# Patient Record
Sex: MALE | Race: WHITE | NOT HISPANIC OR LATINO | Employment: FULL TIME | URBAN - METROPOLITAN AREA
[De-identification: names, ages, dates, MRNs, and addresses within clinical notes are randomized per-mention and may not be internally consistent; named-entity substitution may affect disease eponyms.]

---

## 2022-02-25 ENCOUNTER — OFFICE VISIT (OUTPATIENT)
Dept: INTERNAL MEDICINE | Facility: CLINIC | Age: 67
End: 2022-02-25
Payer: MEDICARE

## 2022-02-25 ENCOUNTER — LAB VISIT (OUTPATIENT)
Dept: LAB | Facility: HOSPITAL | Age: 67
End: 2022-02-25
Attending: PEDIATRICS
Payer: MEDICARE

## 2022-02-25 VITALS
DIASTOLIC BLOOD PRESSURE: 84 MMHG | TEMPERATURE: 98 F | WEIGHT: 154.31 LBS | SYSTOLIC BLOOD PRESSURE: 136 MMHG | OXYGEN SATURATION: 96 % | HEART RATE: 75 BPM | HEIGHT: 69 IN | BODY MASS INDEX: 22.85 KG/M2

## 2022-02-25 DIAGNOSIS — Z12.11 COLON CANCER SCREENING: ICD-10-CM

## 2022-02-25 DIAGNOSIS — Z13.29 THYROID DISORDER SCREEN: ICD-10-CM

## 2022-02-25 DIAGNOSIS — R79.9 ABNORMAL FINDING OF BLOOD CHEMISTRY, UNSPECIFIED: ICD-10-CM

## 2022-02-25 DIAGNOSIS — R94.6 ABNORMAL RESULTS OF THYROID FUNCTION STUDIES: ICD-10-CM

## 2022-02-25 DIAGNOSIS — Z00.00 WELL ADULT EXAM: ICD-10-CM

## 2022-02-25 DIAGNOSIS — Z13.6 ENCOUNTER FOR LIPID SCREENING FOR CARDIOVASCULAR DISEASE: ICD-10-CM

## 2022-02-25 DIAGNOSIS — Z00.00 WELL ADULT EXAM: Primary | ICD-10-CM

## 2022-02-25 DIAGNOSIS — H90.5 SENSORINEURAL HEARING LOSS (SNHL) OF RIGHT EAR, UNSPECIFIED HEARING STATUS ON CONTRALATERAL SIDE: ICD-10-CM

## 2022-02-25 DIAGNOSIS — Z11.59 ENCOUNTER FOR HEPATITIS C SCREENING TEST FOR LOW RISK PATIENT: ICD-10-CM

## 2022-02-25 DIAGNOSIS — Z12.5 PROSTATE CANCER SCREENING: ICD-10-CM

## 2022-02-25 DIAGNOSIS — Z13.220 ENCOUNTER FOR LIPID SCREENING FOR CARDIOVASCULAR DISEASE: ICD-10-CM

## 2022-02-25 LAB
ALBUMIN SERPL BCP-MCNC: 4 G/DL (ref 3.5–5.2)
ALP SERPL-CCNC: 63 U/L (ref 55–135)
ALT SERPL W/O P-5'-P-CCNC: 18 U/L (ref 10–44)
ANION GAP SERPL CALC-SCNC: 7 MMOL/L (ref 8–16)
AST SERPL-CCNC: 18 U/L (ref 10–40)
BASOPHILS # BLD AUTO: 0.07 K/UL (ref 0–0.2)
BASOPHILS NFR BLD: 1.1 % (ref 0–1.9)
BILIRUB SERPL-MCNC: 0.9 MG/DL (ref 0.1–1)
BUN SERPL-MCNC: 15 MG/DL (ref 8–23)
CALCIUM SERPL-MCNC: 9.1 MG/DL (ref 8.7–10.5)
CHLORIDE SERPL-SCNC: 106 MMOL/L (ref 95–110)
CHOLEST SERPL-MCNC: 159 MG/DL (ref 120–199)
CHOLEST/HDLC SERPL: 3.7 {RATIO} (ref 2–5)
CO2 SERPL-SCNC: 26 MMOL/L (ref 23–29)
COMPLEXED PSA SERPL-MCNC: 80.6 NG/ML (ref 0–4)
CREAT SERPL-MCNC: 0.7 MG/DL (ref 0.5–1.4)
DIFFERENTIAL METHOD: ABNORMAL
EOSINOPHIL # BLD AUTO: 0.2 K/UL (ref 0–0.5)
EOSINOPHIL NFR BLD: 3.2 % (ref 0–8)
ERYTHROCYTE [DISTWIDTH] IN BLOOD BY AUTOMATED COUNT: 12.4 % (ref 11.5–14.5)
EST. GFR  (AFRICAN AMERICAN): >60 ML/MIN/1.73 M^2
EST. GFR  (NON AFRICAN AMERICAN): >60 ML/MIN/1.73 M^2
GLUCOSE SERPL-MCNC: 95 MG/DL (ref 70–110)
HCT VFR BLD AUTO: 41.3 % (ref 40–54)
HDLC SERPL-MCNC: 43 MG/DL (ref 40–75)
HDLC SERPL: 27 % (ref 20–50)
HGB BLD-MCNC: 13.9 G/DL (ref 14–18)
IMM GRANULOCYTES # BLD AUTO: 0.02 K/UL (ref 0–0.04)
IMM GRANULOCYTES NFR BLD AUTO: 0.3 % (ref 0–0.5)
LDLC SERPL CALC-MCNC: 104.6 MG/DL (ref 63–159)
LYMPHOCYTES # BLD AUTO: 2 K/UL (ref 1–4.8)
LYMPHOCYTES NFR BLD: 31.6 % (ref 18–48)
MCH RBC QN AUTO: 34 PG (ref 27–31)
MCHC RBC AUTO-ENTMCNC: 33.7 G/DL (ref 32–36)
MCV RBC AUTO: 101 FL (ref 82–98)
MONOCYTES # BLD AUTO: 0.5 K/UL (ref 0.3–1)
MONOCYTES NFR BLD: 8.1 % (ref 4–15)
NEUTROPHILS # BLD AUTO: 3.5 K/UL (ref 1.8–7.7)
NEUTROPHILS NFR BLD: 55.7 % (ref 38–73)
NONHDLC SERPL-MCNC: 116 MG/DL
NRBC BLD-RTO: 0 /100 WBC
PLATELET # BLD AUTO: 334 K/UL (ref 150–450)
PMV BLD AUTO: 9.6 FL (ref 9.2–12.9)
POTASSIUM SERPL-SCNC: 4.3 MMOL/L (ref 3.5–5.1)
PROT SERPL-MCNC: 6.4 G/DL (ref 6–8.4)
RBC # BLD AUTO: 4.09 M/UL (ref 4.6–6.2)
SODIUM SERPL-SCNC: 139 MMOL/L (ref 136–145)
TRIGL SERPL-MCNC: 57 MG/DL (ref 30–150)
TSH SERPL DL<=0.005 MIU/L-ACNC: 0.68 UIU/ML (ref 0.4–4)
WBC # BLD AUTO: 6.26 K/UL (ref 3.9–12.7)

## 2022-02-25 PROCEDURE — 84443 ASSAY THYROID STIM HORMONE: CPT | Performed by: PEDIATRICS

## 2022-02-25 PROCEDURE — 85025 COMPLETE CBC W/AUTO DIFF WBC: CPT | Performed by: PEDIATRICS

## 2022-02-25 PROCEDURE — 99999 PR PBB SHADOW E&M-NEW PATIENT-LVL IV: CPT | Mod: PBBFAC,,, | Performed by: PEDIATRICS

## 2022-02-25 PROCEDURE — 99999 PR PBB SHADOW E&M-NEW PATIENT-LVL IV: ICD-10-PCS | Mod: PBBFAC,,, | Performed by: PEDIATRICS

## 2022-02-25 PROCEDURE — 84153 ASSAY OF PSA TOTAL: CPT | Performed by: PEDIATRICS

## 2022-02-25 PROCEDURE — 36415 COLL VENOUS BLD VENIPUNCTURE: CPT | Performed by: PEDIATRICS

## 2022-02-25 PROCEDURE — 99387 PR PREVENTIVE VISIT,NEW,65 & OVER: ICD-10-PCS | Mod: S$PBB,GZ,, | Performed by: PEDIATRICS

## 2022-02-25 PROCEDURE — 80053 COMPREHEN METABOLIC PANEL: CPT | Performed by: PEDIATRICS

## 2022-02-25 PROCEDURE — 99204 OFFICE O/P NEW MOD 45 MIN: CPT | Mod: PBBFAC | Performed by: PEDIATRICS

## 2022-02-25 PROCEDURE — 86803 HEPATITIS C AB TEST: CPT | Performed by: PEDIATRICS

## 2022-02-25 PROCEDURE — 80061 LIPID PANEL: CPT | Performed by: PEDIATRICS

## 2022-02-25 PROCEDURE — 99387 INIT PM E/M NEW PAT 65+ YRS: CPT | Mod: S$PBB,GZ,, | Performed by: PEDIATRICS

## 2022-02-25 NOTE — PROGRESS NOTES
Subjective:       Patient ID: Aurelio Daniels is a 66 y.o. male.    Chief Complaint: Establish Care    Aurelio Daniels is a 66 y.o. male who presents to clinic to establish care.     PMHx, PSHx, SocHx, and FHx reviewed and discussed with patient.    Hx of smoking: has quit smoking after having COVID    Past Surgical History:  No date: COLONOSCOPY  No date: Vasectomy    Review of patient's family history indicates:  Problem: No Known Problems      Relation: Mother          Age of Onset: (Not Specified)  Problem: Heart disease      Relation: Father          Age of Onset: (Not Specified)  Problem: No Known Problems      Relation: Brother          Age of Onset: (Not Specified)  Problem: No Known Problems      Relation: Brother          Age of Onset: (Not Specified)  Problem: No Known Problems      Relation: Daughter          Age of Onset: (Not Specified)  Problem: No Known Problems      Relation: Daughter          Age of Onset: (Not Specified)      Social History    Socioeconomic History      Marital status:       Spouse name: YUE      Number of children: 2    Tobacco Use      Smoking status: Former Smoker        Packs/day: 1.00        Types: Cigarettes        Quit date: 1/15/2022        Years since quittin.1      Smokeless tobacco: Former User    Substance and Sexual Activity      Alcohol use: Yes        Alcohol/week: 2.0 standard drinks        Types: 2 Shots of liquor per week      Drug use: Never      Sexual activity: Not Currently        Partners: Female         Review of Systems   Constitutional: Negative for activity change, appetite change, chills, diaphoresis, fatigue, fever and unexpected weight change.   HENT: Negative for nasal congestion, ear pain, mouth sores, nosebleeds, postnasal drip, rhinorrhea, sneezing and sore throat.         Decreased hearing loss R ear with fullness for several months   Eyes: Negative for photophobia, pain, discharge, redness and visual disturbance.   Respiratory: Negative  for cough, chest tightness, shortness of breath, wheezing and stridor.    Cardiovascular: Negative for chest pain, palpitations and leg swelling.   Gastrointestinal: Negative for abdominal distention, blood in stool, constipation, diarrhea, nausea and vomiting.   Genitourinary: Negative for decreased urine volume, difficulty urinating, dysuria, flank pain, frequency, genital sores, hematuria and urgency.   Musculoskeletal: Negative for arthralgias, back pain, joint swelling, neck pain and neck stiffness.   Integumentary:  Negative for color change, pallor, rash and wound.   Neurological: Negative for dizziness, syncope, speech difficulty, weakness, light-headedness and headaches.   Hematological: Negative for adenopathy. Does not bruise/bleed easily.   Psychiatric/Behavioral: Negative for confusion, decreased concentration, dysphoric mood, hallucinations, sleep disturbance and suicidal ideas. The patient is not nervous/anxious.    All other systems reviewed and are negative.        Objective:      Physical Exam  Vitals and nursing note reviewed.   Constitutional:       General: He is not in acute distress.     Appearance: He is well-developed.   Neck:      Thyroid: No thyromegaly.      Vascular: No JVD.   Cardiovascular:      Rate and Rhythm: Normal rate and regular rhythm.      Heart sounds: Normal heart sounds. No murmur heard.  Pulmonary:      Effort: Pulmonary effort is normal. No respiratory distress.      Breath sounds: Normal breath sounds. No wheezing or rales.   Abdominal:      General: There is no distension.      Palpations: Abdomen is soft. There is no mass.      Tenderness: There is no abdominal tenderness. There is no guarding.   Musculoskeletal:      Right lower leg: No edema.      Left lower leg: No edema.   Lymphadenopathy:      Cervical: No cervical adenopathy.   Skin:     Capillary Refill: Capillary refill takes less than 2 seconds.      Findings: No rash.   Neurological:      General: No focal  deficit present.      Mental Status: He is alert and oriented to person, place, and time.      Cranial Nerves: No cranial nerve deficit.      Coordination: Coordination normal.   Psychiatric:         Mood and Affect: Mood normal.         Behavior: Behavior normal.         Thought Content: Thought content normal.         Judgment: Judgment normal.         Assessment:       Problem List Items Addressed This Visit    None     Visit Diagnoses     Well adult exam    -  Primary    Relevant Orders    Comprehensive Metabolic Panel    CBC Auto Differential    Colon cancer screening        Relevant Orders    Case Request Endoscopy: COLONOSCOPY (Completed)    Prostate cancer screening        Relevant Orders    PSA, Screening    Sensorineural hearing loss (SNHL) of right ear, unspecified hearing status on contralateral side        Relevant Orders    Ambulatory referral/consult to Audiology    Ambulatory referral/consult to ENT    Encounter for lipid screening for cardiovascular disease        Relevant Orders    Lipid Panel    Thyroid disorder screen        Relevant Orders    TSH    Encounter for hepatitis C screening test for low risk patient        Relevant Orders    Hepatitis C Antibody    Abnormal results of thyroid function studies         Relevant Orders    TSH    Abnormal finding of blood chemistry, unspecified         Relevant Orders    CBC Auto Differential          Plan:     Well adult exam  -     Comprehensive Metabolic Panel; Future; Expected date: 02/25/2022  -     CBC Auto Differential; Future; Expected date: 02/25/2022    Colon cancer screening  -     Case Request Endoscopy: COLONOSCOPY    Prostate cancer screening  -     PSA, Screening; Future; Expected date: 02/25/2022    Sensorineural hearing loss (SNHL) of right ear, unspecified hearing status on contralateral side  -     Ambulatory referral/consult to Audiology; Future; Expected date: 03/04/2022  -     Ambulatory referral/consult to ENT; Future; Expected  date: 03/04/2022    Encounter for lipid screening for cardiovascular disease  -     Lipid Panel; Future; Expected date: 02/25/2022    Thyroid disorder screen  -     TSH; Future; Expected date: 02/25/2022    Encounter for hepatitis C screening test for low risk patient  -     Hepatitis C Antibody; Future; Expected date: 02/25/2022    Abnormal results of thyroid function studies   -     TSH; Future; Expected date: 02/25/2022    Abnormal finding of blood chemistry, unspecified   -     CBC Auto Differential; Future; Expected date: 02/25/2022    HM issues d/w patient. He is reluctant but will consider colonoscopy and immunizations. Await labs. Maintain smoking cessation. F/U yearly or sooner if needed.  Scribe Attestation:   I, Eitan Mejia, am scribing for, and in the presence of, Dr. Shlomo Villatoro Jr. I performed the above scribed service and the documentation accurately describes the services I performed. I attest to the accuracy of the note.    I, Dr. Shlomo Villatoro Jr, reviewed documentation as scribed above. I personally performed the services described in this documentation.  I agree that the record reflects my personal performance and is accurate and complete. Shlomo Villatoro Jr., MD.  02/25/2022

## 2022-02-28 DIAGNOSIS — R97.20 ELEVATED PSA: Primary | ICD-10-CM

## 2022-02-28 LAB — HCV AB SERPL QL IA: NEGATIVE

## 2022-03-03 ENCOUNTER — CLINICAL SUPPORT (OUTPATIENT)
Dept: AUDIOLOGY | Facility: CLINIC | Age: 67
End: 2022-03-03
Payer: MEDICARE

## 2022-03-03 ENCOUNTER — OFFICE VISIT (OUTPATIENT)
Dept: OTOLARYNGOLOGY | Facility: CLINIC | Age: 67
End: 2022-03-03
Payer: MEDICARE

## 2022-03-03 VITALS
DIASTOLIC BLOOD PRESSURE: 87 MMHG | WEIGHT: 153.44 LBS | HEART RATE: 68 BPM | TEMPERATURE: 98 F | BODY MASS INDEX: 22.66 KG/M2 | SYSTOLIC BLOOD PRESSURE: 135 MMHG

## 2022-03-03 DIAGNOSIS — H90.A31 MIXED CONDUCTIVE AND SENSORINEURAL HEARING LOSS OF RIGHT EAR WITH RESTRICTED HEARING OF LEFT EAR: Primary | ICD-10-CM

## 2022-03-03 DIAGNOSIS — H65.91 RIGHT OTITIS MEDIA WITH EFFUSION: ICD-10-CM

## 2022-03-03 DIAGNOSIS — H90.A22 SENSORINEURAL HEARING LOSS (SNHL) OF LEFT EAR WITH RESTRICTED HEARING OF RIGHT EAR: ICD-10-CM

## 2022-03-03 PROCEDURE — 99204 OFFICE O/P NEW MOD 45 MIN: CPT | Mod: S$PBB,,, | Performed by: STUDENT IN AN ORGANIZED HEALTH CARE EDUCATION/TRAINING PROGRAM

## 2022-03-03 PROCEDURE — 99999 PR PBB SHADOW E&M-EST. PATIENT-LVL III: CPT | Mod: PBBFAC,,, | Performed by: STUDENT IN AN ORGANIZED HEALTH CARE EDUCATION/TRAINING PROGRAM

## 2022-03-03 PROCEDURE — 92557 COMPREHENSIVE HEARING TEST: CPT | Mod: PBBFAC | Performed by: AUDIOLOGIST-HEARING AID FITTER

## 2022-03-03 PROCEDURE — 99999 PR PBB SHADOW E&M-EST. PATIENT-LVL III: ICD-10-PCS | Mod: PBBFAC,,, | Performed by: STUDENT IN AN ORGANIZED HEALTH CARE EDUCATION/TRAINING PROGRAM

## 2022-03-03 PROCEDURE — 99204 PR OFFICE/OUTPT VISIT, NEW, LEVL IV, 45-59 MIN: ICD-10-PCS | Mod: S$PBB,,, | Performed by: STUDENT IN AN ORGANIZED HEALTH CARE EDUCATION/TRAINING PROGRAM

## 2022-03-03 PROCEDURE — 99212 OFFICE O/P EST SF 10 MIN: CPT | Mod: PBBFAC,27 | Performed by: AUDIOLOGIST-HEARING AID FITTER

## 2022-03-03 PROCEDURE — 92567 TYMPANOMETRY: CPT | Mod: PBBFAC | Performed by: AUDIOLOGIST-HEARING AID FITTER

## 2022-03-03 PROCEDURE — 99999 PR PBB SHADOW E&M-EST. PATIENT-LVL II: CPT | Mod: PBBFAC,,, | Performed by: AUDIOLOGIST-HEARING AID FITTER

## 2022-03-03 PROCEDURE — 99213 OFFICE O/P EST LOW 20 MIN: CPT | Mod: PBBFAC | Performed by: STUDENT IN AN ORGANIZED HEALTH CARE EDUCATION/TRAINING PROGRAM

## 2022-03-03 PROCEDURE — 99999 PR PBB SHADOW E&M-EST. PATIENT-LVL II: ICD-10-PCS | Mod: PBBFAC,,, | Performed by: AUDIOLOGIST-HEARING AID FITTER

## 2022-03-03 RX ORDER — PREDNISONE 10 MG/1
10 TABLET ORAL DAILY
Qty: 18 TABLET | Refills: 0 | Status: ON HOLD | OUTPATIENT
Start: 2022-03-03 | End: 2022-05-20

## 2022-03-03 RX ORDER — FLUTICASONE PROPIONATE 50 MCG
1 SPRAY, SUSPENSION (ML) NASAL DAILY
Qty: 16 G | Refills: 0 | Status: SHIPPED | OUTPATIENT
Start: 2022-03-03

## 2022-03-03 NOTE — PROGRESS NOTES
Aurelio Daniels was seen 03/03/2022 for an audiological evaluation.  Patient complains of right sided aural fullness and hearing loss. There have been no previous audiograms.     Results reveal a mild conductive hearing loss to severe mixed hearing loss for the right ear, and normal-to-moderate sensorineural hearing loss for the left ear.   Speech Reception Thresholds were  40 dBHL for the right ear and 15 dBHL for the left ear.   Word recognition scores were excellent for the right ear and excellent for the left ear.   Tympanograms were Type B for the right ear and Type A for the left ear.    Patient was counseled on the above findings.    Recommendations:  1. ENT appt today  2. Audiogram post treatment

## 2022-03-03 NOTE — PROGRESS NOTES
Chief complaint:   Chief Complaint   Patient presents with    Hearing Loss     Right ear only, 2 months        History of Present Illness:     Mr. Daniels is a 66 y.o. male presenting for evaluation of hearing loss.     He describes a right sided hearing loss starting recently and has been stable.  Had a sinus infection 2 months ago, then had COVID about 6 weeks ago.     Associated with ear fullness, pressure, feels like fluid in right ear. Denies otalgia, otorrhea.      Review of Systems     A complete 10 point ROS was completed and are positive as per above HPI.    Otherwise negative for fever, diplopia, chest pain, shortness of breath, vomiting, blood in urine, joint pain, skin rash, seizures and unusual bleeding.       History        Past Medical History: History reviewed. No pertinent past medical history. .          Past Surgical History:  Past Surgical History:   Procedure Laterality Date    COLONOSCOPY     .         Medications: Medication list was reviewed. He  has a current medication list which includes the following prescription(s): fluticasone propionate and prednisone.         Allergies: Review of patient's allergies indicates:  No Known Allergies         Family history: family history includes Heart disease in his father; No Known Problems in his brother, brother, daughter, daughter, and mother.         Social History          Alcohol use:  reports current alcohol use of about 2.0 standard drinks of alcohol per week.            Tobacco:  reports that he quit smoking about 6 weeks ago. His smoking use included cigarettes. He smoked 1.00 pack per day. He has quit using smokeless tobacco.         Please see the patient's intake form for full details of past medical history, past surgical history, family history, social history and review of systems. ?This information was reviewed by me and noted.      Physical Examination     General: Well developed, well nourished, well hydrated. Verbal with a  strong voice and not dysphonic.     Head/Face: Normocephalic, atraumatic. No scars or lesions. Facial musculature equal.     Eyes: No scleral icterus or conjunctival hemorrhage. EOMI. PERRLA.     Ears:     · Right ear: No gross deformity. EAC is clear of debris and erythema. The TM is intact with a serous effusion    · Left ear: No gross deformity. EAC is clear of debris and erythema. The TM is intact with a pneumatized middle ear. No signs of retraction, fluid or infection.     Nose: No gross deformity or lesions. No purulent discharge. No significant NSD.      Mouth/Oropharynx: Lips without any lesions. No mucosal lesions within the oropharynx. No tonsillar exudate or lesions. Pharyngeal walls symmetrical. Uvula midline. Tongue midline without lesions.     Neck: Trachea midline. No masses. No thyromegaly or nodules palpated.     Lymphatic: No lymphadenopathy in the neck.     Extremities: No cyanosis. Warm and well-perfused.     Skin: No scars or lesions on face or neck.      Neurologic: Moving all extremities without gross abnormality.CN II-XII grossly intact. House-Brackmann 1/6. No signs of nystagmus.      Psych: Alert and oriented to person, place, and time with an appropriate mood and affect.       Audiogram     Audiogram, 03/03/2022 was independently reviewed         Labs    [facility] 2/25/22   Cr 0.7  Glucose 95       Assessment     Sensorineural hearing loss (SNHL) of right ear, unspecified hearing status on contralateral side   Otitis Media with Effusion, secondary to recent COVID and sinus infection    Plan:      Discussed natural history of Otitis Media with Effusion. Can take up to 3 months to clear. Will try to facilitate drainage of fliud with daily consistent use of Flonase and steroid taper. If effusion present in 6 weeks may consider tympanostomy tube.       Cornel Molina MD  [facility]   [facility]  [address]  [place] [address]  P: [phone]  F: [phone]

## 2022-03-08 ENCOUNTER — OFFICE VISIT (OUTPATIENT)
Dept: UROLOGY | Facility: CLINIC | Age: 67
End: 2022-03-08
Payer: MEDICARE

## 2022-03-08 VITALS
BODY MASS INDEX: 23.02 KG/M2 | DIASTOLIC BLOOD PRESSURE: 91 MMHG | TEMPERATURE: 98 F | SYSTOLIC BLOOD PRESSURE: 160 MMHG | WEIGHT: 155.88 LBS | HEART RATE: 62 BPM

## 2022-03-08 DIAGNOSIS — R97.20 ELEVATED PSA: ICD-10-CM

## 2022-03-08 PROCEDURE — 99213 OFFICE O/P EST LOW 20 MIN: CPT | Mod: PBBFAC | Performed by: UROLOGY

## 2022-03-08 PROCEDURE — 99203 OFFICE O/P NEW LOW 30 MIN: CPT | Mod: S$PBB,,, | Performed by: UROLOGY

## 2022-03-08 PROCEDURE — 99203 PR OFFICE/OUTPT VISIT, NEW, LEVL III, 30-44 MIN: ICD-10-PCS | Mod: S$PBB,,, | Performed by: UROLOGY

## 2022-03-08 PROCEDURE — 99999 PR PBB SHADOW E&M-EST. PATIENT-LVL III: CPT | Mod: PBBFAC,,, | Performed by: UROLOGY

## 2022-03-08 PROCEDURE — 99999 PR PBB SHADOW E&M-EST. PATIENT-LVL III: ICD-10-PCS | Mod: PBBFAC,,, | Performed by: UROLOGY

## 2022-03-08 NOTE — PROGRESS NOTES
Chief Complaint:  Elevated PSA    HPI:   Aurelio Daniels is a 66 y.o. male that presents today as a referral from Dr. Villatoro for elevated PSA.  Patient had routine lab work obtained as part of normal screening in late February which resulted with PSA of 80.6.  Previous PSA level in our system 3.2 in .  Patient notes some intermittency with his urinary stream but denies overt weak stream.  Denies gross hematuria.  He is a smoker and quit about 2 months ago.  Denies family history of  cancers.  Had a vasectomy in  but no other urologic procedures. Mild ED, does not take any meds.     PMH:  Past Medical History:   Diagnosis Date    Tobacco use        PSH:  Past Surgical History:   Procedure Laterality Date    COLONOSCOPY      VASECTOMY         Family History:  Family History   Problem Relation Age of Onset    No Known Problems Mother     Heart disease Father     No Known Problems Brother     No Known Problems Brother     No Known Problems Daughter     No Known Problems Daughter        Social History:  Social History     Tobacco Use    Smoking status: Former Smoker     Packs/day: 1.00     Types: Cigarettes     Quit date: 1/15/2022     Years since quittin.1    Smokeless tobacco: Former User   Substance Use Topics    Alcohol use: Yes     Alcohol/week: 2.0 standard drinks     Types: 2 Shots of liquor per week    Drug use: Never        Review of Systems:  General: No fever, chills  Skin: No rashes  Chest:  Denies cough and sputum production  Heart: Denies chest pain  Resp: Denies dyspnea  Abdomen: Denies diarrhea, abdominal pain, hematemesis, or blood in stool.  Musculoskeletal: No joint stiffness or swelling. Denies back pain.  : see HPI  Neuro: no dizziness or weakness    Allergies:  Patient has no known allergies.    Medications:    Current Outpatient Medications:     fluticasone propionate (FLONASE) 50 mcg/actuation nasal spray, 1 spray (50 mcg total) by Each Nostril route once daily.,  Disp: 16 g, Rfl: 0    predniSONE (DELTASONE) 10 MG tablet, Take 1 tablet (10 mg total) by mouth once daily. Take 3 tablets a day for 3 days (1before breakfast, 1 after lunch, 1 before bed) Then take 2 tablets a day for 3 days (1 before breakfast, 1 before bed) Then take 1 tablet a day for 3 days (1 before breakfast), Disp: 18 tablet, Rfl: 0    Physical Exam:  Vitals:    03/08/22 0804   BP: (!) 160/91   Pulse: 62   Temp: 98.1 °F (36.7 °C)     Body mass index is 23.02 kg/m².  General: awake, alert, cooperative  Head: NC/AT  Ears: external ears normal  Eyes: sclera normal  Lungs: normal inspiration, NAD  Heart: well-perfused  Abdomen: Soft, NT, ND  : Normal uncirc'd phallus, meatus normal in size and position, BL testicles palpable, no masses, nontender, no abnormalities of epididymi  CHRISTOPH: Normal rectal tone, no hemorrhoids. Prostate smooth and normal, no nodules 40 gm SV not palpable. Perineum and anus normal.  Lymphatic: groin nodes negative  Skin: The skin is warm and dry  Ext: No c/c/e.  Neuro: grossly intact, AOx3    RADIOLOGY:  No recent relevant imaging available for review.    LABS:  I personally reviewed the following lab values:  Lab Results   Component Value Date    WBC 6.26 02/25/2022    HGB 13.9 (L) 02/25/2022    HCT 41.3 02/25/2022     02/25/2022     02/25/2022    K 4.3 02/25/2022     02/25/2022    CREATININE 0.7 02/25/2022    BUN 15 02/25/2022    CO2 26 02/25/2022    TSH 0.677 02/25/2022    PSA 80.6 (H) 02/25/2022    CHOL 159 02/25/2022    TRIG 57 02/25/2022    HDL 43 02/25/2022    ALT 18 02/25/2022    AST 18 02/25/2022       URINALYSIS:  Urinalysis obtained in clinic today specific gravity 1.020 pH 6 trace blood, negative for all other parameters      Assessment/Plan:   Aurelio Daniels is a 66 y.o. male with elevated PSA. I discussed the significance and etiology of elevated PSA noting that prostate cancer is the most concerning reason for elevation of PSA. I explained that the only  way of diagnosing prostate cancer is with a prostate biopsy, and I reviewed the biopsy procedure in detail.  I reviewed risks of the procedure including pain, bleeding, infection.  I quoted a risk of overt sepsis at 0.05 % but quoted a risk of infection requiring IV antibiotics of 2-5%.  I also reviewed the utility of a finasteride challenge, explaining that PSA being driven by benign process will decrease by half when the patient is taking finasteride, while PSA being driven by a malignant process will not respond appropriately.  Will repeat PSA in a couple weeks and have him f/u for discussion. Likely will need an MRI and biopsy.     Thank you for allowing me the opportunity to participate in this patient's care.     Jina Smith MD  Urology

## 2022-03-25 ENCOUNTER — LAB VISIT (OUTPATIENT)
Dept: LAB | Facility: HOSPITAL | Age: 67
End: 2022-03-25
Attending: UROLOGY
Payer: MEDICARE

## 2022-03-25 DIAGNOSIS — R97.20 ELEVATED PSA: ICD-10-CM

## 2022-03-25 LAB — COMPLEXED PSA SERPL-MCNC: 97.3 NG/ML (ref 0–4)

## 2022-03-25 PROCEDURE — 36415 COLL VENOUS BLD VENIPUNCTURE: CPT | Performed by: UROLOGY

## 2022-03-25 PROCEDURE — 84153 ASSAY OF PSA TOTAL: CPT | Performed by: UROLOGY

## 2022-03-29 ENCOUNTER — OFFICE VISIT (OUTPATIENT)
Dept: UROLOGY | Facility: CLINIC | Age: 67
End: 2022-03-29
Payer: MEDICARE

## 2022-03-29 VITALS
HEIGHT: 69 IN | TEMPERATURE: 97 F | HEART RATE: 65 BPM | WEIGHT: 155 LBS | SYSTOLIC BLOOD PRESSURE: 139 MMHG | DIASTOLIC BLOOD PRESSURE: 85 MMHG | BODY MASS INDEX: 22.96 KG/M2

## 2022-03-29 DIAGNOSIS — R97.20 ELEVATED PSA: Primary | ICD-10-CM

## 2022-03-29 PROCEDURE — 99213 OFFICE O/P EST LOW 20 MIN: CPT | Mod: PBBFAC | Performed by: UROLOGY

## 2022-03-29 PROCEDURE — 99999 PR PBB SHADOW E&M-EST. PATIENT-LVL III: ICD-10-PCS | Mod: PBBFAC,,, | Performed by: UROLOGY

## 2022-03-29 PROCEDURE — 99999 PR PBB SHADOW E&M-EST. PATIENT-LVL III: CPT | Mod: PBBFAC,,, | Performed by: UROLOGY

## 2022-03-29 PROCEDURE — 99214 OFFICE O/P EST MOD 30 MIN: CPT | Mod: S$PBB,,, | Performed by: UROLOGY

## 2022-03-29 PROCEDURE — 99214 PR OFFICE/OUTPT VISIT, EST, LEVL IV, 30-39 MIN: ICD-10-PCS | Mod: S$PBB,,, | Performed by: UROLOGY

## 2022-03-29 NOTE — PROGRESS NOTES
Chief Complaint:  Elevated PSA    HPI:   2022 - returns today for follow-up, PSA now up to 97, voiding well, denies any pelvic discomfort, denies fevers, denies GH    2022 - 67yo male that presents for elevated PSA.  Patient had routine lab work obtained as part of normal screening in late February which resulted with PSA of 80.6.  Previous PSA level in our system 3.2 in .  Patient notes some intermittency with his urinary stream but denies overt weak stream.  Denies gross hematuria.  He is a smoker and quit about 2 months ago.  Denies family history of  cancers.  Had a vasectomy in  but no other urologic procedures. Mild ED, does not take any meds.     PMH:  Past Medical History:   Diagnosis Date    Tobacco use        PSH:  Past Surgical History:   Procedure Laterality Date    COLONOSCOPY      VASECTOMY         Family History:  Family History   Problem Relation Age of Onset    No Known Problems Mother     Heart disease Father     No Known Problems Brother     No Known Problems Brother     No Known Problems Daughter     No Known Problems Daughter        Social History:  Social History     Tobacco Use    Smoking status: Former Smoker     Packs/day: 1.00     Types: Cigarettes     Quit date: 1/15/2022     Years since quittin.2    Smokeless tobacco: Former User   Substance Use Topics    Alcohol use: Yes     Alcohol/week: 2.0 standard drinks     Types: 2 Shots of liquor per week    Drug use: Never        Review of Systems:  General: No fever, chills  Skin: No rashes  Chest:  Denies cough and sputum production  Heart: Denies chest pain  Resp: Denies dyspnea  Abdomen: Denies diarrhea, abdominal pain, hematemesis, or blood in stool.  Musculoskeletal: No joint stiffness or swelling. Denies back pain.  : see HPI  Neuro: no dizziness or weakness    Allergies:  Patient has no known allergies.    Medications:    Current Outpatient Medications:     fluticasone propionate (FLONASE)  50 mcg/actuation nasal spray, 1 spray (50 mcg total) by Each Nostril route once daily. (Patient not taking: Reported on 3/29/2022), Disp: 16 g, Rfl: 0    predniSONE (DELTASONE) 10 MG tablet, Take 1 tablet (10 mg total) by mouth once daily. Take 3 tablets a day for 3 days (1before breakfast, 1 after lunch, 1 before bed) Then take 2 tablets a day for 3 days (1 before breakfast, 1 before bed) Then take 1 tablet a day for 3 days (1 before breakfast) (Patient not taking: Reported on 3/29/2022), Disp: 18 tablet, Rfl: 0    Physical Exam:  Vitals:    03/29/22 0821   BP: 139/85   Pulse: 65   Temp: 97.3 °F (36.3 °C)     Body mass index is 22.89 kg/m².  General: awake, alert, cooperative  Head: NC/AT  Ears: external ears normal  Eyes: sclera normal  Lungs: normal inspiration, NAD  Heart: well-perfused  Abdomen: Soft, NT, ND   3/22: Normal uncirc'd phallus, meatus normal in size and position, BL testicles palpable, no masses, nontender, no abnormalities of epididymi  CHRISTOPH 3/22: Normal rectal tone, no hemorrhoids. Prostate smooth and normal, no nodules 40 gm SV not palpable. Perineum and anus normal.  Lymphatic: groin nodes negative  Skin: The skin is warm and dry  Ext: No c/c/e.  Neuro: grossly intact, AOx3    RADIOLOGY:  No recent relevant imaging available for review.    LABS:  I personally reviewed the following lab values:  Lab Results   Component Value Date    WBC 6.26 02/25/2022    HGB 13.9 (L) 02/25/2022    HCT 41.3 02/25/2022     02/25/2022     02/25/2022    K 4.3 02/25/2022     02/25/2022    CREATININE 0.7 02/25/2022    BUN 15 02/25/2022    CO2 26 02/25/2022    TSH 0.677 02/25/2022    PSA 80.6 (H) 02/25/2022    CHOL 159 02/25/2022    TRIG 57 02/25/2022    HDL 43 02/25/2022    ALT 18 02/25/2022    AST 18 02/25/2022       URINALYSIS:  Urinalysis obtained in clinic today specific gravity 1.020 pH 6 trace blood, negative for all other parameters      Assessment/Plan:   Aurelio Daniels is a 66 y.o. male  with elevated PSA, now up to 90, proceed with MRI and biopsy    Thank you for allowing me the opportunity to participate in this patient's care.     Jian Smith MD  Urology

## 2022-03-31 ENCOUNTER — HOSPITAL ENCOUNTER (OUTPATIENT)
Dept: RADIOLOGY | Facility: HOSPITAL | Age: 67
Discharge: HOME | End: 2022-03-31
Attending: UROLOGY
Payer: MEDICARE

## 2022-03-31 DIAGNOSIS — R97.20 ELEVATED PSA: ICD-10-CM

## 2022-03-31 PROCEDURE — 72197 MRI PROSTATE W W/O CONTRAST: ICD-10-PCS | Mod: 26,,, | Performed by: RADIOLOGY

## 2022-03-31 PROCEDURE — 72197 MRI PELVIS W/O & W/DYE: CPT | Mod: 26,,, | Performed by: RADIOLOGY

## 2022-03-31 PROCEDURE — A9585 GADOBUTROL INJECTION: HCPCS | Performed by: UROLOGY

## 2022-03-31 PROCEDURE — 25500020 PHARM REV CODE 255: Performed by: UROLOGY

## 2022-03-31 PROCEDURE — 72197 MRI PELVIS W/O & W/DYE: CPT | Mod: TC

## 2022-03-31 RX ORDER — GADOBUTROL 604.72 MG/ML
7 INJECTION INTRAVENOUS
Status: COMPLETED | OUTPATIENT
Start: 2022-03-31 | End: 2022-03-31

## 2022-03-31 RX ADMIN — GADOBUTROL 7 ML: 604.72 INJECTION INTRAVENOUS at 12:03

## 2022-04-01 ENCOUNTER — TELEPHONE (OUTPATIENT)
Dept: ADMINISTRATIVE | Facility: HOSPITAL | Age: 67
End: 2022-04-01
Payer: MEDICARE

## 2022-04-01 DIAGNOSIS — Z12.11 COLON CANCER SCREENING: Primary | ICD-10-CM

## 2022-04-04 ENCOUNTER — TELEPHONE (OUTPATIENT)
Dept: UROLOGY | Facility: CLINIC | Age: 67
End: 2022-04-04
Payer: MEDICARE

## 2022-04-04 ENCOUNTER — PATIENT MESSAGE (OUTPATIENT)
Dept: UROLOGY | Facility: CLINIC | Age: 67
End: 2022-04-04
Payer: MEDICARE

## 2022-04-04 NOTE — TELEPHONE ENCOUNTER
Spoke to pt and informed him that Dr. Smith recc that he have a prostate biopsy.  Pt verbalized understanding and procedure scheduled.

## 2022-04-04 NOTE — TELEPHONE ENCOUNTER
Spoke to pt and informed him that Dr. Smith requested that he have a prostate biopsy.  Pt verbalized understanding; appt and instructions provided.

## 2022-04-13 ENCOUNTER — HOSPITAL ENCOUNTER (OUTPATIENT)
Dept: PREADMISSION TESTING | Facility: HOSPITAL | Age: 67
Discharge: HOME | End: 2022-04-13
Attending: PEDIATRICS
Payer: MEDICARE

## 2022-04-13 DIAGNOSIS — Z12.11 COLON CANCER SCREENING: Primary | ICD-10-CM

## 2022-04-14 ENCOUNTER — OFFICE VISIT (OUTPATIENT)
Dept: OTOLARYNGOLOGY | Facility: CLINIC | Age: 67
End: 2022-04-14
Payer: MEDICARE

## 2022-04-14 ENCOUNTER — CLINICAL SUPPORT (OUTPATIENT)
Dept: AUDIOLOGY | Facility: CLINIC | Age: 67
End: 2022-04-14
Payer: MEDICARE

## 2022-04-14 VITALS — HEART RATE: 68 BPM | SYSTOLIC BLOOD PRESSURE: 122 MMHG | TEMPERATURE: 97 F | DIASTOLIC BLOOD PRESSURE: 81 MMHG

## 2022-04-14 DIAGNOSIS — J34.89 NASAL OBSTRUCTION: ICD-10-CM

## 2022-04-14 DIAGNOSIS — J34.2 NASAL SEPTAL DEVIATION: ICD-10-CM

## 2022-04-14 DIAGNOSIS — H69.91 DYSFUNCTION OF RIGHT EUSTACHIAN TUBE: Primary | ICD-10-CM

## 2022-04-14 DIAGNOSIS — R22.1 NECK MASS: Primary | ICD-10-CM

## 2022-04-14 DIAGNOSIS — I88.9 ADENITIS: ICD-10-CM

## 2022-04-14 DIAGNOSIS — H65.91 RIGHT OTITIS MEDIA WITH EFFUSION: ICD-10-CM

## 2022-04-14 PROCEDURE — 31575 DIAGNOSTIC LARYNGOSCOPY: CPT | Mod: S$PBB,,, | Performed by: STUDENT IN AN ORGANIZED HEALTH CARE EDUCATION/TRAINING PROGRAM

## 2022-04-14 PROCEDURE — 99999 PR PBB SHADOW E&M-EST. PATIENT-LVL I: ICD-10-PCS | Mod: PBBFAC,,, | Performed by: AUDIOLOGIST

## 2022-04-14 PROCEDURE — 92567 TYMPANOMETRY: CPT | Mod: PBBFAC | Performed by: AUDIOLOGIST

## 2022-04-14 PROCEDURE — 99214 PR OFFICE/OUTPT VISIT, EST, LEVL IV, 30-39 MIN: ICD-10-PCS | Mod: S$PBB,25,, | Performed by: STUDENT IN AN ORGANIZED HEALTH CARE EDUCATION/TRAINING PROGRAM

## 2022-04-14 PROCEDURE — 99999 PR PBB SHADOW E&M-EST. PATIENT-LVL I: CPT | Mod: PBBFAC,,, | Performed by: AUDIOLOGIST

## 2022-04-14 PROCEDURE — 99211 OFF/OP EST MAY X REQ PHY/QHP: CPT | Mod: PBBFAC | Performed by: AUDIOLOGIST

## 2022-04-14 PROCEDURE — 31575 PR LARYNGOSCOPY, FLEXIBLE; DIAGNOSTIC: ICD-10-PCS | Mod: S$PBB,,, | Performed by: STUDENT IN AN ORGANIZED HEALTH CARE EDUCATION/TRAINING PROGRAM

## 2022-04-14 PROCEDURE — 99213 OFFICE O/P EST LOW 20 MIN: CPT | Mod: PBBFAC,27,25 | Performed by: STUDENT IN AN ORGANIZED HEALTH CARE EDUCATION/TRAINING PROGRAM

## 2022-04-14 PROCEDURE — 99214 OFFICE O/P EST MOD 30 MIN: CPT | Mod: S$PBB,25,, | Performed by: STUDENT IN AN ORGANIZED HEALTH CARE EDUCATION/TRAINING PROGRAM

## 2022-04-14 PROCEDURE — 31575 DIAGNOSTIC LARYNGOSCOPY: CPT | Mod: PBBFAC | Performed by: STUDENT IN AN ORGANIZED HEALTH CARE EDUCATION/TRAINING PROGRAM

## 2022-04-14 PROCEDURE — 99999 PR PBB SHADOW E&M-EST. PATIENT-LVL III: ICD-10-PCS | Mod: PBBFAC,,, | Performed by: STUDENT IN AN ORGANIZED HEALTH CARE EDUCATION/TRAINING PROGRAM

## 2022-04-14 PROCEDURE — 99999 PR PBB SHADOW E&M-EST. PATIENT-LVL III: CPT | Mod: PBBFAC,,, | Performed by: STUDENT IN AN ORGANIZED HEALTH CARE EDUCATION/TRAINING PROGRAM

## 2022-04-14 RX ORDER — LEVOFLOXACIN 500 MG/1
500 TABLET, FILM COATED ORAL DAILY
Qty: 10 TABLET | Refills: 0 | Status: ON HOLD | OUTPATIENT
Start: 2022-04-14 | End: 2022-05-20

## 2022-04-14 RX ORDER — AZELASTINE 1 MG/ML
1 SPRAY, METERED NASAL 2 TIMES DAILY
Qty: 30 ML | Refills: 3 | Status: SHIPPED | OUTPATIENT
Start: 2022-04-14 | End: 2023-04-14

## 2022-04-14 NOTE — PROGRESS NOTES
"Chief complaint:   Chief Complaint   Patient presents with    Follow-up     6 week follow-up. 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 Patient states "Feels like I just got out of the swimming pool."        History of Present Illness:     Mr. Daniels is a 66 y.o. male presenting for evaluation of hearing loss.     He describes a right sided hearing loss starting recently and has been stable.  Had a sinus infection 2 months ago, then had COVID about 6 weeks ago.     Associated with ear fullness, pressure, feels like fluid in right ear. Denies otalgia, otorrhea.    Return clinic visit, 4/14/22  Continued right auaral fullness, pressure, muffled hearing despite steroid taper. Used flonase inconsistently.     Also with right nasal congestion. No epistaxis.     Does notice right neck mass - fluctuates, superficial, no pain, there for years.    Former smoker. Quit 3 months ago    Review of Systems     A complete 10 point ROS was completed and are positive as per above HPI.    Otherwise negative for fever, diplopia, chest pain, shortness of breath, vomiting, blood in urine, joint pain, skin rash, seizures and unusual bleeding.       History        Past Medical History:   Past Medical History:   Diagnosis Date    Tobacco use     .          Past Surgical History:  Past Surgical History:   Procedure Laterality Date    COLONOSCOPY      VASECTOMY  1985   .         Medications: Medication list was reviewed. He  has a current medication list which includes the following prescription(s): fluticasone propionate, azelastine, levofloxacin, prednisone, and suprep bowel prep kit.         Allergies: Review of patient's allergies indicates:  No Known Allergies         Family history: family history includes Heart disease in his father; No Known Problems in his brother, brother, daughter, daughter, and mother.         Social History          Alcohol use:  reports current alcohol use of about " 2.0 standard drinks of alcohol per week.            Tobacco:  reports that he quit smoking about 2 months ago. His smoking use included cigarettes. He smoked 1.00 pack per day. He has quit using smokeless tobacco.         Please see the patient's intake form for full details of past medical history, past surgical history, family history, social history and review of systems. ?This information was reviewed by me and noted.      Physical Examination     General: Well developed, well nourished, well hydrated. Verbal with a strong voice and not dysphonic.     Head/Face: Normocephalic, atraumatic. No scars or lesions. Facial musculature equal.     Eyes: No scleral icterus or conjunctival hemorrhage. EOMI. PERRLA.     Ears:     · Right ear: No gross deformity. EAC is clear of debris and erythema. The TM is intact with a serous effusion    · Left ear: No gross deformity. EAC is clear of debris and erythema. The TM is intact with a pneumatized middle ear. No signs of retraction, fluid or infection.     Nose: No gross deformity or lesions. No purulent discharge. No significant NSD.      Mouth/Oropharynx: Lips without any lesions. No mucosal lesions within the oropharynx. No tonsillar exudate or lesions. Pharyngeal walls symmetrical. Uvula midline. Tongue midline without lesions.     Neck: Trachea midline. No thyromegaly or nodules palpated. Right superficial neck mass at posterior border of SCM, mobile, superficial to SCM, nontender, about 2 cm     Lymphatic: No lymphadenopathy in the neck.     Extremities: No cyanosis. Warm and well-perfused.     Skin: No scars or lesions on face or neck.      Neurologic: Moving all extremities without gross abnormality.CN II-XII grossly intact. House-Brackmann 1/6. No signs of nystagmus.      Psych: Alert and oriented to person, place, and time with an appropriate mood and affect.       Audiogram     Audiogram,  3/3/22 was independently reviewed       Audio, 4/14/22  Type B tymp on  R      Labs    CMP 2/25/22   Cr 0.7  Glucose 95      Procedure -Transnasal fiberoptic laryngoscopy     Surgeon: Cornel Molina M.D. .      Anesthesia: topical 0.05% oxymetazoline with 4% lidocaine      Complications: None.     Description of Procedure: With the patient in the sitting position, topical lidocaine and oxymetazoline was applied to the nose. The scope was passed through the nose. Examination was carried out of the nose, nasopharynx, oropharynx, hypopharynx, and larynx with findings as noted below. Scope was removed. The patient tolerated the procedure well.      Findings: no NP or nasal masses blocking the ET orifice. Moderate adenitis, but no adenoid hypertrophy. Right septal deviation.         Assessment     Neck mass   Otitis Media with Effusion, secondary to recent COVID and sinus infection  Chronic adenitis  Septal deviation  Nasal obstruction    Plan:      Discussed natural history of Otitis Media with Effusion. Can take up to 3 months to clear. Will try to facilitate drainage of fliud with daily consistent use of Flonase and steroid taper. If effusion present in 6 weeks may consider tympanostomy tube.     Update, 4/14/22  Persistent effusion for 3 months. Scope shows adenitis, but no adenoid hyertrophy or NP masses.     Recommend treatment of adenitis with 10d course abx. He would like to try consistent use of Flonase and Astelin as well.     If effusion persistent in 1 month would plan for tube placement.     If nasal obstruction persists despite medical management, will consider septoplasty.    superficial neck mass - US      Cornel Molina MD  [facility]   [facility]  [address]  [place] [address]  P: [phone]  F: [phone]

## 2022-04-14 NOTE — PROGRESS NOTES
Aurelio Daniels was seen 04/14/2022 for an audiological evaluation.  Patient here for 6 week recheck of right DONOVAN. He reports no improvement since last visit. He still feels right ear fullness with decreased hearing.    Tympanometry revealed Type B, flat in the right ear, and Type A, normal in the left ear.   Right Ear:  No peak, with ear canal volume of:  Left Ear:  0.7ml@-105 daPa, with ear canal volume of:     Patient was counseled on the above findings.    Recommendations include:    1.  ENT followup  2.  Recheck per ENT  3.  Wear hearing protective devices around loud noise  4.  Annual audiograms

## 2022-04-28 ENCOUNTER — PROCEDURE VISIT (OUTPATIENT)
Dept: UROLOGY | Facility: CLINIC | Age: 67
End: 2022-04-28
Payer: MEDICARE

## 2022-04-28 VITALS — WEIGHT: 155 LBS | HEIGHT: 69 IN | BODY MASS INDEX: 22.96 KG/M2

## 2022-04-28 DIAGNOSIS — R97.20 ELEVATED PSA: Primary | ICD-10-CM

## 2022-04-28 PROCEDURE — 55700 PR BIOPSY OF PROSTATE,NEEDLE/PUNCH: CPT | Mod: PBBFAC | Performed by: UROLOGY

## 2022-04-28 PROCEDURE — 88305 TISSUE EXAM BY PATHOLOGIST: ICD-10-PCS | Mod: 26,,, | Performed by: PATHOLOGY

## 2022-04-28 PROCEDURE — 76872 PR US TRANSRECTAL: ICD-10-PCS | Mod: 26,S$PBB,, | Performed by: UROLOGY

## 2022-04-28 PROCEDURE — G0416 PROSTATE BIOPSY, ANY MTHD: HCPCS | Performed by: PATHOLOGY

## 2022-04-28 PROCEDURE — 88341 IMHCHEM/IMCYTCHM EA ADD ANTB: CPT | Mod: 26,,, | Performed by: PATHOLOGY

## 2022-04-28 PROCEDURE — 76872 US TRANSRECTAL: CPT | Mod: 26,S$PBB,, | Performed by: UROLOGY

## 2022-04-28 PROCEDURE — 88341 PR IHC OR ICC EACH ADD'L SINGLE ANTIBODY  STAINPR: ICD-10-PCS | Mod: 26,,, | Performed by: PATHOLOGY

## 2022-04-28 PROCEDURE — 55700 PR BIOPSY OF PROSTATE,NEEDLE/PUNCH: ICD-10-PCS | Mod: S$PBB,,, | Performed by: UROLOGY

## 2022-04-28 PROCEDURE — 88341 IMHCHEM/IMCYTCHM EA ADD ANTB: CPT | Mod: 59 | Performed by: PATHOLOGY

## 2022-04-28 PROCEDURE — 55700 PR BIOPSY OF PROSTATE,NEEDLE/PUNCH: CPT | Mod: S$PBB,,, | Performed by: UROLOGY

## 2022-04-28 PROCEDURE — 88342 IMHCHEM/IMCYTCHM 1ST ANTB: CPT | Performed by: PATHOLOGY

## 2022-04-28 PROCEDURE — 76872 US TRANSRECTAL: CPT | Mod: PBBFAC | Performed by: UROLOGY

## 2022-04-28 PROCEDURE — 88305 TISSUE EXAM BY PATHOLOGIST: CPT | Performed by: PATHOLOGY

## 2022-04-28 PROCEDURE — 96372 THER/PROPH/DIAG INJ SC/IM: CPT | Mod: 59,PBBFAC

## 2022-04-28 PROCEDURE — 76942 ECHO GUIDE FOR BIOPSY: CPT | Mod: PBBFAC | Performed by: UROLOGY

## 2022-04-28 PROCEDURE — 88342 IMHCHEM/IMCYTCHM 1ST ANTB: CPT | Mod: 26,,, | Performed by: PATHOLOGY

## 2022-04-28 PROCEDURE — 88305 TISSUE EXAM BY PATHOLOGIST: CPT | Mod: 26,,, | Performed by: PATHOLOGY

## 2022-04-28 PROCEDURE — 88342 CHG IMMUNOCYTOCHEMISTRY: ICD-10-PCS | Mod: 26,,, | Performed by: PATHOLOGY

## 2022-04-28 RX ORDER — LIDOCAINE HYDROCHLORIDE 20 MG/ML
JELLY TOPICAL
Status: COMPLETED | OUTPATIENT
Start: 2022-04-28 | End: 2022-04-28

## 2022-04-28 RX ORDER — CEFTRIAXONE 1 G/1
1 INJECTION, POWDER, FOR SOLUTION INTRAMUSCULAR; INTRAVENOUS
Status: COMPLETED | OUTPATIENT
Start: 2022-04-28 | End: 2022-04-28

## 2022-04-28 RX ADMIN — LIDOCAINE HYDROCHLORIDE 10 ML: 20 JELLY TOPICAL at 01:04

## 2022-04-28 RX ADMIN — CEFTRIAXONE SODIUM 1 G: 1 INJECTION, POWDER, FOR SOLUTION INTRAMUSCULAR; INTRAVENOUS at 01:04

## 2022-05-01 NOTE — PROCEDURES
Procedures     CC: elevated PSA    HPI: 67 yo M with elevated PSA to 98, MRI shows PIRADS 5 left paramidline AFS/anterior transitional zone lesion       Procedure: (1) Transrectal Prostate Biopsy                      (2) Transrectal ultrasound of prostate                     (3) Ultrasound Guidance of Prostate Biopsy needles    Detail: After proper consents were obtained, the patient was prepped and draped in normal fashion in the left lateral decubitus position for TRUS/Bx.  5 ml of lidocaine jelly was instilled in the rectum.  The U/S with rectal probe was used to size the prostate.  A spinal needle was used and 20ml of 1% lidocaine was instilled on the side of the prostate at the base of the seminal vesicles.  Biopsy was then performed using an 18Ga biopsy needle directed at the base, mid and apex bilaterally for a total of 12 cores. Antibiotic prophylaxis was provided using IM rocephin.    Findings: The prostate is 48W, 36H, and 37L for volume of 28 grams    Impression/Plan:  - will call with path  - blood per rectum, in urine, and in ejaculate are common  - instructed to present to ED for fevers >101F, inability to void, or other issues    Jian Smith MD

## 2022-05-06 LAB
FINAL PATHOLOGIC DIAGNOSIS: NORMAL
GROSS: NORMAL
Lab: NORMAL

## 2022-05-08 ENCOUNTER — PATIENT MESSAGE (OUTPATIENT)
Dept: ENDOSCOPY | Facility: HOSPITAL | Age: 67
End: 2022-05-08
Payer: MEDICARE

## 2022-05-13 ENCOUNTER — OFFICE VISIT (OUTPATIENT)
Dept: UROLOGY | Facility: CLINIC | Age: 67
End: 2022-05-13
Payer: MEDICARE

## 2022-05-13 VITALS
SYSTOLIC BLOOD PRESSURE: 129 MMHG | DIASTOLIC BLOOD PRESSURE: 93 MMHG | WEIGHT: 158.06 LBS | BODY MASS INDEX: 23.34 KG/M2

## 2022-05-13 DIAGNOSIS — C61 PROSTATE CANCER: Primary | ICD-10-CM

## 2022-05-13 DIAGNOSIS — R97.20 ELEVATED PSA: ICD-10-CM

## 2022-05-13 PROCEDURE — 99999 PR PBB SHADOW E&M-EST. PATIENT-LVL III: ICD-10-PCS | Mod: PBBFAC,,, | Performed by: UROLOGY

## 2022-05-13 PROCEDURE — 99214 OFFICE O/P EST MOD 30 MIN: CPT | Mod: S$PBB,,, | Performed by: UROLOGY

## 2022-05-13 PROCEDURE — 99999 PR PBB SHADOW E&M-EST. PATIENT-LVL III: CPT | Mod: PBBFAC,,, | Performed by: UROLOGY

## 2022-05-13 PROCEDURE — 99213 OFFICE O/P EST LOW 20 MIN: CPT | Mod: PBBFAC | Performed by: UROLOGY

## 2022-05-13 PROCEDURE — 99214 PR OFFICE/OUTPT VISIT, EST, LEVL IV, 30-39 MIN: ICD-10-PCS | Mod: S$PBB,,, | Performed by: UROLOGY

## 2022-05-14 NOTE — PROGRESS NOTES
Chief Complaint:  Elevated PSA    HPI:   2022 - returns today for follow-up, no issues since his biopsy, path reviewed [place] 3 + 3 in left apex and [place] 3 + 3 in right apex, presents today for discussion    2022 - TRUS bx    2022 - returns today for follow-up, PSA now up to 97, voiding well, denies any pelvic discomfort, denies fevers, denies GH    2022 - 65yo male that presents for elevated PSA.  Patient had routine lab work obtained as part of normal screening in late February which resulted with PSA of 80.6.  Previous PSA level in our system 3.2 in .  Patient notes some intermittency with his urinary stream but denies overt weak stream.  Denies gross hematuria.  He is a smoker and quit about 2 months ago.  Denies family history of  cancers.  Had a vasectomy in  but no other urologic procedures. Mild ED, does not take any meds.     PMH:  Past Medical History:   Diagnosis Date    Tobacco use        PSH:  Past Surgical History:   Procedure Laterality Date    COLONOSCOPY      VASECTOMY         Family History:  Family History   Problem Relation Age of Onset    No Known Problems Mother     Heart disease Father     No Known Problems Brother     No Known Problems Brother     No Known Problems Daughter     No Known Problems Daughter        Social History:  Social History     Tobacco Use    Smoking status: Former Smoker     Packs/day: 1.00     Types: Cigarettes     Quit date: 1/15/2022     Years since quittin.3    Smokeless tobacco: Former User   Substance Use Topics    Alcohol use: Yes     Alcohol/week: 2.0 standard drinks     Types: 2 Shots of liquor per week    Drug use: Never        Review of Systems:  General: No fever, chills  Skin: No rashes  Chest:  Denies cough and sputum production  Heart: Denies chest pain  Resp: Denies dyspnea  Abdomen: Denies diarrhea, abdominal pain, hematemesis, or blood in stool.  Musculoskeletal: No joint stiffness or swelling.  Denies back pain.  : see HPI  Neuro: no dizziness or weakness    Allergies:  Patient has no known allergies.    Medications:    Current Outpatient Medications:     azelastine (ASTELIN) 137 mcg (0.1 %) nasal spray, 1 spray (137 mcg total) by Nasal route 2 (two) times daily., Disp: 30 mL, Rfl: 3    fluticasone propionate (FLONASE) 50 mcg/actuation nasal spray, 1 spray (50 mcg total) by Each Nostril route once daily., Disp: 16 g, Rfl: 0    levoFLOXacin (LEVAQUIN) 500 MG tablet, Take 1 tablet (500 mg total) by mouth once daily., Disp: 10 tablet, Rfl: 0    predniSONE (DELTASONE) 10 MG tablet, Take 1 tablet (10 mg total) by mouth once daily. Take 3 tablets a day for 3 days (1before breakfast, 1 after lunch, 1 before bed) Then take 2 tablets a day for 3 days (1 before breakfast, 1 before bed) Then take 1 tablet a day for 3 days (1 before breakfast) (Patient not taking: Reported on 3/29/2022), Disp: 18 tablet, Rfl: 0    Physical Exam:  Vitals:    05/13/22 0954   BP: (!) 129/93     Body mass index is 23.34 kg/m².  General: awake, alert, cooperative  Head: NC/AT  Ears: external ears normal  Eyes: sclera normal  Lungs: normal inspiration, NAD  Heart: well-perfused  Abdomen: Soft, NT, ND   3/22: Normal uncirc'd phallus, meatus normal in size and position, BL testicles palpable, no masses, nontender, no abnormalities of epididymi  CHRISTOPH 3/22: Normal rectal tone, no hemorrhoids. Prostate smooth and normal, no nodules 40 gm SV not palpable. Perineum and anus normal.  Lymphatic: groin nodes negative  Skin: The skin is warm and dry  Ext: No c/c/e.  Neuro: grossly intact, AOx3    RADIOLOGY:  No recent relevant imaging available for review.    LABS:  I personally reviewed the following lab values:  Lab Results   Component Value Date    WBC 6.26 02/25/2022    HGB 13.9 (L) 02/25/2022    HCT 41.3 02/25/2022     02/25/2022     02/25/2022    K 4.3 02/25/2022     02/25/2022    CREATININE 0.8 03/31/2022    BUN 15  02/25/2022    CO2 26 02/25/2022    TSH 0.677 02/25/2022    PSA 80.6 (H) 02/25/2022    CHOL 159 02/25/2022    TRIG 57 02/25/2022    HDL 43 02/25/2022    ALT 18 02/25/2022    AST 18 02/25/2022     1. PROSTATE, LEFT BASE, BIOPSY:   - Benign prostate tissue.   2. PROSTATE, LEFT APEX, BIOPSY:   - Prostatic adenocarcinoma, [employer] score 3+3=6 (Grade group 1) involving 2   of 2 cores (5% of tissue submitted).   - Total linear lengths of carcinoma are <1 mm and 1 mm.   3. PROSTATE, LEFT MID, BIOPSY:   - High grade prostatic intraepithelial lesion (PIN).   - AMACR, p63 and HMWK with appropriate controls are performed and support the   diagnosis.   4. PROSTATE, RIGHT BASE, BIOPSY:   - Prostate tissue with small focus of atypical glands, suspicious for   carcinoma.   - Most of the glands of interest are not present on Immunohistochemically   stained slides for further evaluation.   5. PROSTATE, RIGHT APEX, BIOPSY:   - Prostatic adenocarcinoma, [place] score 3+3=6 (Grade group 1) involving 2   of 3 cores (10% of tissue submitted).   - Total linear lengths of carcinoma are <1 mm and 2 mm.   - AMACR, p63 and HMWK with appropriate controls are performed and support the   diagnosis.   6. PROSTATE, RIGHT MID, BIOPSY:   - High grade prostatic intraepithelial lesion (PIN).     Assessment/Plan:   Aurelio Daniels is a 66 y.o. male with high risk prostate cancer, based on his PSA. We had an in-depth discussion regarding options including surgery and radiation.  Risks of surgery include pain, bleeding, infection, injury to abdominal structures, injury to the rectum, heart attack, stroke, death.  We also reviewed side effects of surgery including urinary incontinence which can improve up to 1 year out of surgery as well as a erectile dysfunction which can improve up to 2 years from surgery.  He wants to proceed with radiation. I think this is reasonable, if his staging imaging is negative for metastases. Will obtain Axumin PET scan, and  refer the patient to Radiation Oncology. F/u with me 3 months with PSA    Total visit time = 31 minutes, of which more than 50% of that time was spent in face to face counseling on prostate cancer options and coordinating care.    Thank you for allowing me the opportunity to participate in this patient's care.     Jian Smith MD  Urology

## 2022-05-17 ENCOUNTER — LAB VISIT (OUTPATIENT)
Dept: PRIMARY CARE CLINIC | Facility: CLINIC | Age: 67
End: 2022-05-17
Payer: MEDICARE

## 2022-05-17 DIAGNOSIS — Z01.818 PRE-OP TESTING: ICD-10-CM

## 2022-05-17 LAB — SARS-COV-2 RNA RESP QL NAA+PROBE: NOT DETECTED

## 2022-05-17 PROCEDURE — U0005 INFEC AGEN DETEC AMPLI PROBE: HCPCS | Performed by: ANESTHESIOLOGY

## 2022-05-17 PROCEDURE — U0003 INFECTIOUS AGENT DETECTION BY NUCLEIC ACID (DNA OR RNA); SEVERE ACUTE RESPIRATORY SYNDROME CORONAVIRUS 2 (SARS-COV-2) (CORONAVIRUS DISEASE [COVID-19]), AMPLIFIED PROBE TECHNIQUE, MAKING USE OF HIGH THROUGHPUT TECHNOLOGIES AS DESCRIBED BY CMS-2020-01-R: HCPCS | Performed by: ANESTHESIOLOGY

## 2022-05-20 ENCOUNTER — ANESTHESIA EVENT (OUTPATIENT)
Dept: ENDOSCOPY | Facility: HOSPITAL | Age: 67
End: 2022-05-20
Payer: MEDICARE

## 2022-05-20 ENCOUNTER — HOSPITAL ENCOUNTER (OUTPATIENT)
Facility: HOSPITAL | Age: 67
Discharge: HOME | End: 2022-05-20
Attending: INTERNAL MEDICINE | Admitting: INTERNAL MEDICINE
Payer: MEDICARE

## 2022-05-20 ENCOUNTER — ANESTHESIA (OUTPATIENT)
Dept: ENDOSCOPY | Facility: HOSPITAL | Age: 67
End: 2022-05-20
Payer: MEDICARE

## 2022-05-20 DIAGNOSIS — Z12.11 COLON CANCER SCREENING: Primary | ICD-10-CM

## 2022-05-20 PROCEDURE — 88305 TISSUE EXAM BY PATHOLOGIST: CPT | Mod: 59 | Performed by: PATHOLOGY

## 2022-05-20 PROCEDURE — 45380 PR COLONOSCOPY,BIOPSY: ICD-10-PCS | Mod: PT,,, | Performed by: INTERNAL MEDICINE

## 2022-05-20 PROCEDURE — 88305 TISSUE EXAM BY PATHOLOGIST: CPT | Mod: 26,,, | Performed by: PATHOLOGY

## 2022-05-20 PROCEDURE — 45380 COLONOSCOPY AND BIOPSY: CPT | Performed by: INTERNAL MEDICINE

## 2022-05-20 PROCEDURE — 45380 COLONOSCOPY AND BIOPSY: CPT | Mod: PT,,, | Performed by: INTERNAL MEDICINE

## 2022-05-20 PROCEDURE — 63600175 PHARM REV CODE 636 W HCPCS: Performed by: NURSE ANESTHETIST, CERTIFIED REGISTERED

## 2022-05-20 PROCEDURE — 25000003 PHARM REV CODE 250: Performed by: NURSE ANESTHETIST, CERTIFIED REGISTERED

## 2022-05-20 PROCEDURE — 25000003 PHARM REV CODE 250: Performed by: INTERNAL MEDICINE

## 2022-05-20 PROCEDURE — 88305 TISSUE EXAM BY PATHOLOGIST: ICD-10-PCS | Mod: 26,,, | Performed by: PATHOLOGY

## 2022-05-20 PROCEDURE — 37000009 HC ANESTHESIA EA ADD 15 MINS: Performed by: INTERNAL MEDICINE

## 2022-05-20 PROCEDURE — 27201012 HC FORCEPS, HOT/COLD, DISP: Performed by: INTERNAL MEDICINE

## 2022-05-20 PROCEDURE — 37000008 HC ANESTHESIA 1ST 15 MINUTES: Performed by: INTERNAL MEDICINE

## 2022-05-20 RX ORDER — DEXTROMETHORPHAN/PSEUDOEPHED 2.5-7.5/.8
DROPS ORAL
Status: COMPLETED | OUTPATIENT
Start: 2022-05-20 | End: 2022-05-20

## 2022-05-20 RX ORDER — LIDOCAINE HYDROCHLORIDE 10 MG/ML
INJECTION, SOLUTION EPIDURAL; INFILTRATION; INTRACAUDAL; PERINEURAL
Status: DISCONTINUED | OUTPATIENT
Start: 2022-05-20 | End: 2022-05-20

## 2022-05-20 RX ORDER — SODIUM CHLORIDE, SODIUM LACTATE, POTASSIUM CHLORIDE, CALCIUM CHLORIDE 600; 310; 30; 20 MG/100ML; MG/100ML; MG/100ML; MG/100ML
INJECTION, SOLUTION INTRAVENOUS CONTINUOUS
Status: CANCELLED | OUTPATIENT
Start: 2022-05-20

## 2022-05-20 RX ORDER — SODIUM CHLORIDE, SODIUM LACTATE, POTASSIUM CHLORIDE, CALCIUM CHLORIDE 600; 310; 30; 20 MG/100ML; MG/100ML; MG/100ML; MG/100ML
INJECTION, SOLUTION INTRAVENOUS CONTINUOUS PRN
Status: DISCONTINUED | OUTPATIENT
Start: 2022-05-20 | End: 2022-05-20

## 2022-05-20 RX ORDER — PROPOFOL 10 MG/ML
VIAL (ML) INTRAVENOUS
Status: DISCONTINUED | OUTPATIENT
Start: 2022-05-20 | End: 2022-05-20

## 2022-05-20 RX ORDER — SODIUM CHLORIDE, SODIUM LACTATE, POTASSIUM CHLORIDE, CALCIUM CHLORIDE 600; 310; 30; 20 MG/100ML; MG/100ML; MG/100ML; MG/100ML
INJECTION, SOLUTION INTRAVENOUS CONTINUOUS
Status: DISCONTINUED | OUTPATIENT
Start: 2022-05-20 | End: 2022-05-20 | Stop reason: HOSPADM

## 2022-05-20 RX ADMIN — PROPOFOL 120 MG: 10 INJECTION, EMULSION INTRAVENOUS at 10:05

## 2022-05-20 RX ADMIN — PROPOFOL 30 MG: 10 INJECTION, EMULSION INTRAVENOUS at 11:05

## 2022-05-20 RX ADMIN — LIDOCAINE HYDROCHLORIDE 50 MG: 10 INJECTION, SOLUTION EPIDURAL; INFILTRATION; INTRACAUDAL; PERINEURAL at 10:05

## 2022-05-20 RX ADMIN — SODIUM CHLORIDE, SODIUM LACTATE, POTASSIUM CHLORIDE, AND CALCIUM CHLORIDE: .6; .31; .03; .02 INJECTION, SOLUTION INTRAVENOUS at 10:05

## 2022-05-20 RX ADMIN — SIMETHICONE 40 MG: 20 SUSPENSION/ DROPS ORAL at 11:05

## 2022-05-20 NOTE — H&P
PRE PROCEDURE H&P    Patient Name: Aurelio Daniels  MRN: [id number]  : 1955  Date of Procedure:  2022  Referring Physician: Jamia Alfaro MD  Primary Physician: MATTEO Villatoro Jr, MD  Procedure Physician: Jamia Alfaro MD       Planned Procedure: Colonoscopy  Diagnosis: screening for colon cancer  Chief Complaint: Same as above    HPI: Patient is an 66 y.o. male is here for the above.     Last colonoscopy: 11 years ago   Family history: negative   Anticoagulation: none     Past Medical History:   Past Medical History:   Diagnosis Date    Tobacco use         Past Surgical History:  Past Surgical History:   Procedure Laterality Date    COLONOSCOPY      VASECTOMY          Home Medications:  Prior to Admission medications    Medication Sig Start Date End Date Taking? Authorizing Provider   azelastine (ASTELIN) 137 mcg (0.1 %) nasal spray 1 spray (137 mcg total) by Nasal route 2 (two) times daily. 22 Yes Cornel Molina MD   fluticasone propionate (FLONASE) 50 mcg/actuation nasal spray 1 spray (50 mcg total) by Each Nostril route once daily. 3/3/22   Cornel Molina MD   levoFLOXacin (LEVAQUIN) 500 MG tablet Take 1 tablet (500 mg total) by mouth once daily. 22  Cornel Molina MD   predniSONE (DELTASONE) 10 MG tablet Take 1 tablet (10 mg total) by mouth once daily. Take 3 tablets a day for 3 days (1before breakfast, 1 after lunch, 1 before bed) Then take 2 tablets a day for 3 days (1 before breakfast, 1 before bed) Then take 1 tablet a day for 3 days (1 before breakfast)  Patient not taking: Reported on 3/29/2022 3/3/22 5/20/22  Cornel Molina MD        Allergies:  Review of patient's allergies indicates:  No Known Allergies     Social History:   Social History     Socioeconomic History    Marital status:      Spouse name: YUE    Number of children: 2   Tobacco Use    Smoking status: Current Some Day Smoker     Packs/day: 1.00     Types: Cigarettes      "Last attempt to quit: 1/15/2022     Years since quittin.3    Smokeless tobacco: Never Used   Substance and Sexual Activity    Alcohol use: Yes     Alcohol/week: 2.0 standard drinks     Types: 2 Shots of liquor per week    Drug use: Never    Sexual activity: Not Currently     Partners: Female       Family History:  Family History   Problem Relation Age of Onset    No Known Problems Mother     Heart disease Father     No Known Problems Brother     No Known Problems Brother     No Known Problems Daughter     No Known Problems Daughter        ROS: No acute cardiac events, no acute respiratory complaints.     Physical Exam (all patients):    /86   Pulse 94   Temp 97.9 °F (36.6 °C) (Temporal)   Resp 20   Ht 5' 9" (1.753 m)   Wt 68 kg (150 lb)   SpO2 96%   BMI 22.15 kg/m²   Lungs: Clear to auscultation bilaterally, respirations unlabored  Heart: Regular rate and rhythm, S1 and S2 normal, no obvious murmurs  Abdomen:         Soft, non-tender, bowel sounds normal, no masses, no organomegaly    Lab Results   Component Value Date    WBC 6.26 2022     (H) 2022    RDW 12.4 2022     2022    GLU 95 2022    BUN 15 2022     2022    K 4.3 2022     2022        SEDATION PLAN: per anesthesia      History reviewed, vital signs satisfactory, cardiopulmonary status satisfactory, sedation options, risks and plans have been discussed with the patient  All their questions were answered and the patient agrees to the sedation procedures as planned and the patient is deemed an appropriate candidate for the sedation as planned.    Procedure explained to patient, informed consent obtained and placed in chart.    Jamia Alfaro  2022  10:54 AM   "

## 2022-05-20 NOTE — PLAN OF CARE
Dr Alfaro came to bedside and discussed findings. NO N/V,  no abdominal pain, no GI bleeding, and vitals stable.  Pt discharged from unit.

## 2022-05-20 NOTE — ANESTHESIA PREPROCEDURE EVALUATION
05/20/2022  Aurelio Daniels is a 66 y.o., male.      Pre-op Assessment    I have reviewed the Patient Summary Reports.     I have reviewed the Nursing Notes. I have reviewed the NPO Status.   I have reviewed the Medications.     Review of Systems  Anesthesia Hx:  No problems with previous Anesthesia    Social:  Smoker, Social Alcohol Use    Hematology/Oncology:  Hematology Normal      Current/Recent Cancer. Oncology Comments: Prostate     EENT/Dental:  EENT/Dental Normal Numbness to right ear.    Cardiovascular:  Cardiovascular Normal     Pulmonary:  Pulmonary Normal    Renal/:  Renal/ Normal     Hepatic/GI:  Hepatic/GI Normal    Musculoskeletal:  Musculoskeletal Normal    Neurological:  Neurology Normal    Endocrine:  Endocrine Normal    Dermatological:  Skin Normal    Psych:  Psychiatric Normal           Physical Exam  General: Well nourished, Cooperative, Alert and Oriented    Airway:  Mallampati: II   Mouth Opening: Normal  TM Distance: Normal  Tongue: Normal  Neck ROM: Normal ROM    Dental:  Dentures        Anesthesia Plan  Type of Anesthesia, risks & benefits discussed:    Anesthesia Type: MAC  Intra-op Monitoring Plan: Standard ASA Monitors  Post Op Pain Control Plan: multimodal analgesia  Informed Consent: Informed consent signed with the Patient and all parties understand the risks and agree with anesthesia plan.  All questions answered.   ASA Score: 2  Day of Surgery Review of History & Physical: H&P Update referred to the surgeon/provider.    Ready For Surgery From Anesthesia Perspective.     .

## 2022-05-20 NOTE — [employer]ATION PATIENT INSTRUCTIONS
Discharge Summary/Instructions after an Endoscopic Procedure  Patient Name: Aurelio Daniels  Patient MRN: [id number]  Patient YOB: 1955  Friday, May 20, 2022 Jamia Alfaro MD  Dear patient,  As a result of recent federal legislation (The Federal Cures Act), you may   receive lab or pathology results from your procedure in your [facility]   account before your physician is able to contact you. Your physician or   their representative will relay the results to you with their   recommendations at their soonest availability.  Thank you,  RESTRICTIONS:  During your procedure today, you received medications for sedation.  These   medications may affect your judgment, balance and coordination.  Therefore,   for 24 hours, you have the following restrictions:   - DO NOT drive a car, operate machinery, make legal/financial decisions,   sign important papers or drink alcohol.    ACTIVITY:  Today: no heavy lifting, straining or running due to procedural   sedation/anesthesia.  The following day: return to full activity including work.  DIET:  Eat and drink normally unless instructed otherwise.     TREATMENT FOR COMMON SIDE EFFECTS:  - Mild abdominal pain, nausea, belching, bloating or excessive gas:  rest,   eat lightly and use a heating pad.  - Sore Throat: treat with throat lozenges and/or gargle with warm salt   water.  - Because air was used during the procedure, expelling large amounts of air   from your rectum or belching is normal.  - If a bowel prep was taken, you may not have a bowel movement for 1-3 days.    This is normal.  SYMPTOMS TO WATCH FOR AND REPORT TO YOUR PHYSICIAN:  1. Abdominal pain or bloating, other than gas cramps.  2. Chest pain.  3. Back pain.  4. Signs of infection such as: chills or fever occurring within 24 hours   after the procedure.  5. Rectal bleeding, which would show as bright red, maroon, or black stools.   (A tablespoon of blood from the rectum is not serious, especially if    hemorrhoids are present.)  6. Vomiting.  7. Weakness or dizziness.  GO DIRECTLY TO THE NEAREST EMERGENCY ROOM IF YOU HAVE ANY OF THE FOLLOWING:      Difficulty breathing              Chills and/or fever over 101 F   Persistent vomiting and/or vomiting blood   Severe abdominal pain   Severe chest pain   Black, tarry stools   Bleeding- more than one tablespoon   Any other symptom or condition that you feel may need urgent attention  Your doctor recommends these additional instructions:  If any biopsies were taken, your doctors clinic will contact you in 1 to 2   weeks with any results.  - Patient has a contact number available for emergencies.  The signs and   symptoms of potential delayed complications were discussed with the   patient.  Return to normal activities tomorrow.  Written discharge   instructions were provided to the patient.   - Discharge patient to home (via wheelchair).   - Resume previous diet today.   - Continue present medications.   - Await pathology results.   - Repeat colonoscopy in 7 years for surveillance. If polyps are   hyperplastic, then no further colonoscopy indicated.  For questions, problems or results please call your physician Jamia Alfaro MD at Work:  [phone]  If you have any questions about the above instructions, call the GI   department at [phone] or call the endoscopy unit at [phone]   from 7am until 3 pm.  [facility] [place], EMERGENCY ROOM PHONE NUMBER:   [phone]  IF A COMPLICATION OR EMERGENCY SITUATION ARISES AND YOU ARE UNABLE TO REACH   YOUR PHYSICIAN - GO DIRECTLY TO THE EMERGENCY ROOM.  I have read or have had read to me these discharge instructions for my   procedure and have received a written copy.  I understand these   instructions and will follow-up with my physician if I have any questions.     __________________________________       _____________________________________  Nurse Signature                                           Patient/Designated   Responsible Party Signature  MD Jamia Nicholson MD  5/20/2022 11:15:34 AM  This report has been verified and signed electronically.  Dear patient,  As a result of recent federal legislation (The Federal Cures Act), you may   receive lab or pathology results from your procedure in your [facility]   account before your physician is able to contact you. Your physician or   their representative will relay the results to you with their   recommendations at their soonest availability.  Thank you,  PROVATION

## 2022-05-20 NOTE — TRANSFER OF CARE
"Anesthesia Transfer of Care Note    Patient: Aurelio Daniels    Procedure(s) Performed: Procedure(s) (LRB):  COLONOSCOPY (N/A)    Patient location: GI    Anesthesia Type: MAC    Transport from OR: Transported from OR on room air with adequate spontaneous ventilation    Post pain: adequate analgesia    Post assessment: no apparent anesthetic complications and tolerated procedure well    Post vital signs: stable    Level of consciousness: awake, alert and oriented    Nausea/Vomiting: no nausea/vomiting    Complications: none    Transfer of care protocol was followed      Last vitals:   Visit Vitals  BP (!) 115/56 (BP Location: Left arm, Patient Position: Lying)   Pulse 67   Temp 36.6 °C (97.9 °F) (Temporal)   Resp 18   Ht 5' 9" (1.753 m)   Wt 68 kg (150 lb)   SpO2 97%   BMI 22.15 kg/m²     "

## 2022-05-20 NOTE — ANESTHESIA POSTPROCEDURE EVALUATION
Anesthesia Post Evaluation    Patient: Aurelio Daniels    Procedure(s) Performed: Procedure(s) (LRB):  COLONOSCOPY (N/A)    Final Anesthesia Type: MAC      Patient location during evaluation: GI PACU  Patient participation: Yes- Able to Participate  Level of consciousness: awake and alert  Post-procedure vital signs: reviewed and stable  Pain management: adequate  Airway patency: patent    PONV status at discharge: No PONV  Anesthetic complications: no      Cardiovascular status: blood pressure returned to baseline and hemodynamically stable  Respiratory status: unassisted, spontaneous ventilation and room air  Hydration status: euvolemic  Follow-up not needed.          Vitals Value Taken Time   /56 05/20/22 1115   Temp 36.6 °C (97.9 °F) 05/20/22 1115   Pulse 67 05/20/22 1115   Resp 18 05/20/22 1115   SpO2 97 % 05/20/22 1115         No case tracking events are documented in the log.      Pain/Carmen Score: Carmen Score: 7 (5/20/2022 11:15 AM)

## 2022-05-23 VITALS
OXYGEN SATURATION: 97 % | HEART RATE: 62 BPM | RESPIRATION RATE: 20 BRPM | BODY MASS INDEX: 22.22 KG/M2 | DIASTOLIC BLOOD PRESSURE: 73 MMHG | WEIGHT: 150 LBS | HEIGHT: 69 IN | SYSTOLIC BLOOD PRESSURE: 129 MMHG | TEMPERATURE: 98 F

## 2022-05-26 ENCOUNTER — HOSPITAL ENCOUNTER (OUTPATIENT)
Dept: RADIOLOGY | Facility: HOSPITAL | Age: 67
Discharge: HOME | End: 2022-05-26
Attending: UROLOGY
Payer: MEDICARE

## 2022-05-26 DIAGNOSIS — C61 PROSTATE CANCER: ICD-10-CM

## 2022-05-26 PROCEDURE — 78815 PET IMAGE W/CT SKULL-THIGH: CPT | Mod: 26,PS,, | Performed by: RADIOLOGY

## 2022-05-26 PROCEDURE — 78815 NM PET CT FLUCICLOVINE F18(PROSTATE CANCER RECURRENCE): ICD-10-PCS | Mod: 26,PS,, | Performed by: RADIOLOGY

## 2022-05-26 PROCEDURE — 78815 PET IMAGE W/CT SKULL-THIGH: CPT | Mod: TC

## 2022-05-26 NOTE — PROGRESS NOTES
Chief complaint:   No chief complaint on file.       History of Present Illness, 3/3/22:     Mr. Daniels is a 66 y.o. male presenting for evaluation of hearing loss.     He describes a right sided hearing loss starting recently and has been stable.  Had a sinus infection 2 months ago, then had COVID about 6 weeks ago.     Associated with ear fullness, pressure, feels like fluid in right ear. Denies otalgia, otorrhea.    Return clinic visit, 4/14/22  Continued right auaral fullness, pressure, muffled hearing despite steroid taper. Used flonase inconsistently.     Also with right nasal congestion. No epistaxis.     Does notice right neck mass - fluctuates, superficial, no pain, there for years.    Former smoker. Quit 3 months ago    Return clinic visit, 5/27/22  ***    Review of Systems     A complete 10 point ROS was completed and are positive as per above HPI.    Otherwise negative for fever, diplopia, chest pain, shortness of breath, vomiting, blood in urine, joint pain, skin rash, seizures and unusual bleeding.       History        Past Medical History:   Past Medical History:   Diagnosis Date    Tobacco use     .          Past Surgical History:  Past Surgical History:   Procedure Laterality Date    COLONOSCOPY      COLONOSCOPY N/A 5/20/2022    Procedure: COLONOSCOPY;  Surgeon: Jamia Alfaro MD;  Location: [facility];  Service: Endoscopy;  Laterality: N/A;    VASECTOMY  1985   .         Medications: Medication list was reviewed. He  has a current medication list which includes the following prescription(s): azelastine and fluticasone propionate.         Allergies: Review of patient's allergies indicates:  No Known Allergies         Family history: family history includes Heart disease in his father; No Known Problems in his brother, brother, daughter, daughter, and mother.         Social History          Alcohol use:  reports current alcohol use of about 2.0 standard drinks of alcohol per week.             Tobacco:  reports that he has been smoking cigarettes. He has been smoking about 1.00 pack per day. He has never used smokeless tobacco.         Please see the patient's intake form for full details of past medical history, past surgical history, family history, social history and review of systems. ?This information was reviewed by me and noted.      Physical Examination     General: Well developed, well nourished, well hydrated. Verbal with a strong voice and not dysphonic.     Head/Face: Normocephalic, atraumatic. No scars or lesions. Facial musculature equal.     Eyes: No scleral icterus or conjunctival hemorrhage. EOMI. PERRLA.     Ears:     · Right ear: No gross deformity. EAC is clear of debris and erythema. The TM is intact with a serous effusion    · Left ear: No gross deformity. EAC is clear of debris and erythema. The TM is intact with a pneumatized middle ear. No signs of retraction, fluid or infection.     Nose: No gross deformity or lesions. No purulent discharge. No significant NSD.      Mouth/Oropharynx: Lips without any lesions. No mucosal lesions within the oropharynx. No tonsillar exudate or lesions. Pharyngeal walls symmetrical. Uvula midline. Tongue midline without lesions.     Neck: Trachea midline. No thyromegaly or nodules palpated. Right superficial neck mass at posterior border of SCM, mobile, superficial to SCM, nontender, about 2 cm     Lymphatic: No lymphadenopathy in the neck.     Extremities: No cyanosis. Warm and well-perfused.     Skin: No scars or lesions on face or neck.      Neurologic: Moving all extremities without gross abnormality.CN II-XII grossly intact. House-Brackmann 1/6. No signs of nystagmus.      Psych: Alert and oriented to person, place, and time with an appropriate mood and affect.       Audiogram     Audiogram,  3/3/22 was independently reviewed       Audio, 4/14/22  Type B tymp on R      Labs    CMP 2/25/22   Cr 0.7  Glucose 95      Procedure -Transnasal  fiberoptic laryngoscopy     Surgeon: Cornel Molina M.D. .      Anesthesia: topical 0.05% oxymetazoline with 4% lidocaine      Complications: None.     Description of Procedure: With the patient in the sitting position, topical lidocaine and oxymetazoline was applied to the nose. The scope was passed through the nose. Examination was carried out of the nose, nasopharynx, oropharynx, hypopharynx, and larynx with findings as noted below. Scope was removed. The patient tolerated the procedure well.      Findings: no NP or nasal masses blocking the ET orifice. Moderate adenitis, but no adenoid hypertrophy. Right septal deviation.            Procedure Note - Myringotomy/Tympanostomy Tube Placement     Diagnosis: chronic Otitis Media with Effusion     DETAILS OF PROCEDURE:   Obstructing cerumen was removed from the right ear canal using magnification and appropriate instrumentation. The middle ear was inspected under the microscope. A serous effusion was noted. A joint decision was made to proceed with a myringotomy with or without a tympanostomy tube.  With the TM fully in view, the tympanic membrane was anesthetized and a radial incision in the anterior/inferior quadrant.  The middle ear cavity was suctioned out. The middle ear was fully evacuated of a 3 Fr suction. A beveled Juarez tympanostomy tube was grasped with an alligator forcep and inserted into the myringotomy. A straight pick was used to push the tube into place. The patient tolerated the procedure well.         Assessment     * No diagnoses found *   Otitis Media with Effusion, secondary to recent COVID and sinus infection  Chronic adenitis  Septal deviation  Nasal obstruction    Plan:      Discussed natural history of Otitis Media with Effusion. Can take up to 3 months to clear. Will try to facilitate drainage of fliud with daily consistent use of Flonase and steroid taper. If effusion present in 6 weeks may consider tympanostomy tube.     Update,  4/14/22  Persistent effusion for 3 months. Scope shows adenitis, but no adenoid hyertrophy or NP masses.     Recommend treatment of adenitis with 10d course abx. He would like to try consistent use of Flonase and Astelin as well.     If effusion persistent in 1 month would plan for tube placement.     If nasal obstruction persists despite medical management, will consider septoplasty.    superficial neck mass - US    Update 5/27/22  Discussed excisional bx superficial posterior neck mass vs FNA   ***      Cornel Molina MD  [facility]   [facility]  [id number] [place] [address].  [place] [address]  P: [phone]  F: [phone]

## 2022-05-27 ENCOUNTER — PROCEDURE VISIT (OUTPATIENT)
Dept: OTOLARYNGOLOGY | Facility: CLINIC | Age: 67
End: 2022-05-27
Payer: MEDICARE

## 2022-05-27 ENCOUNTER — OFFICE VISIT (OUTPATIENT)
Dept: OTOLARYNGOLOGY | Facility: CLINIC | Age: 67
End: 2022-05-27
Payer: MEDICARE

## 2022-05-27 VITALS — TEMPERATURE: 98 F | SYSTOLIC BLOOD PRESSURE: 122 MMHG | HEART RATE: 61 BPM | DIASTOLIC BLOOD PRESSURE: 80 MMHG

## 2022-05-27 DIAGNOSIS — H65.91 RIGHT OTITIS MEDIA WITH EFFUSION: ICD-10-CM

## 2022-05-27 DIAGNOSIS — J34.2 NASAL SEPTAL DEVIATION: ICD-10-CM

## 2022-05-27 DIAGNOSIS — R22.1 NECK MASS: Primary | ICD-10-CM

## 2022-05-27 DIAGNOSIS — J34.89 NASAL OBSTRUCTION: ICD-10-CM

## 2022-05-27 LAB
FINAL PATHOLOGIC DIAGNOSIS: NORMAL
GROSS: NORMAL
Lab: NORMAL

## 2022-05-27 PROCEDURE — 99213 PR OFFICE/OUTPT VISIT, EST, LEVL III, 20-29 MIN: ICD-10-PCS | Mod: 25,S$PBB,, | Performed by: STUDENT IN AN ORGANIZED HEALTH CARE EDUCATION/TRAINING PROGRAM

## 2022-05-27 PROCEDURE — 31575 DIAGNOSTIC LARYNGOSCOPY: CPT | Mod: 51,S$PBB,, | Performed by: STUDENT IN AN ORGANIZED HEALTH CARE EDUCATION/TRAINING PROGRAM

## 2022-05-27 PROCEDURE — 69433 CREATE EARDRUM OPENING: CPT | Mod: S$PBB,RT,, | Performed by: STUDENT IN AN ORGANIZED HEALTH CARE EDUCATION/TRAINING PROGRAM

## 2022-05-27 PROCEDURE — 69433 CREATE EARDRUM OPENING: CPT | Mod: PBBFAC | Performed by: STUDENT IN AN ORGANIZED HEALTH CARE EDUCATION/TRAINING PROGRAM

## 2022-05-27 PROCEDURE — 69433 PR CREATE EARDRUM OPENING,LOCAL ANESTH: ICD-10-PCS | Mod: S$PBB,RT,, | Performed by: STUDENT IN AN ORGANIZED HEALTH CARE EDUCATION/TRAINING PROGRAM

## 2022-05-27 PROCEDURE — 31575 PR LARYNGOSCOPY, FLEXIBLE; DIAGNOSTIC: ICD-10-PCS | Mod: 51,S$PBB,, | Performed by: STUDENT IN AN ORGANIZED HEALTH CARE EDUCATION/TRAINING PROGRAM

## 2022-05-27 PROCEDURE — 31575 DIAGNOSTIC LARYNGOSCOPY: CPT | Mod: PBBFAC | Performed by: STUDENT IN AN ORGANIZED HEALTH CARE EDUCATION/TRAINING PROGRAM

## 2022-05-27 PROCEDURE — 99213 OFFICE O/P EST LOW 20 MIN: CPT | Mod: 25,S$PBB,, | Performed by: STUDENT IN AN ORGANIZED HEALTH CARE EDUCATION/TRAINING PROGRAM

## 2022-05-27 RX ORDER — CIPROFLOXACIN HYDROCHLORIDE 3 MG/ML
4 SOLUTION/ DROPS OPHTHALMIC 2 TIMES DAILY
Qty: 10 ML | Refills: 0 | Status: SHIPPED | OUTPATIENT
Start: 2022-05-27 | End: 2022-06-01

## 2022-05-27 NOTE — PROGRESS NOTES
Chief complaint:   No chief complaint on file.       History of Present Illness, 3/3/22:     Mr. Daniels is a 66 y.o. male presenting for evaluation of hearing loss.     He describes a right sided hearing loss starting recently and has been stable.  Had a sinus infection 2 months ago, then had COVID about 6 weeks ago.     Associated with ear fullness, pressure, feels like fluid in right ear. Denies otalgia, otorrhea.    Return clinic visit, 4/14/22  Continued right auaral fullness, pressure, muffled hearing despite steroid taper. Used flonase inconsistently.     Also with right nasal congestion. No epistaxis.     Does notice right neck mass - fluctuates, superficial, no pain, there for years.    Former smoker. Quit 3 months ago    Return clinic visit, 5/27/22  No change in ear pressure, fullness, hearing loss. Used flonase consistently with no relief.    No change to neck mass.    Review of Systems     A complete 10 point ROS was completed and are positive as per above HPI.    Otherwise negative for fever, diplopia, chest pain, shortness of breath, vomiting, blood in urine, joint pain, skin rash, seizures and unusual bleeding.       History        Past Medical History:   Past Medical History:   Diagnosis Date    Tobacco use     .          Past Surgical History:  Past Surgical History:   Procedure Laterality Date    COLONOSCOPY      COLONOSCOPY N/A 5/20/2022    Procedure: COLONOSCOPY;  Surgeon: Jamia Alfaro MD;  Location: [facility];  Service: Endoscopy;  Laterality: N/A;    VASECTOMY  1985   .         Medications: Medication list was reviewed. He  has a current medication list which includes the following prescription(s): azelastine and fluticasone propionate.         Allergies: Review of patient's allergies indicates:  No Known Allergies         Family history: family history includes Heart disease in his father; No Known Problems in his brother, brother, daughter, daughter, and mother.         Social  History          Alcohol use:  reports current alcohol use of about 2.0 standard drinks of alcohol per week.            Tobacco:  reports that he has been smoking cigarettes. He has been smoking about 1.00 pack per day. He has never used smokeless tobacco.         Please see the patient's intake form for full details of past medical history, past surgical history, family history, social history and review of systems. ?This information was reviewed by me and noted.      Physical Examination     General: Well developed, well nourished, well hydrated. Verbal with a strong voice and not dysphonic.     Head/Face: Normocephalic, atraumatic. No scars or lesions. Facial musculature equal.     Eyes: No scleral icterus or conjunctival hemorrhage. EOMI. PERRLA.     Ears:     · Right ear: No gross deformity. EAC is clear of debris and erythema. The TM is intact with a serous effusion    · Left ear: No gross deformity. EAC is clear of debris and erythema. The TM is intact with a pneumatized middle ear. No signs of retraction, fluid or infection.     Nose: No gross deformity or lesions. No purulent discharge. No significant NSD.      Mouth/Oropharynx: Lips without any lesions. No mucosal lesions within the oropharynx. No tonsillar exudate or lesions. Pharyngeal walls symmetrical. Uvula midline. Tongue midline without lesions.     Neck: Trachea midline. No thyromegaly or nodules palpated. Right, very superficial neck mass at posterior border of SCM, mobile, superficial to SCM, nontender, about 2 cm    Lymphatic: No lymphadenopathy in the neck.     Extremities: No cyanosis. Warm and well-perfused.     Skin: No scars or lesions on face or neck.      Neurologic: Moving all extremities without gross abnormality.CN II-XII grossly intact. House-Brackmann 1/6. No signs of nystagmus.      Psych: Alert and oriented to person, place, and time with an appropriate mood and affect.       Audiogram     Audiogram,  3/3/22 was independently  reviewed       Audio, 4/14/22  Type B tymp on R      Labs    CMP 2/25/22   Cr 0.7  Glucose 95      Procedure -Transnasal fiberoptic laryngoscopy     Surgeon: Cornel Molina M.D. .      Anesthesia: topical 0.05% oxymetazoline with 4% lidocaine      Complications: None.     Description of Procedure: With the patient in the sitting position, topical lidocaine and oxymetazoline was applied to the nose. The scope was passed through the nose. Examination was carried out of the nose, nasopharynx, oropharynx, hypopharynx, and larynx with findings as noted below. Scope was removed. The patient tolerated the procedure well.      Findings: no NP or nasal masses blocking the ET orifice. Moderate adenitis, but no adenoid hypertrophy. Right septal deviation.            Procedure Note - Myringotomy/Tympanostomy Tube Placement     Diagnosis: chronic Otitis Media with Effusion     DETAILS OF PROCEDURE:   Obstructing cerumen was removed from the right ear canal using magnification and appropriate instrumentation. The middle ear was inspected under the microscope. A serous effusion was noted. A joint decision was made to proceed with a myringotomy with or without a tympanostomy tube.  With the TM fully in view, the tympanic membrane was anesthetized and a radial incision in the anterior/inferior quadrant.  The middle ear cavity was suctioned out. The middle ear was fully evacuated of a 3 Fr suction. A beveled Juarez tympanostomy tube was grasped with an alligator forcep and inserted into the myringotomy. A straight pick was used to push the tube into place. The patient tolerated the procedure well.         Assessment     Neck mass   Otitis Media with Effusion, secondary to recent COVID and sinus infection  Chronic adenitis  Septal deviation  Nasal obstruction    Plan:      Discussed natural history of Otitis Media with Effusion. Can take up to 3 months to clear. Will try to facilitate drainage of fliud with daily consistent use of  Flonase and steroid taper. If effusion present in 6 weeks may consider tympanostomy tube.     Update, 4/14/22  Persistent effusion for 3 months. Scope shows adenitis, but no adenoid hyertrophy or NP masses.     Recommend treatment of adenitis with 10d course abx. He would like to try consistent use of Flonase and Astelin as well.     If effusion persistent in 1 month would plan for tube placement.     If nasal obstruction persists despite medical management, will consider septoplasty.    superficial neck mass - US    Update 5/27/22    Neck mass   Very superficial likely cystic lesion right neck present for 10+ years - he is not intersected in excision, continue observation     Otitis Media with Effusion  Tube placed today,   ciloxan x 5 days  Return to clinic 1 month for tube recheck     Discuss possible septoplasty at follow up      Cornel Molina MD  [facility]   [facility]  [address]  [place] [address]  P: [phone]  F: [phone]   2018

## 2022-05-30 ENCOUNTER — OFFICE VISIT (OUTPATIENT)
Dept: RADIATION ONCOLOGY | Facility: CLINIC | Age: 67
End: 2022-05-30
Payer: MEDICARE

## 2022-05-30 ENCOUNTER — PATIENT MESSAGE (OUTPATIENT)
Dept: GASTROENTEROLOGY | Facility: CLINIC | Age: 67
End: 2022-05-30
Payer: MEDICARE

## 2022-05-30 VITALS
SYSTOLIC BLOOD PRESSURE: 136 MMHG | RESPIRATION RATE: 18 BRPM | HEIGHT: 69 IN | DIASTOLIC BLOOD PRESSURE: 80 MMHG | TEMPERATURE: 98 F | BODY MASS INDEX: 22.74 KG/M2 | OXYGEN SATURATION: 96 % | HEART RATE: 64 BPM | WEIGHT: 153.5 LBS

## 2022-05-30 DIAGNOSIS — K76.9 LIVER DISEASE, UNSPECIFIED: ICD-10-CM

## 2022-05-30 DIAGNOSIS — C80.1 CANCER: Primary | ICD-10-CM

## 2022-05-30 DIAGNOSIS — C61 PROSTATE CANCER: Primary | ICD-10-CM

## 2022-05-30 PROCEDURE — 99999 PR PBB SHADOW E&M-EST. PATIENT-LVL III: CPT | Mod: PBBFAC,,, | Performed by: RADIOLOGY

## 2022-05-30 PROCEDURE — 99205 PR OFFICE/OUTPT VISIT, NEW, LEVL V, 60-74 MIN: ICD-10-PCS | Mod: S$PBB,,, | Performed by: RADIOLOGY

## 2022-05-30 PROCEDURE — 99999 PR PBB SHADOW E&M-EST. PATIENT-LVL III: ICD-10-PCS | Mod: PBBFAC,,, | Performed by: RADIOLOGY

## 2022-05-30 PROCEDURE — 99213 OFFICE O/P EST LOW 20 MIN: CPT | Mod: PBBFAC | Performed by: RADIOLOGY

## 2022-05-30 PROCEDURE — 99205 OFFICE O/P NEW HI 60 MIN: CPT | Mod: S$PBB,,, | Performed by: RADIOLOGY

## 2022-05-30 NOTE — PROGRESS NOTES
[facility]  RADIATION ONCOLOGY CONSULTATION    Name: Aurelio Daniels  : 1955      Patient Referred To Radiation Oncology By:  Dr. Jian Smith MD  [id number] [facility] [place] [address]    DIAGNOSIS: high risk prostate cancer, Dwight 3+3=6, PSA 97, [id number]    HISTORY OF PRESENT ILLNESS:  Aurelio Daniels is a 66 y.o. male who presents for consultation for the above diagnosis.   PSA 22 was 80, previously 3.2 from .  Another PSA 3/25/22 was 97.  MRI 3/31/22 showed PIRADS 5 lesion on left, 30cc gland    Prostate biopsy pathology showed adenocarcinoma, [place] 3+3=6 in 4/12 cores.    Axumin 22 showed uptake in prostate gland darrell L gland and base, no uptake in nodes or outside pelvis. 2cm R lobe liver lesion warrants further investigation.    Today, he is doing well overall.  He is not taking bladder medications but does have some LUTS.  Denies diarrhea, constipation, bloody stools.   Denies dysuria, hematuria.    AUA symptom score is 7  1 for incomplete emptying, 1 for frequency, 0 for intermittency, 0 for urgency, 2 for weak-stream, 1 for straining, and 2 for nocturia    REVIEW OF SYSTEMS: (Positive findings bold, otherwise negative)   Constitutional: fever, fatigue, weight change  Eyes: blurred vision in the past 3 months, double vision   ENT: ear pain, new mouth lesions, jaw pain, difficulty swallowing, sore throat  Cardiovascular: chest pain on exertion, reflux, leg swelling  Respiratory: shortness of breath, dyspnea, cough, hemoptysis.   GI: abdominal pain, diarrhea, constipation, blood in stool, painful bowel movements  : painful or burning urination, blood in urine  Musculoskeletal: new bone or joint pains  Neurologic: headache, seizure, focal numbness or tingling, balance changes, speech changes  Lymph: new or enlarged lymph nodes  Psychiatric: depression, anxiety    PRIOR RADIATION HISTORY: none    PAST MEDICAL HISTORY:  Past  "Medical History:   Diagnosis Date    Tobacco use        PAST SURGICAL HISTORY:  Past Surgical History:   Procedure Laterality Date    COLONOSCOPY      COLONOSCOPY N/A 2022    Procedure: COLONOSCOPY;  Surgeon: Jamia Alfaro MD;  Location: [facility];  Service: Endoscopy;  Laterality: N/A;    VASECTOMY         ALLERGIES:   Review of patient's allergies indicates:  No Known Allergies    MEDICATIONS:    Current Outpatient Medications:     ciprofloxacin HCl (CILOXAN) 0.3 % ophthalmic solution, Place 4 drops into both ears 2 (two) times a day. for 5 days, Disp: 10 mL, Rfl: 0    azelastine (ASTELIN) 137 mcg (0.1 %) nasal spray, 1 spray (137 mcg total) by Nasal route 2 (two) times daily. (Patient not taking: No sig reported), Disp: 30 mL, Rfl: 3    fluticasone propionate (FLONASE) 50 mcg/actuation nasal spray, 1 spray (50 mcg total) by Each Nostril route once daily. (Patient not taking: No sig reported), Disp: 16 g, Rfl: 0    SOCIAL HISTORY:  Social History     Socioeconomic History    Marital status:      Spouse name: YUE    Number of children: 2   Tobacco Use    Smoking status: Current Some Day Smoker     Packs/day: 1.00     Types: Cigarettes     Last attempt to quit: 1/15/2022     Years since quittin.3    Smokeless tobacco: Never Used   Substance and Sexual Activity    Alcohol use: Yes     Alcohol/week: 2.0 standard drinks     Types: 2 Shots of liquor per week    Drug use: Never    Sexual activity: Not Currently     Partners: Female     Lives in [facility]    FAMILY HISTORY:  Family History   Problem Relation Age of Onset    No Known Problems Mother     Heart disease Father     No Known Problems Brother     No Known Problems Brother     No Known Problems Daughter     No Known Problems Daughter        PHYSICAL EXAMINATION:  Constitutional: well appearing, no acute distress, ECOG 0 - Fully Active  Vitals:    /80   Pulse 64   Temp 97.8 °F (36.6 °C)   Resp 18   Ht 5' 9" " (1.753 m)   Wt 69.6 kg (153 lb 8 oz)   SpO2 96%   BMI 22.67 kg/m²   Eyes: sclera anicteric, EOMI, pupils equal, round and reactive to light  ENT: oral cavity without lesions, moist mucous membranes  Neck: trachea midline, neck supple  Lymphatic: no cervical, supraclavicular or axillary adenopathy  Cardiovascular: regular rate, no edema of the upper or lower extremities, radial pulse 2+  Respiratory: unlabored effort, clear to auscultation, no wheezes  Abdomen: soft, non-tender, no rigidity, no masses, no hepatomegaly  Rectal: deferred  Neuro: Cranial nerves III-XII intact, speech not slurred, gait non-ataxic, no dysdiadochokinesia, strength 5/5 upper and lower extremities  Spine: non-tender to percussion cervical, thoracic and lumbosacral spine    IMAGING AND LABORATORY FINDINGS: As per HPI; images reviewed personally.    PSA  3/25/22 - 97  2/25/22 - 80  2009 - 3    ASSESSMENT: 66 y.o. male with high risk prostate cancer    PLAN: Mr. Daniels has a high risk prostate cancer by extreme PSA elevation alone. His treatment options include prostatectomy or radiation therapy with or without androgen deprivation therapy.   He has no evidence of distant disease despite his very high PSA - I will order MRI abdomen to investigate liver further but prostate cancer will typically not spread to liver/soft organs in isolation.    Biopsy pathology is discordant with PSA, but given high PSA and no evidence for metastases I recommend treating definitively as high risk prostate cancer.  MRI shows anterior lesion which may not have been sampled completely to show high risk tumor.    Recommend long term ADT with 70Gy/28fx to prostate/SVs and 47.6Gy to pelvic lymph nodes.   For him I prefer starting with ADT for 2 months, getting new PSA to ensure response then proceeding with radiation planning.  ADT will be given for at least 2 years pending toleration (typically recommend 1.5-3 years for high risk).    Potential acute toxicities  include fatigue, nausea, and bowel/bladder irritation. Potential late toxicities include bowel/bladder irritation, erectile dysfunction, damage to bowel/bladder, and very low risk of secondary malignancy.     After this discussion regarding the techniques, toxicities and indications of radiation, I answered the patient's questions to their apparent satisfaction.     Follow up after MRI abdomen, ADT for 2 months with PSA, then will proceed with CT simulation and informed consent unless unfavorable PSA response.    I spent approximately 60 minutes reviewing the available records and evaluating the patient, out of which over 50% of the time was spent face to face with the patient in counseling and coordinating this patient's care.    Maria Luisa Torres III, M.D.  Radiation Oncology  [facility] [address] [place] [address]  Ph: [phone]  [email or web address]

## 2022-05-31 DIAGNOSIS — C61 PROSTATE CANCER: Primary | ICD-10-CM

## 2022-06-13 ENCOUNTER — CLINICAL SUPPORT (OUTPATIENT)
Dept: UROLOGY | Facility: CLINIC | Age: 67
End: 2022-06-13
Payer: MEDICARE

## 2022-06-13 DIAGNOSIS — C61 PROSTATE CANCER: Primary | ICD-10-CM

## 2022-06-13 PROCEDURE — 96402 CHEMO HORMON ANTINEOPL SQ/IM: CPT | Mod: PBBFAC

## 2022-06-13 RX ADMIN — LEUPROLIDE ACETATE 45 MG: KIT at 10:06

## 2022-06-13 NOTE — PROGRESS NOTES
.Patient came today for Lupron injection. Using aseptic technique, pt was giving 45 mg of Lupron IM to right ventrogluteal. Pt tolerated well. Pt was instructed to remain in clinic for 15 minutes and to report any adverse reaction to staff.     Loraine Milanes RN

## 2022-06-20 ENCOUNTER — TELEPHONE (OUTPATIENT)
Dept: UROLOGY | Facility: CLINIC | Age: 67
End: 2022-06-20
Payer: MEDICARE

## 2022-06-20 NOTE — TELEPHONE ENCOUNTER
----- Message from Jian Smith MD sent at 6/14/2022  6:31 PM CDT -----  Patient needs to be scheduled for Lupron asap, thanks

## 2022-06-28 ENCOUNTER — TELEPHONE (OUTPATIENT)
Dept: UROLOGY | Facility: CLINIC | Age: 67
End: 2022-06-28
Payer: MEDICARE

## 2022-06-28 NOTE — TELEPHONE ENCOUNTER
Called the patient, after verification of name and , the patient was informed that his appt for him to get his Lupron shot. Explained whay the shot was for and he voiced understanding        ----- Message from Naila Weaver sent at 2022  7:43 AM CDT -----  States he would like to know what his nurse visit appt on  is for. Please call pt [phone]. Thank you

## 2022-06-29 ENCOUNTER — PATIENT MESSAGE (OUTPATIENT)
Dept: UROLOGY | Facility: CLINIC | Age: 67
End: 2022-06-29
Payer: MEDICARE

## 2022-06-29 NOTE — PROGRESS NOTES
Chief complaint:   Chief Complaint   Patient presents with    Follow-up     Tube check.  Doing well.        History of Present Illness, 3/3/22:     Mr. Daniels is a 66 y.o. male presenting for evaluation of hearing loss.     He describes a right sided hearing loss starting recently and has been stable.  Had a sinus infection 2 months ago, then had COVID about 6 weeks ago.     Associated with ear fullness, pressure, feels like fluid in right ear. Denies otalgia, otorrhea.    Return clinic visit, 4/14/22  Continued right auaral fullness, pressure, muffled hearing despite steroid taper. Used flonase inconsistently.     Also with right nasal congestion. No epistaxis.     Does notice right neck mass - fluctuates, superficial, no pain, there for years.    Former smoker. Quit 3 months ago    Return clinic visit, 5/27/22  No change in ear pressure, fullness, hearing loss. Used flonase consistently with no relief.    No change to neck mass.    Return clinic visit, 7/1/22  Doing well. Resolution of ear pressure, fullness, hearing loss.  No significant nasal obstruction since cutting back on smoking.    Review of Systems     A complete 10 point ROS was completed and are positive as per above HPI.    Otherwise negative for fever, diplopia, chest pain, shortness of breath, vomiting, blood in urine, joint pain, skin rash, seizures and unusual bleeding.       History        Past Medical History:   Past Medical History:   Diagnosis Date    Tobacco use     .          Past Surgical History:  Past Surgical History:   Procedure Laterality Date    COLONOSCOPY      COLONOSCOPY N/A 5/20/2022    Procedure: COLONOSCOPY;  Surgeon: Jamia Alfaro MD;  Location: [facility];  Service: Endoscopy;  Laterality: N/A;    VASECTOMY  1985   .         Medications: Medication list was reviewed. He  has a current medication list which includes the following prescription(s): azelastine and fluticasone propionate.         Allergies: Review of  patient's allergies indicates:  No Known Allergies         Family history: family history includes Heart disease in his father; No Known Problems in his brother, brother, daughter, daughter, and mother.         Social History          Alcohol use:  reports current alcohol use of about 2.0 standard drinks of alcohol per week.            Tobacco:  reports that he has been smoking cigarettes. He has been smoking about 1.00 pack per day. He has never used smokeless tobacco.         Please see the patient's intake form for full details of past medical history, past surgical history, family history, social history and review of systems. ?This information was reviewed by me and noted.      Physical Examination     General: Well developed, well nourished, well hydrated. Verbal with a strong voice and not dysphonic.     Head/Face: Normocephalic, atraumatic. No scars or lesions. Facial musculature equal.     Eyes: No scleral icterus or conjunctival hemorrhage. EOMI. PERRLA.     Ears:     · Right ear: No gross deformity. EAC is clear of debris and erythema.  PET in place and patent, dry    · Left ear: No gross deformity. EAC is clear of debris and erythema. The TM is intact with a pneumatized middle ear. No signs of retraction, fluid or infection.     Nose: No gross deformity or lesions. No purulent discharge. R mid septal deviatoin     Mouth/Oropharynx: Lips without any lesions. No mucosal lesions within the oropharynx. No tonsillar exudate or lesions. Pharyngeal walls symmetrical. Uvula midline. Tongue midline without lesions.     Neck: Trachea midline. No thyromegaly or nodules palpated. Right, very superficial neck mass at posterior border of SCM, mobile, superficial to SCM, nontender, about 2 cm    Lymphatic: No lymphadenopathy in the neck.     Extremities: No cyanosis. Warm and well-perfused.     Skin: No scars or lesions on face or neck.      Neurologic: Moving all extremities without gross abnormality.CN II-XII  grossly intact. House-Brackmann 1/6. No signs of nystagmus.      Psych: Alert and oriented to person, place, and time with an appropriate mood and affect.       Audiogram     Audiogram,  3/3/22 was independently reviewed       Audio, 4/14/22  Type B tymp on R      Labs    CMP 2/25/22   Cr 0.7  Glucose 95      Procedure -Transnasal fiberoptic laryngoscopy  (previous)    Surgeon: Cornel Molina M.D. .      Anesthesia: topical 0.05% oxymetazoline with 4% lidocaine      Complications: None.     Description of Procedure: With the patient in the sitting position, topical lidocaine and oxymetazoline was applied to the nose. The scope was passed through the nose. Examination was carried out of the nose, nasopharynx, oropharynx, hypopharynx, and larynx with findings as noted below. Scope was removed. The patient tolerated the procedure well.      Findings: no NP or nasal masses blocking the ET orifice. Moderate adenitis, but no adenoid hypertrophy. Right septal deviation.           Assessment     * No diagnoses found *   Otitis Media with Effusion, secondary to recent COVID and sinus infection  Chronic adenitis  Septal deviation  Nasal obstruction    S/p R PET 6/2022 for COME. Negative NP scope.     Plan:      Update 7/1/22  Doing well  Routine tube care discussed  Return to clinic 6 months  No concern with nasal obstruction/deviation today    Cornel Molina MD  [facility]   [facility]  [id number] [place] [address].  ADDIE [address]ten[address] [address]  P: [phone]  F: [phone]

## 2022-07-01 ENCOUNTER — OFFICE VISIT (OUTPATIENT)
Dept: OTOLARYNGOLOGY | Facility: CLINIC | Age: 67
End: 2022-07-01
Payer: MEDICARE

## 2022-07-01 ENCOUNTER — LAB VISIT (OUTPATIENT)
Dept: LAB | Facility: HOSPITAL | Age: 67
End: 2022-07-01
Attending: RADIOLOGY
Payer: MEDICARE

## 2022-07-01 VITALS — BODY MASS INDEX: 23.28 KG/M2 | TEMPERATURE: 98 F | WEIGHT: 157.63 LBS

## 2022-07-01 DIAGNOSIS — H65.91 RIGHT OTITIS MEDIA WITH EFFUSION: Primary | ICD-10-CM

## 2022-07-01 DIAGNOSIS — J34.2 NASAL SEPTAL DEVIATION: ICD-10-CM

## 2022-07-01 DIAGNOSIS — J34.89 NASAL OBSTRUCTION: ICD-10-CM

## 2022-07-01 DIAGNOSIS — C80.1 CANCER: ICD-10-CM

## 2022-07-01 DIAGNOSIS — Z96.22 HISTORY OF TYMPANOSTOMY TUBE PLACEMENT: ICD-10-CM

## 2022-07-01 LAB
CREAT SERPL-MCNC: 0.7 MG/DL (ref 0.5–1.4)
EST. GFR  (AFRICAN AMERICAN): >60 ML/MIN/1.73 M^2
EST. GFR  (NON AFRICAN AMERICAN): >60 ML/MIN/1.73 M^2

## 2022-07-01 PROCEDURE — 99999 PR PBB SHADOW E&M-EST. PATIENT-LVL II: ICD-10-PCS | Mod: PBBFAC,,, | Performed by: STUDENT IN AN ORGANIZED HEALTH CARE EDUCATION/TRAINING PROGRAM

## 2022-07-01 PROCEDURE — 36415 COLL VENOUS BLD VENIPUNCTURE: CPT | Performed by: RADIOLOGY

## 2022-07-01 PROCEDURE — 99212 OFFICE O/P EST SF 10 MIN: CPT | Mod: PBBFAC | Performed by: STUDENT IN AN ORGANIZED HEALTH CARE EDUCATION/TRAINING PROGRAM

## 2022-07-01 PROCEDURE — 82565 ASSAY OF CREATININE: CPT | Performed by: RADIOLOGY

## 2022-07-01 PROCEDURE — 99999 PR PBB SHADOW E&M-EST. PATIENT-LVL II: CPT | Mod: PBBFAC,,, | Performed by: STUDENT IN AN ORGANIZED HEALTH CARE EDUCATION/TRAINING PROGRAM

## 2022-07-01 PROCEDURE — 99213 PR OFFICE/OUTPT VISIT, EST, LEVL III, 20-29 MIN: ICD-10-PCS | Mod: S$PBB,,, | Performed by: STUDENT IN AN ORGANIZED HEALTH CARE EDUCATION/TRAINING PROGRAM

## 2022-07-01 PROCEDURE — 99213 OFFICE O/P EST LOW 20 MIN: CPT | Mod: S$PBB,,, | Performed by: STUDENT IN AN ORGANIZED HEALTH CARE EDUCATION/TRAINING PROGRAM

## 2022-07-07 ENCOUNTER — HOSPITAL ENCOUNTER (OUTPATIENT)
Dept: RADIOLOGY | Facility: HOSPITAL | Age: 67
Discharge: HOME | End: 2022-07-07
Attending: RADIOLOGY
Payer: MEDICARE

## 2022-07-07 DIAGNOSIS — K76.9 LIVER DISEASE, UNSPECIFIED: ICD-10-CM

## 2022-07-07 PROCEDURE — 74183 MRI ABD W/O CNTR FLWD CNTR: CPT | Mod: TC

## 2022-07-07 PROCEDURE — 25500020 PHARM REV CODE 255: Performed by: RADIOLOGY

## 2022-07-07 PROCEDURE — A9585 GADOBUTROL INJECTION: HCPCS | Performed by: RADIOLOGY

## 2022-07-07 RX ORDER — GADOBUTROL 604.72 MG/ML
10 INJECTION INTRAVENOUS
Status: COMPLETED | OUTPATIENT
Start: 2022-07-07 | End: 2022-07-07

## 2022-07-07 RX ADMIN — GADOBUTROL 7 ML: 604.72 INJECTION INTRAVENOUS at 10:07

## 2022-08-01 ENCOUNTER — HOSPITAL ENCOUNTER (OUTPATIENT)
Dept: RADIATION THERAPY | Facility: HOSPITAL | Age: 67
Discharge: HOME | End: 2022-08-01
Attending: RADIOLOGY
Payer: MEDICARE

## 2022-08-08 ENCOUNTER — LAB VISIT (OUTPATIENT)
Dept: LAB | Facility: HOSPITAL | Age: 67
End: 2022-08-08
Attending: UROLOGY
Payer: MEDICARE

## 2022-08-08 DIAGNOSIS — C61 PROSTATE CANCER: ICD-10-CM

## 2022-08-08 LAB — COMPLEXED PSA SERPL-MCNC: 4.2 NG/ML (ref 0–4)

## 2022-08-08 PROCEDURE — 84153 ASSAY OF PSA TOTAL: CPT | Performed by: UROLOGY

## 2022-08-08 PROCEDURE — 36415 COLL VENOUS BLD VENIPUNCTURE: CPT | Performed by: UROLOGY

## 2022-08-10 ENCOUNTER — HOSPITAL ENCOUNTER (OUTPATIENT)
Dept: RADIATION THERAPY | Facility: HOSPITAL | Age: 67
Discharge: HOME | End: 2022-08-10
Attending: RADIOLOGY
Payer: MEDICARE

## 2022-08-10 ENCOUNTER — HOSPITAL ENCOUNTER (OUTPATIENT)
Dept: RADIOLOGY | Facility: HOSPITAL | Age: 67
Discharge: HOME | End: 2022-08-10
Attending: RADIOLOGY
Payer: MEDICARE

## 2022-08-10 PROCEDURE — 77014 PR  CT GUIDANCE PLACEMENT RAD THERAPY FIELDS: ICD-10-PCS | Mod: 26,,, | Performed by: RADIOLOGY

## 2022-08-10 PROCEDURE — 77014 HC CT GUIDANCE RADIATION THERAPY FLDS PLACEMENT: CPT | Mod: TC | Performed by: RADIOLOGY

## 2022-08-10 PROCEDURE — 77334 RADIATION TREATMENT AID(S): CPT | Mod: 26,,, | Performed by: RADIOLOGY

## 2022-08-10 PROCEDURE — 77334 PR  RADN TREATMENT AID(S) COMPLX: ICD-10-PCS | Mod: 26,,, | Performed by: RADIOLOGY

## 2022-08-10 PROCEDURE — 77263 PR  RADIATION THERAPY PLAN COMPLEX: ICD-10-PCS | Mod: ,,, | Performed by: RADIOLOGY

## 2022-08-10 PROCEDURE — 77014 PR  CT GUIDANCE PLACEMENT RAD THERAPY FIELDS: CPT | Mod: 26,,, | Performed by: RADIOLOGY

## 2022-08-10 PROCEDURE — 77334 RADIATION TREATMENT AID(S): CPT | Mod: TC | Performed by: RADIOLOGY

## 2022-08-10 PROCEDURE — 77263 THER RADIOLOGY TX PLNG CPLX: CPT | Mod: ,,, | Performed by: RADIOLOGY

## 2022-08-17 PROCEDURE — 77301 PR  INTEN MOD RADIOTHER PLAN W/DOSE VOL HIST: ICD-10-PCS | Mod: 26,,, | Performed by: RADIOLOGY

## 2022-08-17 PROCEDURE — 77301 RADIOTHERAPY DOSE PLAN IMRT: CPT | Mod: 26,,, | Performed by: RADIOLOGY

## 2022-08-17 PROCEDURE — 77301 RADIOTHERAPY DOSE PLAN IMRT: CPT | Mod: TC | Performed by: RADIOLOGY

## 2022-08-18 ENCOUNTER — DOCUMENTATION ONLY (OUTPATIENT)
Dept: RADIATION ONCOLOGY | Facility: CLINIC | Age: 67
End: 2022-08-18
Payer: MEDICARE

## 2022-08-18 PROCEDURE — 77014 PR  CT GUIDANCE PLACEMENT RAD THERAPY FIELDS: CPT | Mod: 26,,, | Performed by: RADIOLOGY

## 2022-08-18 PROCEDURE — 77338 DESIGN MLC DEVICE FOR IMRT: CPT | Mod: TC | Performed by: RADIOLOGY

## 2022-08-18 PROCEDURE — 77385 HC IMRT, SIMPLE: CPT | Performed by: RADIOLOGY

## 2022-08-18 PROCEDURE — 77300 PR RADIATION THERAPY,DOSIMETRY PLAN: ICD-10-PCS | Mod: 26,,, | Performed by: RADIOLOGY

## 2022-08-18 PROCEDURE — 77300 RADIATION THERAPY DOSE PLAN: CPT | Mod: TC | Performed by: RADIOLOGY

## 2022-08-18 PROCEDURE — 77014 PR  CT GUIDANCE PLACEMENT RAD THERAPY FIELDS: ICD-10-PCS | Mod: 26,,, | Performed by: RADIOLOGY

## 2022-08-18 PROCEDURE — 77338 DESIGN MLC DEVICE FOR IMRT: CPT | Mod: 26,,, | Performed by: RADIOLOGY

## 2022-08-18 PROCEDURE — 77338 PR  MLC IMRT DESIGN & CONSTRUCTION PER IMRT PLAN: ICD-10-PCS | Mod: 26,,, | Performed by: RADIOLOGY

## 2022-08-18 PROCEDURE — 77014 HC CT GUIDANCE RADIATION THERAPY FLDS PLACEMENT: CPT | Mod: TC | Performed by: RADIOLOGY

## 2022-08-18 PROCEDURE — 77300 RADIATION THERAPY DOSE PLAN: CPT | Mod: 26,,, | Performed by: RADIOLOGY

## 2022-08-18 NOTE — PLAN OF CARE
Day 1 outpatient xrt to prostate. Pelvis handout and verbal instructions given. Contact info provided. Skin care and side effects reviewed. Patient verbalized understanding.

## 2022-08-19 PROCEDURE — 77014 HC CT GUIDANCE RADIATION THERAPY FLDS PLACEMENT: CPT | Mod: TC | Performed by: RADIOLOGY

## 2022-08-19 PROCEDURE — 77385 HC IMRT, SIMPLE: CPT | Performed by: RADIOLOGY

## 2022-08-19 PROCEDURE — 77014 PR  CT GUIDANCE PLACEMENT RAD THERAPY FIELDS: ICD-10-PCS | Mod: 26,,, | Performed by: RADIOLOGY

## 2022-08-19 PROCEDURE — 77014 PR  CT GUIDANCE PLACEMENT RAD THERAPY FIELDS: CPT | Mod: 26,,, | Performed by: RADIOLOGY

## 2022-08-22 PROCEDURE — 77014 PR  CT GUIDANCE PLACEMENT RAD THERAPY FIELDS: ICD-10-PCS | Mod: 26,,, | Performed by: RADIOLOGY

## 2022-08-22 PROCEDURE — 77385 HC IMRT, SIMPLE: CPT | Performed by: RADIOLOGY

## 2022-08-22 PROCEDURE — 77014 HC CT GUIDANCE RADIATION THERAPY FLDS PLACEMENT: CPT | Mod: TC | Performed by: RADIOLOGY

## 2022-08-22 PROCEDURE — 77014 PR  CT GUIDANCE PLACEMENT RAD THERAPY FIELDS: CPT | Mod: 26,,, | Performed by: RADIOLOGY

## 2022-08-23 PROCEDURE — 77014 HC CT GUIDANCE RADIATION THERAPY FLDS PLACEMENT: CPT | Mod: TC | Performed by: RADIOLOGY

## 2022-08-23 PROCEDURE — 77014 PR  CT GUIDANCE PLACEMENT RAD THERAPY FIELDS: CPT | Mod: 26,,, | Performed by: RADIOLOGY

## 2022-08-23 PROCEDURE — 77385 HC IMRT, SIMPLE: CPT | Performed by: RADIOLOGY

## 2022-08-23 PROCEDURE — 77014 PR  CT GUIDANCE PLACEMENT RAD THERAPY FIELDS: ICD-10-PCS | Mod: 26,,, | Performed by: RADIOLOGY

## 2022-08-24 ENCOUNTER — OFFICE VISIT (OUTPATIENT)
Dept: UROLOGY | Facility: CLINIC | Age: 67
End: 2022-08-24
Payer: MEDICARE

## 2022-08-24 ENCOUNTER — DOCUMENTATION ONLY (OUTPATIENT)
Dept: RADIATION ONCOLOGY | Facility: CLINIC | Age: 67
End: 2022-08-24
Payer: MEDICARE

## 2022-08-24 VITALS
SYSTOLIC BLOOD PRESSURE: 102 MMHG | TEMPERATURE: 97 F | BODY MASS INDEX: 23.25 KG/M2 | HEART RATE: 70 BPM | WEIGHT: 157 LBS | DIASTOLIC BLOOD PRESSURE: 67 MMHG | HEIGHT: 69 IN

## 2022-08-24 DIAGNOSIS — C61 PROSTATE CANCER: Primary | ICD-10-CM

## 2022-08-24 PROCEDURE — 99213 OFFICE O/P EST LOW 20 MIN: CPT | Mod: PBBFAC,25 | Performed by: UROLOGY

## 2022-08-24 PROCEDURE — 99213 PR OFFICE/OUTPT VISIT, EST, LEVL III, 20-29 MIN: ICD-10-PCS | Mod: S$PBB,,, | Performed by: UROLOGY

## 2022-08-24 PROCEDURE — 77014 PR  CT GUIDANCE PLACEMENT RAD THERAPY FIELDS: CPT | Mod: 26,,, | Performed by: RADIOLOGY

## 2022-08-24 PROCEDURE — 77385 HC IMRT, SIMPLE: CPT | Performed by: RADIOLOGY

## 2022-08-24 PROCEDURE — 99999 PR PBB SHADOW E&M-EST. PATIENT-LVL III: CPT | Mod: PBBFAC,,, | Performed by: UROLOGY

## 2022-08-24 PROCEDURE — 99999 PR PBB SHADOW E&M-EST. PATIENT-LVL III: ICD-10-PCS | Mod: PBBFAC,,, | Performed by: UROLOGY

## 2022-08-24 PROCEDURE — 77014 PR  CT GUIDANCE PLACEMENT RAD THERAPY FIELDS: ICD-10-PCS | Mod: 26,,, | Performed by: RADIOLOGY

## 2022-08-24 PROCEDURE — 77336 RADIATION PHYSICS CONSULT: CPT | Performed by: RADIOLOGY

## 2022-08-24 PROCEDURE — 99213 OFFICE O/P EST LOW 20 MIN: CPT | Mod: S$PBB,,, | Performed by: UROLOGY

## 2022-08-24 PROCEDURE — 77014 HC CT GUIDANCE RADIATION THERAPY FLDS PLACEMENT: CPT | Mod: TC | Performed by: RADIOLOGY

## 2022-08-24 NOTE — PLAN OF CARE
Day 5 of outpatient xrt to the prostate. Denies any pain, had diarrhea 1 day last week, it resolved on its own now with loose stools, no blood in urine or stools, no urinary issues. Will continue to monitor.

## 2022-08-24 NOTE — PROGRESS NOTES
Chief Complaint:  High risk prostate cancer    HPI:   2022 - returns for follow-up, finished his 5th day of radiation today, overall feeling well, a little tired and maybe some hot flashes, no voiding issues or GH, no dysuria, PSA down to 4.2    2022 - returns today for follow-up, no issues since his biopsy, path reviewed [place] 3 + 3 in left apex and [place] 3 + 3 in right apex, presents today for discussion    2022 - TRUS bx    2022 - returns today for follow-up, PSA now up to 97, voiding well, denies any pelvic discomfort, denies fevers, denies GH    2022 - 67yo male that presents for elevated PSA.  Patient had routine lab work obtained as part of normal screening in late February which resulted with PSA of 80.6.  Previous PSA level in our system 3.2 in .  Patient notes some intermittency with his urinary stream but denies overt weak stream.  Denies gross hematuria.  He is a smoker and quit about 2 months ago.  Denies family history of  cancers.  Had a vasectomy in  but no other urologic procedures. Mild ED, does not take any meds.     PMH:  Past Medical History:   Diagnosis Date    Tobacco use        PSH:  Past Surgical History:   Procedure Laterality Date    COLONOSCOPY      COLONOSCOPY N/A 2022    Procedure: COLONOSCOPY;  Surgeon: Jamia Alfaro MD;  Location: [facility];  Service: Endoscopy;  Laterality: N/A;    VASECTOMY         Family History:  Family History   Problem Relation Age of Onset    No Known Problems Mother     Heart disease Father     No Known Problems Brother     No Known Problems Brother     No Known Problems Daughter     No Known Problems Daughter        Social History:  Social History     Tobacco Use    Smoking status: Current Some Day Smoker     Packs/day: 1.00     Types: Cigarettes     Last attempt to quit: 1/15/2022     Years since quittin.6    Smokeless tobacco: Never Used   Substance Use Topics    Alcohol use: Yes      Alcohol/week: 2.0 standard drinks     Types: 2 Shots of liquor per week    Drug use: Never        Review of Systems:  General: No fever, chills  Skin: No rashes  Chest:  Denies cough and sputum production  Heart: Denies chest pain  Resp: Denies dyspnea  Abdomen: Denies diarrhea, abdominal pain, hematemesis, or blood in stool.  Musculoskeletal: No joint stiffness or swelling. Denies back pain.  : see HPI  Neuro: no dizziness or weakness    Allergies:  Patient has no known allergies.    Medications:    Current Outpatient Medications:     azelastine (ASTELIN) 137 mcg (0.1 %) nasal spray, 1 spray (137 mcg total) by Nasal route 2 (two) times daily., Disp: 30 mL, Rfl: 3    fluticasone propionate (FLONASE) 50 mcg/actuation nasal spray, 1 spray (50 mcg total) by Each Nostril route once daily., Disp: 16 g, Rfl: 0    Physical Exam:  Vitals:    08/24/22 1052   BP: 102/67   Pulse: 70   Temp: 97.3 °F (36.3 °C)     Body mass index is 23.18 kg/m².  General: awake, alert, cooperative  Head: NC/AT  Ears: external ears normal  Eyes: sclera normal  Lungs: normal inspiration, NAD  Heart: well-perfused  Abdomen: Soft, NT, ND   3/22: Normal uncirc'd phallus, meatus normal in size and position, BL testicles palpable, no masses, nontender, no abnormalities of epididymi  CHRISTOPH 3/22: Normal rectal tone, no hemorrhoids. Prostate smooth and normal, no nodules 40 gm SV not palpable. Perineum and anus normal.  Lymphatic: groin nodes negative  Skin: The skin is warm and dry  Ext: No c/c/e.  Neuro: grossly intact, AOx3    RADIOLOGY:  No recent relevant imaging available for review.    LABS:  I personally reviewed the following lab values:  Lab Results   Component Value Date    WBC 6.26 02/25/2022    HGB 13.9 (L) 02/25/2022    HCT 41.3 02/25/2022     02/25/2022     02/25/2022    K 4.3 02/25/2022     02/25/2022    CREATININE 0.7 07/01/2022    BUN 15 02/25/2022    CO2 26 02/25/2022    TSH 0.677 02/25/2022    PSA 80.6 (H)  02/25/2022    CHOL 159 02/25/2022    TRIG 57 02/25/2022    HDL 43 02/25/2022    ALT 18 02/25/2022    AST 18 02/25/2022     1. PROSTATE, LEFT BASE, BIOPSY:   - Benign prostate tissue.   2. PROSTATE, LEFT APEX, BIOPSY:   - Prostatic adenocarcinoma, Early score 3+3=6 (Grade group 1) involving 2   of 2 cores (5% of tissue submitted).   - Total linear lengths of carcinoma are <1 mm and 1 mm.   3. PROSTATE, LEFT MID, BIOPSY:   - High grade prostatic intraepithelial lesion (PIN).   - AMACR, p63 and HMWK with appropriate controls are performed and support the   diagnosis.   4. PROSTATE, RIGHT BASE, BIOPSY:   - Prostate tissue with small focus of atypical glands, suspicious for   carcinoma.   - Most of the glands of interest are not present on Immunohistochemically   stained slides for further evaluation.   5. PROSTATE, RIGHT APEX, BIOPSY:   - Prostatic adenocarcinoma, Dwight score 3+3=6 (Grade group 1) involving 2   of 3 cores (10% of tissue submitted).   - Total linear lengths of carcinoma are <1 mm and 2 mm.   - AMACR, p63 and HMWK with appropriate controls are performed and support the   diagnosis.   6. PROSTATE, RIGHT MID, BIOPSY:   - High grade prostatic intraepithelial lesion (PIN).     Assessment/Plan:   Aurelio Daniels is a 66 y.o. male with high risk prostate cancer, complete XRT per Dr Torres, f/u 3 months with PSA    Thank you for allowing me the opportunity to participate in this patient's care.     Jian Smith MD  Urology

## 2022-08-25 PROCEDURE — 77014 PR  CT GUIDANCE PLACEMENT RAD THERAPY FIELDS: CPT | Mod: 26,,, | Performed by: RADIOLOGY

## 2022-08-25 PROCEDURE — 77014 HC CT GUIDANCE RADIATION THERAPY FLDS PLACEMENT: CPT | Mod: TC | Performed by: RADIOLOGY

## 2022-08-25 PROCEDURE — 77014 PR  CT GUIDANCE PLACEMENT RAD THERAPY FIELDS: ICD-10-PCS | Mod: 26,,, | Performed by: RADIOLOGY

## 2022-08-25 PROCEDURE — 77385 HC IMRT, SIMPLE: CPT | Performed by: RADIOLOGY

## 2022-08-26 PROCEDURE — 77014 PR  CT GUIDANCE PLACEMENT RAD THERAPY FIELDS: CPT | Mod: 26,,, | Performed by: RADIOLOGY

## 2022-08-26 PROCEDURE — 77014 HC CT GUIDANCE RADIATION THERAPY FLDS PLACEMENT: CPT | Mod: TC | Performed by: RADIOLOGY

## 2022-08-26 PROCEDURE — 77014 PR  CT GUIDANCE PLACEMENT RAD THERAPY FIELDS: ICD-10-PCS | Mod: 26,,, | Performed by: RADIOLOGY

## 2022-08-26 PROCEDURE — 77385 HC IMRT, SIMPLE: CPT | Performed by: RADIOLOGY

## 2022-08-29 PROCEDURE — 77014 PR  CT GUIDANCE PLACEMENT RAD THERAPY FIELDS: CPT | Mod: 26,,, | Performed by: RADIOLOGY

## 2022-08-29 PROCEDURE — 77385 HC IMRT, SIMPLE: CPT | Performed by: RADIOLOGY

## 2022-08-29 PROCEDURE — 77014 HC CT GUIDANCE RADIATION THERAPY FLDS PLACEMENT: CPT | Mod: TC | Performed by: RADIOLOGY

## 2022-08-29 PROCEDURE — 77014 PR  CT GUIDANCE PLACEMENT RAD THERAPY FIELDS: ICD-10-PCS | Mod: 26,,, | Performed by: RADIOLOGY

## 2022-08-30 PROCEDURE — 77014 PR  CT GUIDANCE PLACEMENT RAD THERAPY FIELDS: CPT | Mod: 26,,, | Performed by: RADIOLOGY

## 2022-08-30 PROCEDURE — 77014 HC CT GUIDANCE RADIATION THERAPY FLDS PLACEMENT: CPT | Mod: TC | Performed by: RADIOLOGY

## 2022-08-30 PROCEDURE — 77385 HC IMRT, SIMPLE: CPT | Performed by: RADIOLOGY

## 2022-08-30 PROCEDURE — 77014 PR  CT GUIDANCE PLACEMENT RAD THERAPY FIELDS: ICD-10-PCS | Mod: 26,,, | Performed by: RADIOLOGY

## 2022-08-31 ENCOUNTER — DOCUMENTATION ONLY (OUTPATIENT)
Dept: RADIATION ONCOLOGY | Facility: CLINIC | Age: 67
End: 2022-08-31
Payer: MEDICARE

## 2022-08-31 PROCEDURE — 77014 PR  CT GUIDANCE PLACEMENT RAD THERAPY FIELDS: ICD-10-PCS | Mod: 26,,, | Performed by: RADIOLOGY

## 2022-08-31 PROCEDURE — 77014 HC CT GUIDANCE RADIATION THERAPY FLDS PLACEMENT: CPT | Mod: TC | Performed by: RADIOLOGY

## 2022-08-31 PROCEDURE — 77385 HC IMRT, SIMPLE: CPT | Performed by: RADIOLOGY

## 2022-08-31 PROCEDURE — 77014 PR  CT GUIDANCE PLACEMENT RAD THERAPY FIELDS: CPT | Mod: 26,,, | Performed by: RADIOLOGY

## 2022-08-31 PROCEDURE — 77336 RADIATION PHYSICS CONSULT: CPT | Performed by: RADIOLOGY

## 2022-08-31 NOTE — PLAN OF CARE
Day 10 of outpatient xrt to the prostate. Denies dysuria; diarrhea sometimes but has not taken imodium; no blood in stool or urine. Will continue to monitor.

## 2022-09-01 ENCOUNTER — HOSPITAL ENCOUNTER (OUTPATIENT)
Dept: RADIATION THERAPY | Facility: HOSPITAL | Age: 67
Discharge: HOME | End: 2022-09-01
Attending: RADIOLOGY
Payer: MEDICARE

## 2022-09-01 PROCEDURE — 77385 HC IMRT, SIMPLE: CPT | Performed by: RADIOLOGY

## 2022-09-01 PROCEDURE — 77014 PR  CT GUIDANCE PLACEMENT RAD THERAPY FIELDS: ICD-10-PCS | Mod: 26,,, | Performed by: RADIOLOGY

## 2022-09-01 PROCEDURE — 77014 PR  CT GUIDANCE PLACEMENT RAD THERAPY FIELDS: CPT | Mod: 26,,, | Performed by: RADIOLOGY

## 2022-09-01 PROCEDURE — 77014 HC CT GUIDANCE RADIATION THERAPY FLDS PLACEMENT: CPT | Mod: TC | Performed by: RADIOLOGY

## 2022-09-02 PROCEDURE — 77014 PR  CT GUIDANCE PLACEMENT RAD THERAPY FIELDS: ICD-10-PCS | Mod: 26,,, | Performed by: RADIOLOGY

## 2022-09-02 PROCEDURE — 77014 PR  CT GUIDANCE PLACEMENT RAD THERAPY FIELDS: CPT | Mod: 26,,, | Performed by: RADIOLOGY

## 2022-09-02 PROCEDURE — 77014 HC CT GUIDANCE RADIATION THERAPY FLDS PLACEMENT: CPT | Mod: TC | Performed by: RADIOLOGY

## 2022-09-02 PROCEDURE — 77385 HC IMRT, SIMPLE: CPT | Performed by: RADIOLOGY

## 2022-09-06 PROCEDURE — 77014 PR  CT GUIDANCE PLACEMENT RAD THERAPY FIELDS: CPT | Mod: 26,,, | Performed by: RADIOLOGY

## 2022-09-06 PROCEDURE — 77014 PR  CT GUIDANCE PLACEMENT RAD THERAPY FIELDS: ICD-10-PCS | Mod: 26,,, | Performed by: RADIOLOGY

## 2022-09-06 PROCEDURE — 77385 HC IMRT, SIMPLE: CPT | Performed by: RADIOLOGY

## 2022-09-06 PROCEDURE — 77014 HC CT GUIDANCE RADIATION THERAPY FLDS PLACEMENT: CPT | Mod: TC | Performed by: RADIOLOGY

## 2022-09-07 ENCOUNTER — DOCUMENTATION ONLY (OUTPATIENT)
Dept: RADIATION ONCOLOGY | Facility: CLINIC | Age: 67
End: 2022-09-07
Payer: MEDICARE

## 2022-09-07 PROCEDURE — 77014 PR  CT GUIDANCE PLACEMENT RAD THERAPY FIELDS: ICD-10-PCS | Mod: 26,,, | Performed by: RADIOLOGY

## 2022-09-07 PROCEDURE — 77014 HC CT GUIDANCE RADIATION THERAPY FLDS PLACEMENT: CPT | Mod: TC | Performed by: RADIOLOGY

## 2022-09-07 PROCEDURE — 77385 HC IMRT, SIMPLE: CPT | Performed by: RADIOLOGY

## 2022-09-07 PROCEDURE — 77014 PR  CT GUIDANCE PLACEMENT RAD THERAPY FIELDS: CPT | Mod: 26,,, | Performed by: RADIOLOGY

## 2022-09-07 NOTE — PLAN OF CARE
Day 14 outpatient xrt to prostate. Denies dysuria or blood in stool or urine; very mild diarrhea under control; c/o constant sweating and lower abdomen feeling sore throughout the day. Will continue to monitor.   Dr. Steel

## 2022-09-08 PROCEDURE — 77014 HC CT GUIDANCE RADIATION THERAPY FLDS PLACEMENT: CPT | Mod: TC | Performed by: RADIOLOGY

## 2022-09-08 PROCEDURE — 77385 HC IMRT, SIMPLE: CPT | Performed by: RADIOLOGY

## 2022-09-08 PROCEDURE — 77336 RADIATION PHYSICS CONSULT: CPT | Performed by: RADIOLOGY

## 2022-09-08 PROCEDURE — 77014 PR  CT GUIDANCE PLACEMENT RAD THERAPY FIELDS: ICD-10-PCS | Mod: 26,,, | Performed by: RADIOLOGY

## 2022-09-08 PROCEDURE — 77014 PR  CT GUIDANCE PLACEMENT RAD THERAPY FIELDS: CPT | Mod: 26,,, | Performed by: RADIOLOGY

## 2022-09-09 PROCEDURE — 77014 PR  CT GUIDANCE PLACEMENT RAD THERAPY FIELDS: ICD-10-PCS | Mod: 26,,, | Performed by: RADIOLOGY

## 2022-09-09 PROCEDURE — 77385 HC IMRT, SIMPLE: CPT | Performed by: RADIOLOGY

## 2022-09-09 PROCEDURE — 77014 HC CT GUIDANCE RADIATION THERAPY FLDS PLACEMENT: CPT | Mod: TC | Performed by: RADIOLOGY

## 2022-09-09 PROCEDURE — 77014 PR  CT GUIDANCE PLACEMENT RAD THERAPY FIELDS: CPT | Mod: 26,,, | Performed by: RADIOLOGY

## 2022-09-12 PROCEDURE — 77014 HC CT GUIDANCE RADIATION THERAPY FLDS PLACEMENT: CPT | Mod: TC | Performed by: RADIOLOGY

## 2022-09-12 PROCEDURE — 77385 HC IMRT, SIMPLE: CPT | Performed by: RADIOLOGY

## 2022-09-12 PROCEDURE — 77014 PR  CT GUIDANCE PLACEMENT RAD THERAPY FIELDS: CPT | Mod: 26,,, | Performed by: RADIOLOGY

## 2022-09-12 PROCEDURE — 77014 PR  CT GUIDANCE PLACEMENT RAD THERAPY FIELDS: ICD-10-PCS | Mod: 26,,, | Performed by: RADIOLOGY

## 2022-09-13 PROCEDURE — 77014 HC CT GUIDANCE RADIATION THERAPY FLDS PLACEMENT: CPT | Mod: TC | Performed by: RADIOLOGY

## 2022-09-13 PROCEDURE — 77014 PR  CT GUIDANCE PLACEMENT RAD THERAPY FIELDS: ICD-10-PCS | Mod: 26,,, | Performed by: RADIOLOGY

## 2022-09-13 PROCEDURE — 77014 PR  CT GUIDANCE PLACEMENT RAD THERAPY FIELDS: CPT | Mod: 26,,, | Performed by: RADIOLOGY

## 2022-09-13 PROCEDURE — 77385 HC IMRT, SIMPLE: CPT | Performed by: RADIOLOGY

## 2022-09-14 ENCOUNTER — DOCUMENTATION ONLY (OUTPATIENT)
Dept: RADIATION ONCOLOGY | Facility: CLINIC | Age: 67
End: 2022-09-14
Payer: MEDICARE

## 2022-09-14 PROCEDURE — 77014 PR  CT GUIDANCE PLACEMENT RAD THERAPY FIELDS: ICD-10-PCS | Mod: 26,,, | Performed by: RADIOLOGY

## 2022-09-14 PROCEDURE — 77014 PR  CT GUIDANCE PLACEMENT RAD THERAPY FIELDS: CPT | Mod: 26,,, | Performed by: RADIOLOGY

## 2022-09-14 PROCEDURE — 77014 HC CT GUIDANCE RADIATION THERAPY FLDS PLACEMENT: CPT | Mod: TC | Performed by: RADIOLOGY

## 2022-09-14 PROCEDURE — 77385 HC IMRT, SIMPLE: CPT | Performed by: RADIOLOGY

## 2022-09-14 NOTE — PLAN OF CARE
Day 19 of outpatient xrt to the prostate. Denies any pain, has occasional diarrhea but it is controlled, no blood noted in urine or stool, no urinary issues. Will continue to monitor.

## 2022-09-15 PROCEDURE — 77385 HC IMRT, SIMPLE: CPT | Performed by: RADIOLOGY

## 2022-09-15 PROCEDURE — 77336 RADIATION PHYSICS CONSULT: CPT | Performed by: RADIOLOGY

## 2022-09-15 PROCEDURE — 77014 PR  CT GUIDANCE PLACEMENT RAD THERAPY FIELDS: CPT | Mod: 26,,, | Performed by: RADIOLOGY

## 2022-09-15 PROCEDURE — 77014 PR  CT GUIDANCE PLACEMENT RAD THERAPY FIELDS: ICD-10-PCS | Mod: 26,,, | Performed by: RADIOLOGY

## 2022-09-15 PROCEDURE — 77014 HC CT GUIDANCE RADIATION THERAPY FLDS PLACEMENT: CPT | Mod: TC | Performed by: RADIOLOGY

## 2022-09-16 PROCEDURE — 77014 HC CT GUIDANCE RADIATION THERAPY FLDS PLACEMENT: CPT | Mod: TC | Performed by: RADIOLOGY

## 2022-09-16 PROCEDURE — 77014 PR  CT GUIDANCE PLACEMENT RAD THERAPY FIELDS: CPT | Mod: 26,,, | Performed by: RADIOLOGY

## 2022-09-16 PROCEDURE — 77014 PR  CT GUIDANCE PLACEMENT RAD THERAPY FIELDS: ICD-10-PCS | Mod: 26,,, | Performed by: RADIOLOGY

## 2022-09-16 PROCEDURE — 77385 HC IMRT, SIMPLE: CPT | Performed by: RADIOLOGY

## 2022-09-19 PROCEDURE — 77014 PR  CT GUIDANCE PLACEMENT RAD THERAPY FIELDS: ICD-10-PCS | Mod: 26,,, | Performed by: RADIOLOGY

## 2022-09-19 PROCEDURE — 77385 HC IMRT, SIMPLE: CPT | Performed by: RADIOLOGY

## 2022-09-19 PROCEDURE — 77014 HC CT GUIDANCE RADIATION THERAPY FLDS PLACEMENT: CPT | Mod: TC | Performed by: RADIOLOGY

## 2022-09-19 PROCEDURE — 77014 PR  CT GUIDANCE PLACEMENT RAD THERAPY FIELDS: CPT | Mod: 26,,, | Performed by: RADIOLOGY

## 2022-09-20 PROCEDURE — 77014 PR  CT GUIDANCE PLACEMENT RAD THERAPY FIELDS: ICD-10-PCS | Mod: 26,,, | Performed by: RADIOLOGY

## 2022-09-20 PROCEDURE — 77014 HC CT GUIDANCE RADIATION THERAPY FLDS PLACEMENT: CPT | Mod: TC | Performed by: RADIOLOGY

## 2022-09-20 PROCEDURE — 77014 PR  CT GUIDANCE PLACEMENT RAD THERAPY FIELDS: CPT | Mod: 26,,, | Performed by: RADIOLOGY

## 2022-09-20 PROCEDURE — 77385 HC IMRT, SIMPLE: CPT | Performed by: RADIOLOGY

## 2022-09-21 ENCOUNTER — DOCUMENTATION ONLY (OUTPATIENT)
Dept: RADIATION ONCOLOGY | Facility: CLINIC | Age: 67
End: 2022-09-21
Payer: MEDICARE

## 2022-09-21 PROCEDURE — 77385 HC IMRT, SIMPLE: CPT | Performed by: RADIOLOGY

## 2022-09-21 PROCEDURE — 77014 PR  CT GUIDANCE PLACEMENT RAD THERAPY FIELDS: CPT | Mod: 26,,, | Performed by: RADIOLOGY

## 2022-09-21 PROCEDURE — 77014 HC CT GUIDANCE RADIATION THERAPY FLDS PLACEMENT: CPT | Mod: TC | Performed by: RADIOLOGY

## 2022-09-21 PROCEDURE — 77014 PR  CT GUIDANCE PLACEMENT RAD THERAPY FIELDS: ICD-10-PCS | Mod: 26,,, | Performed by: RADIOLOGY

## 2022-09-21 NOTE — PLAN OF CARE
Day 24 of outpatient xrt to the prostate. Pt states doing the same;blood noted; no urinary issues and mild occassional diarrhea thats controlled. Will continue to monitor.

## 2022-09-22 PROCEDURE — 77336 RADIATION PHYSICS CONSULT: CPT | Performed by: RADIOLOGY

## 2022-09-22 PROCEDURE — 77014 PR  CT GUIDANCE PLACEMENT RAD THERAPY FIELDS: ICD-10-PCS | Mod: 26,,, | Performed by: RADIOLOGY

## 2022-09-22 PROCEDURE — 77385 HC IMRT, SIMPLE: CPT | Performed by: RADIOLOGY

## 2022-09-22 PROCEDURE — 77014 HC CT GUIDANCE RADIATION THERAPY FLDS PLACEMENT: CPT | Mod: TC | Performed by: RADIOLOGY

## 2022-09-22 PROCEDURE — 77014 PR  CT GUIDANCE PLACEMENT RAD THERAPY FIELDS: CPT | Mod: 26,,, | Performed by: RADIOLOGY

## 2022-09-23 PROCEDURE — 77385 HC IMRT, SIMPLE: CPT | Performed by: RADIOLOGY

## 2022-09-23 PROCEDURE — 77014 PR  CT GUIDANCE PLACEMENT RAD THERAPY FIELDS: CPT | Mod: 26,,, | Performed by: RADIOLOGY

## 2022-09-23 PROCEDURE — 77014 HC CT GUIDANCE RADIATION THERAPY FLDS PLACEMENT: CPT | Mod: TC | Performed by: RADIOLOGY

## 2022-09-23 PROCEDURE — 77014 PR  CT GUIDANCE PLACEMENT RAD THERAPY FIELDS: ICD-10-PCS | Mod: 26,,, | Performed by: RADIOLOGY

## 2022-09-26 PROCEDURE — 77014 HC CT GUIDANCE RADIATION THERAPY FLDS PLACEMENT: CPT | Mod: TC | Performed by: RADIOLOGY

## 2022-09-26 PROCEDURE — 77014 PR  CT GUIDANCE PLACEMENT RAD THERAPY FIELDS: ICD-10-PCS | Mod: 26,,, | Performed by: RADIOLOGY

## 2022-09-26 PROCEDURE — 77385 HC IMRT, SIMPLE: CPT | Performed by: RADIOLOGY

## 2022-09-26 PROCEDURE — 77014 PR  CT GUIDANCE PLACEMENT RAD THERAPY FIELDS: CPT | Mod: 26,,, | Performed by: RADIOLOGY

## 2022-09-27 ENCOUNTER — DOCUMENTATION ONLY (OUTPATIENT)
Dept: RADIATION ONCOLOGY | Facility: CLINIC | Age: 67
End: 2022-09-27
Payer: MEDICARE

## 2022-09-27 PROCEDURE — 77014 HC CT GUIDANCE RADIATION THERAPY FLDS PLACEMENT: CPT | Mod: TC | Performed by: RADIOLOGY

## 2022-09-27 PROCEDURE — 77014 PR  CT GUIDANCE PLACEMENT RAD THERAPY FIELDS: ICD-10-PCS | Mod: 26,,, | Performed by: RADIOLOGY

## 2022-09-27 PROCEDURE — 77385 HC IMRT, SIMPLE: CPT | Performed by: RADIOLOGY

## 2022-09-27 PROCEDURE — 77014 PR  CT GUIDANCE PLACEMENT RAD THERAPY FIELDS: CPT | Mod: 26,,, | Performed by: RADIOLOGY

## 2022-09-28 PROCEDURE — 77336 RADIATION PHYSICS CONSULT: CPT | Performed by: RADIOLOGY

## 2022-10-04 ENCOUNTER — TELEPHONE (OUTPATIENT)
Dept: RADIATION ONCOLOGY | Facility: CLINIC | Age: 67
End: 2022-10-04
Payer: MEDICARE

## 2022-10-04 NOTE — TELEPHONE ENCOUNTER
Made a f/u call to check on the patient to see how he was doing since completing his xrt to the prostate last week. The patient said he was doing well, said he was a little sore but other than that he was doing well & will see us for his f/u appt.  ----- Message from Vida Abdi RN sent at 9/27/2022  8:23 AM CDT -----  Regarding: make 1 week f/u call

## 2022-11-21 ENCOUNTER — LAB VISIT (OUTPATIENT)
Dept: LAB | Facility: HOSPITAL | Age: 67
End: 2022-11-21
Attending: UROLOGY
Payer: MEDICARE

## 2022-11-21 DIAGNOSIS — C61 PROSTATE CANCER: ICD-10-CM

## 2022-11-21 LAB — COMPLEXED PSA SERPL-MCNC: 0.02 NG/ML (ref 0–4)

## 2022-11-21 PROCEDURE — 36415 COLL VENOUS BLD VENIPUNCTURE: CPT | Performed by: UROLOGY

## 2022-11-21 PROCEDURE — 84153 ASSAY OF PSA TOTAL: CPT | Performed by: UROLOGY

## 2022-12-19 ENCOUNTER — CLINICAL SUPPORT (OUTPATIENT)
Dept: UROLOGY | Facility: CLINIC | Age: 67
End: 2022-12-19
Payer: MEDICARE

## 2022-12-19 DIAGNOSIS — C61 PROSTATE CANCER: Primary | ICD-10-CM

## 2022-12-19 PROCEDURE — 96402 CHEMO HORMON ANTINEOPL SQ/IM: CPT | Mod: PBBFAC

## 2022-12-19 RX ADMIN — LEUPROLIDE ACETATE 45 MG: KIT at 08:12

## 2022-12-19 NOTE — PROGRESS NOTES
.Patient came today for Lupron injection. Donned proper PPE using aseptic technique, pt was giving 45mg of Lupron IM to right ventrogluteal. Pt tolerated procedure well. Pt was instructed to remain in clinic for 15 minutes and to report any adverse reaction to staff.      Loraine Milanes RN

## 2022-12-27 ENCOUNTER — OFFICE VISIT (OUTPATIENT)
Dept: UROLOGY | Facility: CLINIC | Age: 67
End: 2022-12-27
Payer: MEDICARE

## 2022-12-27 VITALS
DIASTOLIC BLOOD PRESSURE: 85 MMHG | WEIGHT: 157 LBS | SYSTOLIC BLOOD PRESSURE: 149 MMHG | BODY MASS INDEX: 23.18 KG/M2 | HEART RATE: 76 BPM

## 2022-12-27 DIAGNOSIS — C61 PROSTATE CANCER: Primary | ICD-10-CM

## 2022-12-27 PROCEDURE — 99213 OFFICE O/P EST LOW 20 MIN: CPT | Mod: PBBFAC | Performed by: UROLOGY

## 2022-12-27 PROCEDURE — 99999 PR PBB SHADOW E&M-EST. PATIENT-LVL III: ICD-10-PCS | Mod: PBBFAC,,, | Performed by: UROLOGY

## 2022-12-27 PROCEDURE — 99213 OFFICE O/P EST LOW 20 MIN: CPT | Mod: S$PBB,,, | Performed by: UROLOGY

## 2022-12-27 PROCEDURE — 99213 PR OFFICE/OUTPT VISIT, EST, LEVL III, 20-29 MIN: ICD-10-PCS | Mod: S$PBB,,, | Performed by: UROLOGY

## 2022-12-27 PROCEDURE — 99999 PR PBB SHADOW E&M-EST. PATIENT-LVL III: CPT | Mod: PBBFAC,,, | Performed by: UROLOGY

## 2022-12-27 NOTE — PROGRESS NOTES
Chief Complaint:  High risk prostate cancer    HPI:   12/27/2022 - patient returns today for follow-up, doing well with the Lupron, has noticed that he gets tired little bit easier, also has a little bit of fecal urgency but voiding okay, PSA down to 0.02, denies any gross hematuria or dysuria    08/24/2022 - returns for follow-up, finished his 5th day of radiation today, overall feeling well, a little tired and maybe some hot flashes, no voiding issues or GH, no dysuria, PSA down to 4.2    05/13/2022 - returns today for follow-up, no issues since his biopsy, path reviewed [place] 3 + 3 in left apex and [place] 3 + 3 in right apex, presents today for discussion    04/28/2022 - TRUS bx    03/29/2022 - returns today for follow-up, PSA now up to 97, voiding well, denies any pelvic discomfort, denies fevers, denies GH    03/08/2022 - 67yo male that presents for elevated PSA.  Patient had routine lab work obtained as part of normal screening in late February which resulted with PSA of 80.6.  Previous PSA level in our system 3.2 in 2009.  Patient notes some intermittency with his urinary stream but denies overt weak stream.  Denies gross hematuria.  He is a smoker and quit about 2 months ago.  Denies family history of  cancers.  Had a vasectomy in 1985 but no other urologic procedures. Mild ED, does not take any meds.     PMH:  Past Medical History:   Diagnosis Date    Tobacco use        PSH:  Past Surgical History:   Procedure Laterality Date    COLONOSCOPY      COLONOSCOPY N/A 5/20/2022    Procedure: COLONOSCOPY;  Surgeon: Jamia Alfaro MD;  Location: [facility];  Service: Endoscopy;  Laterality: N/A;    VASECTOMY  1985       Family History:  Family History   Problem Relation Age of Onset    No Known Problems Mother     Heart disease Father     No Known Problems Brother     No Known Problems Brother     No Known Problems Daughter     No Known Problems Daughter        Social History:  Social History     Tobacco Use     Smoking status: Some Days     Packs/day: 1.00     Types: Cigarettes     Last attempt to quit: 1/15/2022     Years since quittin.9    Smokeless tobacco: Never   Substance Use Topics    Alcohol use: Yes     Alcohol/week: 2.0 standard drinks     Types: 2 Shots of liquor per week    Drug use: Never        Review of Systems:  General: No fever, chills  Skin: No rashes  Chest:  Denies cough and sputum production  Heart: Denies chest pain  Resp: Denies dyspnea  Abdomen: Denies diarrhea, abdominal pain, hematemesis, or blood in stool.  Musculoskeletal: No joint stiffness or swelling. Denies back pain.  : see HPI  Neuro: no dizziness or weakness    Allergies:  Patient has no known allergies.    Medications:    Current Outpatient Medications:     azelastine (ASTELIN) 137 mcg (0.1 %) nasal spray, 1 spray (137 mcg total) by Nasal route 2 (two) times daily., Disp: 30 mL, Rfl: 3    fluticasone propionate (FLONASE) 50 mcg/actuation nasal spray, 1 spray (50 mcg total) by Each Nostril route once daily. (Patient not taking: Reported on 2022), Disp: 16 g, Rfl: 0    Physical Exam:  Vitals:    22 1037   BP: (!) 149/85   Pulse: 76     Body mass index is 23.18 kg/m².  General: awake, alert, cooperative  Head: NC/AT  Ears: external ears normal  Eyes: sclera normal  Lungs: normal inspiration, NAD  Heart: well-perfused  Abdomen: Soft, NT, ND   3/22: Normal uncirc'd phallus, meatus normal in size and position, BL testicles palpable, no masses, nontender, no abnormalities of epididymi  CHRISTOPH 3/22: Normal rectal tone, no hemorrhoids. Prostate smooth and normal, no nodules 40 gm SV not palpable. Perineum and anus normal.  Lymphatic: groin nodes negative  Skin: The skin is warm and dry  Ext: No c/c/e.  Neuro: grossly intact, AOx3    RADIOLOGY:  No recent relevant imaging available for review.    LABS:  I personally reviewed the following lab values:  Lab Results   Component Value Date    WBC 6.26 2022    HGB 13.9 (L)  02/25/2022    HCT 41.3 02/25/2022     02/25/2022     02/25/2022    K 4.3 02/25/2022     02/25/2022    CREATININE 0.7 07/01/2022    BUN 15 02/25/2022    CO2 26 02/25/2022    TSH 0.677 02/25/2022    PSA 80.6 (H) 02/25/2022    CHOL 159 02/25/2022    TRIG 57 02/25/2022    HDL 43 02/25/2022    ALT 18 02/25/2022    AST 18 02/25/2022     1. PROSTATE, LEFT BASE, BIOPSY:   - Benign prostate tissue.   2. PROSTATE, LEFT APEX, BIOPSY:   - Prostatic adenocarcinoma, Dwight score 3+3=6 (Grade group 1) involving 2   of 2 cores (5% of tissue submitted).   - Total linear lengths of carcinoma are <1 mm and 1 mm.   3. PROSTATE, LEFT MID, BIOPSY:   - High grade prostatic intraepithelial lesion (PIN).   - AMACR, p63 and HMWK with appropriate controls are performed and support the   diagnosis.   4. PROSTATE, RIGHT BASE, BIOPSY:   - Prostate tissue with small focus of atypical glands, suspicious for   carcinoma.   - Most of the glands of interest are not present on Immunohistochemically   stained slides for further evaluation.   5. PROSTATE, RIGHT APEX, BIOPSY:   - Prostatic adenocarcinoma, [place] score 3+3=6 (Grade group 1) involving 2   of 3 cores (10% of tissue submitted).   - Total linear lengths of carcinoma are <1 mm and 2 mm.   - AMACR, p63 and HMWK with appropriate controls are performed and support the   diagnosis.   6. PROSTATE, RIGHT MID, BIOPSY:   - High grade prostatic intraepithelial lesion (PIN).     Assessment/Plan:   Aurelio Daniels is a 67 y.o. male with high risk prostate cancer s/p XRT, PSA well down, f/u 3 months with PSA    Jian Smith MD  Urology

## 2022-12-30 ENCOUNTER — OFFICE VISIT (OUTPATIENT)
Dept: RADIATION ONCOLOGY | Facility: CLINIC | Age: 67
End: 2022-12-30
Payer: MEDICARE

## 2022-12-30 VITALS
DIASTOLIC BLOOD PRESSURE: 87 MMHG | HEART RATE: 75 BPM | RESPIRATION RATE: 18 BRPM | OXYGEN SATURATION: 95 % | SYSTOLIC BLOOD PRESSURE: 148 MMHG | TEMPERATURE: 98 F | BODY MASS INDEX: 22.98 KG/M2 | WEIGHT: 155.19 LBS | HEIGHT: 69 IN

## 2022-12-30 DIAGNOSIS — C61 PROSTATE CANCER: Primary | ICD-10-CM

## 2022-12-30 PROCEDURE — 99213 OFFICE O/P EST LOW 20 MIN: CPT | Mod: PBBFAC | Performed by: RADIOLOGY

## 2022-12-30 PROCEDURE — 99213 OFFICE O/P EST LOW 20 MIN: CPT | Mod: S$PBB,,, | Performed by: RADIOLOGY

## 2022-12-30 PROCEDURE — 99213 PR OFFICE/OUTPT VISIT, EST, LEVL III, 20-29 MIN: ICD-10-PCS | Mod: S$PBB,,, | Performed by: RADIOLOGY

## 2022-12-30 PROCEDURE — 99999 PR PBB SHADOW E&M-EST. PATIENT-LVL III: ICD-10-PCS | Mod: PBBFAC,,, | Performed by: RADIOLOGY

## 2022-12-30 PROCEDURE — 99999 PR PBB SHADOW E&M-EST. PATIENT-LVL III: CPT | Mod: PBBFAC,,, | Performed by: RADIOLOGY

## 2022-12-30 NOTE — PROGRESS NOTES
"[facility] [place]  RADIATION ONCOLOGY FOLLOW UP    Name: Aurelio Daniels : 1955     DIAGNOSIS: high risk prostate cancer, Dwight 3+3=6, PSA 97, [id number]    TREATMENT HISTORY:   70Gy/28fx to prostate/SVs and 47.6Gy to nodes completed 22  Concurrent ADT and long term planned    INTERVAL HISTORY: Aurelio Daniels is a 67 y.o. male who presents today for follow-up.  This is his first follow up after completing radiation. He completed 70GY/28fx with 47.6Gy pelvic nodes without interruption. Had fatigue and hot flashes from ADT. No real changes to bladder/bowels, no meds required. Good setup daily.     Today, he is doing well overall.   He denies diarrhea, constipation, melena, bloody stools, or rectal pain.   He denies hematuria, dysuria, or straining.  No bladder meds  Saw urology recently had 6mo Lupron on 22  PSA 22 - 0.02    PHYSICAL EXAM:   Constitutional: well appearing, no acute distress, ECOG 0 - Fully Active  Vitals:    BP (!) 148/87   Pulse 75   Temp 97.8 °F (36.6 °C)   Resp 18   Ht 5' 9" (1.753 m)   Wt 70.4 kg (155 lb 3.2 oz)   SpO2 95%   BMI 22.92 kg/m²   Eyes: sclera anicteric, EOMI, pupils equal, round and reactive to light  ENT: oral cavity without lesions, moist mucous membranes  Cardiovascular: regular rate, no edema of the upper or lower extremities, radial pulse 2+  Respiratory: unlabored effort, clear to auscultation, no wheezes  Abdomen: soft, non-tender, no rigidity, no masses, no hepatomegaly  Spine: non-tender to percussion cervical, thoracic and lumbosacral spine    Laboratory & X-Ray Findings: Per above.      PSA   22 - 0.02  22 - 4.2  3/25/22 - 97.3    ASSESSMENT: recovering well from acute toxicities of radiation with expected PSA response    PLAN: Mr. Daniels has recovered well from radiation and PSA has responded appropriately.  Continue f/u with urology also.  Will need long term ADT for 2 years - he will need 2 more " injections     Follow up with urology, needs PSA q6mo and ADT for 18 more months    I spent approximately 20 minutes reviewing the available records and evaluating the patient, out of which over 50% of the time was spent face to face with the patient in counseling and coordinating this patient's care.    Maria Luisa Torres III, M.D.  Radiation Oncology  [facility] [address] [place] [address]  Ph: [phone]  [email or web address]

## 2023-02-27 ENCOUNTER — HOSPITAL ENCOUNTER (OUTPATIENT)
Dept: CARDIOLOGY | Facility: HOSPITAL | Age: 68
Discharge: HOME | End: 2023-02-27
Attending: PEDIATRICS
Payer: MEDICARE

## 2023-02-27 ENCOUNTER — OFFICE VISIT (OUTPATIENT)
Dept: INTERNAL MEDICINE | Facility: CLINIC | Age: 68
End: 2023-02-27
Payer: MEDICARE

## 2023-02-27 VITALS
SYSTOLIC BLOOD PRESSURE: 116 MMHG | BODY MASS INDEX: 23.31 KG/M2 | OXYGEN SATURATION: 96 % | TEMPERATURE: 99 F | HEART RATE: 78 BPM | DIASTOLIC BLOOD PRESSURE: 78 MMHG | WEIGHT: 157.88 LBS

## 2023-02-27 DIAGNOSIS — Z13.6 ENCOUNTER FOR LIPID SCREENING FOR CARDIOVASCULAR DISEASE: Primary | ICD-10-CM

## 2023-02-27 DIAGNOSIS — F17.210 CIGARETTE SMOKER: ICD-10-CM

## 2023-02-27 DIAGNOSIS — R06.09 DOE (DYSPNEA ON EXERTION): ICD-10-CM

## 2023-02-27 DIAGNOSIS — Z13.220 ENCOUNTER FOR LIPID SCREENING FOR CARDIOVASCULAR DISEASE: Primary | ICD-10-CM

## 2023-02-27 DIAGNOSIS — D64.9 ANEMIA, UNSPECIFIED TYPE: ICD-10-CM

## 2023-02-27 DIAGNOSIS — Z13.6 ENCOUNTER FOR ABDOMINAL AORTIC ANEURYSM (AAA) SCREENING: ICD-10-CM

## 2023-02-27 DIAGNOSIS — Z00.00 WELL ADULT EXAM: ICD-10-CM

## 2023-02-27 PROCEDURE — 93010 EKG 12-LEAD: ICD-10-PCS | Mod: ,,, | Performed by: INTERNAL MEDICINE

## 2023-02-27 PROCEDURE — 99213 OFFICE O/P EST LOW 20 MIN: CPT | Mod: PBBFAC | Performed by: PEDIATRICS

## 2023-02-27 PROCEDURE — 99999 PR PBB SHADOW E&M-EST. PATIENT-LVL III: ICD-10-PCS | Mod: PBBFAC,,, | Performed by: PEDIATRICS

## 2023-02-27 PROCEDURE — 93010 ELECTROCARDIOGRAM REPORT: CPT | Mod: ,,, | Performed by: INTERNAL MEDICINE

## 2023-02-27 PROCEDURE — 90677 PCV20 VACCINE IM: CPT | Mod: PBBFAC

## 2023-02-27 PROCEDURE — 93005 ELECTROCARDIOGRAM TRACING: CPT

## 2023-02-27 PROCEDURE — 99999 PR PBB SHADOW E&M-EST. PATIENT-LVL III: CPT | Mod: PBBFAC,,, | Performed by: PEDIATRICS

## 2023-02-27 PROCEDURE — 99397 PR PREVENTIVE VISIT,EST,65 & OVER: ICD-10-PCS | Mod: S$PBB,GZ,, | Performed by: PEDIATRICS

## 2023-02-27 PROCEDURE — 99397 PER PM REEVAL EST PAT 65+ YR: CPT | Mod: S$PBB,GZ,, | Performed by: PEDIATRICS

## 2023-02-27 NOTE — PROGRESS NOTES
Subjective:       Patient ID: Aurelio Daniels is a 67 y.o. male.    Chief Complaint: No chief complaint on file.    Aurelio Daniels is a 67 y.o. male who presents to clinic for annual       Prostate cancer: followed by Dr. Smith (urology)     Hx of smoking: smokes 4-5 daily (1pack every 3-4 days) and does not smoke the whole thing      PMHx, PSHx, SocHx, and FHx reviewed and discussed with patient.     Has had gradual onset of OH and decreased exercise capacity.    Review of Systems   Constitutional:  Negative for activity change, appetite change, chills, diaphoresis, fatigue, fever and unexpected weight change.   HENT:  Negative for nasal congestion, ear pain, mouth sores, nosebleeds, postnasal drip, rhinorrhea, sneezing and sore throat.    Eyes:  Negative for photophobia, pain, discharge, redness and visual disturbance.   Respiratory:  Positive for shortness of breath. Negative for cough, chest tightness, wheezing and stridor.    Cardiovascular:  Negative for chest pain, palpitations and leg swelling.   Gastrointestinal:  Positive for diarrhea (post radiation) and fecal incontinence. Negative for abdominal distention, blood in stool, constipation, nausea and vomiting.   Endocrine:        Hot flashes, sweats from lupron   Genitourinary:  Negative for decreased urine volume, difficulty urinating, dysuria, flank pain, frequency, genital sores, hematuria and urgency.   Musculoskeletal:  Negative for arthralgias, back pain, joint swelling, neck pain and neck stiffness.   Integumentary:  Negative for color change, pallor, rash and wound.   Neurological:  Negative for dizziness, syncope, speech difficulty, weakness, light-headedness and headaches.   Hematological:  Negative for adenopathy. Does not bruise/bleed easily.   Psychiatric/Behavioral:  Negative for confusion, decreased concentration, dysphoric mood, hallucinations, sleep disturbance and suicidal ideas. The patient is not nervous/anxious.    All  other systems reviewed and are negative.      Objective:      Physical Exam  Vitals and nursing note reviewed.   Constitutional:       General: He is not in acute distress.     Appearance: He is well-developed.   Neck:      Thyroid: No thyromegaly.      Vascular: No JVD.   Cardiovascular:      Rate and Rhythm: Normal rate and regular rhythm.      Heart sounds: Normal heart sounds. No murmur heard.  Pulmonary:      Effort: Pulmonary effort is normal. No respiratory distress.      Breath sounds: Normal breath sounds. No wheezing or rales.   Abdominal:      General: There is no distension.      Palpations: Abdomen is soft. There is no mass.      Tenderness: There is no abdominal tenderness. There is no guarding.   Musculoskeletal:      Right lower leg: No edema.      Left lower leg: No edema.   Lymphadenopathy:      Cervical: No cervical adenopathy.   Skin:     Capillary Refill: Capillary refill takes less than 2 seconds.      Findings: No rash.   Neurological:      General: No focal deficit present.      Mental Status: He is alert and oriented to person, place, and time.      Cranial Nerves: No cranial nerve deficit.      Coordination: Coordination normal.   Psychiatric:         Mood and Affect: Mood normal.         Behavior: Behavior normal.         Thought Content: Thought content normal.         Judgment: Judgment normal.       Assessment:       Problem List Items Addressed This Visit       Cigarette smoker     Other Visit Diagnoses       Encounter for lipid screening for cardiovascular disease    -  Primary    Relevant Orders    Lipid Panel    Comprehensive Metabolic Panel    Anemia, unspecified type        Relevant Orders    CBC Auto Differential    Encounter for abdominal aortic aneurysm (AAA) screening        Relevant Orders    US AAA Screening    Well adult exam        OH (dyspnea on exertion)        Relevant Orders    Exercise Stress - EKG    EKG 12-lead    Complete PFT with bronchodilator            Plan:      Encounter for lipid screening for cardiovascular disease  -     Lipid Panel; Future; Expected date: 02/27/2023  -     Comprehensive Metabolic Panel; Future; Expected date: 02/27/2023    Cigarette smoker    Anemia, unspecified type  -     CBC Auto Differential; Future; Expected date: 02/27/2023    Encounter for abdominal aortic aneurysm (AAA) screening  -      AAA Screening; Future; Expected date: 02/27/2023    Well adult exam    OH (dyspnea on exertion)  -     Exercise Stress - EKG; Future  -     EKG 12-lead; Future  -     Complete PFT with bronchodilator; Future; Expected date: 02/27/2023    Other orders  -     (In Office Administered) Pneumococcal Conjugate Vaccine (20 Valent) (IM)         HMI discussed. Await above w/u. Smoking cessation offered. He declines. Follow up based on w/u and annual.     Scribe Attestation:   I, Eitan Mejia, am scribing for, and in the presence of, Dr. Shlomo Villatoro Jr. I performed the above scribed service and the documentation accurately describes the services I performed. I attest to the accuracy of the note.    I, Dr. Shlomo Villatoro Jr, reviewed documentation as scribed above. I personally performed the services described in this documentation.  I agree that the record reflects my personal performance and is accurate and complete. Shlomo Villatoro Jr., MD.  02/27/2023

## 2023-02-28 ENCOUNTER — TELEPHONE (OUTPATIENT)
Dept: CARDIOLOGY | Facility: HOSPITAL | Age: 68
End: 2023-02-28
Payer: MEDICARE

## 2023-03-08 ENCOUNTER — CLINICAL SUPPORT (OUTPATIENT)
Dept: PULMONOLOGY | Facility: CLINIC | Age: 68
End: 2023-03-08
Payer: MEDICARE

## 2023-03-08 ENCOUNTER — HOSPITAL ENCOUNTER (OUTPATIENT)
Dept: RADIOLOGY | Facility: HOSPITAL | Age: 68
Discharge: HOME | End: 2023-03-08
Attending: PEDIATRICS
Payer: MEDICARE

## 2023-03-08 DIAGNOSIS — Z13.6 ENCOUNTER FOR ABDOMINAL AORTIC ANEURYSM (AAA) SCREENING: ICD-10-CM

## 2023-03-08 DIAGNOSIS — R06.09 DOE (DYSPNEA ON EXERTION): ICD-10-CM

## 2023-03-08 LAB
BRPFT: ABNORMAL
DLCO ADJ PRE: 16.22 ML/(MIN*MMHG) (ref 19.52–33.38)
DLCO SINGLE BREATH LLN: 19.52
DLCO SINGLE BREATH PRE REF: 61.3 %
DLCO SINGLE BREATH REF: 26.45
DLCOC SBVA LLN: 2.64
DLCOC SBVA PRE REF: 86.9 %
DLCOC SBVA REF: 3.83
DLCOC SINGLE BREATH LLN: 19.52
DLCOC SINGLE BREATH PRE REF: 61.3 %
DLCOC SINGLE BREATH REF: 26.45
DLCOVA LLN: 2.64
DLCOVA PRE REF: 86.9 %
DLCOVA PRE: 3.33 ML/(MIN*MMHG*L) (ref 2.64–5.02)
DLCOVA REF: 3.83
DLVAADJ PRE: 3.33 ML/(MIN*MMHG*L) (ref 2.64–5.02)
ERV LLN: -16448.95
ERV PRE REF: 80.3 %
ERV REF: 1.05
FEF 25 75 CHG: 29.5 %
FEF 25 75 LLN: 1.06
FEF 25 75 POST REF: 22.4 %
FEF 25 75 PRE REF: 17.3 %
FEF 25 75 REF: 2.41
FET100 CHG: 2 %
FEV1 CHG: 16.3 %
FEV1 FVC CHG: 5.7 %
FEV1 FVC LLN: 63
FEV1 FVC POST REF: 59.7 %
FEV1 FVC PRE REF: 56.5 %
FEV1 FVC REF: 76
FEV1 LLN: 2.27
FEV1 POST REF: 65.3 %
FEV1 PRE REF: 56.2 %
FEV1 REF: 3.13
FRCPLETH LLN: 2.64
FRCPLETH PREREF: 165.2 %
FRCPLETH REF: 3.63
FVC CHG: 10 %
FVC LLN: 3.06
FVC POST REF: 109 %
FVC PRE REF: 99.1 %
FVC REF: 4.12
IVC PRE: 3.25 L (ref 3.06–5.17)
IVC SINGLE BREATH LLN: 3.06
IVC SINGLE BREATH PRE REF: 79.1 %
IVC SINGLE BREATH REF: 4.12
MVV LLN: 103
MVV PRE REF: 51.9 %
MVV REF: 121
PEF CHG: 11.9 %
PEF LLN: 5.97
PEF POST REF: 67.6 %
PEF PRE REF: 60.4 %
PEF REF: 8.21
POST FEF 25 75: 0.54 L/S (ref 1.06–3.77)
POST FET 100: 22.06 SEC
POST FEV1 FVC: 45.55 % (ref 62.95–89.6)
POST FEV1: 2.04 L (ref 2.27–3.99)
POST FVC: 4.49 L (ref 3.06–5.17)
POST PEF: 5.55 L/S (ref 5.97–10.45)
PRE DLCO: 16.22 ML/(MIN*MMHG) (ref 19.52–33.38)
PRE ERV: 0.84 L (ref -16448.95–16451.05)
PRE FEF 25 75: 0.42 L/S (ref 1.06–3.77)
PRE FET 100: 21.62 SEC
PRE FEV1 FVC: 43.09 % (ref 62.95–89.6)
PRE FEV1: 1.76 L (ref 2.27–3.99)
PRE FRC PL: 5.99 L
PRE FVC: 4.08 L (ref 3.06–5.17)
PRE MVV: 63 L/MIN (ref 103.16–139.56)
PRE PEF: 4.96 L/S (ref 5.97–10.45)
PRE RV: 4.02 L (ref 1.91–3.25)
PRE TLC: 8.11 L (ref 5.75–8.05)
RAW LLN: 3.06
RAW PRE REF: 105.9 %
RAW PRE: 3.24 CMH2O*S/L (ref 3.06–3.06)
RAW REF: 3.06
RV LLN: 1.91
RV PRE REF: 155.9 %
RV REF: 2.58
RVTLC LLN: 32
RVTLC PRE REF: 121.4 %
RVTLC PRE: 49.62 % (ref 31.89–49.85)
RVTLC REF: 41
TLC LLN: 5.75
TLC PRE REF: 117.5 %
TLC REF: 6.9
VA PRE: 4.88 L (ref 6.75–6.75)
VA SINGLE BREATH LLN: 6.75
VA SINGLE BREATH PRE REF: 72.3 %
VA SINGLE BREATH REF: 6.75
VC LLN: 3.06
VC PRE REF: 99.2 %
VC PRE: 4.08 L (ref 3.06–5.17)
VC REF: 4.12
VTGRAWPRE: 6.34 L

## 2023-03-08 PROCEDURE — 76706 US ABDL AORTA SCREEN AAA: CPT | Mod: 26,,, | Performed by: RADIOLOGY

## 2023-03-08 PROCEDURE — 94060 EVALUATION OF WHEEZING: CPT | Mod: 26,S$PBB,, | Performed by: INTERNAL MEDICINE

## 2023-03-08 PROCEDURE — 76706 US ABDL AORTA SCREEN AAA: CPT | Mod: TC

## 2023-03-08 PROCEDURE — 94729 DIFFUSING CAPACITY: CPT | Mod: 26,S$PBB,, | Performed by: INTERNAL MEDICINE

## 2023-03-08 PROCEDURE — 94060 PR EVAL OF BRONCHOSPASM: ICD-10-PCS | Mod: 26,S$PBB,, | Performed by: INTERNAL MEDICINE

## 2023-03-08 PROCEDURE — 94726 PLETHYSMOGRAPHY LUNG VOLUMES: CPT | Mod: 26,S$PBB,, | Performed by: INTERNAL MEDICINE

## 2023-03-08 PROCEDURE — 94726 PLETHYSMOGRAPHY LUNG VOLUMES: CPT | Mod: PBBFAC

## 2023-03-08 PROCEDURE — 94060 EVALUATION OF WHEEZING: CPT | Mod: PBBFAC

## 2023-03-08 PROCEDURE — 76706 US AAA SCREENING: ICD-10-PCS | Mod: 26,,, | Performed by: RADIOLOGY

## 2023-03-08 PROCEDURE — 94729 PR C02/MEMBANE DIFFUSE CAPACITY: ICD-10-PCS | Mod: 26,S$PBB,, | Performed by: INTERNAL MEDICINE

## 2023-03-08 PROCEDURE — 94729 DIFFUSING CAPACITY: CPT | Mod: PBBFAC

## 2023-03-08 PROCEDURE — 94726 PULM FUNCT TST PLETHYSMOGRAP: ICD-10-PCS | Mod: 26,S$PBB,, | Performed by: INTERNAL MEDICINE

## 2023-03-09 DIAGNOSIS — J41.0 SIMPLE CHRONIC BRONCHITIS: ICD-10-CM

## 2023-03-09 DIAGNOSIS — F17.200 SMOKER: Primary | ICD-10-CM

## 2023-03-09 RX ORDER — ALBUTEROL SULFATE 90 UG/1
2 AEROSOL, METERED RESPIRATORY (INHALATION) EVERY 6 HOURS PRN
Qty: 18 G | Refills: 11 | Status: SHIPPED | OUTPATIENT
Start: 2023-03-09

## 2023-03-10 ENCOUNTER — LAB VISIT (OUTPATIENT)
Dept: LAB | Facility: HOSPITAL | Age: 68
End: 2023-03-10
Attending: PEDIATRICS
Payer: MEDICARE

## 2023-03-10 ENCOUNTER — PATIENT MESSAGE (OUTPATIENT)
Dept: INTERNAL MEDICINE | Facility: CLINIC | Age: 68
End: 2023-03-10
Payer: MEDICARE

## 2023-03-10 DIAGNOSIS — Z13.220 ENCOUNTER FOR LIPID SCREENING FOR CARDIOVASCULAR DISEASE: ICD-10-CM

## 2023-03-10 DIAGNOSIS — Z13.6 ENCOUNTER FOR LIPID SCREENING FOR CARDIOVASCULAR DISEASE: ICD-10-CM

## 2023-03-10 DIAGNOSIS — D64.9 ANEMIA, UNSPECIFIED TYPE: ICD-10-CM

## 2023-03-10 LAB
ALBUMIN SERPL BCP-MCNC: 3.7 G/DL (ref 3.5–5.2)
ALP SERPL-CCNC: 72 U/L (ref 55–135)
ALT SERPL W/O P-5'-P-CCNC: 24 U/L (ref 10–44)
ANION GAP SERPL CALC-SCNC: 6 MMOL/L (ref 8–16)
AST SERPL-CCNC: 22 U/L (ref 10–40)
BASOPHILS # BLD AUTO: 0.03 K/UL (ref 0–0.2)
BASOPHILS NFR BLD: 0.7 % (ref 0–1.9)
BILIRUB SERPL-MCNC: 0.4 MG/DL (ref 0.1–1)
BUN SERPL-MCNC: 15 MG/DL (ref 8–23)
CALCIUM SERPL-MCNC: 9.3 MG/DL (ref 8.7–10.5)
CHLORIDE SERPL-SCNC: 108 MMOL/L (ref 95–110)
CHOLEST SERPL-MCNC: 160 MG/DL (ref 120–199)
CHOLEST/HDLC SERPL: 3.1 {RATIO} (ref 2–5)
CO2 SERPL-SCNC: 25 MMOL/L (ref 23–29)
CREAT SERPL-MCNC: 0.8 MG/DL (ref 0.5–1.4)
DIFFERENTIAL METHOD: ABNORMAL
EOSINOPHIL # BLD AUTO: 0.1 K/UL (ref 0–0.5)
EOSINOPHIL NFR BLD: 3.1 % (ref 0–8)
ERYTHROCYTE [DISTWIDTH] IN BLOOD BY AUTOMATED COUNT: 12.3 % (ref 11.5–14.5)
EST. GFR  (NO RACE VARIABLE): >60 ML/MIN/1.73 M^2
GLUCOSE SERPL-MCNC: 95 MG/DL (ref 70–110)
HCT VFR BLD AUTO: 38.5 % (ref 40–54)
HDLC SERPL-MCNC: 52 MG/DL (ref 40–75)
HDLC SERPL: 32.5 % (ref 20–50)
HGB BLD-MCNC: 12.7 G/DL (ref 14–18)
IMM GRANULOCYTES # BLD AUTO: 0.02 K/UL (ref 0–0.04)
IMM GRANULOCYTES NFR BLD AUTO: 0.4 % (ref 0–0.5)
LDLC SERPL CALC-MCNC: 97.8 MG/DL (ref 63–159)
LYMPHOCYTES # BLD AUTO: 0.8 K/UL (ref 1–4.8)
LYMPHOCYTES NFR BLD: 17.2 % (ref 18–48)
MCH RBC QN AUTO: 32.7 PG (ref 27–31)
MCHC RBC AUTO-ENTMCNC: 33 G/DL (ref 32–36)
MCV RBC AUTO: 99 FL (ref 82–98)
MONOCYTES # BLD AUTO: 0.4 K/UL (ref 0.3–1)
MONOCYTES NFR BLD: 7.6 % (ref 4–15)
NEUTROPHILS # BLD AUTO: 3.3 K/UL (ref 1.8–7.7)
NEUTROPHILS NFR BLD: 71 % (ref 38–73)
NONHDLC SERPL-MCNC: 108 MG/DL
NRBC BLD-RTO: 0 /100 WBC
PLATELET # BLD AUTO: 330 K/UL (ref 150–450)
PMV BLD AUTO: 9.2 FL (ref 9.2–12.9)
POTASSIUM SERPL-SCNC: 4.1 MMOL/L (ref 3.5–5.1)
PROT SERPL-MCNC: 6 G/DL (ref 6–8.4)
RBC # BLD AUTO: 3.88 M/UL (ref 4.6–6.2)
SODIUM SERPL-SCNC: 139 MMOL/L (ref 136–145)
TRIGL SERPL-MCNC: 51 MG/DL (ref 30–150)
WBC # BLD AUTO: 4.59 K/UL (ref 3.9–12.7)

## 2023-03-10 PROCEDURE — 80061 LIPID PANEL: CPT | Performed by: PEDIATRICS

## 2023-03-10 PROCEDURE — 36415 COLL VENOUS BLD VENIPUNCTURE: CPT | Performed by: PEDIATRICS

## 2023-03-10 PROCEDURE — 85025 COMPLETE CBC W/AUTO DIFF WBC: CPT | Performed by: PEDIATRICS

## 2023-03-10 PROCEDURE — 80053 COMPREHEN METABOLIC PANEL: CPT | Performed by: PEDIATRICS

## 2023-03-24 ENCOUNTER — HOSPITAL ENCOUNTER (OUTPATIENT)
Dept: CARDIOLOGY | Facility: HOSPITAL | Age: 68
Discharge: HOME | End: 2023-03-24
Attending: PEDIATRICS
Payer: MEDICARE

## 2023-03-24 DIAGNOSIS — R06.09 DOE (DYSPNEA ON EXERTION): ICD-10-CM

## 2023-03-24 LAB
CV STRESS BASE HR: 74 BPM
DIASTOLIC BLOOD PRESSURE: 92 MMHG
OHS CV CPX 85 PERCENT MAX PREDICTED HEART RATE MALE: 128
OHS CV CPX MAX PREDICTED HEART RATE: 151
OHS CV CPX PATIENT IS FEMALE: 0
OHS CV CPX PATIENT IS MALE: 1
OHS CV CPX PEAK DIASTOLIC BLOOD PRESSURE: 87 MMHG
OHS CV CPX PEAK HEAR RATE: 134 BPM
OHS CV CPX PEAK RATE PRESSURE PRODUCT: NORMAL
OHS CV CPX PEAK SYSTOLIC BLOOD PRESSURE: 191 MMHG
OHS CV CPX PERCENT MAX PREDICTED HEART RATE ACHIEVED: 89
OHS CV CPX RATE PRESSURE PRODUCT PRESENTING: NORMAL
SYSTOLIC BLOOD PRESSURE: 146 MMHG

## 2023-03-24 PROCEDURE — 93017 CV STRESS TEST TRACING ONLY: CPT

## 2023-03-24 PROCEDURE — 93016 CV STRESS TEST SUPVJ ONLY: CPT | Mod: ,,, | Performed by: INTERNAL MEDICINE

## 2023-03-24 PROCEDURE — 93018 CV STRESS TEST I&R ONLY: CPT | Mod: ,,, | Performed by: INTERNAL MEDICINE

## 2023-03-24 PROCEDURE — 93016 EXERCISE STRESS - EKG (CUPID ONLY): ICD-10-PCS | Mod: ,,, | Performed by: INTERNAL MEDICINE

## 2023-03-24 PROCEDURE — 93018 EXERCISE STRESS - EKG (CUPID ONLY): ICD-10-PCS | Mod: ,,, | Performed by: INTERNAL MEDICINE

## 2023-03-26 RX ORDER — VALSARTAN 80 MG/1
80 TABLET ORAL DAILY
Qty: 90 TABLET | Refills: 3 | Status: SHIPPED | OUTPATIENT
Start: 2023-03-26 | End: 2023-10-22 | Stop reason: SDUPTHER

## 2023-03-26 RX ORDER — FLUTICASONE FUROATE, UMECLIDINIUM BROMIDE AND VILANTEROL TRIFENATATE 200; 62.5; 25 UG/1; UG/1; UG/1
1 POWDER RESPIRATORY (INHALATION) DAILY
Qty: 3 EACH | Refills: 3 | Status: SHIPPED | OUTPATIENT
Start: 2023-03-26

## 2023-03-28 ENCOUNTER — TELEPHONE (OUTPATIENT)
Dept: UROLOGY | Facility: CLINIC | Age: 68
End: 2023-03-28

## 2023-03-28 ENCOUNTER — OFFICE VISIT (OUTPATIENT)
Dept: UROLOGY | Facility: CLINIC | Age: 68
End: 2023-03-28
Payer: MEDICARE

## 2023-03-28 VITALS
SYSTOLIC BLOOD PRESSURE: 123 MMHG | BODY MASS INDEX: 23.25 KG/M2 | HEART RATE: 71 BPM | WEIGHT: 157 LBS | HEIGHT: 69 IN | DIASTOLIC BLOOD PRESSURE: 85 MMHG | TEMPERATURE: 97 F

## 2023-03-28 DIAGNOSIS — C61 PROSTATE CANCER: Primary | ICD-10-CM

## 2023-03-28 PROCEDURE — 99999 PR PBB SHADOW E&M-EST. PATIENT-LVL III: CPT | Mod: PBBFAC,,, | Performed by: UROLOGY

## 2023-03-28 PROCEDURE — 99213 PR OFFICE/OUTPT VISIT, EST, LEVL III, 20-29 MIN: ICD-10-PCS | Mod: S$PBB,,, | Performed by: UROLOGY

## 2023-03-28 PROCEDURE — 99213 OFFICE O/P EST LOW 20 MIN: CPT | Mod: S$PBB,,, | Performed by: UROLOGY

## 2023-03-28 PROCEDURE — 99999 PR PBB SHADOW E&M-EST. PATIENT-LVL III: ICD-10-PCS | Mod: PBBFAC,,, | Performed by: UROLOGY

## 2023-03-28 PROCEDURE — 99213 OFFICE O/P EST LOW 20 MIN: CPT | Mod: PBBFAC | Performed by: UROLOGY

## 2023-03-28 NOTE — PROGRESS NOTES
Chief Complaint:  High risk prostate cancer    HPI:   03/28/2023 - returns for follow-up, no issues in the interim, voiding well, no GH, does have some hot flashes but tolerating ok     12/27/2022 - patient returns today for follow-up, doing well with the Lupron, has noticed that he gets tired little bit easier, also has a little bit of fecal urgency but voiding okay, PSA down to 0.02, denies any gross hematuria or dysuria    08/24/2022 - returns for follow-up, finished his 5th day of radiation today, overall feeling well, a little tired and maybe some hot flashes, no voiding issues or GH, no dysuria, PSA down to 4.2    05/13/2022 - returns today for follow-up, no issues since his biopsy, path reviewed [place] 3 + 3 in left apex and Dw[place] 3 + 3 in right apex, presents today for discussion    04/28/2022 - TRUS bx    03/29/2022 - returns today for follow-up, PSA now up to 97, voiding well, denies any pelvic discomfort, denies fevers, denies GH    03/08/2022 - 65yo male that presents for elevated PSA.  Patient had routine lab work obtained as part of normal screening in late February which resulted with PSA of 80.6.  Previous PSA level in our system 3.2 in 2009.  Patient notes some intermittency with his urinary stream but denies overt weak stream.  Denies gross hematuria.  He is a smoker and quit about 2 months ago.  Denies family history of  cancers.  Had a vasectomy in 1985 but no other urologic procedures. Mild ED, does not take any meds.     PMH:  Past Medical History:   Diagnosis Date    Cancer     Prostate    Tobacco use        PSH:  Past Surgical History:   Procedure Laterality Date    COLONOSCOPY      COLONOSCOPY N/A 5/20/2022    Procedure: COLONOSCOPY;  Surgeon: Jamia Alfaro MD;  Location: [facility];  Service: Endoscopy;  Laterality: N/A;    VASECTOMY  1985       Family History:  Family History   Problem Relation Age of Onset    No Known Problems Mother     Heart disease Father     No Known Problems  Brother     No Known Problems Brother     No Known Problems Daughter     No Known Problems Daughter        Social History:  Social History     Tobacco Use    Smoking status: Some Days     Packs/day: 1.00     Types: Cigarettes     Last attempt to quit: 1/15/2022     Years since quittin.1    Smokeless tobacco: Never   Substance Use Topics    Alcohol use: Yes     Alcohol/week: 2.0 standard drinks     Types: 2 Shots of liquor per week    Drug use: Never        Review of Systems:  General: No fever, chills  Skin: No rashes  Chest:  Denies cough and sputum production  Heart: Denies chest pain  Resp: Denies dyspnea  Abdomen: Denies diarrhea, abdominal pain, hematemesis, or blood in stool.  Musculoskeletal: No joint stiffness or swelling. Denies back pain.  : see HPI  Neuro: no dizziness or weakness    Allergies:  Patient has no known allergies.    Medications:    Current Outpatient Medications:     albuterol (VENTOLIN HFA) 90 mcg/actuation inhaler, Inhale 2 puffs into the lungs every 6 (six) hours as needed for Wheezing. Rescue, Disp: 18 g, Rfl: 11    azelastine (ASTELIN) 137 mcg (0.1 %) nasal spray, 1 spray (137 mcg total) by Nasal route 2 (two) times daily., Disp: 30 mL, Rfl: 3    fluticasone propionate (FLONASE) 50 mcg/actuation nasal spray, 1 spray (50 mcg total) by Each Nostril route once daily., Disp: 16 g, Rfl: 0    fluticasone-umeclidin-vilanter (TRELEGY ELLIPTA) 200-62.5-25 mcg inhaler, Inhale 1 puff into the lungs once daily. Replaces trelegy 100, Disp: 3 each, Rfl: 3    valsartan (DIOVAN) 80 MG tablet, Take 1 tablet (80 mg total) by mouth once daily., Disp: 90 tablet, Rfl: 3    Physical Exam:  Vitals:    23 1130   BP: 123/85   Pulse: 71   Temp: 97 °F (36.1 °C)     Body mass index is 23.18 kg/m².  General: awake, alert, cooperative  Head: NC/AT  Ears: external ears normal  Eyes: sclera normal  Lungs: normal inspiration, NAD  Heart: well-perfused  Abdomen: Soft, NT, ND   3/22: Normal uncirc'd  phallus, meatus normal in size and position, BL testicles palpable, no masses, nontender, no abnormalities of epididymi  CHRISTOPH 3/22: Normal rectal tone, no hemorrhoids. Prostate smooth and normal, no nodules 40 gm SV not palpable. Perineum and anus normal.  Lymphatic: groin nodes negative  Skin: The skin is warm and dry  Ext: No c/c/e.  Neuro: grossly intact, AOx3    RADIOLOGY:  No recent relevant imaging available for review.    LABS:  I personally reviewed the following lab values:  Lab Results   Component Value Date    WBC 4.59 03/10/2023    HGB 12.7 (L) 03/10/2023    HCT 38.5 (L) 03/10/2023     03/10/2023     03/10/2023    K 4.1 03/10/2023     03/10/2023    CREATININE 0.8 03/10/2023    BUN 15 03/10/2023    CO2 25 03/10/2023    TSH 0.677 02/25/2022    PSA 80.6 (H) 02/25/2022    CHOL 160 03/10/2023    TRIG 51 03/10/2023    HDL 52 03/10/2023    ALT 24 03/10/2023    AST 22 03/10/2023     1. PROSTATE, LEFT BASE, BIOPSY:   - Benign prostate tissue.   2. PROSTATE, LEFT APEX, BIOPSY:   - Prostatic adenocarcinoma, Dwight score 3+3=6 (Grade group 1) involving 2   of 2 cores (5% of tissue submitted).   - Total linear lengths of carcinoma are <1 mm and 1 mm.   3. PROSTATE, LEFT MID, BIOPSY:   - High grade prostatic intraepithelial lesion (PIN).   - AMACR, p63 and HMWK with appropriate controls are performed and support the   diagnosis.   4. PROSTATE, RIGHT BASE, BIOPSY:   - Prostate tissue with small focus of atypical glands, suspicious for   carcinoma.   - Most of the glands of interest are not present on Immunohistochemically   stained slides for further evaluation.   5. PROSTATE, RIGHT APEX, BIOPSY:   - Prostatic adenocarcinoma, Dwight score 3+3=6 (Grade group 1) involving 2   of 3 cores (10% of tissue submitted).   - Total linear lengths of carcinoma are <1 mm and 2 mm.   - AMACR, p63 and HMWK with appropriate controls are performed and support the   diagnosis.   6. PROSTATE, RIGHT MID, BIOPSY:   -  High grade prostatic intraepithelial lesion (PIN).     Assessment/Plan:   Aurelio Daniels is a 67 y.o. male with high risk prostate cancer s/p XRT and ADT, PSA now undetectable, due for lupron 3/3 in 3 months, f/u 3 months with PSA    Jian Smith MD  Urology

## 2023-06-26 ENCOUNTER — LAB VISIT (OUTPATIENT)
Dept: LAB | Facility: HOSPITAL | Age: 68
End: 2023-06-26
Attending: UROLOGY
Payer: MEDICARE

## 2023-06-26 DIAGNOSIS — C61 PROSTATE CANCER: ICD-10-CM

## 2023-06-26 LAB — COMPLEXED PSA SERPL-MCNC: <0.01 NG/ML (ref 0–4)

## 2023-06-26 PROCEDURE — 84153 ASSAY OF PSA TOTAL: CPT | Performed by: UROLOGY

## 2023-06-26 PROCEDURE — 36415 COLL VENOUS BLD VENIPUNCTURE: CPT | Performed by: UROLOGY

## 2023-06-27 ENCOUNTER — OFFICE VISIT (OUTPATIENT)
Dept: UROLOGY | Facility: CLINIC | Age: 68
End: 2023-06-27
Payer: MEDICARE

## 2023-06-27 VITALS
RESPIRATION RATE: 18 BRPM | BODY MASS INDEX: 23.47 KG/M2 | DIASTOLIC BLOOD PRESSURE: 84 MMHG | HEIGHT: 69 IN | SYSTOLIC BLOOD PRESSURE: 138 MMHG | HEART RATE: 74 BPM | WEIGHT: 158.5 LBS

## 2023-06-27 DIAGNOSIS — C61 PROSTATE CANCER: Primary | ICD-10-CM

## 2023-06-27 PROCEDURE — 99214 OFFICE O/P EST MOD 30 MIN: CPT | Mod: S$PBB,,, | Performed by: UROLOGY

## 2023-06-27 PROCEDURE — 96402 CHEMO HORMON ANTINEOPL SQ/IM: CPT | Mod: PBBFAC

## 2023-06-27 PROCEDURE — 99999 PR PBB SHADOW E&M-EST. PATIENT-LVL III: CPT | Mod: PBBFAC,,, | Performed by: UROLOGY

## 2023-06-27 PROCEDURE — 99999 PR PBB SHADOW E&M-EST. PATIENT-LVL III: ICD-10-PCS | Mod: PBBFAC,,, | Performed by: UROLOGY

## 2023-06-27 PROCEDURE — 99214 PR OFFICE/OUTPT VISIT, EST, LEVL IV, 30-39 MIN: ICD-10-PCS | Mod: S$PBB,,, | Performed by: UROLOGY

## 2023-06-27 PROCEDURE — 99213 OFFICE O/P EST LOW 20 MIN: CPT | Mod: 25,PBBFAC | Performed by: UROLOGY

## 2023-06-27 RX ADMIN — LEUPROLIDE ACETATE 45 MG: KIT at 08:06

## 2023-06-27 NOTE — PROGRESS NOTES
.Patient came today for Lupron injection. Donned proper PPE.  Using aseptic technique, pt was giving 45 mg of Lupron IM to right dorsalgluteal. Pt tolerated procedure well. Pt was instructed to remain in clinic for 15 minutes and to report any adverse reaction to staff.      Loraine Milanes RN

## 2023-06-27 NOTE — PROGRESS NOTES
Chief Complaint:  High risk prostate cancer    HPI:   06/451027 - patient returns today for follow-up, no new issues in the interim, still having some very soft bowel movements, has a good flow and empties reasonably well, nocturia x2 but not bothersome, PSA remains undetectable    03/28/2023 - returns for follow-up, no issues in the interim, voiding well, no GH, does have some hot flashes but tolerating ok     12/27/2022 - patient returns today for follow-up, doing well with the Lupron, has noticed that he gets tired little bit easier, also has a little bit of fecal urgency but voiding okay, PSA down to 0.02, denies any gross hematuria or dysuria    08/24/2022 - returns for follow-up, finished his 5th day of radiation today, overall feeling well, a little tired and maybe some hot flashes, no voiding issues or GH, no dysuria, PSA down to 4.2    05/13/2022 - returns today for follow-up, no issues since his biopsy, path reviewed [place] 3 + 3 in left apex and Dwight 3 + 3 in right apex, presents today for discussion    04/28/2022 - TRUS bx    03/29/2022 - returns today for follow-up, PSA now up to 97, voiding well, denies any pelvic discomfort, denies fevers, denies GH    03/08/2022 - 65yo male that presents for elevated PSA.  Patient had routine lab work obtained as part of normal screening in late February which resulted with PSA of 80.6.  Previous PSA level in our system 3.2 in 2009.  Patient notes some intermittency with his urinary stream but denies overt weak stream.  Denies gross hematuria.  He is a smoker and quit about 2 months ago.  Denies family history of  cancers.  Had a vasectomy in 1985 but no other urologic procedures. Mild ED, does not take any meds.     PMH:  Past Medical History:   Diagnosis Date    Cancer     Prostate    Tobacco use        PSH:  Past Surgical History:   Procedure Laterality Date    COLONOSCOPY      COLONOSCOPY N/A 5/20/2022    Procedure: COLONOSCOPY;  Surgeon: Jamia Alfaro,  MD;  Location: [facility];  Service: Endoscopy;  Laterality: N/A;    VASECTOMY  1985       Family History:  Family History   Problem Relation Age of Onset    No Known Problems Mother     Heart disease Father     No Known Problems Brother     No Known Problems Brother     No Known Problems Daughter     No Known Problems Daughter        Social History:  Social History     Tobacco Use    Smoking status: Some Days     Packs/day: 1.00     Types: Cigarettes     Last attempt to quit: 1/15/2022     Years since quittin.4    Smokeless tobacco: Never   Substance Use Topics    Alcohol use: Yes     Alcohol/week: 2.0 standard drinks     Types: 2 Shots of liquor per week    Drug use: Never        Review of Systems:  General: No fever, chills  Skin: No rashes  Chest:  Denies cough and sputum production  Heart: Denies chest pain  Resp: Denies dyspnea  Abdomen: Denies diarrhea, abdominal pain, hematemesis, or blood in stool.  Musculoskeletal: No joint stiffness or swelling. Denies back pain.  : see HPI  Neuro: no dizziness or weakness    Allergies:  Patient has no known allergies.    Medications:    Current Outpatient Medications:     albuterol (VENTOLIN HFA) 90 mcg/actuation inhaler, Inhale 2 puffs into the lungs every 6 (six) hours as needed for Wheezing. Rescue, Disp: 18 g, Rfl: 11    valsartan (DIOVAN) 80 MG tablet, Take 1 tablet (80 mg total) by mouth once daily., Disp: 90 tablet, Rfl: 3    azelastine (ASTELIN) 137 mcg (0.1 %) nasal spray, 1 spray (137 mcg total) by Nasal route 2 (two) times daily., Disp: 30 mL, Rfl: 3    fluticasone propionate (FLONASE) 50 mcg/actuation nasal spray, 1 spray (50 mcg total) by Each Nostril route once daily. (Patient not taking: Reported on 2023), Disp: 16 g, Rfl: 0    fluticasone-umeclidin-vilanter (TRELEGY ELLIPTA) 200-62.5-25 mcg inhaler, Inhale 1 puff into the lungs once daily. Replaces trelegy 100 (Patient not taking: Reported on 2023), Disp: 3 each, Rfl: 3    Physical  Exam:  Vitals:    06/27/23 0809   BP: 138/84   Pulse: 74   Resp: 18     Body mass index is 23.41 kg/m².  General: awake, alert, cooperative  Head: NC/AT  Ears: external ears normal  Eyes: sclera normal  Lungs: normal inspiration, NAD  Heart: well-perfused  Abdomen: Soft, NT, ND   3/22: Normal uncirc'd phallus, meatus normal in size and position, BL testicles palpable, no masses, nontender, no abnormalities of epididymi  CHRISTOPH 3/22: Normal rectal tone, no hemorrhoids. Prostate smooth and normal, no nodules 40 gm SV not palpable. Perineum and anus normal.  Lymphatic: groin nodes negative  Skin: The skin is warm and dry  Ext: No c/c/e.  Neuro: grossly intact, AOx3    RADIOLOGY:  No recent relevant imaging available for review.    LABS:  I personally reviewed the following lab values:  Lab Results   Component Value Date    WBC 4.59 03/10/2023    HGB 12.7 (L) 03/10/2023    HCT 38.5 (L) 03/10/2023     03/10/2023     03/10/2023    K 4.1 03/10/2023     03/10/2023    CREATININE 0.8 03/10/2023    BUN 15 03/10/2023    CO2 25 03/10/2023    TSH 0.677 02/25/2022    PSA 80.6 (H) 02/25/2022    CHOL 160 03/10/2023    TRIG 51 03/10/2023    HDL 52 03/10/2023    ALT 24 03/10/2023    AST 22 03/10/2023     1. PROSTATE, LEFT BASE, BIOPSY:   - Benign prostate tissue.   2. PROSTATE, LEFT APEX, BIOPSY:   - Prostatic adenocarcinoma, Dwight score 3+3=6 (Grade group 1) involving 2   of 2 cores (5% of tissue submitted).   - Total linear lengths of carcinoma are <1 mm and 1 mm.   3. PROSTATE, LEFT MID, BIOPSY:   - High grade prostatic intraepithelial lesion (PIN).   - AMACR, p63 and HMWK with appropriate controls are performed and support the   diagnosis.   4. PROSTATE, RIGHT BASE, BIOPSY:   - Prostate tissue with small focus of atypical glands, suspicious for   carcinoma.   - Most of the glands of interest are not present on Immunohistochemically   stained slides for further evaluation.   5. PROSTATE, RIGHT APEX, BIOPSY:   -  Prostatic adenocarcinoma, Dwight score 3+3=6 (Grade group 1) involving 2   of 3 cores (10% of tissue submitted).   - Total linear lengths of carcinoma are <1 mm and 2 mm.   - AMACR, p63 and HMWK with appropriate controls are performed and support the   diagnosis.   6. PROSTATE, RIGHT MID, BIOPSY:   - High grade prostatic intraepithelial lesion (PIN).     Assessment/Plan:   Aurelio Daniels is a 67 y.o. male with high risk prostate cancer s/p XRT and ADT, PSA undetectable, due for lupron 3/4 today, due for 4/4 in 6 months, f/u 3 months with PSA    Jian Smith MD  Urology

## 2023-09-25 ENCOUNTER — LAB VISIT (OUTPATIENT)
Dept: LAB | Facility: HOSPITAL | Age: 68
End: 2023-09-25
Attending: UROLOGY
Payer: MEDICARE

## 2023-09-25 DIAGNOSIS — C61 PROSTATE CANCER: ICD-10-CM

## 2023-09-25 LAB — COMPLEXED PSA SERPL-MCNC: <0.01 NG/ML (ref 0–4)

## 2023-09-25 PROCEDURE — 84153 ASSAY OF PSA TOTAL: CPT | Performed by: UROLOGY

## 2023-09-25 PROCEDURE — 36415 COLL VENOUS BLD VENIPUNCTURE: CPT | Performed by: UROLOGY

## 2023-09-27 ENCOUNTER — OFFICE VISIT (OUTPATIENT)
Dept: UROLOGY | Facility: CLINIC | Age: 68
End: 2023-09-27
Payer: MEDICARE

## 2023-09-27 VITALS
SYSTOLIC BLOOD PRESSURE: 121 MMHG | DIASTOLIC BLOOD PRESSURE: 71 MMHG | HEIGHT: 69 IN | HEART RATE: 77 BPM | WEIGHT: 158 LBS | BODY MASS INDEX: 23.4 KG/M2

## 2023-09-27 DIAGNOSIS — C61 PROSTATE CANCER: Primary | ICD-10-CM

## 2023-09-27 PROCEDURE — 99999 PR PBB SHADOW E&M-EST. PATIENT-LVL III: CPT | Mod: PBBFAC,,, | Performed by: UROLOGY

## 2023-09-27 PROCEDURE — 99213 PR OFFICE/OUTPT VISIT, EST, LEVL III, 20-29 MIN: ICD-10-PCS | Mod: S$PBB,,, | Performed by: UROLOGY

## 2023-09-27 PROCEDURE — 99213 OFFICE O/P EST LOW 20 MIN: CPT | Mod: S$PBB,,, | Performed by: UROLOGY

## 2023-09-27 PROCEDURE — 99213 OFFICE O/P EST LOW 20 MIN: CPT | Mod: PBBFAC | Performed by: UROLOGY

## 2023-09-27 PROCEDURE — 99999 PR PBB SHADOW E&M-EST. PATIENT-LVL III: ICD-10-PCS | Mod: PBBFAC,,, | Performed by: UROLOGY

## 2023-09-27 NOTE — PROGRESS NOTES
Chief Complaint:  High risk prostate cancer    HPI:   09/27/2023 - returns today for follow-up, no new issues in the interim, voids well, denies any gross hematuria or dysuria, still has hot flashes due to Lupron but tolerable, PSA remains undetectable    06/272023 - patient returns today for follow-up, no new issues in the interim, still having some very soft bowel movements, has a good flow and empties reasonably well, nocturia x2 but not bothersome, PSA remains undetectable    03/28/2023 - returns for follow-up, no issues in the interim, voiding well, no GH, does have some hot flashes but tolerating ok     12/27/2022 - patient returns today for follow-up, doing well with the Lupron, has noticed that he gets tired little bit easier, also has a little bit of fecal urgency but voiding okay, PSA down to 0.02, denies any gross hematuria or dysuria    08/24/2022 - returns for follow-up, finished his 5th day of radiation today, overall feeling well, a little tired and maybe some hot flashes, no voiding issues or GH, no dysuria, PSA down to 4.2    05/13/2022 - returns today for follow-up, no issues since his biopsy, path reviewed [place] 3 + 3 in left apex and [place] 3 + 3 in right apex, presents today for discussion    04/28/2022 - TRUS bx    03/29/2022 - returns today for follow-up, PSA now up to 97, voiding well, denies any pelvic discomfort, denies fevers, denies GH    03/08/2022 - 65yo male that presents for elevated PSA.  Patient had routine lab work obtained as part of normal screening in late February which resulted with PSA of 80.6.  Previous PSA level in our system 3.2 in 2009.  Patient notes some intermittency with his urinary stream but denies overt weak stream.  Denies gross hematuria.  He is a smoker and quit about 2 months ago.  Denies family history of  cancers.  Had a vasectomy in 1985 but no other urologic procedures. Mild ED, does not take any meds.     PMH:  Past Medical History:   Diagnosis Date     Cancer     Prostate    Tobacco use        PSH:  Past Surgical History:   Procedure Laterality Date    COLONOSCOPY      COLONOSCOPY N/A 2022    Procedure: COLONOSCOPY;  Surgeon: Jamia Alfaro MD;  Location: [facility];  Service: Endoscopy;  Laterality: N/A;    VASECTOMY         Family History:  Family History   Problem Relation Age of Onset    No Known Problems Mother     Heart disease Father     No Known Problems Brother     No Known Problems Brother     No Known Problems Daughter     No Known Problems Daughter        Social History:  Social History     Tobacco Use    Smoking status: Some Days     Current packs/day: 0.00     Types: Cigarettes     Last attempt to quit: 1/15/2022     Years since quittin.6    Smokeless tobacco: Never   Substance Use Topics    Alcohol use: Yes     Alcohol/week: 2.0 standard drinks of alcohol     Types: 2 Shots of liquor per week    Drug use: Never        Review of Systems:  General: No fever, chills  Skin: No rashes  Chest:  Denies cough and sputum production  Heart: Denies chest pain  Resp: Denies dyspnea  Abdomen: Denies diarrhea, abdominal pain, hematemesis, or blood in stool.  Musculoskeletal: No joint stiffness or swelling. Denies back pain.  : see HPI  Neuro: no dizziness or weakness    Allergies:  Patient has no known allergies.    Medications:    Current Outpatient Medications:     albuterol (VENTOLIN HFA) 90 mcg/actuation inhaler, Inhale 2 puffs into the lungs every 6 (six) hours as needed for Wheezing. Rescue, Disp: 18 g, Rfl: 11    fluticasone propionate (FLONASE) 50 mcg/actuation nasal spray, 1 spray (50 mcg total) by Each Nostril route once daily., Disp: 16 g, Rfl: 0    fluticasone-umeclidin-vilanter (TRELEGY ELLIPTA) 200-62.5-25 mcg inhaler, Inhale 1 puff into the lungs once daily. Replaces trelegy 100, Disp: 3 each, Rfl: 3    valsartan (DIOVAN) 80 MG tablet, Take 1 tablet (80 mg total) by mouth once daily., Disp: 90 tablet, Rfl: 3    azelastine  (ASTELIN) 137 mcg (0.1 %) nasal spray, 1 spray (137 mcg total) by Nasal route 2 (two) times daily., Disp: 30 mL, Rfl: 3    Physical Exam:  Vitals:    09/27/23 0919   BP: 121/71   Pulse: 77     Body mass index is 23.33 kg/m².  General: awake, alert, cooperative  Head: NC/AT  Ears: external ears normal  Eyes: sclera normal  Lungs: normal inspiration, NAD  Heart: well-perfused  Abdomen: Soft, NT, ND   3/22: Normal uncirc'd phallus, meatus normal in size and position, BL testicles palpable, no masses, nontender, no abnormalities of epididymi  CHRISTOPH 3/22: Normal rectal tone, no hemorrhoids. Prostate smooth and normal, no nodules 40 gm SV not palpable. Perineum and anus normal.  Lymphatic: groin nodes negative  Skin: The skin is warm and dry  Ext: No c/c/e.  Neuro: grossly intact, AOx3    RADIOLOGY:  No recent relevant imaging available for review.    LABS:  I personally reviewed the following lab values:  Lab Results   Component Value Date    WBC 4.59 03/10/2023    HGB 12.7 (L) 03/10/2023    HCT 38.5 (L) 03/10/2023     03/10/2023     03/10/2023    K 4.1 03/10/2023     03/10/2023    CREATININE 0.8 03/10/2023    BUN 15 03/10/2023    CO2 25 03/10/2023    TSH 0.677 02/25/2022    PSA 80.6 (H) 02/25/2022    CHOL 160 03/10/2023    TRIG 51 03/10/2023    HDL 52 03/10/2023    ALT 24 03/10/2023    AST 22 03/10/2023     1. PROSTATE, LEFT BASE, BIOPSY:   - Benign prostate tissue.   2. PROSTATE, LEFT APEX, BIOPSY:   - Prostatic adenocarcinoma, Dw[place] score 3+3=6 (Grade group 1) involving 2   of 2 cores (5% of tissue submitted).   - Total linear lengths of carcinoma are <1 mm and 1 mm.   3. PROSTATE, LEFT MID, BIOPSY:   - High grade prostatic intraepithelial lesion (PIN).   - AMACR, p63 and HMWK with appropriate controls are performed and support the   diagnosis.   4. PROSTATE, RIGHT BASE, BIOPSY:   - Prostate tissue with small focus of atypical glands, suspicious for   carcinoma.   - Most of the glands of interest  are not present on Immunohistochemically   stained slides for further evaluation.   5. PROSTATE, RIGHT APEX, BIOPSY:   - Prostatic adenocarcinoma, [place] score 3+3=6 (Grade group 1) involving 2   of 3 cores (10% of tissue submitted).   - Total linear lengths of carcinoma are <1 mm and 2 mm.   - AMACR, p63 and HMWK with appropriate controls are performed and support the   diagnosis.   6. PROSTATE, RIGHT MID, BIOPSY:   - High grade prostatic intraepithelial lesion (PIN).     Assessment/Plan:   Aurelio Daniels is a 67 y.o. male with high risk prostate cancer s/p XRT and ADT, PSA undetectable, due for 4/4 in 3 months, f/u 3 months with PSA    Jian Smith MD  Urology

## 2023-10-22 DIAGNOSIS — I10 PRIMARY HYPERTENSION: ICD-10-CM

## 2023-10-22 DIAGNOSIS — D64.89 ANEMIA DUE TO OTHER CAUSE, NOT CLASSIFIED: ICD-10-CM

## 2023-10-22 NOTE — TELEPHONE ENCOUNTER
No care due was identified.  Health [facility] Embedded Care Due Messages. Reference number: [id number].   10/22/2023 12:06:09 PM CDT

## 2023-10-24 PROBLEM — D64.89 OTHER SPECIFIED ANEMIAS: Status: ACTIVE | Noted: 2023-10-24

## 2023-10-24 PROBLEM — I10 PRIMARY HYPERTENSION: Status: ACTIVE | Noted: 2023-10-24

## 2023-10-24 RX ORDER — VALSARTAN 80 MG/1
80 TABLET ORAL DAILY
Qty: 90 TABLET | Refills: 3 | Status: SHIPPED | OUTPATIENT
Start: 2023-10-24 | End: 2024-10-23

## 2023-12-27 ENCOUNTER — LAB VISIT (OUTPATIENT)
Dept: LAB | Facility: HOSPITAL | Age: 68
End: 2023-12-27
Attending: UROLOGY
Payer: MEDICARE

## 2023-12-27 DIAGNOSIS — C61 PROSTATE CANCER: ICD-10-CM

## 2023-12-27 LAB — COMPLEXED PSA SERPL-MCNC: <0.01 NG/ML (ref 0–4)

## 2023-12-27 PROCEDURE — 84153 ASSAY OF PSA TOTAL: CPT | Performed by: UROLOGY

## 2023-12-27 PROCEDURE — 36415 COLL VENOUS BLD VENIPUNCTURE: CPT | Performed by: UROLOGY

## 2024-01-03 ENCOUNTER — OFFICE VISIT (OUTPATIENT)
Dept: UROLOGY | Facility: CLINIC | Age: 69
End: 2024-01-03
Payer: MEDICARE

## 2024-01-03 VITALS
BODY MASS INDEX: 23.33 KG/M2 | WEIGHT: 158 LBS | SYSTOLIC BLOOD PRESSURE: 145 MMHG | HEART RATE: 72 BPM | DIASTOLIC BLOOD PRESSURE: 100 MMHG

## 2024-01-03 DIAGNOSIS — C61 PROSTATE CANCER: Primary | ICD-10-CM

## 2024-01-03 PROCEDURE — 1101F PT FALLS ASSESS-DOCD LE1/YR: CPT | Mod: CPTII,S$GLB,, | Performed by: UROLOGY

## 2024-01-03 PROCEDURE — 1160F RVW MEDS BY RX/DR IN RCRD: CPT | Mod: CPTII,S$GLB,, | Performed by: UROLOGY

## 2024-01-03 PROCEDURE — 96402 CHEMO HORMON ANTINEOPL SQ/IM: CPT | Mod: S$GLB,,, | Performed by: UROLOGY

## 2024-01-03 PROCEDURE — 99499 UNLISTED E&M SERVICE: CPT | Mod: S$GLB,,, | Performed by: UROLOGY

## 2024-01-03 PROCEDURE — 1126F AMNT PAIN NOTED NONE PRSNT: CPT | Mod: CPTII,S$GLB,, | Performed by: UROLOGY

## 2024-01-03 PROCEDURE — 3077F SYST BP >= 140 MM HG: CPT | Mod: CPTII,S$GLB,, | Performed by: UROLOGY

## 2024-01-03 PROCEDURE — 3080F DIAST BP >= 90 MM HG: CPT | Mod: CPTII,S$GLB,, | Performed by: UROLOGY

## 2024-01-03 PROCEDURE — 1159F MED LIST DOCD IN RCRD: CPT | Mod: CPTII,S$GLB,, | Performed by: UROLOGY

## 2024-01-03 PROCEDURE — 3288F FALL RISK ASSESSMENT DOCD: CPT | Mod: CPTII,S$GLB,, | Performed by: UROLOGY

## 2024-01-03 PROCEDURE — 3008F BODY MASS INDEX DOCD: CPT | Mod: CPTII,S$GLB,, | Performed by: UROLOGY

## 2024-01-03 PROCEDURE — 99999 PR PBB SHADOW E&M-EST. PATIENT-LVL III: CPT | Mod: PBBFAC,,, | Performed by: UROLOGY

## 2024-01-03 NOTE — PROGRESS NOTES
.Patient came today for Lupron injection. Donned proper PPE and using aseptic technique, pt was giving 45mg of Lupron IM to right dorsalgluteal. Pt tolerated procedure well. Pt was instructed to remain in clinic for 15 minutes and to report any adverse reaction to staff.      Loraine Milanes RN

## 2024-01-03 NOTE — PROGRESS NOTES
Chief Complaint:  High risk prostate cancer    HPI:   01/03/2024 - patient returns today for follow-up, no new issues in the interim, no voiding difficulty, still little bit hot flashes, PSA remains undetectable, due for last Lupron today    09/27/2023 - returns today for follow-up, no new issues in the interim, voids well, denies any gross hematuria or dysuria, still has hot flashes due to Lupron but tolerable, PSA remains undetectable    06/272023 - patient returns today for follow-up, no new issues in the interim, still having some very soft bowel movements, has a good flow and empties reasonably well, nocturia x2 but not bothersome, PSA remains undetectable    03/28/2023 - returns for follow-up, no issues in the interim, voiding well, no GH, does have some hot flashes but tolerating ok     12/27/2022 - patient returns today for follow-up, doing well with the Lupron, has noticed that he gets tired little bit easier, also has a little bit of fecal urgency but voiding okay, PSA down to 0.02, denies any gross hematuria or dysuria    08/24/2022 - returns for follow-up, finished his 5th day of radiation today, overall feeling well, a little tired and maybe some hot flashes, no voiding issues or GH, no dysuria, PSA down to 4.2    05/13/2022 - returns today for follow-up, no issues since his biopsy, path reviewed [place] 3 + 3 in left apex and [place] 3 + 3 in right apex, presents today for discussion    04/28/2022 - TRUS bx    03/29/2022 - returns today for follow-up, PSA now up to 97, voiding well, denies any pelvic discomfort, denies fevers, denies GH    03/08/2022 - 67yo male that presents for elevated PSA.  Patient had routine lab work obtained as part of normal screening in late February which resulted with PSA of 80.6.  Previous PSA level in our system 3.2 in 2009.  Patient notes some intermittency with his urinary stream but denies overt weak stream.  Denies gross hematuria.  He is a smoker and quit about 2  months ago.  Denies family history of  cancers.  Had a vasectomy in  but no other urologic procedures. Mild ED, does not take any meds.     PMH:  Past Medical History:   Diagnosis Date    Cancer     Prostate    Tobacco use        PSH:  Past Surgical History:   Procedure Laterality Date    COLONOSCOPY      COLONOSCOPY N/A 2022    Procedure: COLONOSCOPY;  Surgeon: Jamia Alfaro MD;  Location: [facility];  Service: Endoscopy;  Laterality: N/A;    VASECTOMY         Family History:  Family History   Problem Relation Age of Onset    No Known Problems Mother     Heart disease Father     No Known Problems Brother     No Known Problems Brother     No Known Problems Daughter     No Known Problems Daughter        Social History:  Social History     Tobacco Use    Smoking status: Some Days     Current packs/day: 0.00     Types: Cigarettes     Last attempt to quit: 1/15/2022     Years since quittin.9    Smokeless tobacco: Never   Substance Use Topics    Alcohol use: Yes     Alcohol/week: 2.0 standard drinks of alcohol     Types: 2 Shots of liquor per week    Drug use: Never        Review of Systems:  General: No fever, chills  Skin: No rashes  Chest:  Denies cough and sputum production  Heart: Denies chest pain  Resp: Denies dyspnea  Abdomen: Denies diarrhea, abdominal pain, hematemesis, or blood in stool.  Musculoskeletal: No joint stiffness or swelling. Denies back pain.  : see HPI  Neuro: no dizziness or weakness    Allergies:  Patient has no known allergies.    Medications:    Current Outpatient Medications:     albuterol (VENTOLIN HFA) 90 mcg/actuation inhaler, Inhale 2 puffs into the lungs every 6 (six) hours as needed for Wheezing. Rescue, Disp: 18 g, Rfl: 11    fluticasone propionate (FLONASE) 50 mcg/actuation nasal spray, 1 spray (50 mcg total) by Each Nostril route once daily., Disp: 16 g, Rfl: 0    fluticasone-umeclidin-vilanter (TRELEGY ELLIPTA) 200-62.5-25 mcg inhaler, Inhale 1 puff into the  lungs once daily. Replaces trelegy 100, Disp: 3 each, Rfl: 3    valsartan (DIOVAN) 80 MG tablet, Take 1 tablet (80 mg total) by mouth once daily., Disp: 90 tablet, Rfl: 3    azelastine (ASTELIN) 137 mcg (0.1 %) nasal spray, 1 spray (137 mcg total) by Nasal route 2 (two) times daily., Disp: 30 mL, Rfl: 3    Physical Exam:  Vitals:    01/03/24 1107   BP: (!) 145/100   Pulse: 72     Body mass index is 23.33 kg/m².  General: awake, alert, cooperative  Head: NC/AT  Ears: external ears normal  Eyes: sclera normal  Lungs: normal inspiration, NAD  Heart: well-perfused  Abdomen: Soft, NT, ND   3/22: Normal uncirc'd phallus, meatus normal in size and position, BL testicles palpable, no masses, nontender, no abnormalities of epididymi  CHRISTOPH 3/22: Normal rectal tone, no hemorrhoids. Prostate smooth and normal, no nodules 40 gm SV not palpable. Perineum and anus normal.  Lymphatic: groin nodes negative  Skin: The skin is warm and dry  Ext: No c/c/e.  Neuro: grossly intact, AOx3    RADIOLOGY:  No recent relevant imaging available for review.    LABS:  I personally reviewed the following lab values:  Lab Results   Component Value Date    WBC 4.59 03/10/2023    HGB 12.7 (L) 03/10/2023    HCT 38.5 (L) 03/10/2023     03/10/2023     03/10/2023    K 4.1 03/10/2023     03/10/2023    CREATININE 0.8 03/10/2023    BUN 15 03/10/2023    CO2 25 03/10/2023    TSH 0.677 02/25/2022    PSA 80.6 (H) 02/25/2022    CHOL 160 03/10/2023    TRIG 51 03/10/2023    HDL 52 03/10/2023    ALT 24 03/10/2023    AST 22 03/10/2023     1. PROSTATE, LEFT BASE, BIOPSY:   - Benign prostate tissue.   2. PROSTATE, LEFT APEX, BIOPSY:   - Prostatic adenocarcinoma, [place] score 3+3=6 (Grade group 1) involving 2   of 2 cores (5% of tissue submitted).   - Total linear lengths of carcinoma are <1 mm and 1 mm.   3. PROSTATE, LEFT MID, BIOPSY:   - High grade prostatic intraepithelial lesion (PIN).   - AMACR, p63 and HMWK with appropriate controls are  performed and support the   diagnosis.   4. PROSTATE, RIGHT BASE, BIOPSY:   - Prostate tissue with small focus of atypical glands, suspicious for   carcinoma.   - Most of the glands of interest are not present on Immunohistochemically   stained slides for further evaluation.   5. PROSTATE, RIGHT APEX, BIOPSY:   - Prostatic adenocarcinoma, [place] score 3+3=6 (Grade group 1) involving 2   of 3 cores (10% of tissue submitted).   - Total linear lengths of carcinoma are <1 mm and 2 mm.   - AMACR, p63 and HMWK with appropriate controls are performed and support the   diagnosis.   6. PROSTATE, RIGHT MID, BIOPSY:   - High grade prostatic intraepithelial lesion (PIN).     Assessment/Plan:   Aurelio Daniels is a 68 y.o. male with high risk prostate cancer s/p XRT and ADT, PSA undetectable, due for 4/4 today, f/u 3 months with PSA    Jian Smith MD  Urology

## 2024-04-08 ENCOUNTER — LAB VISIT (OUTPATIENT)
Dept: LAB | Facility: HOSPITAL | Age: 69
End: 2024-04-08
Attending: UROLOGY
Payer: MEDICARE

## 2024-04-08 DIAGNOSIS — C61 PROSTATE CANCER: ICD-10-CM

## 2024-04-08 LAB — COMPLEXED PSA SERPL-MCNC: <0.01 NG/ML (ref 0–4)

## 2024-04-08 PROCEDURE — 84153 ASSAY OF PSA TOTAL: CPT | Performed by: UROLOGY

## 2024-04-08 PROCEDURE — 36415 COLL VENOUS BLD VENIPUNCTURE: CPT | Performed by: UROLOGY

## 2024-04-09 ENCOUNTER — OFFICE VISIT (OUTPATIENT)
Dept: UROLOGY | Facility: CLINIC | Age: 69
End: 2024-04-09
Payer: MEDICARE

## 2024-04-09 VITALS
WEIGHT: 157.44 LBS | SYSTOLIC BLOOD PRESSURE: 115 MMHG | DIASTOLIC BLOOD PRESSURE: 71 MMHG | BODY MASS INDEX: 23.32 KG/M2 | HEIGHT: 69 IN | HEART RATE: 77 BPM

## 2024-04-09 DIAGNOSIS — C61 PROSTATE CANCER: Primary | ICD-10-CM

## 2024-04-09 PROCEDURE — 1101F PT FALLS ASSESS-DOCD LE1/YR: CPT | Mod: CPTII,S$GLB,, | Performed by: UROLOGY

## 2024-04-09 PROCEDURE — 3078F DIAST BP <80 MM HG: CPT | Mod: CPTII,S$GLB,, | Performed by: UROLOGY

## 2024-04-09 PROCEDURE — 1126F AMNT PAIN NOTED NONE PRSNT: CPT | Mod: CPTII,S$GLB,, | Performed by: UROLOGY

## 2024-04-09 PROCEDURE — 4010F ACE/ARB THERAPY RXD/TAKEN: CPT | Mod: CPTII,S$GLB,, | Performed by: UROLOGY

## 2024-04-09 PROCEDURE — 3074F SYST BP LT 130 MM HG: CPT | Mod: CPTII,S$GLB,, | Performed by: UROLOGY

## 2024-04-09 PROCEDURE — 3288F FALL RISK ASSESSMENT DOCD: CPT | Mod: CPTII,S$GLB,, | Performed by: UROLOGY

## 2024-04-09 PROCEDURE — 3008F BODY MASS INDEX DOCD: CPT | Mod: CPTII,S$GLB,, | Performed by: UROLOGY

## 2024-04-09 PROCEDURE — 99999 PR PBB SHADOW E&M-EST. PATIENT-LVL III: CPT | Mod: PBBFAC,,, | Performed by: UROLOGY

## 2024-04-09 PROCEDURE — 1160F RVW MEDS BY RX/DR IN RCRD: CPT | Mod: CPTII,S$GLB,, | Performed by: UROLOGY

## 2024-04-09 PROCEDURE — 99213 OFFICE O/P EST LOW 20 MIN: CPT | Mod: S$GLB,,, | Performed by: UROLOGY

## 2024-04-09 PROCEDURE — 1159F MED LIST DOCD IN RCRD: CPT | Mod: CPTII,S$GLB,, | Performed by: UROLOGY

## 2024-04-09 NOTE — PROGRESS NOTES
Chief Complaint:  High risk prostate cancer    HPI:   04/09/2024 - returns today for follow-up, no new issues in the interim, voiding well, PSA remains undetectable, energy level good, strength is pretty good, still few hot flashes but nothing bothersome    01/03/2024 - patient returns today for follow-up, no new issues in the interim, no voiding difficulty, still little bit hot flashes, PSA remains undetectable, due for last Lupron today    09/27/2023 - returns today for follow-up, no new issues in the interim, voids well, denies any gross hematuria or dysuria, still has hot flashes due to Lupron but tolerable, PSA remains undetectable    06/272023 - patient returns today for follow-up, no new issues in the interim, still having some very soft bowel movements, has a good flow and empties reasonably well, nocturia x2 but not bothersome, PSA remains undetectable    03/28/2023 - returns for follow-up, no issues in the interim, voiding well, no GH, does have some hot flashes but tolerating ok     12/27/2022 - patient returns today for follow-up, doing well with the Lupron, has noticed that he gets tired little bit easier, also has a little bit of fecal urgency but voiding okay, PSA down to 0.02, denies any gross hematuria or dysuria    08/24/2022 - returns for follow-up, finished his 5th day of radiation today, overall feeling well, a little tired and maybe some hot flashes, no voiding issues or GH, no dysuria, PSA down to 4.2    05/13/2022 - returns today for follow-up, no issues since his biopsy, path reviewed [place] 3 + 3 in left apex and [place] 3 + 3 in right apex, presents today for discussion    04/28/2022 - TRUS bx    03/29/2022 - returns today for follow-up, PSA now up to 97, voiding well, denies any pelvic discomfort, denies fevers, denies GH    03/08/2022 - 65yo male that presents for elevated PSA.  Patient had routine lab work obtained as part of normal screening in late February which resulted with PSA  of 80.6.  Previous PSA level in our system 3.2 in .  Patient notes some intermittency with his urinary stream but denies overt weak stream.  Denies gross hematuria.  He is a smoker and quit about 2 months ago.  Denies family history of  cancers.  Had a vasectomy in  but no other urologic procedures. Mild ED, does not take any meds.     PMH:  Past Medical History:   Diagnosis Date    Cancer     Prostate    Tobacco use        PSH:  Past Surgical History:   Procedure Laterality Date    COLONOSCOPY      COLONOSCOPY N/A 2022    Procedure: COLONOSCOPY;  Surgeon: Jamia Alfaro MD;  Location: [facility];  Service: Endoscopy;  Laterality: N/A;    VASECTOMY         Family History:  Family History   Problem Relation Age of Onset    No Known Problems Mother     Heart disease Father     No Known Problems Brother     No Known Problems Brother     No Known Problems Daughter     No Known Problems Daughter        Social History:  Social History     Tobacco Use    Smoking status: Some Days     Current packs/day: 0.00     Types: Cigarettes     Last attempt to quit: 1/15/2022     Years since quittin.2    Smokeless tobacco: Never   Substance Use Topics    Alcohol use: Yes     Alcohol/week: 2.0 standard drinks of alcohol     Types: 2 Shots of liquor per week    Drug use: Never        Review of Systems:  General: No fever, chills  Skin: No rashes  Chest:  Denies cough and sputum production  Heart: Denies chest pain  Resp: Denies dyspnea  Abdomen: Denies diarrhea, abdominal pain, hematemesis, or blood in stool.  Musculoskeletal: No joint stiffness or swelling. Denies back pain.  : see HPI  Neuro: no dizziness or weakness    Allergies:  Patient has no known allergies.    Medications:    Current Outpatient Medications:     albuterol (VENTOLIN HFA) 90 mcg/actuation inhaler, Inhale 2 puffs into the lungs every 6 (six) hours as needed for Wheezing. Rescue, Disp: 18 g, Rfl: 11    azelastine (ASTELIN) 137 mcg (0.1 %)  nasal spray, 1 spray (137 mcg total) by Nasal route 2 (two) times daily., Disp: 30 mL, Rfl: 3    fluticasone propionate (FLONASE) 50 mcg/actuation nasal spray, 1 spray (50 mcg total) by Each Nostril route once daily., Disp: 16 g, Rfl: 0    fluticasone-umeclidin-vilanter (TRELEGY ELLIPTA) 200-62.5-25 mcg inhaler, Inhale 1 puff into the lungs once daily. Replaces trelegy 100, Disp: 3 each, Rfl: 3    valsartan (DIOVAN) 80 MG tablet, Take 1 tablet (80 mg total) by mouth once daily., Disp: 90 tablet, Rfl: 3    Physical Exam:  Vitals:    04/09/24 1048   BP: 115/71   Pulse: 77     Body mass index is 23.25 kg/m².  General: awake, alert, cooperative  Head: NC/AT  Ears: external ears normal  Eyes: sclera normal  Lungs: normal inspiration, NAD  Heart: well-perfused  Abdomen: Soft, NT, ND   3/22: Normal uncirc'd phallus, meatus normal in size and position, BL testicles palpable, no masses, nontender, no abnormalities of epididymi  CHRISTOPH 3/22: Normal rectal tone, no hemorrhoids. Prostate smooth and normal, no nodules 40 gm SV not palpable. Perineum and anus normal.  Lymphatic: groin nodes negative  Skin: The skin is warm and dry  Ext: No c/c/e.  Neuro: grossly intact, AOx3    RADIOLOGY:  No recent relevant imaging available for review.    LABS:  I personally reviewed the following lab values:  Lab Results   Component Value Date    WBC 4.59 03/10/2023    HGB 12.7 (L) 03/10/2023    HCT 38.5 (L) 03/10/2023     03/10/2023     03/10/2023    K 4.1 03/10/2023     03/10/2023    CREATININE 0.8 03/10/2023    BUN 15 03/10/2023    CO2 25 03/10/2023    TSH 0.677 02/25/2022    PSA 80.6 (H) 02/25/2022    CHOL 160 03/10/2023    TRIG 51 03/10/2023    HDL 52 03/10/2023    ALT 24 03/10/2023    AST 22 03/10/2023     1. PROSTATE, LEFT BASE, BIOPSY:   - Benign prostate tissue.   2. PROSTATE, LEFT APEX, BIOPSY:   - Prostatic adenocarcinoma, [place] score 3+3=6 (Grade group 1) involving 2   of 2 cores (5% of tissue submitted).   -  Total linear lengths of carcinoma are <1 mm and 1 mm.   3. PROSTATE, LEFT MID, BIOPSY:   - High grade prostatic intraepithelial lesion (PIN).   - AMACR, p63 and HMWK with appropriate controls are performed and support the   diagnosis.   4. PROSTATE, RIGHT BASE, BIOPSY:   - Prostate tissue with small focus of atypical glands, suspicious for   carcinoma.   - Most of the glands of interest are not present on Immunohistochemically   stained slides for further evaluation.   5. PROSTATE, RIGHT APEX, BIOPSY:   - Prostatic adenocarcinoma, [place] score 3+3=6 (Grade group 1) involving 2   of 3 cores (10% of tissue submitted).   - Total linear lengths of carcinoma are <1 mm and 2 mm.   - AMACR, p63 and HMWK with appropriate controls are performed and support the   diagnosis.   6. PROSTATE, RIGHT MID, BIOPSY:   - High grade prostatic intraepithelial lesion (PIN).     Assessment/Plan:   Aurelio Daniels is a 68 y.o. male with high risk prostate cancer s/p XRT and ADT, PSA undetectable, done with 2 yrs of lupron, f/u 3 months with PSA    Jian Smith MD  Urology

## 2024-04-22 NOTE — TELEPHONE ENCOUNTER
Care Due:                  Date            Visit Type   Department     Provider  --------------------------------------------------------------------------------                                EP -                              PRIMARY      [facility] INTER[facility]  Last Visit: 02-      CARE ([facility])   MEDICINE       Shlomo Villatoro  Next Visit: None Scheduled  None         None Found                                                            Last  Test          Frequency    Reason                     Performed    Due Date  --------------------------------------------------------------------------------    Office Visit  15 months..  albuterol,                 02- 05-                             fluticasone-umeclidin-amol                             anter, valsartan.........    CMP.........  12 months..  valsartan................  03-   03-    Health [facility] Embedded Care Due Messages. Reference number: [id number].   4/22/2024 8:39:51 AM CDT

## 2024-04-22 NOTE — TELEPHONE ENCOUNTER
Refill Routing Note   Medication(s) are not appropriate for processing by [facility] for the following reason(s):        Required labs outdated    [facility] action(s):  Defer   Requires appointment : Yes   Requires labs : Yes             Appointments  past 12m or future 3m with PCP    Date Provider   Last Visit   2/27/2023 Shlomo Villatoro MD   Next Visit   Visit date not found Shlomo Villatoro MD   ED visits in past 90 days: 0        Note composed:3:04 PM 04/22/2024

## 2024-04-23 ENCOUNTER — TELEPHONE (OUTPATIENT)
Dept: INTERNAL MEDICINE | Facility: CLINIC | Age: 69
End: 2024-04-23
Payer: MEDICARE

## 2024-04-23 RX ORDER — VALSARTAN 80 MG/1
TABLET ORAL
Qty: 90 TABLET | Refills: 0 | Status: SHIPPED | OUTPATIENT
Start: 2024-04-23

## 2024-05-28 DIAGNOSIS — Z00.00 ENCOUNTER FOR MEDICARE ANNUAL WELLNESS EXAM: ICD-10-CM

## 2024-07-08 ENCOUNTER — LAB VISIT (OUTPATIENT)
Dept: LAB | Facility: HOSPITAL | Age: 69
End: 2024-07-08
Attending: UROLOGY
Payer: MEDICARE

## 2024-07-08 DIAGNOSIS — C61 PROSTATE CANCER: ICD-10-CM

## 2024-07-08 LAB — COMPLEXED PSA SERPL-MCNC: <0.01 NG/ML (ref 0–4)

## 2024-07-08 PROCEDURE — 36415 COLL VENOUS BLD VENIPUNCTURE: CPT | Performed by: UROLOGY

## 2024-07-08 PROCEDURE — 84153 ASSAY OF PSA TOTAL: CPT | Performed by: UROLOGY

## 2024-07-10 ENCOUNTER — OFFICE VISIT (OUTPATIENT)
Dept: UROLOGY | Facility: CLINIC | Age: 69
End: 2024-07-10
Payer: MEDICARE

## 2024-07-10 VITALS
RESPIRATION RATE: 16 BRPM | DIASTOLIC BLOOD PRESSURE: 67 MMHG | SYSTOLIC BLOOD PRESSURE: 134 MMHG | HEIGHT: 69 IN | HEART RATE: 72 BPM | BODY MASS INDEX: 23.77 KG/M2 | WEIGHT: 160.5 LBS

## 2024-07-10 DIAGNOSIS — C61 PROSTATE CANCER: Primary | ICD-10-CM

## 2024-07-10 PROCEDURE — 1126F AMNT PAIN NOTED NONE PRSNT: CPT | Mod: CPTII,S$GLB,, | Performed by: UROLOGY

## 2024-07-10 PROCEDURE — 1159F MED LIST DOCD IN RCRD: CPT | Mod: CPTII,S$GLB,, | Performed by: UROLOGY

## 2024-07-10 PROCEDURE — 3288F FALL RISK ASSESSMENT DOCD: CPT | Mod: CPTII,S$GLB,, | Performed by: UROLOGY

## 2024-07-10 PROCEDURE — 99213 OFFICE O/P EST LOW 20 MIN: CPT | Mod: S$GLB,,, | Performed by: UROLOGY

## 2024-07-10 PROCEDURE — 3078F DIAST BP <80 MM HG: CPT | Mod: CPTII,S$GLB,, | Performed by: UROLOGY

## 2024-07-10 PROCEDURE — 1160F RVW MEDS BY RX/DR IN RCRD: CPT | Mod: CPTII,S$GLB,, | Performed by: UROLOGY

## 2024-07-10 PROCEDURE — 3008F BODY MASS INDEX DOCD: CPT | Mod: CPTII,S$GLB,, | Performed by: UROLOGY

## 2024-07-10 PROCEDURE — 4010F ACE/ARB THERAPY RXD/TAKEN: CPT | Mod: CPTII,S$GLB,, | Performed by: UROLOGY

## 2024-07-10 PROCEDURE — 99999 PR PBB SHADOW E&M-EST. PATIENT-LVL III: CPT | Mod: PBBFAC,,, | Performed by: UROLOGY

## 2024-07-10 PROCEDURE — 1101F PT FALLS ASSESS-DOCD LE1/YR: CPT | Mod: CPTII,S$GLB,, | Performed by: UROLOGY

## 2024-07-10 PROCEDURE — 3075F SYST BP GE 130 - 139MM HG: CPT | Mod: CPTII,S$GLB,, | Performed by: UROLOGY

## 2024-07-10 NOTE — PROGRESS NOTES
Chief Complaint:  High risk prostate cancer    HPI:   07/10/2024 - returns today for follow-up, no new issues in the interim, PSA remains undetectable, thinking about retiring in August 04/09/2024 - returns today for follow-up, no new issues in the interim, voiding well, PSA remains undetectable, energy level good, strength is pretty good, still few hot flashes but nothing bothersome    01/03/2024 - patient returns today for follow-up, no new issues in the interim, no voiding difficulty, still little bit hot flashes, PSA remains undetectable, due for last Lupron today    09/27/2023 - returns today for follow-up, no new issues in the interim, voids well, denies any gross hematuria or dysuria, still has hot flashes due to Lupron but tolerable, PSA remains undetectable    06/272023 - patient returns today for follow-up, no new issues in the interim, still having some very soft bowel movements, has a good flow and empties reasonably well, nocturia x2 but not bothersome, PSA remains undetectable    03/28/2023 - returns for follow-up, no issues in the interim, voiding well, no GH, does have some hot flashes but tolerating ok     12/27/2022 - patient returns today for follow-up, doing well with the Lupron, has noticed that he gets tired little bit easier, also has a little bit of fecal urgency but voiding okay, PSA down to 0.02, denies any gross hematuria or dysuria    08/24/2022 - returns for follow-up, finished his 5th day of radiation today, overall feeling well, a little tired and maybe some hot flashes, no voiding issues or GH, no dysuria, PSA down to 4.2    05/13/2022 - returns today for follow-up, no issues since his biopsy, path reviewed [place] 3 + 3 in left apex and Dwight 3 + 3 in right apex, presents today for discussion    04/28/2022 - TRUS bx    03/29/2022 - returns today for follow-up, PSA now up to 97, voiding well, denies any pelvic discomfort, denies fevers, denies GH    03/08/2022 - 65yo male that  presents for elevated PSA.  Patient had routine lab work obtained as part of normal screening in late February which resulted with PSA of 80.6.  Previous PSA level in our system 3.2 in .  Patient notes some intermittency with his urinary stream but denies overt weak stream.  Denies gross hematuria.  He is a smoker and quit about 2 months ago.  Denies family history of  cancers.  Had a vasectomy in  but no other urologic procedures. Mild ED, does not take any meds.     PMH:  Past Medical History:   Diagnosis Date    Cancer     Prostate    Tobacco use        PSH:  Past Surgical History:   Procedure Laterality Date    COLONOSCOPY      COLONOSCOPY N/A 2022    Procedure: COLONOSCOPY;  Surgeon: Jamia Alfaro MD;  Location: [facility];  Service: Endoscopy;  Laterality: N/A;    VASECTOMY         Family History:  Family History   Problem Relation Name Age of Onset    No Known Problems Mother      Heart disease Father      No Known Problems Brother      No Known Problems Brother      No Known Problems Daughter      No Known Problems Daughter         Social History:  Social History     Tobacco Use    Smoking status: Some Days     Current packs/day: 0.00     Types: Cigarettes     Last attempt to quit: 1/15/2022     Years since quittin.4    Smokeless tobacco: Never   Substance Use Topics    Alcohol use: Yes     Alcohol/week: 2.0 standard drinks of alcohol     Types: 2 Shots of liquor per week    Drug use: Never        Review of Systems:  General: No fever, chills  Skin: No rashes  Chest:  Denies cough and sputum production  Heart: Denies chest pain  Resp: Denies dyspnea  Abdomen: Denies diarrhea, abdominal pain, hematemesis, or blood in stool.  Musculoskeletal: No joint stiffness or swelling. Denies back pain.  : see HPI  Neuro: no dizziness or weakness    Allergies:  Patient has no known allergies.    Medications:    Current Outpatient Medications:     albuterol (VENTOLIN HFA) 90 mcg/actuation  inhaler, Inhale 2 puffs into the lungs every 6 (six) hours as needed for Wheezing. Rescue, Disp: 18 g, Rfl: 11    fluticasone propionate (FLONASE) 50 mcg/actuation nasal spray, 1 spray (50 mcg total) by Each Nostril route once daily., Disp: 16 g, Rfl: 0    fluticasone-umeclidin-vilanter (TRELEGY ELLIPTA) 200-62.5-25 mcg inhaler, Inhale 1 puff into the lungs once daily. Replaces trelegy 100, Disp: 3 each, Rfl: 3    valsartan (DIOVAN) 80 MG tablet, TAKE 1 TABLET BY MOUTH ONCE DAILY **MAY CAUSE DROWSINESS**, Disp: 90 tablet, Rfl: 0    azelastine (ASTELIN) 137 mcg (0.1 %) nasal spray, 1 spray (137 mcg total) by Nasal route 2 (two) times daily., Disp: 30 mL, Rfl: 3    Physical Exam:  Vitals:    07/10/24 0957   BP: 134/67   Pulse: 72   Resp: 16     Body mass index is 23.7 kg/m².  General: awake, alert, cooperative  Head: NC/AT  Ears: external ears normal  Eyes: sclera normal  Lungs: normal inspiration, NAD  Heart: well-perfused  Abdomen: Soft, NT, ND   3/22: Normal uncirc'd phallus, meatus normal in size and position, BL testicles palpable, no masses, nontender, no abnormalities of epididymi  CHRISTOPH 3/22: Normal rectal tone, no hemorrhoids. Prostate smooth and normal, no nodules 40 gm SV not palpable. Perineum and anus normal.  Lymphatic: groin nodes negative  Skin: The skin is warm and dry  Ext: No c/c/e.  Neuro: grossly intact, AOx3    RADIOLOGY:  No recent relevant imaging available for review.    LABS:  I personally reviewed the following lab values:  Lab Results   Component Value Date    WBC 4.59 03/10/2023    HGB 12.7 (L) 03/10/2023    HCT 38.5 (L) 03/10/2023     03/10/2023     03/10/2023    K 4.1 03/10/2023     03/10/2023    CREATININE 0.8 03/10/2023    BUN 15 03/10/2023    CO2 25 03/10/2023    TSH 0.677 02/25/2022    PSA 80.6 (H) 02/25/2022    CHOL 160 03/10/2023    TRIG 51 03/10/2023    HDL 52 03/10/2023    ALT 24 03/10/2023    AST 22 03/10/2023     1. PROSTATE, LEFT BASE, BIOPSY:   - Benign  prostate tissue.   2. PROSTATE, LEFT APEX, BIOPSY:   - Prostatic adenocarcinoma, Dwight score 3+3=6 (Grade group 1) involving 2   of 2 cores (5% of tissue submitted).   - Total linear lengths of carcinoma are <1 mm and 1 mm.   3. PROSTATE, LEFT MID, BIOPSY:   - High grade prostatic intraepithelial lesion (PIN).   - AMACR, p63 and HMWK with appropriate controls are performed and support the   diagnosis.   4. PROSTATE, RIGHT BASE, BIOPSY:   - Prostate tissue with small focus of atypical glands, suspicious for   carcinoma.   - Most of the glands of interest are not present on Immunohistochemically   stained slides for further evaluation.   5. PROSTATE, RIGHT APEX, BIOPSY:   - Prostatic adenocarcinoma, [place] score 3+3=6 (Grade group 1) involving 2   of 3 cores (10% of tissue submitted).   - Total linear lengths of carcinoma are <1 mm and 2 mm.   - AMACR, p63 and HMWK with appropriate controls are performed and support the   diagnosis.   6. PROSTATE, RIGHT MID, BIOPSY:   - High grade prostatic intraepithelial lesion (PIN).     Assessment/Plan:   Aurelio Daniels is a 68 y.o. male with high risk prostate cancer s/p XRT and ADT, PSA undetectable, done with 2 yrs of lupron, f/u 6 months with PSA    Jian Smith MD  Urology

## 2024-07-30 ENCOUNTER — PATIENT MESSAGE (OUTPATIENT)
Dept: INTERNAL MEDICINE | Facility: CLINIC | Age: 69
End: 2024-07-30
Payer: MEDICARE

## 2024-08-19 ENCOUNTER — LAB VISIT (OUTPATIENT)
Dept: LAB | Facility: HOSPITAL | Age: 69
End: 2024-08-19
Attending: PEDIATRICS
Payer: MEDICARE

## 2024-08-19 ENCOUNTER — OFFICE VISIT (OUTPATIENT)
Dept: INTERNAL MEDICINE | Facility: CLINIC | Age: 69
End: 2024-08-19
Payer: MEDICARE

## 2024-08-19 VITALS
RESPIRATION RATE: 18 BRPM | BODY MASS INDEX: 23.97 KG/M2 | DIASTOLIC BLOOD PRESSURE: 76 MMHG | WEIGHT: 161.81 LBS | HEART RATE: 75 BPM | TEMPERATURE: 98 F | OXYGEN SATURATION: 97 % | HEIGHT: 69 IN | SYSTOLIC BLOOD PRESSURE: 132 MMHG

## 2024-08-19 DIAGNOSIS — D64.89 ANEMIA DUE TO OTHER CAUSE, NOT CLASSIFIED: ICD-10-CM

## 2024-08-19 DIAGNOSIS — C61 PROSTATE CANCER: ICD-10-CM

## 2024-08-19 DIAGNOSIS — I10 PRIMARY HYPERTENSION: ICD-10-CM

## 2024-08-19 DIAGNOSIS — Z00.00 WELL ADULT EXAM: Primary | ICD-10-CM

## 2024-08-19 DIAGNOSIS — J41.0 SIMPLE CHRONIC BRONCHITIS: ICD-10-CM

## 2024-08-19 LAB
ALBUMIN SERPL BCP-MCNC: 3.8 G/DL (ref 3.5–5.2)
ALP SERPL-CCNC: 68 U/L (ref 55–135)
ALT SERPL W/O P-5'-P-CCNC: 15 U/L (ref 10–44)
ANION GAP SERPL CALC-SCNC: 9 MMOL/L (ref 8–16)
AST SERPL-CCNC: 17 U/L (ref 10–40)
BASOPHILS # BLD AUTO: 0.06 K/UL (ref 0–0.2)
BASOPHILS NFR BLD: 0.8 % (ref 0–1.9)
BILIRUB SERPL-MCNC: 0.5 MG/DL (ref 0.1–1)
BUN SERPL-MCNC: 9 MG/DL (ref 8–23)
CALCIUM SERPL-MCNC: 9.6 MG/DL (ref 8.7–10.5)
CHLORIDE SERPL-SCNC: 107 MMOL/L (ref 95–110)
CHOLEST SERPL-MCNC: 169 MG/DL (ref 120–199)
CHOLEST/HDLC SERPL: 3.4 {RATIO} (ref 2–5)
CO2 SERPL-SCNC: 25 MMOL/L (ref 23–29)
CREAT SERPL-MCNC: 0.8 MG/DL (ref 0.5–1.4)
DIFFERENTIAL METHOD BLD: ABNORMAL
EOSINOPHIL # BLD AUTO: 0.1 K/UL (ref 0–0.5)
EOSINOPHIL NFR BLD: 2 % (ref 0–8)
ERYTHROCYTE [DISTWIDTH] IN BLOOD BY AUTOMATED COUNT: 12.5 % (ref 11.5–14.5)
EST. GFR  (NO RACE VARIABLE): >60 ML/MIN/1.73 M^2
GLUCOSE SERPL-MCNC: 80 MG/DL (ref 70–110)
HCT VFR BLD AUTO: 36.4 % (ref 40–54)
HDLC SERPL-MCNC: 50 MG/DL (ref 40–75)
HDLC SERPL: 29.6 % (ref 20–50)
HGB BLD-MCNC: 11.6 G/DL (ref 14–18)
IMM GRANULOCYTES # BLD AUTO: 0.03 K/UL (ref 0–0.04)
IMM GRANULOCYTES NFR BLD AUTO: 0.4 % (ref 0–0.5)
LDLC SERPL CALC-MCNC: 100 MG/DL (ref 63–159)
LYMPHOCYTES # BLD AUTO: 1.2 K/UL (ref 1–4.8)
LYMPHOCYTES NFR BLD: 17.5 % (ref 18–48)
MCH RBC QN AUTO: 32 PG (ref 27–31)
MCHC RBC AUTO-ENTMCNC: 31.9 G/DL (ref 32–36)
MCV RBC AUTO: 101 FL (ref 82–98)
MONOCYTES # BLD AUTO: 0.5 K/UL (ref 0.3–1)
MONOCYTES NFR BLD: 6.5 % (ref 4–15)
NEUTROPHILS # BLD AUTO: 5.2 K/UL (ref 1.8–7.7)
NEUTROPHILS NFR BLD: 72.8 % (ref 38–73)
NONHDLC SERPL-MCNC: 119 MG/DL
NRBC BLD-RTO: 0 /100 WBC
PLATELET # BLD AUTO: 327 K/UL (ref 150–450)
PMV BLD AUTO: 9.5 FL (ref 9.2–12.9)
POTASSIUM SERPL-SCNC: 4.1 MMOL/L (ref 3.5–5.1)
PROT SERPL-MCNC: 6.2 G/DL (ref 6–8.4)
RBC # BLD AUTO: 3.62 M/UL (ref 4.6–6.2)
SODIUM SERPL-SCNC: 141 MMOL/L (ref 136–145)
TRIGL SERPL-MCNC: 95 MG/DL (ref 30–150)
WBC # BLD AUTO: 7.1 K/UL (ref 3.9–12.7)

## 2024-08-19 PROCEDURE — 3008F BODY MASS INDEX DOCD: CPT | Mod: CPTII,S$GLB,, | Performed by: PEDIATRICS

## 2024-08-19 PROCEDURE — 99999 PR PBB SHADOW E&M-EST. PATIENT-LVL IV: CPT | Mod: PBBFAC,,, | Performed by: PEDIATRICS

## 2024-08-19 PROCEDURE — 99397 PER PM REEVAL EST PAT 65+ YR: CPT | Mod: S$GLB,,, | Performed by: PEDIATRICS

## 2024-08-19 PROCEDURE — 4010F ACE/ARB THERAPY RXD/TAKEN: CPT | Mod: CPTII,S$GLB,, | Performed by: PEDIATRICS

## 2024-08-19 PROCEDURE — 3288F FALL RISK ASSESSMENT DOCD: CPT | Mod: CPTII,S$GLB,, | Performed by: PEDIATRICS

## 2024-08-19 PROCEDURE — 1159F MED LIST DOCD IN RCRD: CPT | Mod: CPTII,S$GLB,, | Performed by: PEDIATRICS

## 2024-08-19 PROCEDURE — 3078F DIAST BP <80 MM HG: CPT | Mod: CPTII,S$GLB,, | Performed by: PEDIATRICS

## 2024-08-19 PROCEDURE — 36415 COLL VENOUS BLD VENIPUNCTURE: CPT | Performed by: PEDIATRICS

## 2024-08-19 PROCEDURE — 1126F AMNT PAIN NOTED NONE PRSNT: CPT | Mod: CPTII,S$GLB,, | Performed by: PEDIATRICS

## 2024-08-19 PROCEDURE — 85025 COMPLETE CBC W/AUTO DIFF WBC: CPT | Performed by: PEDIATRICS

## 2024-08-19 PROCEDURE — 3075F SYST BP GE 130 - 139MM HG: CPT | Mod: CPTII,S$GLB,, | Performed by: PEDIATRICS

## 2024-08-19 PROCEDURE — 1160F RVW MEDS BY RX/DR IN RCRD: CPT | Mod: CPTII,S$GLB,, | Performed by: PEDIATRICS

## 2024-08-19 PROCEDURE — 80053 COMPREHEN METABOLIC PANEL: CPT | Performed by: PEDIATRICS

## 2024-08-19 PROCEDURE — 80061 LIPID PANEL: CPT | Performed by: PEDIATRICS

## 2024-08-19 PROCEDURE — 1101F PT FALLS ASSESS-DOCD LE1/YR: CPT | Mod: CPTII,S$GLB,, | Performed by: PEDIATRICS

## 2024-08-19 RX ORDER — VALSARTAN 80 MG/1
80 TABLET ORAL DAILY
Qty: 90 TABLET | Refills: 3 | Status: SHIPPED | OUTPATIENT
Start: 2024-08-19

## 2024-08-19 NOTE — PROGRESS NOTES
Subjective:       Patient ID: Aurelio Daniels is a 68 y.o. male.     Chief Complaint: Annual     Aurelio Daniels is a 67 y.o. male who presents to clinic for his overdue annual        Prostate cancer: followed by Dr. Smith (urology)     Hx of smoking: smokes 4-5 a week       COPD: Continued OH and decreased exercise capacity. Did not find trelegy or albuterol helpful     HTN: taking valsartan 80, no HTNive symptoms.     Anemia:long standing, had colonoscopy 2022     PMHx, PSHx, SocHx, and FHx reviewed and discussed with patient.      Continued OH and decreased exercise capacity.     Review of Systems   Constitutional:  Negative for activity change, appetite change, chills, diaphoresis, fatigue, fever and unexpected weight change.   HENT:  Negative for nasal congestion, ear pain, mouth sores, nosebleeds, postnasal drip, rhinorrhea, sneezing and sore throat.    Eyes:  Negative for photophobia, pain, discharge, redness and visual disturbance.   Respiratory:  Positive for shortness of breath. Negative for cough, chest tightness, wheezing and stridor.    Cardiovascular:  Negative for chest pain, palpitations and leg swelling.   Gastrointestinal:  Positive for diarrhea (post radiation) and fecal incontinence. Negative for abdominal distention, blood in stool, constipation, nausea and vomiting.   Endocrine:        Hot flashes, sweats from lupron   Genitourinary:  Negative for decreased urine volume, difficulty urinating, dysuria, flank pain, frequency, genital sores, hematuria and urgency.   Musculoskeletal:  Negative for arthralgias, back pain, joint swelling, neck pain and neck stiffness.   Integumentary:  Negative for color change, pallor, rash and wound.   Neurological:  Negative for dizziness, syncope, speech difficulty, weakness, light-headedness and headaches.   Hematological:  Negative for adenopathy. Does not bruise/bleed easily.   Psychiatric/Behavioral:  Negative for confusion, decreased  concentration, dysphoric mood, hallucinations, sleep disturbance and suicidal ideas. The patient is not nervous/anxious.    All other systems reviewed and are negative.      Objective:   Physical Exam  Vitals and nursing note reviewed.   Constitutional:       General: He is not in acute distress.     Appearance: He is well-developed.   Neck:      Thyroid: No thyromegaly.      Vascular: No JVD.   Cardiovascular:      Rate and Rhythm: Normal rate and regular rhythm.      Heart sounds: Normal heart sounds. No murmur heard.  Pulmonary:      Effort: Pulmonary effort is normal. No respiratory distress.      Breath sounds: Normal breath sounds. No wheezing or rales.   Abdominal:      General: There is no distension.      Palpations: Abdomen is soft. There is no mass.      Tenderness: There is no abdominal tenderness. There is no guarding.   Musculoskeletal:      Right lower leg: No edema.      Left lower leg: No edema.   Lymphadenopathy:      Cervical: No cervical adenopathy.   Skin:     Capillary Refill: Capillary refill takes less than 2 seconds.      Findings: No rash.   Neurological:      General: No focal deficit present.      Mental Status: He is alert and oriented to person, place, and time.      Cranial Nerves: No cranial nerve deficit.      Coordination: Coordination normal.   Psychiatric:         Mood and Affect: Mood normal.         Behavior: Behavior normal.         Thought Content: Thought content normal.         Judgment: Judgment normal.       Assessment:Annual exam in a patient with       Problem List Items Addressed This Visit         Cigarette smoker          COPD      Anemia, unspecified type         Relevant Orders     CBC Auto Differential     HTN               Hx prostate cancer                                       Plan:     HMI discussed. Await missed labs. Smoking cessation discussed. Vaccines discussed. See pulmonary. Follow up 6 months.

## 2024-09-05 ENCOUNTER — OFFICE VISIT (OUTPATIENT)
Dept: PULMONOLOGY | Facility: CLINIC | Age: 69
End: 2024-09-05
Payer: MEDICARE

## 2024-09-05 VITALS
HEART RATE: 95 BPM | HEIGHT: 69 IN | RESPIRATION RATE: 20 BRPM | SYSTOLIC BLOOD PRESSURE: 130 MMHG | DIASTOLIC BLOOD PRESSURE: 82 MMHG | OXYGEN SATURATION: 98 % | WEIGHT: 162.06 LBS | BODY MASS INDEX: 24 KG/M2

## 2024-09-05 DIAGNOSIS — J43.2 CENTRILOBULAR EMPHYSEMA: Primary | ICD-10-CM

## 2024-09-05 DIAGNOSIS — J41.0 SIMPLE CHRONIC BRONCHITIS: ICD-10-CM

## 2024-09-05 PROCEDURE — 1160F RVW MEDS BY RX/DR IN RCRD: CPT | Mod: CPTII,S$GLB,, | Performed by: INTERNAL MEDICINE

## 2024-09-05 PROCEDURE — 1159F MED LIST DOCD IN RCRD: CPT | Mod: CPTII,S$GLB,, | Performed by: INTERNAL MEDICINE

## 2024-09-05 PROCEDURE — 3288F FALL RISK ASSESSMENT DOCD: CPT | Mod: CPTII,S$GLB,, | Performed by: INTERNAL MEDICINE

## 2024-09-05 PROCEDURE — 3079F DIAST BP 80-89 MM HG: CPT | Mod: CPTII,S$GLB,, | Performed by: INTERNAL MEDICINE

## 2024-09-05 PROCEDURE — 1101F PT FALLS ASSESS-DOCD LE1/YR: CPT | Mod: CPTII,S$GLB,, | Performed by: INTERNAL MEDICINE

## 2024-09-05 PROCEDURE — 99204 OFFICE O/P NEW MOD 45 MIN: CPT | Mod: S$GLB,,, | Performed by: INTERNAL MEDICINE

## 2024-09-05 PROCEDURE — 3008F BODY MASS INDEX DOCD: CPT | Mod: CPTII,S$GLB,, | Performed by: INTERNAL MEDICINE

## 2024-09-05 PROCEDURE — 99999 PR PBB SHADOW E&M-EST. PATIENT-LVL IV: CPT | Mod: PBBFAC,,, | Performed by: INTERNAL MEDICINE

## 2024-09-05 PROCEDURE — 3075F SYST BP GE 130 - 139MM HG: CPT | Mod: CPTII,S$GLB,, | Performed by: INTERNAL MEDICINE

## 2024-09-05 PROCEDURE — 4010F ACE/ARB THERAPY RXD/TAKEN: CPT | Mod: CPTII,S$GLB,, | Performed by: INTERNAL MEDICINE

## 2024-09-05 RX ORDER — FLUTICASONE FUROATE, UMECLIDINIUM BROMIDE AND VILANTEROL TRIFENATATE 200; 62.5; 25 UG/1; UG/1; UG/1
1 POWDER RESPIRATORY (INHALATION) DAILY
Qty: 60 EACH | Refills: 6 | Status: SHIPPED | OUTPATIENT
Start: 2024-09-05

## 2024-09-05 NOTE — PROGRESS NOTES
Subjective:      Patient ID: Aurelio Daniels is a 68 y.o. male.    Chief Complaint: Cough and Shortness of Breath (/)    Cough  Associated symptoms include shortness of breath.   Shortness of Breath        68-year-old male long-time smoker here for evaluation of dyspnea on exertion.  Patient states it has been getting steadily worse over time.  Had PFTs done here March of last year which showed moderately severe obstruction with hyperinflation.  No imaging done here.  States that he was given prescription for albuterol and a long-acting inhaler but never filled the long-acting inhaler.  Has been using albuterol only with no benefit.  States that when he gets short of breath he just stops and rests and it takes him a few minutes to recover.  No complaints of cough, anginal chest pain, productive sputum or fevers or chills.  He is here today with his wife.    Patient Active Problem List   Diagnosis    Prostate cancer    Cigarette smoker    Simple chronic bronchitis    Primary hypertension    Other specified anemias     Past Surgical History:   Procedure Laterality Date    COLONOSCOPY      COLONOSCOPY N/A 2022    Procedure: COLONOSCOPY;  Surgeon: Jamia Alfaro MD;  Location: [facility];  Service: Endoscopy;  Laterality: N/A;    VASECTOMY       Social History     Tobacco Use    Smoking status: Some Days     Current packs/day: 0.00     Types: Cigarettes     Last attempt to quit: 1/15/2022     Years since quittin.6    Smokeless tobacco: Never   Substance Use Topics    Alcohol use: Yes     Alcohol/week: 2.0 standard drinks of alcohol     Types: 2 Shots of liquor per week    Drug use: Never     Family History   Problem Relation Name Age of Onset    No Known Problems Mother      Heart disease Father      No Known Problems Brother      No Known Problems Brother      No Known Problems Daughter      No Known Problems Daughter         Review of Systems   Respiratory:  Positive for cough and shortness of  "breath.     as per HPI otherwise negative    Objective:     Physical Exam   Constitutional: He is oriented to person, place, and time. He appears well-developed. No distress.   HENT:   Head: Normocephalic.   Cardiovascular: Normal rate and regular rhythm.   Pulmonary/Chest: Hyperinflation and effort normal. He has decreased breath sounds. He has no wheezes.   Musculoskeletal:      Cervical back: Neck supple.   Neurological: He is alert and oriented to person, place, and time.   Psychiatric: He has a normal mood and affect.   Nursing note and vitals reviewed.          9/5/2024     2:20 PM 8/19/2024     1:09 PM 7/10/2024     9:57 AM 4/9/2024    10:48 AM 1/3/2024    11:07 AM 9/27/2023     9:19 AM 6/27/2023     8:09 AM   Pulmonary Function Tests   SpO2 98 % 97 %        Height 5' 9" (1.753 m) 5' 9" (1.753 m) 5' 9" (1.753 m) 5' 9" (1.753 m)  5' 9" (1.753 m) 5' 9" (1.753 m)   Weight 73.5 kg (162 lb 0.6 oz) 73.4 kg (161 lb 13.1 oz) 72.8 kg (160 lb 7.9 oz) 71.4 kg (157 lb 6.5 oz) 71.7 kg (158 lb) 71.7 kg (158 lb) 71.9 kg (158 lb 8.2 oz)   BMI (Calculated) 23.9 23.9 23.7 23.2  23.3 23.4        Assessment:     1. Centrilobular emphysema    2. Simple chronic bronchitis         Orders Placed This Encounter   Procedures    CT Chest Without Contrast     Standing Status:   Future     Standing Expiration Date:   9/5/2025     Order Specific Question:   May the Radiologist modify the order per protocol to meet the clinical needs of the patient?     Answer:   Yes    Complete PFT with bronchodilator     Standing Status:   Future     Standing Expiration Date:   9/5/2025    Stress test, pulmonary     Standing Status:   Future     Standing Expiration Date:   9/5/2025     Order Specific Question:   Reason for study     Answer:   Functional status     Order Specific Question:   Release to patient     Answer:   Immediate         Plan:     Significant emphysema at least by spirometry data  Preliminarily would qualify for endobronchial valve " placement  Discussed that he needs to be abstinent from cigarettes for a minimum of 3 months before this would be entertained  Has not tried combination long-acting inhalers as of yet  Trelegy prescription sent to the pharmacy  We will have him return in 3 months for repeat PFTs, 6 minute walk test and a CT of the chest  Discussed the above in detail with the patient in his wife who voiced understanding and agreement with this plan

## 2024-09-08 ENCOUNTER — PATIENT MESSAGE (OUTPATIENT)
Dept: PULMONOLOGY | Facility: CLINIC | Age: 69
End: 2024-09-08
Payer: MEDICARE

## 2024-09-09 ENCOUNTER — TELEPHONE (OUTPATIENT)
Dept: URGENT CARE | Facility: CLINIC | Age: 69
End: 2024-09-09

## 2024-09-09 ENCOUNTER — OFFICE VISIT (OUTPATIENT)
Dept: URGENT CARE | Facility: CLINIC | Age: 69
End: 2024-09-09
Payer: MEDICARE

## 2024-09-09 ENCOUNTER — HOSPITAL ENCOUNTER (OUTPATIENT)
Dept: RADIOLOGY | Facility: HOSPITAL | Age: 69
Discharge: HOME | End: 2024-09-09
Attending: FAMILY MEDICINE
Payer: MEDICARE

## 2024-09-09 VITALS
SYSTOLIC BLOOD PRESSURE: 127 MMHG | TEMPERATURE: 101 F | WEIGHT: 162 LBS | DIASTOLIC BLOOD PRESSURE: 68 MMHG | HEART RATE: 94 BPM | BODY MASS INDEX: 23.99 KG/M2 | HEIGHT: 69 IN | OXYGEN SATURATION: 95 % | RESPIRATION RATE: 20 BRPM

## 2024-09-09 DIAGNOSIS — R05.9 COUGH, UNSPECIFIED TYPE: ICD-10-CM

## 2024-09-09 DIAGNOSIS — B97.89 VIRAL RESPIRATORY ILLNESS: Primary | ICD-10-CM

## 2024-09-09 DIAGNOSIS — J44.9 CHRONIC OBSTRUCTIVE PULMONARY DISEASE, UNSPECIFIED COPD TYPE: ICD-10-CM

## 2024-09-09 DIAGNOSIS — R50.9 FEVER, UNSPECIFIED FEVER CAUSE: ICD-10-CM

## 2024-09-09 DIAGNOSIS — R52 BODY ACHES: ICD-10-CM

## 2024-09-09 DIAGNOSIS — J98.8 VIRAL RESPIRATORY ILLNESS: Primary | ICD-10-CM

## 2024-09-09 LAB
CTP QC/QA: YES
CTP QC/QA: YES
POC MOLECULAR INFLUENZA A AGN: NEGATIVE
POC MOLECULAR INFLUENZA B AGN: NEGATIVE
SARS-COV-2 AG RESP QL IA.RAPID: NEGATIVE

## 2024-09-09 PROCEDURE — 71046 X-RAY EXAM CHEST 2 VIEWS: CPT | Mod: 26,,, | Performed by: RADIOLOGY

## 2024-09-09 PROCEDURE — 87811 SARS-COV-2 COVID19 W/OPTIC: CPT | Mod: QW,S$GLB,, | Performed by: FAMILY MEDICINE

## 2024-09-09 PROCEDURE — 99213 OFFICE O/P EST LOW 20 MIN: CPT | Mod: S$GLB,,, | Performed by: FAMILY MEDICINE

## 2024-09-09 PROCEDURE — 71046 X-RAY EXAM CHEST 2 VIEWS: CPT | Mod: TC,FY,PO

## 2024-09-09 PROCEDURE — 87502 INFLUENZA DNA AMP PROBE: CPT | Mod: QW,S$GLB,, | Performed by: FAMILY MEDICINE

## 2024-09-09 NOTE — PROGRESS NOTES
"Subjective:      Patient ID: Aurelio Daniels is a 68 y.o. male.    Vitals:  height is 5' 9" (1.753 m) and weight is 73.5 kg (162 lb). His oral temperature is 100.6 °F (38.1 °C) (abnormal). His blood pressure is 127/68 and his pulse is 94. His respiration is 20 and oxygen saturation is 95%.     Chief Complaint: Sinus Problem    67 yo male here with family member. Pt complain of headache, chills, and body aches that started 3 days ago. Just starting with cough, nasal congestion.  No known sick contacts. Saw pulmonologist the day before. Hx COPD. Just started on a new inhaler which has helped his breathing so far. Pt did not take anything for relief.    Sinus Problem  This is a new problem. Episode onset: 4 days ago. The problem has been gradually worsening since onset. There has been no fever. His pain is at a severity of 0/10. He is experiencing no pain. Associated symptoms include chills, congestion, coughing and headaches. Pertinent negatives include no diaphoresis, ear pain, hoarse voice, neck pain, shortness of breath, sinus pressure, sneezing, sore throat or swollen glands. (Body aches) Past treatments include nothing. The treatment provided no relief.       Constitution: Positive for chills and fatigue. Negative for sweating and fever.   HENT:  Positive for congestion. Negative for ear pain, postnasal drip, sinus pressure and sore throat.    Neck: Negative for neck pain.   Cardiovascular: Negative.    Eyes: Negative.    Respiratory:  Positive for cough and COPD. Negative for bloody sputum, shortness of breath and wheezing.    Gastrointestinal: Negative.    Genitourinary: Negative.    Musculoskeletal:  Positive for muscle ache.   Skin:  Negative for rash.   Allergic/Immunologic: Negative for sneezing.   Neurological:  Positive for headaches.   Psychiatric/Behavioral: Negative.        Objective:     Physical Exam   Constitutional: He is oriented to person, place, and time.   HENT:   Head: Normocephalic. "   Ears:   Right Ear: Tympanic membrane, external ear and ear canal normal.   Left Ear: Tympanic membrane, external ear and ear canal normal.   Nose: Congestion present. No purulent discharge or sinus tenderness. No epistaxis.   Mouth/Throat: Mucous membranes are moist. No oropharyngeal exudate or posterior oropharyngeal erythema. Oropharynx is clear.   Eyes: Conjunctivae are normal. Pupils are equal, round, and reactive to light.   Neck: Neck supple.   Cardiovascular: Normal rate, regular rhythm, normal heart sounds and normal pulses.   Pulmonary/Chest: Effort normal and breath sounds normal.         Comments: Intermittent cough. Normal chest rise and fall. No accessory muscle use.     Abdominal: Normal appearance. He exhibits no distension. Soft. There is no abdominal tenderness.   Musculoskeletal: Normal range of motion.         General: Normal range of motion.   Lymphadenopathy:     He has no cervical adenopathy.   Neurological: no focal deficit. He is alert and oriented to person, place, and time.   Skin: Skin is warm and no rash.         Comments: Age appropriate turgor   Psychiatric: His behavior is normal. Mood, judgment and thought content normal.   Nursing note and vitals reviewed.      Assessment:     1. Viral respiratory illness    2. Cough, unspecified type    3. Fever, unspecified fever cause    4. Body aches    5. Chronic obstructive pulmonary disease, unspecified COPD type        Plan:       Viral respiratory illness    Cough, unspecified type  -     SARS Coronavirus 2 Antigen, POCT Manual Read  -     POCT Influenza A/B MOLECULAR  -     XR CHEST PA AND LATERAL; Future; Expected date: 09/09/2024    Fever, unspecified fever cause  -     XR CHEST PA AND LATERAL; Future; Expected date: 09/09/2024    Body aches  -     XR CHEST PA AND LATERAL; Future; Expected date: 09/09/2024    Chronic obstructive pulmonary disease, unspecified COPD type  -     XR CHEST PA AND LATERAL; Future; Expected date:  09/09/2024      Results for orders placed or performed in visit on 09/09/24   SARS Coronavirus 2 Antigen, POCT Manual Read   Result Value Ref Range    SARS Coronavirus 2 Antigen Negative Negative     Acceptable Yes    POCT Influenza A/B MOLECULAR   Result Value Ref Range    POC Molecular Influenza A Ag Negative Negative    POC Molecular Influenza B Ag Negative Negative     Acceptable Yes       Review of patient's allergies indicates:  No Known Allergies      SUMMARY:  See hpi. Testing currently negative. Can sometimes change to positive in a day or two if early with symptoms. Send out for chest xray. Contact precautions. Supportive measures. Clinically seems to be a viral pattern. He is awaiting contact from pulmonology office.  Immediate medical provider evaluation if worsens or new symptoms.       Patient Instructions   Thank you for allowing our team to take care of you today.  You are being evaluated for body aches, fever and developing cough/congestion.  Flu result still pending and chest xray ordered. Covid negative.   Will notify you of result once they returned. Further instructions will be given then.   Most likely early symptoms of an acute viral syndrome.  Rest.  Stay hydrated.  You are contagious so be careful around you.  Fever reducer medication as needed and appropriate  Gargles with warm salt water if throat irritated.  Throat lozenges/cough drops if needed.  Routine followup with your primary/specialist.  Immediate followup with medical provider/ER if worsens.       ADDENDUM: Spoke to patient about chest xray result which showed no acute abnormality per radiologist. Suspect acute viral syndrome. Supportive measures. Immediate evaluation if worsening/new symptoms.      Additional MDM:     Heart Failure Score:   COPD = Yes

## 2024-09-09 NOTE — TELEPHONE ENCOUNTER
Spoke to patient. Chest xray with no acute abnormality per radiologist. Symptoms currently following viral pattern. Supportive measures. Fever reducers. Plain mucinex or delsym or robitussin if needed for cough. Stay hydrated. Rest. Continue his regular meds/inhaler. He is awaiting a response from his pulmonologist. Immediate medical provider evaluation if worsens.   Kimmy Ramirez MD

## 2024-09-09 NOTE — PATIENT INSTRUCTIONS
Thank you for allowing our team to take care of you today.  You are being evaluated for body aches, fever and developing cough/congestion.  Flu result still pending and chest xray ordered. Covid negative.   Will notify you of result once they returned. Further instructions will be given then.   Most likely early symptoms of an acute viral syndrome.  Rest.  Stay hydrated.  You are contagious so be careful around you.  Fever reducer medication as needed and appropriate  Gargles with warm salt water if throat irritated.  Throat lozenges/cough drops if needed.  Routine followup with your primary/specialist.  Immediate followup with medical provider/ER if worsens.

## 2024-09-13 ENCOUNTER — HOSPITAL ENCOUNTER (INPATIENT)
Facility: HOSPITAL | Age: 69
LOS: 4 days | Discharge: HOME | DRG: 194 | End: 2024-09-17
Attending: EMERGENCY MEDICINE | Admitting: FAMILY MEDICINE
Payer: MEDICARE

## 2024-09-13 DIAGNOSIS — R05.9 COUGH: ICD-10-CM

## 2024-09-13 DIAGNOSIS — R06.02 SHORTNESS OF BREATH: ICD-10-CM

## 2024-09-13 DIAGNOSIS — Z72.0 TOBACCO ABUSE DISORDER: ICD-10-CM

## 2024-09-13 DIAGNOSIS — J96.01 ACUTE HYPOXEMIC RESPIRATORY FAILURE: ICD-10-CM

## 2024-09-13 DIAGNOSIS — J18.9 PNEUMONIA OF BOTH LOWER LOBES DUE TO INFECTIOUS ORGANISM: Primary | ICD-10-CM

## 2024-09-13 DIAGNOSIS — Z87.09 HISTORY OF COPD: ICD-10-CM

## 2024-09-13 PROBLEM — F17.200 TOBACCO DEPENDENCY: Status: ACTIVE | Noted: 2024-09-13

## 2024-09-13 PROBLEM — E87.6 HYPOKALEMIA: Status: ACTIVE | Noted: 2024-09-13

## 2024-09-13 LAB
ALBUMIN SERPL BCP-MCNC: 2.6 G/DL (ref 3.5–5.2)
ALLENS TEST: ABNORMAL
ALP SERPL-CCNC: 299 U/L (ref 55–135)
ALT SERPL W/O P-5'-P-CCNC: 42 U/L (ref 10–44)
ANION GAP SERPL CALC-SCNC: 15 MMOL/L (ref 8–16)
AST SERPL-CCNC: 33 U/L (ref 10–40)
BACTERIA #/AREA URNS HPF: ABNORMAL /HPF
BASOPHILS # BLD AUTO: 0.07 K/UL (ref 0–0.2)
BASOPHILS NFR BLD: 0.4 % (ref 0–1.9)
BILIRUB SERPL-MCNC: 0.8 MG/DL (ref 0.1–1)
BILIRUB UR QL STRIP: NEGATIVE
BNP SERPL-MCNC: 226 PG/ML (ref 0–99)
BUN SERPL-MCNC: 22 MG/DL (ref 8–23)
CALCIUM SERPL-MCNC: 9.5 MG/DL (ref 8.7–10.5)
CHLORIDE SERPL-SCNC: 101 MMOL/L (ref 95–110)
CLARITY UR: CLEAR
CO2 SERPL-SCNC: 21 MMOL/L (ref 23–29)
COLOR UR: YELLOW
CREAT SERPL-MCNC: 1.2 MG/DL (ref 0.5–1.4)
DELSYS: ABNORMAL
DIFFERENTIAL METHOD BLD: ABNORMAL
EOSINOPHIL # BLD AUTO: 0 K/UL (ref 0–0.5)
EOSINOPHIL NFR BLD: 0.2 % (ref 0–8)
ERYTHROCYTE [DISTWIDTH] IN BLOOD BY AUTOMATED COUNT: 12.9 % (ref 11.5–14.5)
EST. GFR  (NO RACE VARIABLE): >60 ML/MIN/1.73 M^2
GLUCOSE SERPL-MCNC: 124 MG/DL (ref 70–110)
GLUCOSE UR QL STRIP: NEGATIVE
HCO3 UR-SCNC: 21.1 MMOL/L (ref 24–28)
HCT VFR BLD AUTO: 35.1 % (ref 40–54)
HGB BLD-MCNC: 12 G/DL (ref 14–18)
HGB UR QL STRIP: ABNORMAL
HYALINE CASTS #/AREA URNS LPF: 0 /LPF
IMM GRANULOCYTES # BLD AUTO: 0.47 K/UL (ref 0–0.04)
IMM GRANULOCYTES NFR BLD AUTO: 2.6 % (ref 0–0.5)
INFLUENZA A, MOLECULAR: NEGATIVE
INFLUENZA B, MOLECULAR: NEGATIVE
KETONES UR QL STRIP: NEGATIVE
LACTATE SERPL-SCNC: 1.1 MMOL/L (ref 0.5–2.2)
LEUKOCYTE ESTERASE UR QL STRIP: NEGATIVE
LYMPHOCYTES # BLD AUTO: 0.7 K/UL (ref 1–4.8)
LYMPHOCYTES NFR BLD: 4.1 % (ref 18–48)
MAGNESIUM SERPL-MCNC: 2.2 MG/DL (ref 1.6–2.6)
MCH RBC QN AUTO: 32.1 PG (ref 27–31)
MCHC RBC AUTO-ENTMCNC: 34.2 G/DL (ref 32–36)
MCV RBC AUTO: 94 FL (ref 82–98)
MICROSCOPIC COMMENT: ABNORMAL
MONOCYTES # BLD AUTO: 0.6 K/UL (ref 0.3–1)
MONOCYTES NFR BLD: 3.5 % (ref 4–15)
NEUTROPHILS # BLD AUTO: 16 K/UL (ref 1.8–7.7)
NEUTROPHILS NFR BLD: 89.2 % (ref 38–73)
NITRITE UR QL STRIP: NEGATIVE
NRBC BLD-RTO: 0 /100 WBC
PCO2 BLDA: 31.2 MMHG (ref 35–45)
PH SMN: 7.44 [PH] (ref 7.35–7.45)
PH UR STRIP: 7 [PH] (ref 5–8)
PLATELET # BLD AUTO: 447 K/UL (ref 150–450)
PMV BLD AUTO: 9.5 FL (ref 9.2–12.9)
PO2 BLDA: 54 MMHG (ref 80–100)
POC BE: -3 MMOL/L
POC SATURATED O2: 89 % (ref 95–100)
POTASSIUM SERPL-SCNC: 3.2 MMOL/L (ref 3.5–5.1)
PROCALCITONIN SERPL IA-MCNC: 1.18 NG/ML
PROT SERPL-MCNC: 7 G/DL (ref 6–8.4)
PROT UR QL STRIP: ABNORMAL
RBC # BLD AUTO: 3.74 M/UL (ref 4.6–6.2)
RBC #/AREA URNS HPF: 3 /HPF (ref 0–4)
SAMPLE: ABNORMAL
SARS-COV-2 RDRP RESP QL NAA+PROBE: NEGATIVE
SITE: ABNORMAL
SODIUM SERPL-SCNC: 137 MMOL/L (ref 136–145)
SP GR UR STRIP: 1.02 (ref 1–1.03)
SPECIMEN SOURCE: NORMAL
TROPONIN I SERPL DL<=0.01 NG/ML-MCNC: 0.26 NG/ML (ref 0–0.03)
URN SPEC COLLECT METH UR: ABNORMAL
UROBILINOGEN UR STRIP-ACNC: >=8 EU/DL
WBC # BLD AUTO: 17.89 K/UL (ref 3.9–12.7)
WBC #/AREA URNS HPF: 5 /HPF (ref 0–5)
WBC CLUMPS URNS QL MICRO: ABNORMAL

## 2024-09-13 PROCEDURE — 25000242 PHARM REV CODE 250 ALT 637 W/ HCPCS: Performed by: NURSE PRACTITIONER

## 2024-09-13 PROCEDURE — 83880 ASSAY OF NATRIURETIC PEPTIDE: CPT | Performed by: EMERGENCY MEDICINE

## 2024-09-13 PROCEDURE — 99900035 HC TECH TIME PER 15 MIN (STAT)

## 2024-09-13 PROCEDURE — 81000 URINALYSIS NONAUTO W/SCOPE: CPT | Performed by: NURSE PRACTITIONER

## 2024-09-13 PROCEDURE — 84484 ASSAY OF TROPONIN QUANT: CPT | Performed by: EMERGENCY MEDICINE

## 2024-09-13 PROCEDURE — 36415 COLL VENOUS BLD VENIPUNCTURE: CPT | Performed by: EMERGENCY MEDICINE

## 2024-09-13 PROCEDURE — 83735 ASSAY OF MAGNESIUM: CPT | Performed by: EMERGENCY MEDICINE

## 2024-09-13 PROCEDURE — 63600175 PHARM REV CODE 636 W HCPCS: Performed by: NURSE PRACTITIONER

## 2024-09-13 PROCEDURE — 85025 COMPLETE CBC W/AUTO DIFF WBC: CPT | Performed by: NURSE PRACTITIONER

## 2024-09-13 PROCEDURE — 87040 BLOOD CULTURE FOR BACTERIA: CPT | Mod: 59 | Performed by: NURSE PRACTITIONER

## 2024-09-13 PROCEDURE — U0002 COVID-19 LAB TEST NON-CDC: HCPCS | Performed by: EMERGENCY MEDICINE

## 2024-09-13 PROCEDURE — 96360 HYDRATION IV INFUSION INIT: CPT | Mod: 59

## 2024-09-13 PROCEDURE — 63600175 PHARM REV CODE 636 W HCPCS: Performed by: EMERGENCY MEDICINE

## 2024-09-13 PROCEDURE — 82803 BLOOD GASES ANY COMBINATION: CPT

## 2024-09-13 PROCEDURE — 11000001 HC ACUTE MED/SURG PRIVATE ROOM

## 2024-09-13 PROCEDURE — 94640 AIRWAY INHALATION TREATMENT: CPT | Mod: XB

## 2024-09-13 PROCEDURE — 99285 EMERGENCY DEPT VISIT HI MDM: CPT | Mod: 25

## 2024-09-13 PROCEDURE — 93005 ELECTROCARDIOGRAM TRACING: CPT

## 2024-09-13 PROCEDURE — 25000242 PHARM REV CODE 250 ALT 637 W/ HCPCS

## 2024-09-13 PROCEDURE — 25000003 PHARM REV CODE 250: Performed by: NURSE PRACTITIONER

## 2024-09-13 PROCEDURE — 83605 ASSAY OF LACTIC ACID: CPT | Performed by: NURSE PRACTITIONER

## 2024-09-13 PROCEDURE — 96365 THER/PROPH/DIAG IV INF INIT: CPT

## 2024-09-13 PROCEDURE — 36600 WITHDRAWAL OF ARTERIAL BLOOD: CPT

## 2024-09-13 PROCEDURE — 80053 COMPREHEN METABOLIC PANEL: CPT | Performed by: NURSE PRACTITIONER

## 2024-09-13 PROCEDURE — 84145 PROCALCITONIN (PCT): CPT | Performed by: EMERGENCY MEDICINE

## 2024-09-13 PROCEDURE — 94640 AIRWAY INHALATION TREATMENT: CPT

## 2024-09-13 PROCEDURE — 96361 HYDRATE IV INFUSION ADD-ON: CPT

## 2024-09-13 PROCEDURE — 93010 ELECTROCARDIOGRAM REPORT: CPT | Mod: ,,, | Performed by: INTERNAL MEDICINE

## 2024-09-13 PROCEDURE — 87502 INFLUENZA DNA AMP PROBE: CPT | Performed by: EMERGENCY MEDICINE

## 2024-09-13 PROCEDURE — 25000003 PHARM REV CODE 250: Performed by: EMERGENCY MEDICINE

## 2024-09-13 RX ORDER — ACETAMINOPHEN 325 MG/1
650 TABLET ORAL
Status: COMPLETED | OUTPATIENT
Start: 2024-09-13 | End: 2024-09-13

## 2024-09-13 RX ORDER — HYDROCODONE BITARTRATE AND ACETAMINOPHEN 5; 325 MG/1; MG/1
1 TABLET ORAL EVERY 6 HOURS PRN
Status: DISCONTINUED | OUTPATIENT
Start: 2024-09-13 | End: 2024-09-17 | Stop reason: HOSPADM

## 2024-09-13 RX ORDER — SODIUM CHLORIDE 0.9 % (FLUSH) 0.9 %
10 SYRINGE (ML) INJECTION EVERY 12 HOURS PRN
Status: DISCONTINUED | OUTPATIENT
Start: 2024-09-13 | End: 2024-09-17 | Stop reason: HOSPADM

## 2024-09-13 RX ORDER — PROMETHAZINE HYDROCHLORIDE 25 MG/1
25 SUPPOSITORY RECTAL EVERY 6 HOURS PRN
Status: DISCONTINUED | OUTPATIENT
Start: 2024-09-13 | End: 2024-09-17 | Stop reason: HOSPADM

## 2024-09-13 RX ORDER — POLYETHYLENE GLYCOL 3350 17 G/17G
17 POWDER, FOR SOLUTION ORAL DAILY PRN
Status: DISCONTINUED | OUTPATIENT
Start: 2024-09-13 | End: 2024-09-17 | Stop reason: HOSPADM

## 2024-09-13 RX ORDER — HYDRALAZINE HYDROCHLORIDE 20 MG/ML
10 INJECTION INTRAMUSCULAR; INTRAVENOUS EVERY 6 HOURS PRN
Status: DISCONTINUED | OUTPATIENT
Start: 2024-09-13 | End: 2024-09-17 | Stop reason: HOSPADM

## 2024-09-13 RX ORDER — NALOXONE HCL 0.4 MG/ML
0.02 VIAL (ML) INJECTION
Status: DISCONTINUED | OUTPATIENT
Start: 2024-09-13 | End: 2024-09-17 | Stop reason: HOSPADM

## 2024-09-13 RX ORDER — ENOXAPARIN SODIUM 100 MG/ML
40 INJECTION SUBCUTANEOUS EVERY 24 HOURS
Status: DISCONTINUED | OUTPATIENT
Start: 2024-09-13 | End: 2024-09-17 | Stop reason: HOSPADM

## 2024-09-13 RX ORDER — POTASSIUM CHLORIDE 20 MEQ/1
40 TABLET, EXTENDED RELEASE ORAL ONCE
Status: COMPLETED | OUTPATIENT
Start: 2024-09-13 | End: 2024-09-13

## 2024-09-13 RX ORDER — GUAIFENESIN 100 MG/5ML
200 SOLUTION ORAL EVERY 4 HOURS PRN
Status: DISCONTINUED | OUTPATIENT
Start: 2024-09-13 | End: 2024-09-14

## 2024-09-13 RX ORDER — IPRATROPIUM BROMIDE AND ALBUTEROL SULFATE 2.5; .5 MG/3ML; MG/3ML
3 SOLUTION RESPIRATORY (INHALATION)
Status: COMPLETED | OUTPATIENT
Start: 2024-09-13 | End: 2024-09-14

## 2024-09-13 RX ORDER — IPRATROPIUM BROMIDE AND ALBUTEROL SULFATE 2.5; .5 MG/3ML; MG/3ML
SOLUTION RESPIRATORY (INHALATION)
Status: COMPLETED
Start: 2024-09-13 | End: 2024-09-13

## 2024-09-13 RX ORDER — ONDANSETRON HYDROCHLORIDE 2 MG/ML
4 INJECTION, SOLUTION INTRAVENOUS EVERY 8 HOURS PRN
Status: DISCONTINUED | OUTPATIENT
Start: 2024-09-13 | End: 2024-09-17 | Stop reason: HOSPADM

## 2024-09-13 RX ORDER — SIMETHICONE 80 MG
1 TABLET,CHEWABLE ORAL 4 TIMES DAILY PRN
Status: DISCONTINUED | OUTPATIENT
Start: 2024-09-13 | End: 2024-09-17 | Stop reason: HOSPADM

## 2024-09-13 RX ORDER — ACETAMINOPHEN 325 MG/1
650 TABLET ORAL EVERY 8 HOURS PRN
Status: DISCONTINUED | OUTPATIENT
Start: 2024-09-13 | End: 2024-09-17 | Stop reason: HOSPADM

## 2024-09-13 RX ORDER — SODIUM CHLORIDE 9 MG/ML
1000 INJECTION, SOLUTION INTRAVENOUS
Status: COMPLETED | OUTPATIENT
Start: 2024-09-13 | End: 2024-09-13

## 2024-09-13 RX ADMIN — POTASSIUM CHLORIDE 40 MEQ: 1500 TABLET, EXTENDED RELEASE ORAL at 04:09

## 2024-09-13 RX ADMIN — IPRATROPIUM BROMIDE AND ALBUTEROL SULFATE 3 ML: 2.5; .5 SOLUTION RESPIRATORY (INHALATION) at 07:09

## 2024-09-13 RX ADMIN — IPRATROPIUM BROMIDE AND ALBUTEROL SULFATE 3 ML: 2.5; .5 SOLUTION RESPIRATORY (INHALATION) at 02:09

## 2024-09-13 RX ADMIN — CEFTRIAXONE 1 G: 1 INJECTION, POWDER, FOR SOLUTION INTRAMUSCULAR; INTRAVENOUS at 02:09

## 2024-09-13 RX ADMIN — ENOXAPARIN SODIUM 40 MG: 40 INJECTION SUBCUTANEOUS at 09:09

## 2024-09-13 RX ADMIN — SODIUM CHLORIDE 1000 ML: 9 INJECTION, SOLUTION INTRAVENOUS at 12:09

## 2024-09-13 RX ADMIN — AZITHROMYCIN MONOHYDRATE 500 MG: 500 INJECTION, POWDER, LYOPHILIZED, FOR SOLUTION INTRAVENOUS at 05:09

## 2024-09-13 RX ADMIN — ACETAMINOPHEN 650 MG: 325 TABLET ORAL at 12:09

## 2024-09-13 NOTE — ED PROVIDER NOTES
SCRIBE #1 NOTE: I, Yakelin Lm, am scribing for, and in the presence of, Bárbara Minor MD. I have scribed the entire note.       History     Chief Complaint   Patient presents with    Cough    Nasal Congestion    Chest Congestion    Generalized Body Aches     Generalized body aches, cough and congestion x one week.      Review of patient's allergies indicates:  No Known Allergies      History of Present Illness     HPI    9/13/2024, 2:35 PM  History obtained from the patient      History of Present Illness: Aurelio Daniels is a 68 y.o. male patient with a PMHx of tobacco use and cancer who presents to the Emergency Department for evaluation of a cough which onset gradually 1 week ago. Pt was prescribed Trelegy but stopped taking it as he thought it was exacerbating his sxs. Pt reports he is coughing up clear phlegm. Pt hasn't smoked cigarettes in 4 weeks. Pt reports a subjective fever, but did not record a temperature value. Symptoms are constant and moderate in severity. No mitigating factors reported. No associated sxs. Patient denies any and all other sxs at this time. No prior tx reported. No further complaints or concerns at this time.       Arrival mode: Personal vehicle    PCP: Shlomo Villatoro MD        Past Medical History:  Past Medical History:   Diagnosis Date    Cancer     Prostate    Tobacco use        Past Surgical History:  Past Surgical History:   Procedure Laterality Date    COLONOSCOPY      COLONOSCOPY N/A 5/20/2022    Procedure: COLONOSCOPY;  Surgeon: Jamia Alfaro MD;  Location: [facility];  Service: Endoscopy;  Laterality: N/A;    VASECTOMY  1985         Family History:  Family History   Problem Relation Name Age of Onset    No Known Problems Mother      Heart disease Father      No Known Problems Brother      No Known Problems Brother      No Known Problems Daughter      No Known Problems Daughter         Social History:  Social History     Tobacco Use    Smoking status: Some  Days     Current packs/day: 0.00     Types: Cigarettes     Last attempt to quit: 1/15/2022     Years since quittin.6     Passive exposure: Current    Smokeless tobacco: Never   Substance and Sexual Activity    Alcohol use: Yes     Alcohol/week: 2.0 standard drinks of alcohol     Types: 2 Shots of liquor per week    Drug use: Never    Sexual activity: Not Currently     Partners: Female        Review of Systems     Review of Systems   Constitutional:  Positive for fever.   Respiratory:  Positive for cough.    All other systems reviewed and are negative.       Physical Exam     Initial Vitals   BP Pulse Resp Temp SpO2   24 1127 24 1127 24 1127 24 1127 24 1130   (!) 104/57 103 18 100.1 °F (37.8 °C) 96 %      MAP       --                 Physical Exam  Nursing Notes and Vital Signs Reviewed.  Constitutional: Patient is in no acute distress. Well-developed and well-nourished.  Head: Atraumatic. Normocephalic.  Eyes: PERRL. EOM intact. Conjunctivae are not pale. No scleral icterus.  ENT: Mucous membranes are moist. Oropharynx is clear and symmetric.    Neck: Supple. Full ROM. No lymphadenopathy.  Cardiovascular: Regular rate. Regular rhythm. No murmurs, rubs, or gallops. Distal pulses are 2+ and symmetric.  Pulmonary/Chest: No respiratory distress. No wheezing. Bilateral crackles.  Abdominal: Soft and non-distended.  There is no tenderness.  No rebound, guarding, or rigidity.  Genitourinary: No CVA tenderness  Musculoskeletal: Moves all extremities. No obvious deformities. No edema.  Skin: Warm and dry.  Neurological:  Alert, awake, and appropriate.  Normal speech.  No acute focal neurological deficits are appreciated.  Psychiatric: Normal affect. Good eye contact. Appropriate in content.     ED Course   Procedures  ED Vital Signs:  Vitals:    24 1127 24 1130 24 1319 24 1404   BP: (!) 104/57  104/60    Pulse: 103  95 82   Resp: 18  20 18   Temp: 100.1 °F (37.8 °C)   "97.8 °F (36.6 °C)    TempSrc: Oral  Oral    SpO2:  96% (!) 88% (!) 90%   Weight: 71.7 kg (158 lb)      Height: 5' 9" (1.753 m)       09/13/24 1415 09/13/24 1500   BP: 110/67 107/67   Pulse: 84 100   Resp: 20 (!) 22   Temp:     TempSrc:     SpO2: 98% (!) 91%   Weight:     Height:         Abnormal Lab Results:  Labs Reviewed   CBC W/ AUTO DIFFERENTIAL - Abnormal       Result Value    WBC 17.89 (*)     RBC 3.74 (*)     Hemoglobin 12.0 (*)     Hematocrit 35.1 (*)     MCV 94      MCH 32.1 (*)     MCHC 34.2      RDW 12.9      Platelets 447      MPV 9.5      Immature Granulocytes 2.6 (*)     Gran # (ANC) 16.0 (*)     Immature Grans (Abs) 0.47 (*)     Lymph # 0.7 (*)     Mono # 0.6      Eos # 0.0      Baso # 0.07      nRBC 0      Gran % 89.2 (*)     Lymph % 4.1 (*)     Mono % 3.5 (*)     Eosinophil % 0.2      Basophil % 0.4      Differential Method Automated     COMPREHENSIVE METABOLIC PANEL - Abnormal    Sodium 137      Potassium 3.2 (*)     Chloride 101      CO2 21 (*)     Glucose 124 (*)     BUN 22      Creatinine 1.2      Calcium 9.5      Total Protein 7.0      Albumin 2.6 (*)     Total Bilirubin 0.8      Alkaline Phosphatase 299 (*)     AST 33      ALT 42      eGFR >60      Anion Gap 15     PROCALCITONIN - Abnormal    Procalcitonin 1.18 (*)    B-TYPE NATRIURETIC PEPTIDE - Abnormal     (*)    ISTAT PROCEDURE - Abnormal    POC PH 7.437      POC PCO2 31.2 (*)     POC PO2 54 (*)     POC HCO3 21.1 (*)     POC BE -3 (*)     POC SATURATED O2 89      Sample ARTERIAL      Site RR      Allens Test Pass      DelSys Room Air     INFLUENZA A & B BY MOLECULAR    Influenza A, Molecular Negative      Influenza B, Molecular Negative      Flu A & B Source Nasal swab     CULTURE, BLOOD   CULTURE, BLOOD   CULTURE, RESPIRATORY   RESPIRATORY INFECTION PANEL (PCR), NASOPHARYNGEAL   SARS-COV-2 RNA AMPLIFICATION, QUAL    SARS-CoV-2 RNA, Amplification, Qual Negative     LACTIC ACID, PLASMA    Lactate (Lactic Acid) 1.1     TROPONIN I "   MAGNESIUM   URINALYSIS, REFLEX TO URINE CULTURE   TROPONIN I   MAGNESIUM        All Lab Results:  Results for orders placed or performed during the hospital encounter of 09/13/24   Influenza A & B by Molecular    Specimen: Nasopharyngeal Swab   Result Value Ref Range    Influenza A, Molecular Negative Negative    Influenza B, Molecular Negative Negative    Flu A & B Source Nasal swab    COVID-19 Rapid Screening   Result Value Ref Range    SARS-CoV-2 RNA, Amplification, Qual Negative Negative   CBC auto differential   Result Value Ref Range    WBC 17.89 (H) 3.90 - 12.70 K/uL    RBC 3.74 (L) 4.60 - 6.20 M/uL    Hemoglobin 12.0 (L) 14.0 - 18.0 g/dL    Hematocrit 35.1 (L) 40.0 - 54.0 %    MCV 94 82 - 98 fL    MCH 32.1 (H) 27.0 - 31.0 pg    MCHC 34.2 32.0 - 36.0 g/dL    RDW 12.9 11.5 - 14.5 %    Platelets 447 150 - 450 K/uL    MPV 9.5 9.2 - 12.9 fL    Immature Granulocytes 2.6 (H) 0.0 - 0.5 %    Gran # (ANC) 16.0 (H) 1.8 - 7.7 K/uL    Immature Grans (Abs) 0.47 (H) 0.00 - 0.04 K/uL    Lymph # 0.7 (L) 1.0 - 4.8 K/uL    Mono # 0.6 0.3 - 1.0 K/uL    Eos # 0.0 0.0 - 0.5 K/uL    Baso # 0.07 0.00 - 0.20 K/uL    nRBC 0 0 /100 WBC    Gran % 89.2 (H) 38.0 - 73.0 %    Lymph % 4.1 (L) 18.0 - 48.0 %    Mono % 3.5 (L) 4.0 - 15.0 %    Eosinophil % 0.2 0.0 - 8.0 %    Basophil % 0.4 0.0 - 1.9 %    Differential Method Automated    Comprehensive metabolic panel   Result Value Ref Range    Sodium 137 136 - 145 mmol/L    Potassium 3.2 (L) 3.5 - 5.1 mmol/L    Chloride 101 95 - 110 mmol/L    CO2 21 (L) 23 - 29 mmol/L    Glucose 124 (H) 70 - 110 mg/dL    BUN 22 8 - 23 mg/dL    Creatinine 1.2 0.5 - 1.4 mg/dL    Calcium 9.5 8.7 - 10.5 mg/dL    Total Protein 7.0 6.0 - 8.4 g/dL    Albumin 2.6 (L) 3.5 - 5.2 g/dL    Total Bilirubin 0.8 0.1 - 1.0 mg/dL    Alkaline Phosphatase 299 (H) 55 - 135 U/L    AST 33 10 - 40 U/L    ALT 42 10 - 44 U/L    eGFR >60 >60 mL/min/1.73 m^2    Anion Gap 15 8 - 16 mmol/L   Lactic acid, plasma #1   Result Value Ref Range     Lactate (Lactic Acid) 1.1 0.5 - 2.2 mmol/L   Procalcitonin   Result Value Ref Range    Procalcitonin 1.18 (H) <0.25 ng/mL   Brain natriuretic peptide   Result Value Ref Range     (H) 0 - 99 pg/mL   ISTAT PROCEDURE   Result Value Ref Range    POC PH 7.437 7.35 - 7.45    POC PCO2 31.2 (L) 35 - 45 mmHg    POC PO2 54 (LL) 80 - 100 mmHg    POC HCO3 21.1 (L) 24 - 28 mmol/L    POC BE -3 (L) -2 to 2 mmol/L    POC SATURATED O2 89 95 - 100 %    Sample ARTERIAL     Site RR     Allens Test Pass     DelSys Room Air          Imaging Results:  Imaging Results              X-Ray Chest PA And Lateral (Final result)  Result time 09/13/24 12:31:34      Final result by Navneet Oswald MD (09/13/24 12:31:34)                   Impression:      Findings concerning for multifocal interstitial pneumonia.      Electronically signed by: Navneet Oswald MD  Date:    09/13/2024  Time:    12:31               Narrative:    EXAMINATION:  XR CHEST PA AND LATERAL    CLINICAL HISTORY:  Cough, unspecified    COMPARISON:  09/09/2024    FINDINGS:  Cardiac silhouette is normal. Interstitial and alveolar opacities seen scattered throughout the left lung with relative sparing at the apices.  Patchy infiltrate also seen within the right lower lobe.  No pneumothorax.  Trace left pleural effusion.  Bones appear intact.  Moderate degenerative changes and moderate atherosclerotic disease.                                       The EKG was ordered, reviewed, and independently interpreted by the ED provider.  Interpretation time: 13:58  Rate: 82 BPM  Rhythm: normal sinus rhythm  Interpretation: No acute ST changes. No STEMI.           The Emergency Provider reviewed the vital signs and test results, which are outlined above.     ED Discussion       3:22 PM: Discussed case with Dr. Ortiz ([facility]). Dr. Ortiz agrees with current care and management of pt and accepts admission.   Admitting Service: Hospital Medicine  Admitting Physician: Dr. Ortiz  Admit  to: [facility]    3:22 PM: Re-evaluated pt. I have discussed test results, shared treatment plan, and the need for admission with patient and family at bedside. Pt and family express understanding at this time and agree with all information. All questions answered. Pt and family have no further questions or concerns at this time. Pt is ready for admit.         Medical Decision Making  Amount and/or Complexity of Data Reviewed  Labs: ordered. Decision-making details documented in ED Course.  Radiology: ordered. Decision-making details documented in ED Course.  ECG/medicine tests: ordered and independent interpretation performed. Decision-making details documented in ED Course.    Risk  OTC drugs.  Prescription drug management.  Decision regarding hospitalization.                ED Medication(s):  Medications   cefTRIAXone (Rocephin) 1 g in D5W 100 mL IVPB (MB+) (1 g Intravenous New Bag 9/13/24 1441)   azithromycin (ZITHROMAX) 500 mg in D5W 250 mL  IVPB (admixture device) (has no administration in time range)   0.9%  NaCl infusion (1,000 mLs Intravenous New Bag 9/13/24 1238)   acetaminophen tablet 650 mg (650 mg Oral Given 9/13/24 1228)   albuterol-ipratropium (DUO-NEB) 2.5 mg-0.5 mg/3 mL nebulizer solution (3 mLs  Given 9/13/24 1404)   albuterol-ipratropium (DUO-NEB) 2.5 mg-0.5 mg/3 mL nebulizer solution (3 mLs  Given 9/13/24 1404)       New Prescriptions    No medications on file               Scribe Attestation:   Scribe #1: I performed the above scribed service and the documentation accurately describes the services I performed. I attest to the accuracy of the note.     Attending:   Physician Attestation Statement for Scribe #1: I, Bárbara Minor MD, personally performed the services described in this documentation, as scribed by Yakelin Amato, in my presence, and it is both accurate and complete.           Clinical Impression       ICD-10-CM ICD-9-CM   1. Pneumonia of both lower lobes due to infectious organism  J18.9  486   2. Cough  R05.9 786.2   3. Shortness of breath  R06.02 786.05   4. Tobacco abuse disorder  Z72.0 305.1   5. Acute hypoxemic respiratory failure  J96.01 518.81   6. History of COPD  Z87.09 V12.69       Disposition:   Disposition: Admitted  Condition: Stable

## 2024-09-13 NOTE — ASSESSMENT & PLAN NOTE
Chronic, uncontrolled. Latest blood pressure and vitals reviewed-     Temp:  [97.8 °F (36.6 °C)-100.1 °F (37.8 °C)]   Pulse:  []   Resp:  [18-22]   BP: (104-110)/(57-67)   SpO2:  [88 %-98 %] .   Home meds for hypertension were reviewed and noted below.   Hypertension Medications               valsartan (DIOVAN) 80 MG tablet Take 1 tablet (80 mg total) by mouth once daily.          While in the hospital, will manage blood pressure as follows; hold home anti-hypertensives for now with hypotension    Will utilize p.r.n. blood pressure medication only if patient's blood pressure greater than 160/100 and he develops symptoms such as worsening chest pain or shortness of breath.

## 2024-09-13 NOTE — HPI
68 year-old-male with PMH of tobacco use, prostate cancer, COPD, and HTN presented to the ER 9/13/24 with c/o worsening shortness of breath with associated cough. He reports going to urgent care last Friday and was told he had a viral infection and flu/COVID was negative. He saw pulmonology on Thursday of last week and diagnosed with COPD and started on Trelegy inhaler. He reports taking it one day and began to feel worse. He reached out to pulmonology and was told to hold the Trelegy for a few days and see if his symptoms improved. He reports it was helping with his shortness of breath. He quit smoking for good 1 month ago. Patient denies CP, SOB, nausea, vomiting, diarrhea, chills, diaphoresis, or fever. He reports decreased oral intake for about 1 week.     In ER, labs revealed WBC 17.84, granulocyte dominate, H&H 12/35.1, K 3.2, , Alp 299, albumin 2.6, lactic acid normal, procal 1.18. He was hypoxic in the ER, ABGs revealed pH 7.437, PO2 54, PCO2 31.2. He was placed on 2 LPM to keep sats > 88%. CXR revealed mulitfocal interstitial pneumonia. He was started on Ceftriaxone and azithromycin IV. [facility] was consulted to admit patient for pneumonia.

## 2024-09-13 NOTE — Clinical Note
Diagnosis: Pneumonia of both lower lobes due to infectious organism [[id number]]   Future Attending Provider: ROC SCHWARTZ [[id number]]   Reason for IP Medical Treatment  (Clinical interventions that can only be accomplished in the IP setting? ) :: iv antibiotics, oxygen support   Plans for Post-Acute care--if anticipated (pick the single best option):: A. No post acute care anticipated at this time

## 2024-09-13 NOTE — SUBJECTIVE & OBJECTIVE
Past Medical History:   Diagnosis Date    Cancer     Prostate    Tobacco use        Past Surgical History:   Procedure Laterality Date    COLONOSCOPY      COLONOSCOPY N/A 2022    Procedure: COLONOSCOPY;  Surgeon: Jamia Alfaro MD;  Location: [facility];  Service: Endoscopy;  Laterality: N/A;    VASECTOMY         Review of patient's allergies indicates:  No Known Allergies    No current facility-administered medications on file prior to encounter.     Current Outpatient Medications on File Prior to Encounter   Medication Sig    fluticasone-umeclidin-vilanter (TRELEGY ELLIPTA) 200-62.5-25 mcg inhaler Inhale 1 puff into the lungs once daily. Rinse and spit after each use    valsartan (DIOVAN) 80 MG tablet Take 1 tablet (80 mg total) by mouth once daily.    azelastine (ASTELIN) 137 mcg (0.1 %) nasal spray 1 spray (137 mcg total) by Nasal route 2 (two) times daily.    [DISCONTINUED] fluticasone propionate (FLONASE) 50 mcg/actuation nasal spray 1 spray (50 mcg total) by Each Nostril route once daily. (Patient not taking: Reported on 2024)     Family History       Problem Relation (Age of Onset)    Heart disease Father    No Known Problems Mother, Brother, Brother, Daughter, Daughter          Tobacco Use    Smoking status: Some Days     Current packs/day: 0.00     Types: Cigarettes     Last attempt to quit: 1/15/2022     Years since quittin.6     Passive exposure: Current    Smokeless tobacco: Never   Substance and Sexual Activity    Alcohol use: Yes     Alcohol/week: 2.0 standard drinks of alcohol     Types: 2 Shots of liquor per week    Drug use: Never    Sexual activity: Not Currently     Partners: Female     Review of Systems   Constitutional:  Negative for chills, fatigue and fever.   HENT:  Positive for congestion.    Respiratory:  Positive for cough. Negative for shortness of breath and wheezing.    Cardiovascular:  Negative for chest pain and palpitations.   Gastrointestinal:  Negative for  abdominal pain, constipation, diarrhea, nausea and vomiting.   Musculoskeletal:  Negative for back pain.   Neurological:  Positive for weakness. Negative for dizziness.     Objective:     Vital Signs (Most Recent):  Temp: 97.8 °F (36.6 °C) (09/13/24 1319)  Pulse: 100 (09/13/24 1500)  Resp: (!) 22 (09/13/24 1500)  BP: 107/67 (09/13/24 1500)  SpO2: (!) 91 % (09/13/24 1500) Vital Signs (24h Range):  Temp:  [97.8 °F (36.6 °C)-100.1 °F (37.8 °C)] 97.8 °F (36.6 °C)  Pulse:  [] 100  Resp:  [18-22] 22  SpO2:  [88 %-98 %] 91 %  BP: (104-110)/(57-67) 107/67     Weight: 71.7 kg (158 lb)  Body mass index is 23.33 kg/m².     Physical Exam  Vitals and nursing note reviewed.   Constitutional:       Appearance: Normal appearance.   HENT:      Head: Normocephalic.   Eyes:      Pupils: Pupils are equal, round, and reactive to light.   Cardiovascular:      Rate and Rhythm: Normal rate and regular rhythm.      Heart sounds: No murmur heard.  Pulmonary:      Effort: Pulmonary effort is normal.      Breath sounds: Normal breath sounds. No wheezing or rales.      Comments: 2 Lpm NC  Abdominal:      General: Bowel sounds are normal.      Palpations: Abdomen is soft.   Musculoskeletal:         General: Normal range of motion.   Skin:     General: Skin is warm and dry.   Neurological:      General: No focal deficit present.      Mental Status: He is alert and oriented to person, place, and time.   Psychiatric:         Mood and Affect: Mood normal.         Behavior: Behavior normal.              CRANIAL NERVES     CN III, IV, VI   Pupils are equal, round, and reactive to light.       Significant Labs: All pertinent labs within the past 24 hours have been reviewed.  ABGs:   Recent Labs   Lab 09/13/24  1403   PH 7.437   PCO2 31.2*   HCO3 21.1*   POCSATURATED 89   BE -3*   PO2 54*     CBC:   Recent Labs   Lab 09/13/24  1237   WBC 17.89*   HGB 12.0*   HCT 35.1*        CMP:   Recent Labs   Lab 09/13/24  1237      K 3.2*     "  CO2 21*   *   BUN 22   CREATININE 1.2   CALCIUM 9.5   PROT 7.0   ALBUMIN 2.6*   BILITOT 0.8   ALKPHOS 299*   AST 33   ALT 42   ANIONGAP 15     Magnesium: No results for input(s): "MG" in the last 48 hours.    Significant Imaging: I have reviewed all pertinent imaging results/findings within the past 24 hours.  "

## 2024-09-13 NOTE — ASSESSMENT & PLAN NOTE
Patient's most recent potassium results are listed below.   Recent Labs     09/13/24  1237   K 3.2*     Replete potassium per protocol  Monitor potassium Daily  Patient's hypokalemia is stable

## 2024-09-13 NOTE — ASSESSMENT & PLAN NOTE
Anemia is likely due to Iron deficiency. Most recent hemoglobin and hematocrit are listed below.  Recent Labs     09/13/24  1237   HGB 12.0*   HCT 35.1*     Monitor serial CBC: Daily  Transfuse PRBC if patient becomes hemodynamically unstable, symptomatic or H/H drops below 7/21.  Patient has not received any PRBC transfusions to date  anemia is currently stable

## 2024-09-13 NOTE — ED PROVIDER NOTES
Encounter Date: 2024       History     Chief Complaint   Patient presents with    Cough    Nasal Congestion    Chest Congestion    Generalized Body Aches     Generalized body aches, cough and congestion x one week.      Patient Presents to ER for cough, onset 1 week ago.  Associated symptoms include chills, subjective fever, congestion, generalized body aches.  Symptoms have been constant since onset.  States he was evaluated at an outside facility, prescribed Trelegy which initially provided symptom relief.  States his symptoms have improved since onset but is still experiencing cough and generalized body aches.  He denies chest pain, abdominal pain, nausea, vomiting, diarrhea, sore throat, headache.    The history is provided by the patient.     Review of patient's allergies indicates:  No Known Allergies  Past Medical History:   Diagnosis Date    Cancer     Prostate    Tobacco use      Past Surgical History:   Procedure Laterality Date    COLONOSCOPY      COLONOSCOPY N/A 2022    Procedure: COLONOSCOPY;  Surgeon: Jamia Alfaro MD;  Location: [facility];  Service: Endoscopy;  Laterality: N/A;    VASECTOMY       Family History   Problem Relation Name Age of Onset    No Known Problems Mother      Heart disease Father      No Known Problems Brother      No Known Problems Brother      No Known Problems Daughter      No Known Problems Daughter       Social History     Tobacco Use    Smoking status: Some Days     Current packs/day: 0.00     Types: Cigarettes     Last attempt to quit: 1/15/2022     Years since quittin.6     Passive exposure: Current    Smokeless tobacco: Never   Substance Use Topics    Alcohol use: Yes     Alcohol/week: 2.0 standard drinks of alcohol     Types: 2 Shots of liquor per week    Drug use: Never     Review of Systems   Constitutional:  Positive for chills, fatigue and fever (Subjective).   HENT:  Positive for congestion. Negative for ear pain, sinus pain and sore throat.     Eyes:  Negative for pain.   Respiratory:  Positive for cough and shortness of breath.    Cardiovascular:  Negative for chest pain.   Gastrointestinal:  Negative for abdominal pain, nausea and vomiting.   Genitourinary:  Negative for dysuria.   Musculoskeletal:  Negative for back pain and neck pain.        +generalized body aches   Skin:  Negative for rash.   Neurological:  Negative for weakness and headaches.       Physical Exam     Initial Vitals   BP Pulse Resp Temp SpO2   09/13/24 1127 09/13/24 1127 09/13/24 1127 09/13/24 1127 09/13/24 1130   (!) 104/57 103 18 100.1 °F (37.8 °C) 96 %      MAP       --                Physical Exam    Nursing note and vitals reviewed.  Constitutional: He is not diaphoretic. He is cooperative.  Non-toxic appearance. No distress.   HENT:   Head: Normocephalic and atraumatic.   Right Ear: External ear normal.   Left Ear: External ear normal.   Nose: Nose normal.   Mouth/Throat: Oropharynx is clear and moist. No oropharyngeal exudate.   Neck: Neck supple.   Normal range of motion.  Cardiovascular:  Regular rhythm.           +mild tachycardia 103bpm   Pulmonary/Chest: Breath sounds normal. No respiratory distress.   Abdominal: Abdomen is soft. There is no abdominal tenderness.   Musculoskeletal:         General: Normal range of motion.      Cervical back: Normal range of motion and neck supple.     Neurological: He is alert and oriented to person, place, and time. He has normal strength. GCS score is 15. GCS eye subscore is 4. GCS verbal subscore is 5. GCS motor subscore is 6.   Skin: Skin is warm and dry. Capillary refill takes less than 2 seconds.         ED Course   Procedures  Labs Reviewed   CBC W/ AUTO DIFFERENTIAL - Abnormal       Result Value    WBC 17.89 (*)     RBC 3.74 (*)     Hemoglobin 12.0 (*)     Hematocrit 35.1 (*)     MCV 94      MCH 32.1 (*)     MCHC 34.2      RDW 12.9      Platelets 447      MPV 9.5      Immature Granulocytes 2.6 (*)     Gran # (ANC) 16.0 (*)      Immature Grans (Abs) 0.47 (*)     Lymph # 0.7 (*)     Mono # 0.6      Eos # 0.0      Baso # 0.07      nRBC 0      Gran % 89.2 (*)     Lymph % 4.1 (*)     Mono % 3.5 (*)     Eosinophil % 0.2      Basophil % 0.4      Differential Method Automated     COMPREHENSIVE METABOLIC PANEL - Abnormal    Sodium 137      Potassium 3.2 (*)     Chloride 101      CO2 21 (*)     Glucose 124 (*)     BUN 22      Creatinine 1.2      Calcium 9.5      Total Protein 7.0      Albumin 2.6 (*)     Total Bilirubin 0.8      Alkaline Phosphatase 299 (*)     AST 33      ALT 42      eGFR >60      Anion Gap 15     INFLUENZA A & B BY MOLECULAR    Influenza A, Molecular Negative      Influenza B, Molecular Negative      Flu A & B Source Nasal swab     CULTURE, BLOOD   CULTURE, BLOOD   CULTURE, RESPIRATORY   RESPIRATORY INFECTION PANEL (PCR), NASOPHARYNGEAL   SARS-COV-2 RNA AMPLIFICATION, QUAL    SARS-CoV-2 RNA, Amplification, Qual Negative     TROPONIN I   MAGNESIUM   LACTIC ACID, PLASMA   URINALYSIS, REFLEX TO URINE CULTURE   PROCALCITONIN   B-TYPE NATRIURETIC PEPTIDE   TROPONIN I   MAGNESIUM          Imaging Results              X-Ray Chest PA And Lateral (Final result)  Result time 09/13/24 12:31:34      Final result by Navneet Oswald MD (09/13/24 12:31:34)                   Impression:      Findings concerning for multifocal interstitial pneumonia.      Electronically signed by: Navneet Oswald MD  Date:    09/13/2024  Time:    12:31               Narrative:    EXAMINATION:  XR CHEST PA AND LATERAL    CLINICAL HISTORY:  Cough, unspecified    COMPARISON:  09/09/2024    FINDINGS:  Cardiac silhouette is normal. Interstitial and alveolar opacities seen scattered throughout the left lung with relative sparing at the apices.  Patchy infiltrate also seen within the right lower lobe.  No pneumothorax.  Trace left pleural effusion.  Bones appear intact.  Moderate degenerative changes and moderate atherosclerotic disease.                                        Medications   cefTRIAXone (Rocephin) 1 g in D5W 100 mL IVPB (MB+) (has no administration in time range)   azithromycin (ZITHROMAX) 500 mg in D5W 250 mL  IVPB (admixture device) (has no administration in time range)   0.9%  NaCl infusion (1,000 mLs Intravenous New Bag 9/13/24 1238)   acetaminophen tablet 650 mg (650 mg Oral Given 9/13/24 1228)     Medical Decision Making  Amount and/or Complexity of Data Reviewed  Labs: ordered. Decision-making details documented in ED Course.  Radiology: ordered.    Risk  OTC drugs.  Prescription drug management.  Decision regarding hospitalization.       1:20 PM- repeat vital signs noted. O2 sat 88%.  Discussed patient's case with ED physican Dr. Minor. Patient to be brought to ED bed 5 for further treatment. Care handoff given to Dr. Minor.  Staff attempting to place O2 Nasal cannula, patient adamantly refusing.          ED Course as of 09/14/24 1358   Fri Sep 13, 2024   1545 WBC(!): 17.89 [AB]   1545 Hemoglobin(!): 12.0 [AB]   1545 Hematocrit(!): 35.1 [AB]   1545 CO2(!): 21 [AB]   1545 Glucose(!): 124 [AB]   1545 Potassium(!): 3.2 [AB]   1545 BUN: 22 [AB]   1545 Creatinine: 1.2 [AB]   1545 Albumin(!): 2.6 [AB]   1545 BNP(!): 226 [AB]   1545 Procalcitonin(!): 1.18 [AB]   1546 POC PO2(!!): 54 [AB]   1546 Lactic Acid Level: 1.1  ECG reviewed and no ischemic changes, CXR reviewed and shows extensive bilateral pneumonia which is new compared to CXR done on 9/9, flu and covid negative, WBC 17,000; lactate normal, , procal 1.18, iv rocephin and azithromycin given, BP stable, placed on 2 liters NC, breathing tx given, hospital med to admit.  [AB]      ED Course User Index  [AB] Bárbara Minor MD          Care was taken over by ER physician, Dr. Minor, who started a new ER provider note.  Patient was dispositioned by Dr. Minor. Please see that provider note for completion of ED encounter.                 Clinical Impression:  Final diagnoses:  [R05.9]  Cough  [R06.02] Shortness of breath                 Chavo Weinstein, NP  09/14/24 1793

## 2024-09-13 NOTE — ASSESSMENT & PLAN NOTE
Patient has a diagnosis of pneumonia. The cause of the pneumonia is suspected to be bacterial in etiology but organism is not known. The pneumonia is stable. The patient has the following signs/symptoms of pneumonia: persistent hypoxia , cough, and shortness of breath. The patient does have a current oxygen requirement and the patient does not have a home oxygen requirement. I have reviewed the pertinent imaging. The following cultures have been collected: blood cultures, Sputum culture The culture results are listed below.     Current antimicrobial regimen consists of the antibiotics listed below. Will monitor patient closely and continue current treatment plan unchanged.    Antibiotics (From admission, onward)      Start     Stop Route Frequency Ordered    09/13/24 1330  cefTRIAXone (Rocephin) 1 g in D5W 100 mL IVPB (MB+)         -- IV Every 24 hours (non-standard times) 09/13/24 1330    09/13/24 1330  azithromycin (ZITHROMAX) 500 mg in D5W 250 mL  IVPB (admixture device)         -- IV Every 24 hours (non-standard times) 09/13/24 1330            Microbiology Results (last 7 days)       Procedure Component Value Units Date/Time    Blood culture x two cultures. Draw prior to antibiotics. [[id number]] Collected: 09/13/24 1425    Order Status: Sent Specimen: Blood from Peripheral, Forearm, Left     Blood culture x two cultures. Draw prior to antibiotics. [[id number]] Collected: 09/13/24 1410    Order Status: Sent Specimen: Blood from Peripheral, Forearm, Left     Culture, Respiratory with Gram Stain [[id number]]     Order Status: No result Specimen: Respiratory     Respiratory Infection Panel (PCR), Nasopharyngeal [[id number]]     Order Status: No result Specimen: Nasopharyngeal Swab     Influenza A & B by Molecular [[id number]] Collected: 09/13/24 1131    Order Status: Completed Specimen: Nasopharyngeal Swab Updated: 09/13/24 1209     Influenza A, Molecular Negative     Influenza B, Molecular Negative     Flu A & B  Source Nasal swab          Procal 1.18

## 2024-09-13 NOTE — H&P
[facility] - Emergency Dept.  [facility] Medicine  History & Physical    Patient Name: Aurelio Daniels  MRN: [id number]  Patient Class: IP- Inpatient  Admission Date: 9/13/2024  Attending Physician: Narinder Ortiz MD   Primary Care Provider: Shlomo Villatoro MD         Patient information was obtained from patient, spouse/SO, and ER records.     Subjective:     Principal Problem:Pneumonia of both lower lobes due to infectious organism    Chief Complaint:   Chief Complaint   Patient presents with    Cough    Nasal Congestion    Chest Congestion    Generalized Body Aches     Generalized body aches, cough and congestion x one week.         HPI: 68 year-old-male with PMH of tobacco use, prostate cancer, COPD, and HTN presented to the ER 9/13/24 with c/o worsening shortness of breath with associated cough. He reports going to urgent care last Friday and was told he had a viral infection and flu/COVID was negative. He saw pulmonology on Thursday of last week and diagnosed with COPD and started on Trelegy inhaler. He reports taking it one day and began to feel worse. He reached out to pulmonology and was told to hold the Trelegy for a few days and see if his symptoms improved. He reports it was helping with his shortness of breath. He quit smoking for good 1 month ago. Patient denies CP, SOB, nausea, vomiting, diarrhea, chills, diaphoresis, or fever. He reports decreased oral intake for about 1 week.     In ER, labs revealed WBC 17.84, granulocyte dominate, H&H 12/35.1, K 3.2, , Alp 299, albumin 2.6, lactic acid normal, procal 1.18. He was hypoxic in the ER, ABGs revealed pH 7.437, PO2 54, PCO2 31.2. He was placed on 2 LPM to keep sats > 88%. CXR revealed mulitfocal interstitial pneumonia. He was started on Ceftriaxone and azithromycin IV. [facility] was consulted to admit patient for pneumonia.     Past Medical History:   Diagnosis Date    Cancer     Prostate    Tobacco use        Past Surgical History:   Procedure  Laterality Date    COLONOSCOPY      COLONOSCOPY N/A 2022    Procedure: COLONOSCOPY;  Surgeon: Jamia Alfaro MD;  Location: [facility];  Service: Endoscopy;  Laterality: N/A;    VASECTOMY         Review of patient's allergies indicates:  No Known Allergies    No current facility-administered medications on file prior to encounter.     Current Outpatient Medications on File Prior to Encounter   Medication Sig    fluticasone-umeclidin-vilanter (TRELEGY ELLIPTA) 200-62.5-25 mcg inhaler Inhale 1 puff into the lungs once daily. Rinse and spit after each use    valsartan (DIOVAN) 80 MG tablet Take 1 tablet (80 mg total) by mouth once daily.    azelastine (ASTELIN) 137 mcg (0.1 %) nasal spray 1 spray (137 mcg total) by Nasal route 2 (two) times daily.    [DISCONTINUED] fluticasone propionate (FLONASE) 50 mcg/actuation nasal spray 1 spray (50 mcg total) by Each Nostril route once daily. (Patient not taking: Reported on 2024)     Family History       Problem Relation (Age of Onset)    Heart disease Father    No Known Problems Mother, Brother, Brother, Daughter, Daughter          Tobacco Use    Smoking status: Some Days     Current packs/day: 0.00     Types: Cigarettes     Last attempt to quit: 1/15/2022     Years since quittin.6     Passive exposure: Current    Smokeless tobacco: Never   Substance and Sexual Activity    Alcohol use: Yes     Alcohol/week: 2.0 standard drinks of alcohol     Types: 2 Shots of liquor per week    Drug use: Never    Sexual activity: Not Currently     Partners: Female     Review of Systems   Constitutional:  Negative for chills, fatigue and fever.   HENT:  Positive for congestion.    Respiratory:  Positive for cough. Negative for shortness of breath and wheezing.    Cardiovascular:  Negative for chest pain and palpitations.   Gastrointestinal:  Negative for abdominal pain, constipation, diarrhea, nausea and vomiting.   Musculoskeletal:  Negative for back pain.   Neurological:   Positive for weakness. Negative for dizziness.     Objective:     Vital Signs (Most Recent):  Temp: 97.8 °F (36.6 °C) (09/13/24 1319)  Pulse: 100 (09/13/24 1500)  Resp: (!) 22 (09/13/24 1500)  BP: 107/67 (09/13/24 1500)  SpO2: (!) 91 % (09/13/24 1500) Vital Signs (24h Range):  Temp:  [97.8 °F (36.6 °C)-100.1 °F (37.8 °C)] 97.8 °F (36.6 °C)  Pulse:  [] 100  Resp:  [18-22] 22  SpO2:  [88 %-98 %] 91 %  BP: (104-110)/(57-67) 107/67     Weight: 71.7 kg (158 lb)  Body mass index is 23.33 kg/m².     Physical Exam  Vitals and nursing note reviewed.   Constitutional:       Appearance: Normal appearance.   HENT:      Head: Normocephalic.   Eyes:      Pupils: Pupils are equal, round, and reactive to light.   Cardiovascular:      Rate and Rhythm: Normal rate and regular rhythm.      Heart sounds: No murmur heard.  Pulmonary:      Effort: Pulmonary effort is normal.      Breath sounds: Normal breath sounds. No wheezing or rales.      Comments: 2 Lpm NC  Abdominal:      General: Bowel sounds are normal.      Palpations: Abdomen is soft.   Musculoskeletal:         General: Normal range of motion.   Skin:     General: Skin is warm and dry.   Neurological:      General: No focal deficit present.      Mental Status: He is alert and oriented to person, place, and time.   Psychiatric:         Mood and Affect: Mood normal.         Behavior: Behavior normal.              CRANIAL NERVES     CN III, IV, VI   Pupils are equal, round, and reactive to light.       Significant Labs: All pertinent labs within the past 24 hours have been reviewed.  ABGs:   Recent Labs   Lab 09/13/24  1403   PH 7.437   PCO2 31.2*   HCO3 21.1*   POCSATURATED 89   BE -3*   PO2 54*     CBC:   Recent Labs   Lab 09/13/24  1237   WBC 17.89*   HGB 12.0*   HCT 35.1*        CMP:   Recent Labs   Lab 09/13/24  1237      K 3.2*      CO2 21*   *   BUN 22   CREATININE 1.2   CALCIUM 9.5   PROT 7.0   ALBUMIN 2.6*   BILITOT 0.8   ALKPHOS 299*   AST  "33   ALT 42   ANIONGAP 15     Magnesium: No results for input(s): "MG" in the last 48 hours.    Significant Imaging: I have reviewed all pertinent imaging results/findings within the past 24 hours.  Assessment/Plan:     * Pneumonia of both lower lobes due to infectious organism  Patient has a diagnosis of pneumonia. The cause of the pneumonia is suspected to be bacterial in etiology but organism is not known. The pneumonia is stable. The patient has the following signs/symptoms of pneumonia: persistent hypoxia , cough, and shortness of breath. The patient does have a current oxygen requirement and the patient does not have a home oxygen requirement. I have reviewed the pertinent imaging. The following cultures have been collected: blood cultures, Sputum culture The culture results are listed below.     Current antimicrobial regimen consists of the antibiotics listed below. Will monitor patient closely and continue current treatment plan unchanged.    Antibiotics (From admission, onward)      Start     Stop Route Frequency Ordered    09/13/24 1330  cefTRIAXone (Rocephin) 1 g in D5W 100 mL IVPB (MB+)         -- IV Every 24 hours (non-standard times) 09/13/24 1330    09/13/24 1330  azithromycin (ZITHROMAX) 500 mg in D5W 250 mL  IVPB (admixture device)         -- IV Every 24 hours (non-standard times) 09/13/24 1330            Microbiology Results (last 7 days)       Procedure Component Value Units Date/Time    Blood culture x two cultures. Draw prior to antibiotics. [[id number]] Collected: 09/13/24 1425    Order Status: Sent Specimen: Blood from Peripheral, Forearm, Left     Blood culture x two cultures. Draw prior to antibiotics. [[id number]] Collected: 09/13/24 1410    Order Status: Sent Specimen: Blood from Peripheral, Forearm, Left     Culture, Respiratory with Gram Stain [[id number]]     Order Status: No result Specimen: Respiratory     Respiratory Infection Panel (PCR), Nasopharyngeal [[id number]]     Order " Status: No result Specimen: Nasopharyngeal Swab     Influenza A & B by Molecular [[id number]] Collected: 09/13/24 1131    Order Status: Completed Specimen: Nasopharyngeal Swab Updated: 09/13/24 1209     Influenza A, Molecular Negative     Influenza B, Molecular Negative     Flu A & B Source Nasal swab          Procal 1.18    Hypokalemia  Patient's most recent potassium results are listed below.   Recent Labs     09/13/24  1237   K 3.2*     Replete potassium per protocol  Monitor potassium Daily  Patient's hypokalemia is stable    Other specified anemias  Anemia is likely due to Iron deficiency. Most recent hemoglobin and hematocrit are listed below.  Recent Labs     09/13/24  1237   HGB 12.0*   HCT 35.1*     Monitor serial CBC: Daily  Transfuse PRBC if patient becomes hemodynamically unstable, symptomatic or H/H drops below 7/21.  Patient has not received any PRBC transfusions to date  anemia is currently stable    Primary hypertension  Chronic, uncontrolled. Latest blood pressure and vitals reviewed-     Temp:  [97.8 °F (36.6 °C)-100.1 °F (37.8 °C)]   Pulse:  []   Resp:  [18-22]   BP: (104-110)/(57-67)   SpO2:  [88 %-98 %] .   Home meds for hypertension were reviewed and noted below.   Hypertension Medications               valsartan (DIOVAN) 80 MG tablet Take 1 tablet (80 mg total) by mouth once daily.          While in the hospital, will manage blood pressure as follows; hold home anti-hypertensives for now with hypotension    Will utilize p.r.n. blood pressure medication only if patient's blood pressure greater than 160/100 and he develops symptoms such as worsening chest pain or shortness of breath.      VTE Risk Mitigation (From admission, onward)           Ordered     enoxaparin injection 40 mg  Daily         09/13/24 1601     IP VTE HIGH RISK PATIENT  Once         09/13/24 1601     Place sequential compression device  Until discontinued         09/13/24 1601                              Belgica JUÁREZ  CONCEPCION Gee  Department of Hospital Medicine  [facility] - Emergency Dept.

## 2024-09-14 PROBLEM — J43.2 CENTRILOBULAR EMPHYSEMA: Status: ACTIVE | Noted: 2024-09-14

## 2024-09-14 LAB
ADENOVIRUS: NOT DETECTED
ANION GAP SERPL CALC-SCNC: 12 MMOL/L (ref 8–16)
BASOPHILS # BLD AUTO: 0.05 K/UL (ref 0–0.2)
BASOPHILS NFR BLD: 0.3 % (ref 0–1.9)
BORDETELLA PARAPERTUSSIS (IS1001): NOT DETECTED
BORDETELLA PERTUSSIS (PTXP): NOT DETECTED
BUN SERPL-MCNC: 17 MG/DL (ref 8–23)
CALCIUM SERPL-MCNC: 8.5 MG/DL (ref 8.7–10.5)
CHLAMYDIA PNEUMONIAE: NOT DETECTED
CHLORIDE SERPL-SCNC: 106 MMOL/L (ref 95–110)
CO2 SERPL-SCNC: 20 MMOL/L (ref 23–29)
CORONAVIRUS 229E, COMMON COLD VIRUS: NOT DETECTED
CORONAVIRUS HKU1, COMMON COLD VIRUS: NOT DETECTED
CORONAVIRUS NL63, COMMON COLD VIRUS: NOT DETECTED
CORONAVIRUS OC43, COMMON COLD VIRUS: NOT DETECTED
CREAT SERPL-MCNC: 0.8 MG/DL (ref 0.5–1.4)
DIFFERENTIAL METHOD BLD: ABNORMAL
EOSINOPHIL # BLD AUTO: 0.1 K/UL (ref 0–0.5)
EOSINOPHIL NFR BLD: 0.8 % (ref 0–8)
ERYTHROCYTE [DISTWIDTH] IN BLOOD BY AUTOMATED COUNT: 13.3 % (ref 11.5–14.5)
EST. GFR  (NO RACE VARIABLE): >60 ML/MIN/1.73 M^2
FLUBV RNA NPH QL NAA+NON-PROBE: NOT DETECTED
GLUCOSE SERPL-MCNC: 110 MG/DL (ref 70–110)
HCT VFR BLD AUTO: 29.4 % (ref 40–54)
HGB BLD-MCNC: 10 G/DL (ref 14–18)
HPIV1 RNA NPH QL NAA+NON-PROBE: NOT DETECTED
HPIV2 RNA NPH QL NAA+NON-PROBE: NOT DETECTED
HPIV3 RNA NPH QL NAA+NON-PROBE: NOT DETECTED
HPIV4 RNA NPH QL NAA+NON-PROBE: NOT DETECTED
HUMAN METAPNEUMOVIRUS: NOT DETECTED
IMM GRANULOCYTES # BLD AUTO: 0.6 K/UL (ref 0–0.04)
IMM GRANULOCYTES NFR BLD AUTO: 4.2 % (ref 0–0.5)
INFLUENZA A (SUBTYPES H1,H1-2009,H3): NOT DETECTED
LYMPHOCYTES # BLD AUTO: 1 K/UL (ref 1–4.8)
LYMPHOCYTES NFR BLD: 6.7 % (ref 18–48)
MCH RBC QN AUTO: 32.1 PG (ref 27–31)
MCHC RBC AUTO-ENTMCNC: 34 G/DL (ref 32–36)
MCV RBC AUTO: 94 FL (ref 82–98)
MONOCYTES # BLD AUTO: 0.7 K/UL (ref 0.3–1)
MONOCYTES NFR BLD: 4.8 % (ref 4–15)
MYCOPLASMA PNEUMONIAE: NOT DETECTED
NEUTROPHILS # BLD AUTO: 12 K/UL (ref 1.8–7.7)
NEUTROPHILS NFR BLD: 83.2 % (ref 38–73)
NRBC BLD-RTO: 0 /100 WBC
PLATELET # BLD AUTO: 396 K/UL (ref 150–450)
PMV BLD AUTO: 9.5 FL (ref 9.2–12.9)
POTASSIUM SERPL-SCNC: 3.7 MMOL/L (ref 3.5–5.1)
RBC # BLD AUTO: 3.12 M/UL (ref 4.6–6.2)
RESPIRATORY INFECTION PANEL SOURCE: NORMAL
RSV RNA NPH QL NAA+NON-PROBE: NOT DETECTED
RV+EV RNA NPH QL NAA+NON-PROBE: NOT DETECTED
SARS-COV-2 RNA RESP QL NAA+PROBE: NOT DETECTED
SODIUM SERPL-SCNC: 138 MMOL/L (ref 136–145)
WBC # BLD AUTO: 14.4 K/UL (ref 3.9–12.7)

## 2024-09-14 PROCEDURE — 87205 SMEAR GRAM STAIN: CPT | Performed by: NURSE PRACTITIONER

## 2024-09-14 PROCEDURE — 94761 N-INVAS EAR/PLS OXIMETRY MLT: CPT

## 2024-09-14 PROCEDURE — 94640 AIRWAY INHALATION TREATMENT: CPT

## 2024-09-14 PROCEDURE — 25000003 PHARM REV CODE 250: Performed by: NURSE PRACTITIONER

## 2024-09-14 PROCEDURE — 87798 DETECT AGENT NOS DNA AMP: CPT | Mod: 59 | Performed by: NURSE PRACTITIONER

## 2024-09-14 PROCEDURE — 11000001 HC ACUTE MED/SURG PRIVATE ROOM

## 2024-09-14 PROCEDURE — 63600175 PHARM REV CODE 636 W HCPCS: Performed by: NURSE PRACTITIONER

## 2024-09-14 PROCEDURE — 85025 COMPLETE CBC W/AUTO DIFF WBC: CPT | Performed by: NURSE PRACTITIONER

## 2024-09-14 PROCEDURE — 36415 COLL VENOUS BLD VENIPUNCTURE: CPT | Performed by: NURSE PRACTITIONER

## 2024-09-14 PROCEDURE — 99900035 HC TECH TIME PER 15 MIN (STAT)

## 2024-09-14 PROCEDURE — 27000221 HC OXYGEN, UP TO 24 HOURS

## 2024-09-14 PROCEDURE — 87070 CULTURE OTHR SPECIMN AEROBIC: CPT | Performed by: NURSE PRACTITIONER

## 2024-09-14 PROCEDURE — 80048 BASIC METABOLIC PNL TOTAL CA: CPT | Performed by: NURSE PRACTITIONER

## 2024-09-14 PROCEDURE — 25000242 PHARM REV CODE 250 ALT 637 W/ HCPCS: Performed by: NURSE PRACTITIONER

## 2024-09-14 PROCEDURE — 87633 RESP VIRUS 12-25 TARGETS: CPT | Performed by: NURSE PRACTITIONER

## 2024-09-14 PROCEDURE — 87486 CHLMYD PNEUM DNA AMP PROBE: CPT | Performed by: NURSE PRACTITIONER

## 2024-09-14 RX ORDER — IPRATROPIUM BROMIDE AND ALBUTEROL SULFATE 2.5; .5 MG/3ML; MG/3ML
3 SOLUTION RESPIRATORY (INHALATION)
Status: DISCONTINUED | OUTPATIENT
Start: 2024-09-14 | End: 2024-09-17 | Stop reason: HOSPADM

## 2024-09-14 RX ORDER — PREDNISONE 20 MG/1
40 TABLET ORAL DAILY
Status: DISCONTINUED | OUTPATIENT
Start: 2024-09-15 | End: 2024-09-17 | Stop reason: HOSPADM

## 2024-09-14 RX ORDER — BUDESONIDE 0.5 MG/2ML
0.5 INHALANT ORAL EVERY 12 HOURS
Status: DISCONTINUED | OUTPATIENT
Start: 2024-09-14 | End: 2024-09-17 | Stop reason: HOSPADM

## 2024-09-14 RX ORDER — FUROSEMIDE 10 MG/ML
40 INJECTION INTRAMUSCULAR; INTRAVENOUS ONCE
Status: COMPLETED | OUTPATIENT
Start: 2024-09-14 | End: 2024-09-14

## 2024-09-14 RX ORDER — GUAIFENESIN 100 MG/5ML
200 SOLUTION ORAL EVERY 4 HOURS
Status: DISCONTINUED | OUTPATIENT
Start: 2024-09-14 | End: 2024-09-16

## 2024-09-14 RX ADMIN — FUROSEMIDE 40 MG: 10 INJECTION, SOLUTION INTRAMUSCULAR; INTRAVENOUS at 05:09

## 2024-09-14 RX ADMIN — BUDESONIDE 0.5 MG: 0.5 SUSPENSION RESPIRATORY (INHALATION) at 07:09

## 2024-09-14 RX ADMIN — GUAIFENESIN 200 MG: 200 SOLUTION ORAL at 01:09

## 2024-09-14 RX ADMIN — IPRATROPIUM BROMIDE AND ALBUTEROL SULFATE 3 ML: 2.5; .5 SOLUTION RESPIRATORY (INHALATION) at 02:09

## 2024-09-14 RX ADMIN — ENOXAPARIN SODIUM 40 MG: 40 INJECTION SUBCUTANEOUS at 05:09

## 2024-09-14 RX ADMIN — CEFTRIAXONE 1 G: 1 INJECTION, POWDER, FOR SOLUTION INTRAMUSCULAR; INTRAVENOUS at 01:09

## 2024-09-14 RX ADMIN — AZITHROMYCIN MONOHYDRATE 500 MG: 500 INJECTION, POWDER, LYOPHILIZED, FOR SOLUTION INTRAVENOUS at 02:09

## 2024-09-14 RX ADMIN — GUAIFENESIN 200 MG: 200 SOLUTION ORAL at 05:09

## 2024-09-14 RX ADMIN — GUAIFENESIN 200 MG: 200 SOLUTION ORAL at 10:09

## 2024-09-14 RX ADMIN — IPRATROPIUM BROMIDE AND ALBUTEROL SULFATE 3 ML: 2.5; .5 SOLUTION RESPIRATORY (INHALATION) at 06:09

## 2024-09-14 RX ADMIN — GUAIFENESIN 200 MG: 200 SOLUTION ORAL at 09:09

## 2024-09-14 RX ADMIN — IPRATROPIUM BROMIDE AND ALBUTEROL SULFATE 3 ML: 2.5; .5 SOLUTION RESPIRATORY (INHALATION) at 07:09

## 2024-09-14 NOTE — PLAN OF CARE
[facility] - Med Surg  Initial Discharge Assessment       Primary Care Provider: Shlomo Villatoro MD    Admission Diagnosis: Shortness of breath [R06.02]  Cough [R05.9]  Tobacco abuse disorder [Z72.0]  History of COPD [Z87.09]  Pneumonia of both lower lobes due to infectious organism [J18.9]  Acute hypoxemic respiratory failure [J96.01]    Admission Date: 9/13/2024  Expected Discharge Date:     Transition of Care Barriers: None    Payor: [employer] [facility] / Plan: [employer] CHOICES / Product Type: Medicare Advantage /     Extended Emergency Contact Information  Primary Emergency Contact: Eladia Daniels  Mobile Phone: [phone]  Relation: Spouse  Preferred language: English   needed? No    Discharge Plan A: Home         [facility] - [place], [place] - [address] [facility] [address]  [address] [address] [address]  Phone: [phone] Fax: [phone]      Initial Assessment (most recent)       Adult Discharge Assessment - 09/14/24 1043          Discharge Assessment    Assessment Type Discharge Planning Assessment     Confirmed/corrected address, phone number and insurance Yes     Confirmed Demographics Correct on Facesheet     Source of Information patient     Does patient/caregiver understand observation status Yes     Communicated YUE with patient/caregiver Date not available/Unable to determine     People in Home spouse     Do you expect to return to your current living situation? Yes     Do you have help at home or someone to help you manage your care at home? Yes     Who are your caregiver(s) and their phone number(s)? Eladia Daniels, spouse, [phone]     Prior to hospitilization cognitive status: Alert/Oriented     Current cognitive status: Alert/Oriented     Walking or Climbing Stairs Difficulty no     Dressing/Bathing Difficulty no     Equipment Currently Used at Home none     Readmission within 30 days? No     Patient currently being followed by outpatient case management? No      Do you currently have service(s) that help you manage your care at home? No     Do you take prescription medications? Yes     Do you have prescription coverage? Yes     Do you have any problems affording any of your prescribed medications? No     Is the patient taking medications as prescribed? yes     Who is going to help you get home at discharge? family     How do you get to doctors appointments? car, drives self     Are you on dialysis? No     Do you take coumadin? No     Discharge Plan A Home     DME Needed Upon Discharge  none     Discharge Plan discussed with: Patient     Transition of Care Barriers None

## 2024-09-14 NOTE — ASSESSMENT & PLAN NOTE
Patient's most recent potassium results are listed below.   Recent Labs     09/13/24  1237 09/14/24  0545   K 3.2* 3.7       Replete potassium per protocol  Monitor potassium Daily  Patient's hypokalemia is stable

## 2024-09-14 NOTE — PLAN OF CARE
Discussed poc with pt, pt verbalized understanding    Purposeful rounding every 2hours    VS wnl  Cardiac monitoring in use, pt is RAMAKRISHNA, tele monitor # [id number]    Fall precautions in place, remains injury free    Pain and nausea under control with PRN meds      Accurate I&Os  Abx given as prescribed  Bed locked at lowest position  Call light within reach    Chart check complete  Will cont with POC    Problem: Adult Inpatient Plan of Care  Goal: Plan of Care Review  Outcome: Progressing  Goal: Patient-Specific Goal (Individualized)  Outcome: Progressing  Flowsheets (Taken 9/14/2024 7959)  Individualized Care Needs: wants to stop coughing  Goal: Absence of Hospital-Acquired Illness or Injury  Outcome: Progressing  Goal: Optimal Comfort and Wellbeing  Outcome: Progressing  Goal: Readiness for Transition of Care  Outcome: Progressing     Problem: Pneumonia  Goal: Fluid Balance  Outcome: Progressing  Goal: Resolution of Infection Signs and Symptoms  Outcome: Progressing  Goal: Effective Oxygenation and Ventilation  Outcome: Progressing     Problem: Fall Injury Risk  Goal: Absence of Fall and Fall-Related Injury  Outcome: Progressing

## 2024-09-14 NOTE — ASSESSMENT & PLAN NOTE
Chronic, uncontrolled. Latest blood pressure and vitals reviewed-     Temp:  [98 °F (36.7 °C)-100.3 °F (37.9 °C)]   Pulse:  []   Resp:  [16-34]   BP: (113-144)/(62-92)   SpO2:  [91 %-97 %] .   Home meds for hypertension were reviewed and noted below.   Hypertension Medications               valsartan (DIOVAN) 80 MG tablet Take 1 tablet (80 mg total) by mouth once daily.          While in the hospital, will manage blood pressure as follows; hold home anti-hypertensives for now with hypotension    Will utilize p.r.n. blood pressure medication only if patient's blood pressure greater than 160/100 and he develops symptoms such as worsening chest pain or shortness of breath.

## 2024-09-14 NOTE — ASSESSMENT & PLAN NOTE
Patient's COPD is with exacerbation noted by continued dyspnea, use of accessory muscles for breathing, and worsening of baseline hypoxia currently.  Patient is currently off COPD Pathway. Continue scheduled inhalers Steroids, Antibiotics, and Supplemental oxygen and monitor respiratory status closely.   Follows Dr. Tony VICTORIA, started on trelegy last week  Discussed endotrachial valve placement, but has to be tobacco free for at least 3 months before being considered

## 2024-09-14 NOTE — ASSESSMENT & PLAN NOTE
Dangers of cigarette smoking were reviewed with patient in detail. Patient was Counseled for 3-10 minutes. Nicotine replacement options were discussed. Nicotine replacement was discussed- not prescribed per patient's request  Patient reports he quit smoking ~ 1 month ago

## 2024-09-14 NOTE — AI DETERIORATION ALERT
Artificial Intelligence Notification  [facility]  [id number] [place] [address] [address] [place] [address]  Phone: [phone]    This documentation was triggered by an Artificial Intelligence Notification:    Admit Date: 2024   LOS: 1  Code Status: Full Code  : 1955  Age: 68 y.o.  Weight:   Wt Readings from Last 1 Encounters:   24 71.7 kg (158 lb)        Sex: male  Bed: A553/A553 A  MRN: [id number]  Attending Physician: Narinder Ortiz MD     Date of Alert: 2024  Time AI Alert Received: 1149            Vitals:    24 1604   BP: 113/62   Pulse: 95   Resp: 19   Temp: 98.7 °F (37.1 °C)     SpO2: (!) 93 %      Artificial Intelligence alert discussed with Provider:     Name: Dr. Ortiz   Date/Time of Provider Notification: 2024 @ 1149      Patient Condition: According to the chart, patient heart rate jumped to 166, it was likely entered in error. HR has been  since admission. Repeat HR 15 minutes after was 89.

## 2024-09-14 NOTE — PROGRESS NOTES
[facility] Medicine  Progress Note    Patient Name: Aurelio Daniels  MRN: [id number]  Patient Class: IP- Inpatient   Admission Date: 9/13/2024  Length of Stay: 1 days  Attending Physician: Narinder Ortiz MD  Primary Care Provider: Shlomo Villatoro MD        Subjective:     Principal Problem:Pneumonia of both lower lobes due to infectious organism      HPI:  68 year-old-male with PMH of tobacco use, prostate cancer, COPD, and HTN presented to the ER 9/13/24 with c/o worsening shortness of breath with associated cough. He reports going to urgent care last Friday and was told he had a viral infection and flu/COVID was negative. He saw pulmonology on Thursday of last week and diagnosed with COPD and started on Trelegy inhaler. He reports taking it one day and began to feel worse. He reached out to pulmonology and was told to hold the Trelegy for a few days and see if his symptoms improved. He reports it was helping with his shortness of breath. He quit smoking for good 1 month ago. Patient denies CP, SOB, nausea, vomiting, diarrhea, chills, diaphoresis, or fever. He reports decreased oral intake for about 1 week.     In ER, labs revealed WBC 17.84, granulocyte dominate, H&H 12/35.1, K 3.2, , Alp 299, albumin 2.6, lactic acid normal, procal 1.18. He was hypoxic in the ER, ABGs revealed pH 7.437, PO2 54, PCO2 31.2. He was placed on 2 LPM to keep sats > 88%. CXR revealed mulitfocal interstitial pneumonia. He was started on Ceftriaxone and azithromycin IV. [facility] was consulted to admit patient for pneumonia.     Overview/Hospital Course:  68 year-old-female admitted with pneumonia. He was started on scheduled duonebs every 6 hours and IV Rocephin and ceftriaxone daily. Patient with minimal improvement on day 2, still with a lot of coughing. Scheduled the Robitussin every 4 hours for now to see if improved.   Ordered 1 time dose of Lasix,  on admission. Started budesonide inhalers along with  duo-neb and added prednisone 40 mg x 5 days. He likely has COPD exacerbation and pneumonia. Continue supplemental oxygen.     Interval History: f/u with shortness of breath, pna, COPD. Awake, alert, sitting up in bed, wife at bedside. Patient c/o cough, will schedule Robitussin for now. Supplemental oxygen, wean as tolerated. Cont azithromycin/ceftriaxone IV. Cont scheduled nebs, add budesonide.     Review of Systems  Objective:     Vital Signs (Most Recent):  Temp: 98.7 °F (37.1 °C) (09/14/24 1604)  Pulse: 95 (09/14/24 1604)  Resp: 19 (09/14/24 1604)  BP: 113/62 (09/14/24 1604)  SpO2: (!) 93 % (09/14/24 1604) Vital Signs (24h Range):  Temp:  [98 °F (36.7 °C)-100.3 °F (37.9 °C)] 98.7 °F (37.1 °C)  Pulse:  [] 95  Resp:  [16-34] 19  SpO2:  [91 %-97 %] 93 %  BP: (113-144)/(62-92) 113/62     Weight: 71.7 kg (158 lb)  Body mass index is 23.33 kg/m².    Intake/Output Summary (Last 24 hours) at 9/14/2024 1636  Last data filed at 9/13/2024 1900  Gross per 24 hour   Intake 1202.13 ml   Output --   Net 1202.13 ml         Physical Exam  Vitals and nursing note reviewed.   Constitutional:       Appearance: Normal appearance.   HENT:      Head: Normocephalic.   Eyes:      Pupils: Pupils are equal, round, and reactive to light.   Cardiovascular:      Rate and Rhythm: Normal rate and regular rhythm.      Heart sounds: No murmur heard.  Pulmonary:      Effort: Pulmonary effort is normal.      Breath sounds: Decreased breath sounds present. No wheezing or rales.      Comments: 3 Lpm NC  Abdominal:      General: Bowel sounds are normal.      Palpations: Abdomen is soft.   Musculoskeletal:         General: Normal range of motion.   Skin:     General: Skin is warm and dry.   Neurological:      General: No focal deficit present.      Mental Status: He is alert and oriented to person, place, and time.   Psychiatric:         Mood and Affect: Mood normal.         Behavior: Behavior normal.             Significant Labs: All pertinent  labs within the past 24 hours have been reviewed.  CBC:   Recent Labs   Lab 09/13/24  1237 09/14/24  0545   WBC 17.89* 14.40*   HGB 12.0* 10.0*   HCT 35.1* 29.4*    396     CMP:   Recent Labs   Lab 09/13/24  1237 09/14/24  0545    138   K 3.2* 3.7    106   CO2 21* 20*   * 110   BUN 22 17   CREATININE 1.2 0.8   CALCIUM 9.5 8.5*   PROT 7.0  --    ALBUMIN 2.6*  --    BILITOT 0.8  --    ALKPHOS 299*  --    AST 33  --    ALT 42  --    ANIONGAP 15 12       Significant Imaging: I have reviewed all pertinent imaging results/findings within the past 24 hours.    Assessment/Plan:      * Pneumonia of both lower lobes due to infectious organism  Patient has a diagnosis of pneumonia. The cause of the pneumonia is suspected to be bacterial in etiology but organism is not known. The pneumonia is stable. The patient has the following signs/symptoms of pneumonia: persistent hypoxia , cough, and shortness of breath. The patient does have a current oxygen requirement and the patient does not have a home oxygen requirement. I have reviewed the pertinent imaging. The following cultures have been collected: blood cultures, Sputum culture The culture results are listed below.     Current antimicrobial regimen consists of the antibiotics listed below. Will monitor patient closely and continue current treatment plan unchanged.    Antibiotics (From admission, onward)      Start     Stop Route Frequency Ordered    09/13/24 1330  cefTRIAXone (Rocephin) 1 g in D5W 100 mL IVPB (MB+)         -- IV Every 24 hours (non-standard times) 09/13/24 1330    09/13/24 1330  azithromycin (ZITHROMAX) 500 mg in D5W 250 mL  IVPB (admixture device)         -- IV Every 24 hours (non-standard times) 09/13/24 1330            Microbiology Results (last 7 days)       Procedure Component Value Units Date/Time    Culture, Respiratory with Gram Stain [[id number]] Collected: 09/14/24 1045    Order Status: Sent Specimen: Respiratory from Sputum  Updated: 09/14/24 1612    Respiratory Infection Panel (PCR), Nasopharyngeal [[id number]] Collected: 09/14/24 1045    Order Status: Completed Specimen: Nasopharyngeal Swab Updated: 09/14/24 1204     Respiratory Infection Panel Source NP Swab     Adenovirus Not Detected     Coronavirus 229E, Common Cold Virus Not Detected     Coronavirus HKU1, Common Cold Virus Not Detected     Coronavirus NL63, Common Cold Virus Not Detected     Coronavirus OC43, Common Cold Virus Not Detected     Comment: The Coronavirus strains detected in this test cause the common cold.  These strains are not the COVID-19 (novel Coronavirus)strain   associated with the respiratory disease outbreak.          SARS-CoV2 (COVID-19) Qualitative PCR Not Detected     Human Metapneumovirus Not Detected     Human Rhinovirus/Enterovirus Not Detected     Influenza A (subtypes H1, H1-2009,H3) Not Detected     Influenza B Not Detected     Parainfluenza Virus 1 Not Detected     Parainfluenza Virus 2 Not Detected     Parainfluenza Virus 3 Not Detected     Parainfluenza Virus 4 Not Detected     Respiratory Syncytial Virus Not Detected     Bordetella Parapertussis (EK2405) Not Detected     Bordetella pertussis (ptxP) Not Detected     Chlamydia pneumoniae Not Detected     Mycoplasma pneumoniae Not Detected     Comment: Respiratory Infection Panel testing performed by Multiplex PCR.       Narrative:      Assay not valid for lower respiratory specimens, alternate  testing required.    Blood culture x two cultures. Draw prior to antibiotics. [[id number]] Collected: 09/13/24 1425    Order Status: Completed Specimen: Blood from Peripheral, Forearm, Left Updated: 09/14/24 0715     Blood Culture, Routine No Growth to date    Narrative:      Aerobic and anaerobic    Blood culture x two cultures. Draw prior to antibiotics. [[id number]] Collected: 09/13/24 1410    Order Status: Completed Specimen: Blood from Peripheral, Forearm, Left Updated: 09/14/24 0715     Blood  Culture, Routine No Growth to date    Narrative:      Aerobic and anaerobic    Influenza A & B by Molecular [[id number]] Collected: 09/13/24 1131    Order Status: Completed Specimen: Nasopharyngeal Swab Updated: 09/13/24 1209     Influenza A, Molecular Negative     Influenza B, Molecular Negative     Flu A & B Source Nasal swab          Procal 1.18    Centrilobular emphysema  Patient's COPD is with exacerbation noted by continued dyspnea, use of accessory muscles for breathing, and worsening of baseline hypoxia currently.  Patient is currently off COPD Pathway. Continue scheduled inhalers Steroids, Antibiotics, and Supplemental oxygen and monitor respiratory status closely.   Follows Dr. Tony VICTORIA, started on trelegy last week  Discussed endotrachial valve placement, but has to be tobacco free for at least 3 months before being considered    Tobacco dependency  Dangers of cigarette smoking were reviewed with patient in detail. Patient was Counseled for 3-10 minutes. Nicotine replacement options were discussed. Nicotine replacement was discussed- not prescribed per patient's request  Patient reports he quit smoking ~ 1 month ago    Hypokalemia  Patient's most recent potassium results are listed below.   Recent Labs     09/13/24  1237 09/14/24  0545   K 3.2* 3.7       Replete potassium per protocol  Monitor potassium Daily  Patient's hypokalemia is stable    Other specified anemias  Anemia is likely due to Iron deficiency. Most recent hemoglobin and hematocrit are listed below.  Recent Labs     09/13/24  1237 09/14/24  0545   HGB 12.0* 10.0*   HCT 35.1* 29.4*       Monitor serial CBC: Daily  Transfuse PRBC if patient becomes hemodynamically unstable, symptomatic or H/H drops below 7/21.  Patient has not received any PRBC transfusions to date  anemia is currently stable    Primary hypertension  Chronic, uncontrolled. Latest blood pressure and vitals reviewed-     Temp:  [98 °F (36.7 °C)-100.3 °F (37.9 °C)]   Pulse:   []   Resp:  [16-34]   BP: (113-144)/(62-92)   SpO2:  [91 %-97 %] .   Home meds for hypertension were reviewed and noted below.   Hypertension Medications               valsartan (DIOVAN) 80 MG tablet Take 1 tablet (80 mg total) by mouth once daily.          While in the hospital, will manage blood pressure as follows; hold home anti-hypertensives for now with hypotension    Will utilize p.r.n. blood pressure medication only if patient's blood pressure greater than 160/100 and he develops symptoms such as worsening chest pain or shortness of breath.      VTE Risk Mitigation (From admission, onward)           Ordered     enoxaparin injection 40 mg  Daily         09/13/24 1601     IP VTE HIGH RISK PATIENT  Once         09/13/24 1601     Place sequential compression device  Until discontinued         09/13/24 1601                    Discharge Planning   YUE:      Code Status: Full Code   Is the patient medically ready for discharge?:     Reason for patient still in hospital (select all that apply): Patient trending condition, Laboratory test, and Treatment  Discharge Plan A: Home            Belgica Gee NP  Department of Hospital Medicine   [facility] - Med Surg

## 2024-09-14 NOTE — HOSPITAL COURSE
The patient was admitted with pneumonia with hypoxia. He was placed on supplemental oxygen. He was started on scheduled duonebs every 6 hours and IV Rocephin. Patient with minimal improvement on day 2, still with a lot of coughing. Robitussin was scheduled and he received dose of IV lasix. His symptoms improved. His oxygen requirements were weaned to 2L NC. Robitussin changed to as needed morning of 9/16. He was continued on oral steroids and scheduled breathing treatments. Home oxygen evaluation obtained. Patient qualified for home oxygen. Home oxygen arranged prior to discharge. He was transitioned to oral antibiotics upon discharge. Outpatient pulmonology follow-up. Patient not prescribed nicotine patches as he has not smoked in a month and did not feel they were warranted. Patient seen and examined, stable for discharge. F/U with PCP- instructed to check blood pressure twice a day and record log. Bring record to PCP visit. Holding home BP medication for SBP less than 130.

## 2024-09-14 NOTE — SUBJECTIVE & OBJECTIVE
Interval History: f/u with shortness of breath, pna, COPD. Awake, alert, sitting up in bed, wife at bedside. Patient c/o cough, will schedule Robitussin for now. Supplemental oxygen, wean as tolerated. Cont azithromycin/ceftriaxone IV. Cont scheduled nebs, add budesonide.     Review of Systems  Objective:     Vital Signs (Most Recent):  Temp: 98.7 °F (37.1 °C) (09/14/24 1604)  Pulse: 95 (09/14/24 1604)  Resp: 19 (09/14/24 1604)  BP: 113/62 (09/14/24 1604)  SpO2: (!) 93 % (09/14/24 1604) Vital Signs (24h Range):  Temp:  [98 °F (36.7 °C)-100.3 °F (37.9 °C)] 98.7 °F (37.1 °C)  Pulse:  [] 95  Resp:  [16-34] 19  SpO2:  [91 %-97 %] 93 %  BP: (113-144)/(62-92) 113/62     Weight: 71.7 kg (158 lb)  Body mass index is 23.33 kg/m².    Intake/Output Summary (Last 24 hours) at 9/14/2024 1636  Last data filed at 9/13/2024 1900  Gross per 24 hour   Intake 1202.13 ml   Output --   Net 1202.13 ml         Physical Exam  Vitals and nursing note reviewed.   Constitutional:       Appearance: Normal appearance.   HENT:      Head: Normocephalic.   Eyes:      Pupils: Pupils are equal, round, and reactive to light.   Cardiovascular:      Rate and Rhythm: Normal rate and regular rhythm.      Heart sounds: No murmur heard.  Pulmonary:      Effort: Pulmonary effort is normal.      Breath sounds: Decreased breath sounds present. No wheezing or rales.      Comments: 3 Lpm NC  Abdominal:      General: Bowel sounds are normal.      Palpations: Abdomen is soft.   Musculoskeletal:         General: Normal range of motion.   Skin:     General: Skin is warm and dry.   Neurological:      General: No focal deficit present.      Mental Status: He is alert and oriented to person, place, and time.   Psychiatric:         Mood and Affect: Mood normal.         Behavior: Behavior normal.             Significant Labs: All pertinent labs within the past 24 hours have been reviewed.  CBC:   Recent Labs   Lab 09/13/24  1237 09/14/24  0545   WBC 17.89* 14.40*    HGB 12.0* 10.0*   HCT 35.1* 29.4*    396     CMP:   Recent Labs   Lab 09/13/24  1237 09/14/24  0545    138   K 3.2* 3.7    106   CO2 21* 20*   * 110   BUN 22 17   CREATININE 1.2 0.8   CALCIUM 9.5 8.5*   PROT 7.0  --    ALBUMIN 2.6*  --    BILITOT 0.8  --    ALKPHOS 299*  --    AST 33  --    ALT 42  --    ANIONGAP 15 12       Significant Imaging: I have reviewed all pertinent imaging results/findings within the past 24 hours.   English

## 2024-09-14 NOTE — ASSESSMENT & PLAN NOTE
Patient has a diagnosis of pneumonia. The cause of the pneumonia is suspected to be bacterial in etiology but organism is not known. The pneumonia is stable. The patient has the following signs/symptoms of pneumonia: persistent hypoxia , cough, and shortness of breath. The patient does have a current oxygen requirement and the patient does not have a home oxygen requirement. I have reviewed the pertinent imaging. The following cultures have been collected: blood cultures, Sputum culture The culture results are listed below.     Current antimicrobial regimen consists of the antibiotics listed below. Will monitor patient closely and continue current treatment plan unchanged.    Antibiotics (From admission, onward)      Start     Stop Route Frequency Ordered    09/13/24 1330  cefTRIAXone (Rocephin) 1 g in D5W 100 mL IVPB (MB+)         -- IV Every 24 hours (non-standard times) 09/13/24 1330    09/13/24 1330  azithromycin (ZITHROMAX) 500 mg in D5W 250 mL  IVPB (admixture device)         -- IV Every 24 hours (non-standard times) 09/13/24 1330            Microbiology Results (last 7 days)       Procedure Component Value Units Date/Time    Culture, Respiratory with Gram Stain [[id number]] Collected: 09/14/24 1045    Order Status: Sent Specimen: Respiratory from Sputum Updated: 09/14/24 1612    Respiratory Infection Panel (PCR), Nasopharyngeal [[id number]] Collected: 09/14/24 1045    Order Status: Completed Specimen: Nasopharyngeal Swab Updated: 09/14/24 1204     Respiratory Infection Panel Source NP Swab     Adenovirus Not Detected     Coronavirus 229E, Common Cold Virus Not Detected     Coronavirus HKU1, Common Cold Virus Not Detected     Coronavirus NL63, Common Cold Virus Not Detected     Coronavirus OC43, Common Cold Virus Not Detected     Comment: The Coronavirus strains detected in this test cause the common cold.  These strains are not the COVID-19 (novel Coronavirus)strain   associated with the respiratory disease  outbreak.          SARS-CoV2 (COVID-19) Qualitative PCR Not Detected     Human Metapneumovirus Not Detected     Human Rhinovirus/Enterovirus Not Detected     Influenza A (subtypes H1, H1-2009,H3) Not Detected     Influenza B Not Detected     Parainfluenza Virus 1 Not Detected     Parainfluenza Virus 2 Not Detected     Parainfluenza Virus 3 Not Detected     Parainfluenza Virus 4 Not Detected     Respiratory Syncytial Virus Not Detected     Bordetella Parapertussis (WL9573) Not Detected     Bordetella pertussis (ptxP) Not Detected     Chlamydia pneumoniae Not Detected     Mycoplasma pneumoniae Not Detected     Comment: Respiratory Infection Panel testing performed by Multiplex PCR.       Narrative:      Assay not valid for lower respiratory specimens, alternate  testing required.    Blood culture x two cultures. Draw prior to antibiotics. [[id number]] Collected: 09/13/24 1425    Order Status: Completed Specimen: Blood from Peripheral, Forearm, Left Updated: 09/14/24 0715     Blood Culture, Routine No Growth to date    Narrative:      Aerobic and anaerobic    Blood culture x two cultures. Draw prior to antibiotics. [[id number]] Collected: 09/13/24 1410    Order Status: Completed Specimen: Blood from Peripheral, Forearm, Left Updated: 09/14/24 0715     Blood Culture, Routine No Growth to date    Narrative:      Aerobic and anaerobic    Influenza A & B by Molecular [[id number]] Collected: 09/13/24 1131    Order Status: Completed Specimen: Nasopharyngeal Swab Updated: 09/13/24 1209     Influenza A, Molecular Negative     Influenza B, Molecular Negative     Flu A & B Source Nasal swab          Procal 1.18

## 2024-09-15 LAB
ANION GAP SERPL CALC-SCNC: 12 MMOL/L (ref 8–16)
BASOPHILS # BLD AUTO: ABNORMAL K/UL (ref 0–0.2)
BASOPHILS NFR BLD: 0 % (ref 0–1.9)
BUN SERPL-MCNC: 15 MG/DL (ref 8–23)
CALCIUM SERPL-MCNC: 8 MG/DL (ref 8.7–10.5)
CHLORIDE SERPL-SCNC: 101 MMOL/L (ref 95–110)
CO2 SERPL-SCNC: 23 MMOL/L (ref 23–29)
CREAT SERPL-MCNC: 0.7 MG/DL (ref 0.5–1.4)
DIFFERENTIAL METHOD BLD: ABNORMAL
EOSINOPHIL # BLD AUTO: ABNORMAL K/UL (ref 0–0.5)
EOSINOPHIL NFR BLD: 3 % (ref 0–8)
ERYTHROCYTE [DISTWIDTH] IN BLOOD BY AUTOMATED COUNT: 13.2 % (ref 11.5–14.5)
EST. GFR  (NO RACE VARIABLE): >60 ML/MIN/1.73 M^2
GLUCOSE SERPL-MCNC: 105 MG/DL (ref 70–110)
HCT VFR BLD AUTO: 29 % (ref 40–54)
HGB BLD-MCNC: 9.8 G/DL (ref 14–18)
IMM GRANULOCYTES # BLD AUTO: ABNORMAL K/UL (ref 0–0.04)
IMM GRANULOCYTES NFR BLD AUTO: ABNORMAL % (ref 0–0.5)
LYMPHOCYTES # BLD AUTO: ABNORMAL K/UL (ref 1–4.8)
LYMPHOCYTES NFR BLD: 9 % (ref 18–48)
MCH RBC QN AUTO: 31.3 PG (ref 27–31)
MCHC RBC AUTO-ENTMCNC: 33.8 G/DL (ref 32–36)
MCV RBC AUTO: 93 FL (ref 82–98)
MONOCYTES # BLD AUTO: ABNORMAL K/UL (ref 0.3–1)
MONOCYTES NFR BLD: 5 % (ref 4–15)
MYELOCYTES NFR BLD MANUAL: 1 %
NEUTROPHILS NFR BLD: 79 % (ref 38–73)
NEUTS BAND NFR BLD MANUAL: 3 %
NRBC BLD-RTO: 0 /100 WBC
OHS QRS DURATION: 80 MS
OHS QTC CALCULATION: 441 MS
PLATELET # BLD AUTO: 480 K/UL (ref 150–450)
PLATELET BLD QL SMEAR: ABNORMAL
PMV BLD AUTO: 9.2 FL (ref 9.2–12.9)
POTASSIUM SERPL-SCNC: 3.8 MMOL/L (ref 3.5–5.1)
RBC # BLD AUTO: 3.13 M/UL (ref 4.6–6.2)
SODIUM SERPL-SCNC: 136 MMOL/L (ref 136–145)
TROPONIN I SERPL DL<=0.01 NG/ML-MCNC: <0.006 NG/ML (ref 0–0.03)
WBC # BLD AUTO: 12.17 K/UL (ref 3.9–12.7)

## 2024-09-15 PROCEDURE — 36415 COLL VENOUS BLD VENIPUNCTURE: CPT | Performed by: NURSE PRACTITIONER

## 2024-09-15 PROCEDURE — 99900035 HC TECH TIME PER 15 MIN (STAT)

## 2024-09-15 PROCEDURE — 25000003 PHARM REV CODE 250: Performed by: NURSE PRACTITIONER

## 2024-09-15 PROCEDURE — 80048 BASIC METABOLIC PNL TOTAL CA: CPT | Performed by: NURSE PRACTITIONER

## 2024-09-15 PROCEDURE — 27000221 HC OXYGEN, UP TO 24 HOURS

## 2024-09-15 PROCEDURE — 63600175 PHARM REV CODE 636 W HCPCS: Performed by: NURSE PRACTITIONER

## 2024-09-15 PROCEDURE — 85027 COMPLETE CBC AUTOMATED: CPT | Performed by: NURSE PRACTITIONER

## 2024-09-15 PROCEDURE — 84484 ASSAY OF TROPONIN QUANT: CPT | Performed by: NURSE PRACTITIONER

## 2024-09-15 PROCEDURE — 94761 N-INVAS EAR/PLS OXIMETRY MLT: CPT

## 2024-09-15 PROCEDURE — 25000242 PHARM REV CODE 250 ALT 637 W/ HCPCS: Performed by: NURSE PRACTITIONER

## 2024-09-15 PROCEDURE — 85007 BL SMEAR W/DIFF WBC COUNT: CPT | Performed by: NURSE PRACTITIONER

## 2024-09-15 PROCEDURE — 11000001 HC ACUTE MED/SURG PRIVATE ROOM

## 2024-09-15 PROCEDURE — 94640 AIRWAY INHALATION TREATMENT: CPT

## 2024-09-15 RX ADMIN — BUDESONIDE 0.5 MG: 0.5 SUSPENSION RESPIRATORY (INHALATION) at 07:09

## 2024-09-15 RX ADMIN — GUAIFENESIN 200 MG: 200 SOLUTION ORAL at 02:09

## 2024-09-15 RX ADMIN — GUAIFENESIN 200 MG: 200 SOLUTION ORAL at 01:09

## 2024-09-15 RX ADMIN — GUAIFENESIN 200 MG: 200 SOLUTION ORAL at 10:09

## 2024-09-15 RX ADMIN — GUAIFENESIN 200 MG: 200 SOLUTION ORAL at 09:09

## 2024-09-15 RX ADMIN — GUAIFENESIN 200 MG: 200 SOLUTION ORAL at 05:09

## 2024-09-15 RX ADMIN — IPRATROPIUM BROMIDE AND ALBUTEROL SULFATE 3 ML: 2.5; .5 SOLUTION RESPIRATORY (INHALATION) at 07:09

## 2024-09-15 RX ADMIN — ENOXAPARIN SODIUM 40 MG: 40 INJECTION SUBCUTANEOUS at 04:09

## 2024-09-15 RX ADMIN — IPRATROPIUM BROMIDE AND ALBUTEROL SULFATE 3 ML: 2.5; .5 SOLUTION RESPIRATORY (INHALATION) at 02:09

## 2024-09-15 RX ADMIN — CEFTRIAXONE 1 G: 1 INJECTION, POWDER, FOR SOLUTION INTRAMUSCULAR; INTRAVENOUS at 12:09

## 2024-09-15 RX ADMIN — PREDNISONE 40 MG: 20 TABLET ORAL at 08:09

## 2024-09-15 RX ADMIN — AZITHROMYCIN MONOHYDRATE 500 MG: 500 INJECTION, POWDER, LYOPHILIZED, FOR SOLUTION INTRAVENOUS at 01:09

## 2024-09-15 NOTE — PROGRESS NOTES
[facility] Medicine  Progress Note    Patient Name: Aurelio Daniels  MRN: [id number]  Patient Class: IP- Inpatient   Admission Date: 9/13/2024  Length of Stay: 2 days  Attending Physician: Narinder Ortiz MD  Primary Care Provider: Shlomo Villatoro MD        Subjective:     Principal Problem:Pneumonia of both lower lobes due to infectious organism      HPI:  68 year-old-male with PMH of tobacco use, prostate cancer, COPD, and HTN presented to the ER 9/13/24 with c/o worsening shortness of breath with associated cough. He reports going to urgent care last Friday and was told he had a viral infection and flu/COVID was negative. He saw pulmonology on Thursday of last week and diagnosed with COPD and started on Trelegy inhaler. He reports taking it one day and began to feel worse. He reached out to pulmonology and was told to hold the Trelegy for a few days and see if his symptoms improved. He reports it was helping with his shortness of breath. He quit smoking for good 1 month ago. Patient denies CP, SOB, nausea, vomiting, diarrhea, chills, diaphoresis, or fever. He reports decreased oral intake for about 1 week.     In ER, labs revealed WBC 17.84, granulocyte dominate, H&H 12/35.1, K 3.2, , Alp 299, albumin 2.6, lactic acid normal, procal 1.18. He was hypoxic in the ER, ABGs revealed pH 7.437, PO2 54, PCO2 31.2. He was placed on 2 LPM to keep sats > 88%. CXR revealed mulitfocal interstitial pneumonia. He was started on Ceftriaxone and azithromycin IV. [facility] [place] consulted to admit patient for pneumonia.     Overview/Hospital Course:  68 year-old-female admitted with pneumonia. He was started on scheduled duonebs every 6 hours and IV Rocephin and ceftriaxone daily. Patient with minimal improvement on day 2, still with a lot of coughing. Scheduled the Robitussin every 4 hours for now to see if improved.   Ordered 1 time dose of Lasix,  on admission. Started budesonide inhalers along with  duo-neb and added prednisone 40 mg x 5 days. He likely has COPD exacerbation as well as pneumonia. Continue supplemental oxygen.   Cough and SOB better, still requiring supplemental oxygen.   Would like inhaler changed on discharge from [facility], they feel it made him sicker. Will try Symbicort verses Breo on discharge until he can follow up with pulmonology OP. Will likely d/c tomorrow if stable.     Interval History: f/u with shortness of breath, pna, COPD. Awake, alert, sitting up in bed, wife at bedside. Patient c/o cough, will schedule Robitussin for now. Supplemental oxygen, wean as tolerated. Cont azithromycin/ceftriaxone IV. Cont scheduled nebs. Likely d/c tomorrow if stable.     Review of Systems  Objective:     Vital Signs (Most Recent):  Temp: 98.7 °F (37.1 °C) (09/15/24 1303)  Pulse: 95 (09/15/24 1303)  Resp: 18 (09/15/24 1303)  BP: 117/68 (09/15/24 1303)  SpO2: (!) 91 % (09/15/24 1303) Vital Signs (24h Range):  Temp:  [97.5 °F (36.4 °C)-99.8 °F (37.7 °C)] 98.7 °F (37.1 °C)  Pulse:  [] 95  Resp:  [16-21] 18  SpO2:  [90 %-96 %] 91 %  BP: (109-129)/(60-70) 117/68     Weight: 70.5 kg (155 lb 6.8 oz)  Body mass index is 22.95 kg/m².    Intake/Output Summary (Last 24 hours) at 9/15/2024 1346  Last data filed at 9/15/2024 0400  Gross per 24 hour   Intake 370 ml   Output --   Net 370 ml         Physical Exam  Vitals and nursing note reviewed.   Constitutional:       Appearance: Normal appearance.   HENT:      Head: Normocephalic.   Eyes:      Pupils: Pupils are equal, round, and reactive to light.   Cardiovascular:      Rate and Rhythm: Normal rate and regular rhythm.      Heart sounds: No murmur heard.  Pulmonary:      Effort: Pulmonary effort is normal.      Breath sounds: Decreased breath sounds present. No wheezing or rales.      Comments: 3 Lpm NC  Abdominal:      General: Bowel sounds are normal.      Palpations: Abdomen is soft.   Musculoskeletal:         General: Normal range of motion.   Skin:      General: Skin is warm and dry.   Neurological:      General: No focal deficit present.      Mental Status: He is alert and oriented to person, place, and time.   Psychiatric:         Mood and Affect: Mood normal.         Behavior: Behavior normal.             Significant Labs: All pertinent labs within the past 24 hours have been reviewed.  CBC:   Recent Labs   Lab 09/14/24  0545 09/15/24  0539   WBC 14.40* 12.17   HGB 10.0* 9.8*   HCT 29.4* 29.0*    480*     CMP:   Recent Labs   Lab 09/14/24  0545 09/15/24  0539    136   K 3.7 3.8    101   CO2 20* 23    105   BUN 17 15   CREATININE 0.8 0.7   CALCIUM 8.5* 8.0*   ANIONGAP 12 12       Significant Imaging: I have reviewed all pertinent imaging results/findings within the past 24 hours.    Assessment/Plan:      * Pneumonia of both lower lobes due to infectious organism  Patient has a diagnosis of pneumonia. The cause of the pneumonia is suspected to be bacterial in etiology but organism is not known. The pneumonia is stable. The patient has the following signs/symptoms of pneumonia: persistent hypoxia , cough, and shortness of breath. The patient does have a current oxygen requirement and the patient does not have a home oxygen requirement. I have reviewed the pertinent imaging. The following cultures have been collected: blood cultures, Sputum culture The culture results are listed below.     Current antimicrobial regimen consists of the antibiotics listed below. Will monitor patient closely and continue current treatment plan unchanged.    Antibiotics (From admission, onward)      Start     Stop Route Frequency Ordered    09/13/24 1330  cefTRIAXone (Rocephin) 1 g in D5W 100 mL IVPB (MB+)         -- IV Every 24 hours (non-standard times) 09/13/24 1330    09/13/24 1330  azithromycin (ZITHROMAX) 500 mg in D5W 250 mL  IVPB (admixture device)         -- IV Every 24 hours (non-standard times) 09/13/24 1330            Microbiology Results (last 7  days)       Procedure Component Value Units Date/Time    Culture, Respiratory with Gram Stain [[id number]] Collected: 09/14/24 1045    Order Status: Completed Specimen: Respiratory from Sputum Updated: 09/15/24 0740     Respiratory Culture Normal respiratory noe     Gram Stain (Respiratory) <10 epithelial cells per low power field.     Gram Stain (Respiratory) Rare WBC's     Gram Stain (Respiratory) Few Gram positive rods    Blood culture x two cultures. Draw prior to antibiotics. [[id number]] Collected: 09/13/24 1410    Order Status: Completed Specimen: Blood from Peripheral, Forearm, Left Updated: 09/15/24 0613     Blood Culture, Routine No Growth to date      No Growth to date    Narrative:      Aerobic and anaerobic    Blood culture x two cultures. Draw prior to antibiotics. [[id number]] Collected: 09/13/24 1425    Order Status: Completed Specimen: Blood from Peripheral, Forearm, Left Updated: 09/15/24 0613     Blood Culture, Routine No Growth to date      No Growth to date    Narrative:      Aerobic and anaerobic    Respiratory Infection Panel (PCR), Nasopharyngeal [[id number]] Collected: 09/14/24 1045    Order Status: Completed Specimen: Nasopharyngeal Swab Updated: 09/14/24 1204     Respiratory Infection Panel Source NP Swab     Adenovirus Not Detected     Coronavirus 229E, Common Cold Virus Not Detected     Coronavirus HKU1, Common Cold Virus Not Detected     Coronavirus NL63, Common Cold Virus Not Detected     Coronavirus OC43, Common Cold Virus Not Detected     Comment: The Coronavirus strains detected in this test cause the common cold.  These strains are not the COVID-19 (novel Coronavirus)strain   associated with the respiratory disease outbreak.          SARS-CoV2 (COVID-19) Qualitative PCR Not Detected     Human Metapneumovirus Not Detected     Human Rhinovirus/Enterovirus Not Detected     Influenza A (subtypes H1, H1-2009,H3) Not Detected     Influenza B Not Detected     Parainfluenza Virus 1 Not  Detected     Parainfluenza Virus 2 Not Detected     Parainfluenza Virus 3 Not Detected     Parainfluenza Virus 4 Not Detected     Respiratory Syncytial Virus Not Detected     Bordetella Parapertussis (PZ0862) Not Detected     Bordetella pertussis (ptxP) Not Detected     Chlamydia pneumoniae Not Detected     Mycoplasma pneumoniae Not Detected     Comment: Respiratory Infection Panel testing performed by Multiplex PCR.       Narrative:      Assay not valid for lower respiratory specimens, alternate  testing required.    Influenza A & B by Molecular [[id number]] Collected: 09/13/24 1131    Order Status: Completed Specimen: Nasopharyngeal Swab Updated: 09/13/24 1209     Influenza A, Molecular Negative     Influenza B, Molecular Negative     Flu A & B Source Nasal swab          Procal 1.18    Centrilobular emphysema  Patient's COPD is with exacerbation noted by continued dyspnea, use of accessory muscles for breathing, and worsening of baseline hypoxia currently.  Patient is currently off COPD Pathway. Continue scheduled inhalers Steroids, Antibiotics, and Supplemental oxygen and monitor respiratory status closely.   Follows Dr. Tony VICTORIA, started on trelegy last week  Discussed endotrachial valve placement, but has to be tobacco free for at least 3 months before being considered  Patient would like trelegy stopped and start another inhaler on discharge, possibly Symbicort or Breo?   Follow up with pulmonology on discharge  Will need a home O2 evaluation before discharge and oxygen if qualifies    Tobacco dependency  Dangers of cigarette smoking were reviewed with patient in detail. Patient was Counseled for 3-10 minutes. Nicotine replacement options were discussed. Nicotine replacement was discussed- not prescribed per patient's request  Patient reports he quit smoking ~ 1 month ago    Hypokalemia  Patient's most recent potassium results are listed below.   Recent Labs     09/13/24  1237 09/14/24  0545 09/15/24  0539    K 3.2* 3.7 3.8       Replete potassium per protocol  Monitor potassium Daily  Patient's hypokalemia is stable    Other specified anemias  Anemia is likely due to Iron deficiency. Most recent hemoglobin and hematocrit are listed below.  Recent Labs     09/13/24  1237 09/14/24  0545 09/15/24  0539   HGB 12.0* 10.0* 9.8*   HCT 35.1* 29.4* 29.0*       Monitor serial CBC: Daily  Transfuse PRBC if patient becomes hemodynamically unstable, symptomatic or H/H drops below 7/21.  Patient has not received any PRBC transfusions to date  anemia is currently stable    Primary hypertension  Chronic, uncontrolled. Latest blood pressure and vitals reviewed-     Temp:  [97.5 °F (36.4 °C)-99.8 °F (37.7 °C)]   Pulse:  []   Resp:  [16-21]   BP: (109-129)/(60-70)   SpO2:  [90 %-96 %] .   Home meds for hypertension were reviewed and noted below.   Hypertension Medications               valsartan (DIOVAN) 80 MG tablet Take 1 tablet (80 mg total) by mouth once daily.          While in the hospital, will manage blood pressure as follows; hold home anti-hypertensives for now with hypotension    Will utilize p.r.n. blood pressure medication only if patient's blood pressure greater than 160/100 and he develops symptoms such as worsening chest pain or shortness of breath.      VTE Risk Mitigation (From admission, onward)           Ordered     enoxaparin injection 40 mg  Daily         09/13/24 1601     IP VTE HIGH RISK PATIENT  Once         09/13/24 1601     Place sequential compression device  Until discontinued         09/13/24 1601                    Discharge Planning   YUE:      Code Status: Full Code   Is the patient medically ready for discharge?:     Reason for patient still in hospital (select all that apply): Patient trending condition, Laboratory test, and Treatment  Discharge Plan A: Home              Belgica Gee NP  Department of Hospital Medicine   [facility]

## 2024-09-15 NOTE — ASSESSMENT & PLAN NOTE
Patient has a diagnosis of pneumonia. The cause of the pneumonia is suspected to be bacterial in etiology but organism is not known. The pneumonia is stable. The patient has the following signs/symptoms of pneumonia: persistent hypoxia , cough, and shortness of breath. The patient does have a current oxygen requirement and the patient does not have a home oxygen requirement. I have reviewed the pertinent imaging. The following cultures have been collected: blood cultures, Sputum culture The culture results are listed below.     Current antimicrobial regimen consists of the antibiotics listed below. Will monitor patient closely and continue current treatment plan unchanged.    Antibiotics (From admission, onward)      Start     Stop Route Frequency Ordered    09/13/24 1330  cefTRIAXone (Rocephin) 1 g in D5W 100 mL IVPB (MB+)         -- IV Every 24 hours (non-standard times) 09/13/24 1330    09/13/24 1330  azithromycin (ZITHROMAX) 500 mg in D5W 250 mL  IVPB (admixture device)         -- IV Every 24 hours (non-standard times) 09/13/24 1330            Microbiology Results (last 7 days)       Procedure Component Value Units Date/Time    Culture, Respiratory with Gram Stain [[id number]] Collected: 09/14/24 1045    Order Status: Completed Specimen: Respiratory from Sputum Updated: 09/15/24 0740     Respiratory Culture Normal respiratory noe     Gram Stain (Respiratory) <10 epithelial cells per low power field.     Gram Stain (Respiratory) Rare WBC's     Gram Stain (Respiratory) Few Gram positive rods    Blood culture x two cultures. Draw prior to antibiotics. [[id number]] Collected: 09/13/24 1410    Order Status: Completed Specimen: Blood from Peripheral, Forearm, Left Updated: 09/15/24 0613     Blood Culture, Routine No Growth to date      No Growth to date    Narrative:      Aerobic and anaerobic    Blood culture x two cultures. Draw prior to antibiotics. [[id number]] Collected: 09/13/24 1425    Order Status: Completed  Specimen: Blood from Peripheral, Forearm, Left Updated: 09/15/24 0613     Blood Culture, Routine No Growth to date      No Growth to date    Narrative:      Aerobic and anaerobic    Respiratory Infection Panel (PCR), Nasopharyngeal [[id number]] Collected: 09/14/24 1045    Order Status: Completed Specimen: Nasopharyngeal Swab Updated: 09/14/24 1204     Respiratory Infection Panel Source NP Swab     Adenovirus Not Detected     Coronavirus 229E, Common Cold Virus Not Detected     Coronavirus HKU1, Common Cold Virus Not Detected     Coronavirus NL63, Common Cold Virus Not Detected     Coronavirus OC43, Common Cold Virus Not Detected     Comment: The Coronavirus strains detected in this test cause the common cold.  These strains are not the COVID-19 (novel Coronavirus)strain   associated with the respiratory disease outbreak.          SARS-CoV2 (COVID-19) Qualitative PCR Not Detected     Human Metapneumovirus Not Detected     Human Rhinovirus/Enterovirus Not Detected     Influenza A (subtypes H1, H1-2009,H3) Not Detected     Influenza B Not Detected     Parainfluenza Virus 1 Not Detected     Parainfluenza Virus 2 Not Detected     Parainfluenza Virus 3 Not Detected     Parainfluenza Virus 4 Not Detected     Respiratory Syncytial Virus Not Detected     Bordetella Parapertussis (ND7130) Not Detected     Bordetella pertussis (ptxP) Not Detected     Chlamydia pneumoniae Not Detected     Mycoplasma pneumoniae Not Detected     Comment: Respiratory Infection Panel testing performed by Multiplex PCR.       Narrative:      Assay not valid for lower respiratory specimens, alternate  testing required.    Influenza A & B by Molecular [[id number]] Collected: 09/13/24 1131    Order Status: Completed Specimen: Nasopharyngeal Swab Updated: 09/13/24 1209     Influenza A, Molecular Negative     Influenza B, Molecular Negative     Flu A & B Source Nasal swab          Procal 1.18

## 2024-09-15 NOTE — PLAN OF CARE
Discussed plan of care with patient and this patient and spouse was able to verbalize understanding.  Patient remains free from injury.  Safety and comfort precautions maintained this shift.   Call light and personal belongings within reach, bed in low position with bed wheels locked.  No s/s of acute distress.   Purposeful rounding continued this shift.  Pain levels are controlled per MD order. IVF infusing.  Cardiac monitoring in place.  Diet orders continued.  Vital signs continued per order.  Chart and orders review completed.   Patient education about care completed.     Problem: Adult Inpatient Plan of Care  Goal: Plan of Care Review  Outcome: Progressing  Goal: Patient-Specific Goal (Individualized)  Outcome: Progressing  Goal: Absence of Hospital-Acquired Illness or Injury  Outcome: Progressing  Goal: Optimal Comfort and Wellbeing  Outcome: Progressing  Goal: Readiness for Transition of Care  Outcome: Progressing     Problem: Pneumonia  Goal: Fluid Balance  Outcome: Progressing  Goal: Resolution of Infection Signs and Symptoms  Outcome: Progressing  Goal: Effective Oxygenation and Ventilation  Outcome: Progressing  Problem: Fall Injury Risk  Goal: Absence of Fall and Fall-Related Injury  Outcome: Progressing

## 2024-09-15 NOTE — ASSESSMENT & PLAN NOTE
Patient's most recent potassium results are listed below.   Recent Labs     09/13/24  1237 09/14/24  0545 09/15/24  0539   K 3.2* 3.7 3.8       Replete potassium per protocol  Monitor potassium Daily  Patient's hypokalemia is stable

## 2024-09-15 NOTE — SUBJECTIVE & OBJECTIVE
Interval History: f/u with shortness of breath, pna, COPD. Awake, alert, sitting up in bed, wife at bedside. Patient c/o cough, will schedule Robitussin for now. Supplemental oxygen, wean as tolerated. Cont azithromycin/ceftriaxone IV. Cont scheduled nebs. Likely d/c tomorrow if stable.     Review of Systems  Objective:     Vital Signs (Most Recent):  Temp: 98.7 °F (37.1 °C) (09/15/24 1303)  Pulse: 95 (09/15/24 1303)  Resp: 18 (09/15/24 1303)  BP: 117/68 (09/15/24 1303)  SpO2: (!) 91 % (09/15/24 1303) Vital Signs (24h Range):  Temp:  [97.5 °F (36.4 °C)-99.8 °F (37.7 °C)] 98.7 °F (37.1 °C)  Pulse:  [] 95  Resp:  [16-21] 18  SpO2:  [90 %-96 %] 91 %  BP: (109-129)/(60-70) 117/68     Weight: 70.5 kg (155 lb 6.8 oz)  Body mass index is 22.95 kg/m².    Intake/Output Summary (Last 24 hours) at 9/15/2024 1346  Last data filed at 9/15/2024 0400  Gross per 24 hour   Intake 370 ml   Output --   Net 370 ml         Physical Exam  Vitals and nursing note reviewed.   Constitutional:       Appearance: Normal appearance.   HENT:      Head: Normocephalic.   Eyes:      Pupils: Pupils are equal, round, and reactive to light.   Cardiovascular:      Rate and Rhythm: Normal rate and regular rhythm.      Heart sounds: No murmur heard.  Pulmonary:      Effort: Pulmonary effort is normal.      Breath sounds: Decreased breath sounds present. No wheezing or rales.      Comments: 3 Lpm NC  Abdominal:      General: Bowel sounds are normal.      Palpations: Abdomen is soft.   Musculoskeletal:         General: Normal range of motion.   Skin:     General: Skin is warm and dry.   Neurological:      General: No focal deficit present.      Mental Status: He is alert and oriented to person, place, and time.   Psychiatric:         Mood and Affect: Mood normal.         Behavior: Behavior normal.             Significant Labs: All pertinent labs within the past 24 hours have been reviewed.  CBC:   Recent Labs   Lab 09/14/24  0545 09/15/24  0539   WBC  14.40* 12.17   HGB 10.0* 9.8*   HCT 29.4* 29.0*    480*     CMP:   Recent Labs   Lab 09/14/24  0545 09/15/24  0539    136   K 3.7 3.8    101   CO2 20* 23    105   BUN 17 15   CREATININE 0.8 0.7   CALCIUM 8.5* 8.0*   ANIONGAP 12 12       Significant Imaging: I have reviewed all pertinent imaging results/findings within the past 24 hours.

## 2024-09-15 NOTE — ASSESSMENT & PLAN NOTE
Chronic, uncontrolled. Latest blood pressure and vitals reviewed-     Temp:  [97.5 °F (36.4 °C)-99.8 °F (37.7 °C)]   Pulse:  []   Resp:  [16-21]   BP: (109-129)/(60-70)   SpO2:  [90 %-96 %] .   Home meds for hypertension were reviewed and noted below.   Hypertension Medications               valsartan (DIOVAN) 80 MG tablet Take 1 tablet (80 mg total) by mouth once daily.          While in the hospital, will manage blood pressure as follows; hold home anti-hypertensives for now with hypotension    Will utilize p.r.n. blood pressure medication only if patient's blood pressure greater than 160/100 and he develops symptoms such as worsening chest pain or shortness of breath.

## 2024-09-15 NOTE — PLAN OF CARE
Discussed poc with pt, pt verbalized understanding    Purposeful rounding every 2hours    VS wnl  Cardiac monitoring in use, pt is NSR, tele monitor #[id number]  Fall precautions in place, remains injury free  Pt denies c/o pain and nausea at this time  Pain and nausea under control with PRN meds    Accurate I&Os  Abx given as prescribed  Bed locked at lowest position  Call light within reach    Chart check complete  Will cont with POC

## 2024-09-15 NOTE — ASSESSMENT & PLAN NOTE
Anemia is likely due to Iron deficiency. Most recent hemoglobin and hematocrit are listed below.  Recent Labs     09/13/24  1237 09/14/24  0545 09/15/24  0539   HGB 12.0* 10.0* 9.8*   HCT 35.1* 29.4* 29.0*       Monitor serial CBC: Daily  Transfuse PRBC if patient becomes hemodynamically unstable, symptomatic or H/H drops below 7/21.  Patient has not received any PRBC transfusions to date  anemia is currently stable

## 2024-09-15 NOTE — ASSESSMENT & PLAN NOTE
Patient's COPD is with exacerbation noted by continued dyspnea, use of accessory muscles for breathing, and worsening of baseline hypoxia currently.  Patient is currently off COPD Pathway. Continue scheduled inhalers Steroids, Antibiotics, and Supplemental oxygen and monitor respiratory status closely.   Follows Dr. Tony VICTORIA, started on trelegy last week  Discussed endotrachial valve placement, but has to be tobacco free for at least 3 months before being considered  Patient would like trelegy stopped and start another inhaler on discharge, possibly Symbicort or Breo?   Follow up with pulmonology on discharge  Will need a home O2 evaluation before discharge and oxygen if qualifies

## 2024-09-16 DIAGNOSIS — J43.2 CENTRILOBULAR EMPHYSEMA: Primary | ICD-10-CM

## 2024-09-16 LAB
ANION GAP SERPL CALC-SCNC: 11 MMOL/L (ref 8–16)
ANISOCYTOSIS BLD QL SMEAR: SLIGHT
BACTERIA SPEC AEROBE CULT: NORMAL
BACTERIA SPEC AEROBE CULT: NORMAL
BASOPHILS # BLD AUTO: ABNORMAL K/UL (ref 0–0.2)
BASOPHILS NFR BLD: 0 % (ref 0–1.9)
BUN SERPL-MCNC: 15 MG/DL (ref 8–23)
CALCIUM SERPL-MCNC: 8.5 MG/DL (ref 8.7–10.5)
CHLORIDE SERPL-SCNC: 103 MMOL/L (ref 95–110)
CO2 SERPL-SCNC: 24 MMOL/L (ref 23–29)
CREAT SERPL-MCNC: 0.7 MG/DL (ref 0.5–1.4)
DACRYOCYTES BLD QL SMEAR: ABNORMAL
DIFFERENTIAL METHOD BLD: ABNORMAL
EOSINOPHIL # BLD AUTO: ABNORMAL K/UL (ref 0–0.5)
EOSINOPHIL NFR BLD: 0 % (ref 0–8)
ERYTHROCYTE [DISTWIDTH] IN BLOOD BY AUTOMATED COUNT: 13.4 % (ref 11.5–14.5)
EST. GFR  (NO RACE VARIABLE): >60 ML/MIN/1.73 M^2
GLUCOSE SERPL-MCNC: 107 MG/DL (ref 70–110)
GRAM STN SPEC: NORMAL
HCT VFR BLD AUTO: 29 % (ref 40–54)
HGB BLD-MCNC: 9.8 G/DL (ref 14–18)
IMM GRANULOCYTES # BLD AUTO: ABNORMAL K/UL (ref 0–0.04)
IMM GRANULOCYTES NFR BLD AUTO: ABNORMAL % (ref 0–0.5)
LYMPHOCYTES # BLD AUTO: ABNORMAL K/UL (ref 1–4.8)
LYMPHOCYTES NFR BLD: 12 % (ref 18–48)
MCH RBC QN AUTO: 32.2 PG (ref 27–31)
MCHC RBC AUTO-ENTMCNC: 33.8 G/DL (ref 32–36)
MCV RBC AUTO: 95 FL (ref 82–98)
MONOCYTES # BLD AUTO: ABNORMAL K/UL (ref 0.3–1)
MONOCYTES NFR BLD: 3 % (ref 4–15)
NEUTROPHILS NFR BLD: 85 % (ref 38–73)
NRBC BLD-RTO: 0 /100 WBC
PLATELET # BLD AUTO: 572 K/UL (ref 150–450)
PLATELET BLD QL SMEAR: ABNORMAL
PMV BLD AUTO: 9.1 FL (ref 9.2–12.9)
POTASSIUM SERPL-SCNC: 3.3 MMOL/L (ref 3.5–5.1)
RBC # BLD AUTO: 3.04 M/UL (ref 4.6–6.2)
SCHISTOCYTES BLD QL SMEAR: PRESENT
SODIUM SERPL-SCNC: 138 MMOL/L (ref 136–145)
WBC # BLD AUTO: 14.37 K/UL (ref 3.9–12.7)

## 2024-09-16 PROCEDURE — 27000221 HC OXYGEN, UP TO 24 HOURS

## 2024-09-16 PROCEDURE — 25000003 PHARM REV CODE 250: Performed by: NURSE PRACTITIONER

## 2024-09-16 PROCEDURE — 94799 UNLISTED PULMONARY SVC/PX: CPT

## 2024-09-16 PROCEDURE — 25000003 PHARM REV CODE 250: Performed by: INTERNAL MEDICINE

## 2024-09-16 PROCEDURE — 99900035 HC TECH TIME PER 15 MIN (STAT)

## 2024-09-16 PROCEDURE — 94761 N-INVAS EAR/PLS OXIMETRY MLT: CPT

## 2024-09-16 PROCEDURE — 36415 COLL VENOUS BLD VENIPUNCTURE: CPT | Performed by: NURSE PRACTITIONER

## 2024-09-16 PROCEDURE — 85007 BL SMEAR W/DIFF WBC COUNT: CPT | Performed by: NURSE PRACTITIONER

## 2024-09-16 PROCEDURE — 94640 AIRWAY INHALATION TREATMENT: CPT

## 2024-09-16 PROCEDURE — 63600175 PHARM REV CODE 636 W HCPCS: Performed by: NURSE PRACTITIONER

## 2024-09-16 PROCEDURE — 85027 COMPLETE CBC AUTOMATED: CPT | Performed by: NURSE PRACTITIONER

## 2024-09-16 PROCEDURE — 25000242 PHARM REV CODE 250 ALT 637 W/ HCPCS: Performed by: NURSE PRACTITIONER

## 2024-09-16 PROCEDURE — 80048 BASIC METABOLIC PNL TOTAL CA: CPT | Performed by: NURSE PRACTITIONER

## 2024-09-16 PROCEDURE — 11000001 HC ACUTE MED/SURG PRIVATE ROOM

## 2024-09-16 RX ORDER — GUAIFENESIN 100 MG/5ML
200 SOLUTION ORAL EVERY 4 HOURS PRN
Status: DISCONTINUED | OUTPATIENT
Start: 2024-09-16 | End: 2024-09-17 | Stop reason: HOSPADM

## 2024-09-16 RX ADMIN — GUAIFENESIN 200 MG: 200 SOLUTION ORAL at 05:09

## 2024-09-16 RX ADMIN — CEFTRIAXONE 1 G: 1 INJECTION, POWDER, FOR SOLUTION INTRAMUSCULAR; INTRAVENOUS at 01:09

## 2024-09-16 RX ADMIN — IPRATROPIUM BROMIDE AND ALBUTEROL SULFATE 3 ML: 2.5; .5 SOLUTION RESPIRATORY (INHALATION) at 08:09

## 2024-09-16 RX ADMIN — BUDESONIDE 0.5 MG: 0.5 SUSPENSION RESPIRATORY (INHALATION) at 08:09

## 2024-09-16 RX ADMIN — ENOXAPARIN SODIUM 40 MG: 40 INJECTION SUBCUTANEOUS at 04:09

## 2024-09-16 RX ADMIN — GUAIFENESIN 200 MG: 200 SOLUTION ORAL at 01:09

## 2024-09-16 RX ADMIN — AZITHROMYCIN MONOHYDRATE 500 MG: 500 INJECTION, POWDER, LYOPHILIZED, FOR SOLUTION INTRAVENOUS at 02:09

## 2024-09-16 RX ADMIN — GUAIFENESIN 200 MG: 200 SOLUTION ORAL at 08:09

## 2024-09-16 RX ADMIN — PREDNISONE 40 MG: 20 TABLET ORAL at 09:09

## 2024-09-16 RX ADMIN — GUAIFENESIN 200 MG: 200 SOLUTION ORAL at 09:09

## 2024-09-16 RX ADMIN — IPRATROPIUM BROMIDE AND ALBUTEROL SULFATE 3 ML: 2.5; .5 SOLUTION RESPIRATORY (INHALATION) at 02:09

## 2024-09-16 NOTE — ASSESSMENT & PLAN NOTE
Anemia is likely due to  chronic disease. Most recent hemoglobin and hematocrit are listed below.  Recent Labs     09/14/24  0545 09/15/24  0539 09/16/24  0603   HGB 10.0* 9.8* 9.8*   HCT 29.4* 29.0* 29.0*       Monitor serial CBC: Daily  Transfuse PRBC if patient becomes hemodynamically unstable, symptomatic or H/H drops below 7/21.  Patient has not received any PRBC transfusions to date  anemia is currently stable

## 2024-09-16 NOTE — ASSESSMENT & PLAN NOTE
Patient's most recent potassium results are listed below.   Recent Labs     09/14/24  0545 09/15/24  0539 09/16/24  0603   K 3.7 3.8 3.3*       Replete potassium per protocol  Monitor potassium Daily  Patient's hypokalemia is  being monitored

## 2024-09-16 NOTE — PLAN OF CARE
09/16/24 1427   Rounds   Attendance Provider;;Charge nurse;Physical therapist   Discharge Plan A Home with family   Why the patient remains in the hospital Requires continued medical care   Transition of Care Barriers None       Assessment/Plan:      * Pneumonia of both lower lobes due to infectious organism  Patient has a diagnosis of pneumonia. The cause of the pneumonia is suspected to be bacterial in etiology but organism is not known. The pneumonia is stable. The patient has the following signs/symptoms of pneumonia: persistent hypoxia , cough, and shortness of breath. The patient does have a current oxygen requirement and the patient does not have a home oxygen requirement. I have reviewed the pertinent imaging. The following cultures have been collected: blood cultures, Sputum culture The culture results are listed below.      Current antimicrobial regimen consists of the antibiotics listed below. Will monitor patient closely and continue current treatment plan unchanged.     Antibiotics (From admission, onward)        Start     Stop Route Frequency Ordered     09/13/24 1330   cefTRIAXone (Rocephin) 1 g in D5W 100 mL IVPB (MB+)         -- IV Every 24 hours (non-standard times) 09/13/24 1330     09/13/24 1330   azithromycin (ZITHROMAX) 500 mg in D5W 250 mL  IVPB (admixture device)         -- IV Every 24 hours (non-standard times) 09/13/24 1330                Microbiology Results (last 7 days)         Procedure Component Value Units Date/Time     Blood culture x two cultures. Draw prior to antibiotics. [[id number]] Collected: 09/13/24 1410     Order Status: Completed Specimen: Blood from Peripheral, Forearm, Left Updated: 09/16/24 0612       Blood Culture, Routine No Growth to date         No Growth to date         No Growth to date     Narrative:       Aerobic and anaerobic     Blood culture x two cultures. Draw prior to antibiotics. [[id number]] Collected: 09/13/24 1425     Order Status:  Completed Specimen: Blood from Peripheral, Forearm, Left Updated: 09/16/24 0612       Blood Culture, Routine No Growth to date         No Growth to date         No Growth to date     Narrative:       Aerobic and anaerobic     Culture, Respiratory with Gram Stain [[id number]] Collected: 09/14/24 1045     Order Status: Completed Specimen: Respiratory from Sputum Updated: 09/15/24 0740       Respiratory Culture Normal respiratory noe       Gram Stain (Respiratory) <10 epithelial cells per low power field.       Gram Stain (Respiratory) Rare WBC's       Gram Stain (Respiratory) Few Gram positive rods     Respiratory Infection Panel (PCR), Nasopharyngeal [[id number]] Collected: 09/14/24 1045     Order Status: Completed Specimen: Nasopharyngeal Swab Updated: 09/14/24 1204       Respiratory Infection Panel Source NP Swab       Adenovirus Not Detected       Coronavirus 229E, Common Cold Virus Not Detected       Coronavirus HKU1, Common Cold Virus Not Detected       Coronavirus NL63, Common Cold Virus Not Detected       Coronavirus OC43, Common Cold Virus Not Detected       Comment: The Coronavirus strains detected in this test cause the common cold.  These strains are not the COVID-19 (novel Coronavirus)strain   associated with the respiratory disease outbreak.             SARS-CoV2 (COVID-19) Qualitative PCR Not Detected       Human Metapneumovirus Not Detected       Human Rhinovirus/Enterovirus Not Detected       Influenza A (subtypes H1, H1-2009,H3) Not Detected       Influenza B Not Detected       Parainfluenza Virus 1 Not Detected       Parainfluenza Virus 2 Not Detected       Parainfluenza Virus 3 Not Detected       Parainfluenza Virus 4 Not Detected       Respiratory Syncytial Virus Not Detected       Bordetella Parapertussis (HV7741) Not Detected       Bordetella pertussis (ptxP) Not Detected       Chlamydia pneumoniae Not Detected       Mycoplasma pneumoniae Not Detected       Comment: Respiratory Infection Panel  testing performed by Multiplex PCR.        Narrative:       Assay not valid for lower respiratory specimens, alternate  testing required.     Influenza A & B by Molecular [[id number]] Collected: 09/13/24 1131     Order Status: Completed Specimen: Nasopharyngeal Swab Updated: 09/13/24 1209       Influenza A, Molecular Negative       Influenza B, Molecular Negative       Flu A & B Source Nasal swab             Home oxygen evaluation in am  Transition to oral antibiotics upon discharge     Centrilobular emphysema  Patient's COPD is with exacerbation noted by continued dyspnea, use of accessory muscles for breathing, and worsening of baseline hypoxia currently.  Patient is currently off COPD Pathway. Continue scheduled inhalers Steroids, Antibiotics, and Supplemental oxygen and monitor respiratory status closely.   Follows Dr. Tony VICTORIA, started on trelegy last week  Discussed endotrachial valve placement, but has to be tobacco free for at least 3 months before being considered  Follow up with pulmonology on discharge  Will need a home O2 evaluation before discharge and oxygen if qualifies     Tobacco dependency  Dangers of cigarette smoking were reviewed with patient in detail. Patient was Counseled for 3-10 minutes. Nicotine replacement options were discussed. Nicotine replacement was discussed- not prescribed per patient's request  Patient reports he quit smoking ~ 1 month ago     Hypokalemia  Patient's most recent potassium results are listed below.         Recent Labs     09/14/24  0545 09/15/24  0539 09/16/24  0603   K 3.7 3.8 3.3*         Replete potassium per protocol  Monitor potassium Daily  Patient's hypokalemia is  being monitored     Other specified anemias  Anemia is likely due to  chronic disease. Most recent hemoglobin and hematocrit are listed below.        Recent Labs     09/14/24  0545 09/15/24  0539 09/16/24  0603   HGB 10.0* 9.8* 9.8*   HCT 29.4* 29.0* 29.0*         Monitor serial CBC:  Daily  Transfuse PRBC if patient becomes hemodynamically unstable, symptomatic or H/H drops below 7/21.  Patient has not received any PRBC transfusions to date  anemia is currently stable     Primary hypertension  Chronic, . Latest blood pressure and vitals reviewed-      Temp:  [97.8 °F (36.6 °C)-98.7 °F (37.1 °C)]   Pulse:  []   Resp:  [16-22]   BP: (103-118)/(59-68)   SpO2:  [90 %-95 %] .   Home meds for hypertension were reviewed and noted below.   Hypertension Medications                    valsartan (DIOVAN) 80 MG tablet Take 1 tablet (80 mg total) by mouth once daily.             While in the hospital, will manage blood pressure as follows; hold home anti-hypertensives for now   Resume as BP allows        VTE Risk Mitigation (From admission, onward)              Ordered       enoxaparin injection 40 mg  Daily         09/13/24 1601       IP VTE HIGH RISK PATIENT  Once         09/13/24 1601       Place sequential compression device  Until discontinued         09/13/24 1601                          Discharge Planning   YUE:      Code Status: Full Code   Is the patient medically ready for discharge?:     Reason for patient still in hospital (select all that apply): Patient trending condition and Treatment  Discharge Plan A: Home

## 2024-09-16 NOTE — ASSESSMENT & PLAN NOTE
Patient's COPD is with exacerbation noted by continued dyspnea, use of accessory muscles for breathing, and worsening of baseline hypoxia currently.  Patient is currently off COPD Pathway. Continue scheduled inhalers Steroids, Antibiotics, and Supplemental oxygen and monitor respiratory status closely.   Follows Dr. Tony VICTORIA, started on trelegy last week  Discussed endotrachial valve placement, but has to be tobacco free for at least 3 months before being considered  Follow up with pulmonology on discharge  Will need a home O2 evaluation before discharge and oxygen if qualifies

## 2024-09-16 NOTE — PROGRESS NOTES
[facility] Medicine  Progress Note    Patient Name: Aurelio Daniels  MRN: [id number]  Patient Class: IP- Inpatient   Admission Date: 9/13/2024  Length of Stay: 3 days  Attending Physician: Myra Contreras MD  Primary Care Provider: Shlomo Villatoro MD        Subjective:     Principal Problem:Pneumonia of both lower lobes due to infectious organism        HPI:  68 year-old-male with PMH of tobacco use, prostate cancer, COPD, and HTN presented to the ER 9/13/24 with c/o worsening shortness of breath with associated cough. He reports going to urgent care last Friday and was told he had a viral infection and flu/COVID was negative. He saw pulmonology on Thursday of last week and diagnosed with COPD and started on Trelegy inhaler. He reports taking it one day and began to feel worse. He reached out to pulmonology and was told to hold the Trelegy for a few days and see if his symptoms improved. He reports it was helping with his shortness of breath. He quit smoking for good 1 month ago. Patient denies CP, SOB, nausea, vomiting, diarrhea, chills, diaphoresis, or fever. He reports decreased oral intake for about 1 week.     In ER, labs revealed WBC 17.84, granulocyte dominate, H&H 12/35.1, K 3.2, , Alp 299, albumin 2.6, lactic acid normal, procal 1.18. He was hypoxic in the ER, ABGs revealed pH 7.437, PO2 54, PCO2 31.2. He was placed on 2 LPM to keep sats > 88%. CXR revealed mulitfocal interstitial pneumonia. He was started on Ceftriaxone and azithromycin IV. [facility] was consulted to admit patient for pneumonia.     Overview/Hospital Course:  68 year-old male admitted with pneumonia. He was started on scheduled duonebs every 6 hours and IV Rocephin daily. Patient with minimal improvement on day 2, still with a lot of coughing. Scheduled the Robitussin every 4 hours for now to see if improved.   Ordered 1 time dose of Lasix,  on admission. Started budesonide inhalers along with duo-neb  and added prednisone 40 mg x 5 days. He likely has COPD exacerbation as well as pneumonia. Continue supplemental oxygen.   Cough and SOB better, still requiring supplemental oxygen.   Would like inhaler changed on discharge from [facility], they feel it made him sicker. Will try Symbicort verses Breo on discharge until he can follow up with pulmonology OP.  Continue supplemental oxygen. Discussed home oxygen evaluation in the morning as oxygen saturation marginal on 2L NC. 89-93% this morning during breathing treatment.    Interval History: f/u PNA not on home oxygen. Currently on 2L. Discussed home oxygen evaluation in am. Discussed/encouraged use of IS.    Review of Systems  Objective:     Vital Signs (Most Recent):  Temp: 97.8 °F (36.6 °C) (09/16/24 0428)  Pulse: 87 (09/16/24 0935)  Resp: 16 (09/16/24 0802)  BP: 117/68 (09/16/24 0428)  SpO2: (!) 93 % (09/16/24 0802) Vital Signs (24h Range):  Temp:  [97.8 °F (36.6 °C)-98.7 °F (37.1 °C)] 97.8 °F (36.6 °C)  Pulse:  [] 87  Resp:  [16-22] 16  SpO2:  [90 %-95 %] 93 %  BP: (103-118)/(59-68) 117/68     Weight: 70.5 kg (155 lb 6.8 oz)  Body mass index is 22.95 kg/m².  No intake or output data in the 24 hours ending 09/16/24 0942      Physical Exam  HENT:      Head: Normocephalic and atraumatic.   Cardiovascular:      Rate and Rhythm: Normal rate and regular rhythm.      Heart sounds: No murmur heard.  Pulmonary:      Effort: Pulmonary effort is normal. No respiratory distress.      Breath sounds: Decreased breath sounds present. No wheezing.   Abdominal:      General: Bowel sounds are normal. There is no distension.      Palpations: Abdomen is soft.      Tenderness: There is no abdominal tenderness.   Musculoskeletal:         General: No swelling.   Skin:     General: Skin is warm and dry.   Neurological:      Mental Status: He is alert and oriented to person, place, and time. Mental status is at baseline.             Significant Labs: All pertinent labs within the  past 24 hours have been reviewed.  Recent Lab Results         09/16/24  0603        Anion Gap 11       Aniso Slight       Baso # CANCELED  Comment: Result canceled by the ancillary.       Basophil % 0.0       BUN 15       Calcium 8.5       Chloride 103       CO2 24       Creatinine 0.7       Differential Method Manual       eGFR >60       Eos # CANCELED  Comment: Result canceled by the ancillary.       Eos % 0.0       Glucose 107       Gran % 85.0       Hematocrit 29.0       Hemoglobin 9.8       Immature Grans (Abs) CANCELED  Comment: Mild elevation in immature granulocytes is non specific and   can be seen in a variety of conditions including stress response,   acute inflammation, trauma and pregnancy. Correlation with other   laboratory and clinical findings is essential.    Result canceled by the ancillary.         Immature Granulocytes CANCELED  Comment: Result canceled by the ancillary.       Lymph # CANCELED  Comment: Result canceled by the ancillary.       Lymph % 12.0       MCH 32.2       MCHC 33.8       MCV 95       Mono # CANCELED  Comment: Result canceled by the ancillary.       Mono % 3.0       MPV 9.1       nRBC 0       Platelet Estimate Increased  Comment: Reviewed by Technologist.       Platelet Count 572       Potassium 3.3       RBC 3.04       RDW 13.4       Schistocytes Present       Sodium 138       Teardrop Cells Occasional       WBC 14.37               Significant Imaging: I have reviewed all pertinent imaging results/findings within the past 24 hours.    Assessment/Plan:      * Pneumonia of both lower lobes due to infectious organism  Patient has a diagnosis of pneumonia. The cause of the pneumonia is suspected to be bacterial in etiology but organism is not known. The pneumonia is stable. The patient has the following signs/symptoms of pneumonia: persistent hypoxia , cough, and shortness of breath. The patient does have a current oxygen requirement and the patient does not have a home oxygen  requirement. I have reviewed the pertinent imaging. The following cultures have been collected: blood cultures, Sputum culture The culture results are listed below.     Current antimicrobial regimen consists of the antibiotics listed below. Will monitor patient closely and continue current treatment plan unchanged.    Antibiotics (From admission, onward)      Start     Stop Route Frequency Ordered    09/13/24 1330  cefTRIAXone (Rocephin) 1 g in D5W 100 mL IVPB (MB+)         -- IV Every 24 hours (non-standard times) 09/13/24 1330    09/13/24 1330  azithromycin (ZITHROMAX) 500 mg in D5W 250 mL  IVPB (admixture device)         -- IV Every 24 hours (non-standard times) 09/13/24 1330            Microbiology Results (last 7 days)       Procedure Component Value Units Date/Time    Blood culture x two cultures. Draw prior to antibiotics. [[id number]] Collected: 09/13/24 1410    Order Status: Completed Specimen: Blood from Peripheral, Forearm, Left Updated: 09/16/24 0612     Blood Culture, Routine No Growth to date      No Growth to date      No Growth to date    Narrative:      Aerobic and anaerobic    Blood culture x two cultures. Draw prior to antibiotics. [[id number]] Collected: 09/13/24 1425    Order Status: Completed Specimen: Blood from Peripheral, Forearm, Left Updated: 09/16/24 0612     Blood Culture, Routine No Growth to date      No Growth to date      No Growth to date    Narrative:      Aerobic and anaerobic    Culture, Respiratory with Gram Stain [[id number]] Collected: 09/14/24 1045    Order Status: Completed Specimen: Respiratory from Sputum Updated: 09/15/24 0740     Respiratory Culture Normal respiratory noe     Gram Stain (Respiratory) <10 epithelial cells per low power field.     Gram Stain (Respiratory) Rare WBC's     Gram Stain (Respiratory) Few Gram positive rods    Respiratory Infection Panel (PCR), Nasopharyngeal [[id number]] Collected: 09/14/24 1045    Order Status: Completed Specimen:  Nasopharyngeal Swab Updated: 09/14/24 1204     Respiratory Infection Panel Source NP Swab     Adenovirus Not Detected     Coronavirus 229E, Common Cold Virus Not Detected     Coronavirus HKU1, Common Cold Virus Not Detected     Coronavirus NL63, Common Cold Virus Not Detected     Coronavirus OC43, Common Cold Virus Not Detected     Comment: The Coronavirus strains detected in this test cause the common cold.  These strains are not the COVID-19 (novel Coronavirus)strain   associated with the respiratory disease outbreak.          SARS-CoV2 (COVID-19) Qualitative PCR Not Detected     Human Metapneumovirus Not Detected     Human Rhinovirus/Enterovirus Not Detected     Influenza A (subtypes H1, H1-2009,H3) Not Detected     Influenza B Not Detected     Parainfluenza Virus 1 Not Detected     Parainfluenza Virus 2 Not Detected     Parainfluenza Virus 3 Not Detected     Parainfluenza Virus 4 Not Detected     Respiratory Syncytial Virus Not Detected     Bordetella Parapertussis (BF1843) Not Detected     Bordetella pertussis (ptxP) Not Detected     Chlamydia pneumoniae Not Detected     Mycoplasma pneumoniae Not Detected     Comment: Respiratory Infection Panel testing performed by Multiplex PCR.       Narrative:      Assay not valid for lower respiratory specimens, alternate  testing required.    Influenza A & B by Molecular [[id number]] Collected: 09/13/24 1131    Order Status: Completed Specimen: Nasopharyngeal Swab Updated: 09/13/24 1209     Influenza A, Molecular Negative     Influenza B, Molecular Negative     Flu A & B Source Nasal swab          Home oxygen evaluation in am  Transition to oral antibiotics upon discharge    Centrilobular emphysema  Patient's COPD is with exacerbation noted by continued dyspnea, use of accessory muscles for breathing, and worsening of baseline hypoxia currently.  Patient is currently off COPD Pathway. Continue scheduled inhalers Steroids, Antibiotics, and Supplemental oxygen and monitor  respiratory status closely.   Follows Dr. Tony VICTORIA, started on trelegy last week  Discussed endotrachial valve placement, but has to be tobacco free for at least 3 months before being considered  Follow up with pulmonology on discharge  Will need a home O2 evaluation before discharge and oxygen if qualifies    Tobacco dependency  Dangers of cigarette smoking were reviewed with patient in detail. Patient was Counseled for 3-10 minutes. Nicotine replacement options were discussed. Nicotine replacement was discussed- not prescribed per patient's request  Patient reports he quit smoking ~ 1 month ago    Hypokalemia  Patient's most recent potassium results are listed below.   Recent Labs     09/14/24  0545 09/15/24  0539 09/16/24  0603   K 3.7 3.8 3.3*       Replete potassium per protocol  Monitor potassium Daily  Patient's hypokalemia is  being monitored    Other specified anemias  Anemia is likely due to  chronic disease. Most recent hemoglobin and hematocrit are listed below.  Recent Labs     09/14/24  0545 09/15/24  0539 09/16/24  0603   HGB 10.0* 9.8* 9.8*   HCT 29.4* 29.0* 29.0*       Monitor serial CBC: Daily  Transfuse PRBC if patient becomes hemodynamically unstable, symptomatic or H/H drops below 7/21.  Patient has not received any PRBC transfusions to date  anemia is currently stable    Primary hypertension  Chronic, . Latest blood pressure and vitals reviewed-     Temp:  [97.8 °F (36.6 °C)-98.7 °F (37.1 °C)]   Pulse:  []   Resp:  [16-22]   BP: (103-118)/(59-68)   SpO2:  [90 %-95 %] .   Home meds for hypertension were reviewed and noted below.   Hypertension Medications               valsartan (DIOVAN) 80 MG tablet Take 1 tablet (80 mg total) by mouth once daily.          While in the hospital, will manage blood pressure as follows; hold home anti-hypertensives for now   Resume as BP allows      VTE Risk Mitigation (From admission, onward)           Ordered     enoxaparin injection 40 mg  Daily          09/13/24 1601     IP VTE HIGH RISK PATIENT  Once         09/13/24 1601     Place sequential compression device  Until discontinued         09/13/24 1601                    Discharge Planning   YUE:      Code Status: Full Code   Is the patient medically ready for discharge?:     Reason for patient still in hospital (select all that apply): Patient trending condition and Treatment  Discharge Plan A: Home                  Myra Contreras MD  Department of Hospital Medicine   '[facility]

## 2024-09-16 NOTE — PLAN OF CARE
Discussed plan of care with patient and this patient and spouse was able to verbalized understanding.  Patient remains free from injury.  Safety and comfort precautions maintained this shift.   Call light and personal belongings within reach, bed in low position with bed wheels locked.  No s/s of acute distress.   Purposeful rounding continued this shift.  Pain levels are controlled per MD order. IVF infusing.  Cardiac monitoring in place.  Diet orders continued.  Vital signs continued per order.  Patient mobility status remains independent  Chart and orders review completed.   Patient education about care completed.     Problem: Adult Inpatient Plan of Care  Goal: Plan of Care Review  Outcome: Progressing  Goal: Patient-Specific Goal (Individualized)  Outcome: Progressing  Goal: Absence of Hospital-Acquired Illness or Injury  Outcome: Progressing  Goal: Optimal Comfort and Wellbeing  Outcome: Progressing,  Goal: Readiness for Transition of Care  Outcome: Progressing     Problem: Pneumonia  Goal: Fluid Balance  Outcome: Progressing  Goal: Resolution of Infection Signs and Symptoms  Outcome: Progressing  Goal: Effective Oxygenation and Ventilation  Outcome: Progressing     Problem: Fall Injury Risk  Goal: Absence of Fall and Fall-Related Injury  Outcome: Progressing      No

## 2024-09-16 NOTE — PLAN OF CARE
Discussed poc with pt, pt verbalized understanding    Purposeful rounding every 2hours    VS wnl  Cardiac monitoring in use, pt is NSR, tele monitor # [id number]  Fall precautions in place, remains injury free  Pt c/o frequent, productive cough; reports moderate relief from PRN guaifenesin    Accurate I&Os  Abx given as prescribed  Bed locked at lowest position  Call light within reach    Chart check complete  Will cont with POC

## 2024-09-16 NOTE — ASSESSMENT & PLAN NOTE
Patient has a diagnosis of pneumonia. The cause of the pneumonia is suspected to be bacterial in etiology but organism is not known. The pneumonia is stable. The patient has the following signs/symptoms of pneumonia: persistent hypoxia , cough, and shortness of breath. The patient does have a current oxygen requirement and the patient does not have a home oxygen requirement. I have reviewed the pertinent imaging. The following cultures have been collected: blood cultures, Sputum culture The culture results are listed below.     Current antimicrobial regimen consists of the antibiotics listed below. Will monitor patient closely and continue current treatment plan unchanged.    Antibiotics (From admission, onward)      Start     Stop Route Frequency Ordered    09/13/24 1330  cefTRIAXone (Rocephin) 1 g in D5W 100 mL IVPB (MB+)         -- IV Every 24 hours (non-standard times) 09/13/24 1330    09/13/24 1330  azithromycin (ZITHROMAX) 500 mg in D5W 250 mL  IVPB (admixture device)         -- IV Every 24 hours (non-standard times) 09/13/24 1330            Microbiology Results (last 7 days)       Procedure Component Value Units Date/Time    Blood culture x two cultures. Draw prior to antibiotics. [[id number]] Collected: 09/13/24 1410    Order Status: Completed Specimen: Blood from Peripheral, Forearm, Left Updated: 09/16/24 0612     Blood Culture, Routine No Growth to date      No Growth to date      No Growth to date    Narrative:      Aerobic and anaerobic    Blood culture x two cultures. Draw prior to antibiotics. [[id number]] Collected: 09/13/24 1425    Order Status: Completed Specimen: Blood from Peripheral, Forearm, Left Updated: 09/16/24 0612     Blood Culture, Routine No Growth to date      No Growth to date      No Growth to date    Narrative:      Aerobic and anaerobic    Culture, Respiratory with Gram Stain [[id number]] Collected: 09/14/24 1045    Order Status: Completed Specimen: Respiratory from Sputum Updated:  09/15/24 0740     Respiratory Culture Normal respiratory noe     Gram Stain (Respiratory) <10 epithelial cells per low power field.     Gram Stain (Respiratory) Rare WBC's     Gram Stain (Respiratory) Few Gram positive rods    Respiratory Infection Panel (PCR), Nasopharyngeal [[id number]] Collected: 09/14/24 1045    Order Status: Completed Specimen: Nasopharyngeal Swab Updated: 09/14/24 1204     Respiratory Infection Panel Source NP Swab     Adenovirus Not Detected     Coronavirus 229E, Common Cold Virus Not Detected     Coronavirus HKU1, Common Cold Virus Not Detected     Coronavirus NL63, Common Cold Virus Not Detected     Coronavirus OC43, Common Cold Virus Not Detected     Comment: The Coronavirus strains detected in this test cause the common cold.  These strains are not the COVID-19 (novel Coronavirus)strain   associated with the respiratory disease outbreak.          SARS-CoV2 (COVID-19) Qualitative PCR Not Detected     Human Metapneumovirus Not Detected     Human Rhinovirus/Enterovirus Not Detected     Influenza A (subtypes H1, H1-2009,H3) Not Detected     Influenza B Not Detected     Parainfluenza Virus 1 Not Detected     Parainfluenza Virus 2 Not Detected     Parainfluenza Virus 3 Not Detected     Parainfluenza Virus 4 Not Detected     Respiratory Syncytial Virus Not Detected     Bordetella Parapertussis (DC3407) Not Detected     Bordetella pertussis (ptxP) Not Detected     Chlamydia pneumoniae Not Detected     Mycoplasma pneumoniae Not Detected     Comment: Respiratory Infection Panel testing performed by Multiplex PCR.       Narrative:      Assay not valid for lower respiratory specimens, alternate  testing required.    Influenza A & B by Molecular [[id number]] Collected: 09/13/24 1131    Order Status: Completed Specimen: Nasopharyngeal Swab Updated: 09/13/24 1209     Influenza A, Molecular Negative     Influenza B, Molecular Negative     Flu A & B Source Nasal swab          Home oxygen evaluation in  am  Transition to oral antibiotics upon discharge

## 2024-09-16 NOTE — ASSESSMENT & PLAN NOTE
Chronic, . Latest blood pressure and vitals reviewed-     Temp:  [97.8 °F (36.6 °C)-98.7 °F (37.1 °C)]   Pulse:  []   Resp:  [16-22]   BP: (103-118)/(59-68)   SpO2:  [90 %-95 %] .   Home meds for hypertension were reviewed and noted below.   Hypertension Medications               valsartan (DIOVAN) 80 MG tablet Take 1 tablet (80 mg total) by mouth once daily.          While in the hospital, will manage blood pressure as follows; hold home anti-hypertensives for now   Resume as BP allows

## 2024-09-16 NOTE — SUBJECTIVE & OBJECTIVE
Interval History: f/u PNA not on home oxygen. Currently on 2L. Discussed home oxygen evaluation in am. Discussed/encouraged use of IS.    Review of Systems  Objective:     Vital Signs (Most Recent):  Temp: 97.8 °F (36.6 °C) (09/16/24 0428)  Pulse: 87 (09/16/24 0935)  Resp: 16 (09/16/24 0802)  BP: 117/68 (09/16/24 0428)  SpO2: (!) 93 % (09/16/24 0802) Vital Signs (24h Range):  Temp:  [97.8 °F (36.6 °C)-98.7 °F (37.1 °C)] 97.8 °F (36.6 °C)  Pulse:  [] 87  Resp:  [16-22] 16  SpO2:  [90 %-95 %] 93 %  BP: (103-118)/(59-68) 117/68     Weight: 70.5 kg (155 lb 6.8 oz)  Body mass index is 22.95 kg/m².  No intake or output data in the 24 hours ending 09/16/24 0942      Physical Exam  HENT:      Head: Normocephalic and atraumatic.   Cardiovascular:      Rate and Rhythm: Normal rate and regular rhythm.      Heart sounds: No murmur heard.  Pulmonary:      Effort: Pulmonary effort is normal. No respiratory distress.      Breath sounds: Decreased breath sounds present. No wheezing.   Abdominal:      General: Bowel sounds are normal. There is no distension.      Palpations: Abdomen is soft.      Tenderness: There is no abdominal tenderness.   Musculoskeletal:         General: No swelling.   Skin:     General: Skin is warm and dry.   Neurological:      Mental Status: He is alert and oriented to person, place, and time. Mental status is at baseline.             Significant Labs: All pertinent labs within the past 24 hours have been reviewed.  Recent Lab Results         09/16/24  0603        Anion Gap 11       Aniso Slight       Baso # CANCELED  Comment: Result canceled by the ancillary.       Basophil % 0.0       BUN 15       Calcium 8.5       Chloride 103       CO2 24       Creatinine 0.7       Differential Method Manual       eGFR >60       Eos # CANCELED  Comment: Result canceled by the ancillary.       Eos % 0.0       Glucose 107       Gran % 85.0       Hematocrit 29.0       Hemoglobin 9.8       Immature Grans (Abs)  CANCELED  Comment: Mild elevation in immature granulocytes is non specific and   can be seen in a variety of conditions including stress response,   acute inflammation, trauma and pregnancy. Correlation with other   laboratory and clinical findings is essential.    Result canceled by the ancillary.         Immature Granulocytes CANCELED  Comment: Result canceled by the ancillary.       Lymph # CANCELED  Comment: Result canceled by the ancillary.       Lymph % 12.0       MCH 32.2       MCHC 33.8       MCV 95       Mono # CANCELED  Comment: Result canceled by the ancillary.       Mono % 3.0       MPV 9.1       nRBC 0       Platelet Estimate Increased  Comment: Reviewed by Technologist.       Platelet Count 572       Potassium 3.3       RBC 3.04       RDW 13.4       Schistocytes Present       Sodium 138       Teardrop Cells Occasional       WBC 14.37               Significant Imaging: I have reviewed all pertinent imaging results/findings within the past 24 hours.

## 2024-09-17 VITALS
DIASTOLIC BLOOD PRESSURE: 71 MMHG | OXYGEN SATURATION: 99 % | SYSTOLIC BLOOD PRESSURE: 133 MMHG | RESPIRATION RATE: 18 BRPM | BODY MASS INDEX: 22.3 KG/M2 | HEART RATE: 81 BPM | HEIGHT: 69 IN | WEIGHT: 150.56 LBS | TEMPERATURE: 98 F

## 2024-09-17 PROCEDURE — 63600175 PHARM REV CODE 636 W HCPCS: Performed by: NURSE PRACTITIONER

## 2024-09-17 PROCEDURE — 99900035 HC TECH TIME PER 15 MIN (STAT)

## 2024-09-17 PROCEDURE — 94640 AIRWAY INHALATION TREATMENT: CPT

## 2024-09-17 PROCEDURE — 25000242 PHARM REV CODE 250 ALT 637 W/ HCPCS: Performed by: NURSE PRACTITIONER

## 2024-09-17 PROCEDURE — 94761 N-INVAS EAR/PLS OXIMETRY MLT: CPT

## 2024-09-17 PROCEDURE — 27000221 HC OXYGEN, UP TO 24 HOURS

## 2024-09-17 PROCEDURE — 94799 UNLISTED PULMONARY SVC/PX: CPT

## 2024-09-17 RX ORDER — AMOXICILLIN AND CLAVULANATE POTASSIUM 875; 125 MG/1; MG/1
1 TABLET, FILM COATED ORAL 2 TIMES DAILY
Qty: 12 TABLET | Refills: 0 | Status: SHIPPED | OUTPATIENT
Start: 2024-09-17

## 2024-09-17 RX ORDER — PREDNISONE 20 MG/1
40 TABLET ORAL DAILY
Qty: 10 TABLET | Refills: 0 | Status: SHIPPED | OUTPATIENT
Start: 2024-09-18 | End: 2024-09-23

## 2024-09-17 RX ADMIN — PREDNISONE 40 MG: 20 TABLET ORAL at 09:09

## 2024-09-17 RX ADMIN — BUDESONIDE 0.5 MG: 0.5 SUSPENSION RESPIRATORY (INHALATION) at 07:09

## 2024-09-17 RX ADMIN — IPRATROPIUM BROMIDE AND ALBUTEROL SULFATE 3 ML: 2.5; .5 SOLUTION RESPIRATORY (INHALATION) at 07:09

## 2024-09-17 NOTE — PLAN OF CARE
09/17/24 1046   Post-Acute Status   Post-Acute Authorization HME   HME Status Pending payor review   Discharge Delays None known at this time   Discharge Plan   Discharge Plan A Home with family     Home O2 order and clinicals sent to [facility] for approval.

## 2024-09-17 NOTE — DISCHARGE SUMMARY
[facility] Medicine  Discharge Summary      Patient Name: Aurelio Daniels  MRN: [id number]  TERRIE: [id number]  Patient Class: IP- Inpatient  Admission Date: 9/13/2024  Hospital Length of Stay: 4 days  Discharge Date and Time:  09/17/2024 12:43 PM  Attending Physician: Myra Contreras MD   Discharging Provider: Myra Contreras MD  Primary Care Provider: Shlomo Villatoro MD    Primary Care Team: Networked reference to record PCT     HPI:   68 year-old-male with PMH of tobacco use, prostate cancer, COPD, and HTN presented to the ER 9/13/24 with c/o worsening shortness of breath with associated cough. He reports going to urgent care last Friday and was told he had a viral infection and flu/COVID was negative. He saw pulmonology on Thursday of last week and diagnosed with COPD and started on Trelegy inhaler. He reports taking it one day and began to feel worse. He reached out to pulmonology and was told to hold the Trelegy for a few days and see if his symptoms improved. He reports it was helping with his shortness of breath. He quit smoking for good 1 month ago. Patient denies CP, SOB, nausea, vomiting, diarrhea, chills, diaphoresis, or fever. He reports decreased oral intake for about 1 week.     In ER, labs revealed WBC 17.84, granulocyte dominate, H&H 12/35.1, K 3.2, , Alp 299, albumin 2.6, lactic acid normal, procal 1.18. He was hypoxic in the ER, ABGs revealed pH 7.437, PO2 54, PCO2 31.2. He was placed on 2 LPM to keep sats > 88%. CXR revealed mulitfocal interstitial pneumonia. He was started on Ceftriaxone and azithromycin IV. [facility] was consulted to admit patient for pneumonia.     * No surgery found *      Hospital Course:   The patient was admitted with pneumonia with hypoxia. He was placed on supplemental oxygen. He was started on scheduled duonebs every 6 hours and IV Rocephin. Patient with minimal improvement on day 2, still with a lot of coughing. Robitussin was scheduled  and he received dose of IV lasix. His symptoms improved. His oxygen requirements were weaned to 2L NC. Robitussin changed to as needed morning of 9/16. He was continued on oral steroids and scheduled breathing treatments. Home oxygen evaluation obtained. Patient qualified for home oxygen. Home oxygen arranged prior to discharge. He was transitioned to oral antibiotics upon discharge. Outpatient pulmonology follow-up. Patient not prescribed nicotine patches as he has not smoked in a month and did not feel they were warranted. Patient seen and examined, stable for discharge. F/U with PCP- instructed to check blood pressure twice a day and record log. Bring record to PCP visit. Holding home BP medication for SBP less than 130.      Goals of Care Treatment Preferences:  Code Status: Full Code         Consults:         Final Active Diagnoses:    Diagnosis Date Noted POA    PRINCIPAL PROBLEM:  Pneumonia of both lower lobes due to infectious organism [J18.9] 09/13/2024 Yes    Centrilobular emphysema [J43.2] 09/14/2024 Yes    Hypokalemia [E87.6] 09/13/2024 Yes    Tobacco dependency [F17.200] 09/13/2024 Yes    Primary hypertension [I10] 10/24/2023 Yes    Other specified anemias [D64.89] 10/24/2023 Yes      Problems Resolved During this Admission:       Discharged Condition: stable    Disposition: Home or Self Care    Follow Up:   Follow-up Information       Shlomo Villatoro MD Follow up.    Specialties: Internal Medicine, Pediatrics  Contact information:  [address]  [place] [address]  [phone]               Fabio Jimenez MD Follow up.    Specialties: Pulmonary Disease, Critical Care Medicine  Contact information:  [address] [place] [address]  [phone]               [facility] home medical equipment Follow up.    Why: For home oxygen needs  Contact information:  [phone]                         Patient Instructions:      OXYGEN FOR HOME USE     Order Specific Question Answer  "Comments   Liter Flow 2    Duration Continuous    Qualifying Test Performed at: Activity    Oxygen saturation at rest 93    Oxygen saturation with activity 85    Oxygen saturation with activity on oxygen 96    Portable mode: continuous    Route nasal cannula    Device: home concentrator with portable concentrator POC   Length of need (in months): 99 mos    Patient condition with qualifying saturation COPD    Height: 5' 9" (1.753 m)    Weight: 68.3 kg (150 lb 9.2 oz)    Alternative treatment measures have been tried or considered and deemed clinically ineffective. Yes      Diet Adult Regular     Reason for not Prescribing Nicotine Replacement     Order Specific Question Answer Comments   Reason for not Prescribing: Not medically appropriate at this time      Reason for not Ordering Smoking Cessation Referral     Order Specific Question Answer Comments   Reason for not ordering: Not medically appropriate at this time        Significant Diagnostic Studies: Radiology:   Imaging Results              X-Ray Chest PA And Lateral (Final result)  Result time 09/13/24 12:31:34      Final result by Navneet Oswald MD (09/13/24 12:31:34)                   Impression:      Findings concerning for multifocal interstitial pneumonia.      Electronically signed by: Navneet Oswald MD  Date:    09/13/2024  Time:    12:31               Narrative:    EXAMINATION:  XR CHEST PA AND LATERAL    CLINICAL HISTORY:  Cough, unspecified    COMPARISON:  09/09/2024    FINDINGS:  Cardiac silhouette is normal. Interstitial and alveolar opacities seen scattered throughout the left lung with relative sparing at the apices.  Patchy infiltrate also seen within the right lower lobe.  No pneumothorax.  Trace left pleural effusion.  Bones appear intact.  Moderate degenerative changes and moderate atherosclerotic disease.                                        Pending Diagnostic Studies:       None           Medications:  Reconciled Home Medications:    "   Medication List        START taking these medications      amoxicillin-clavulanate 875-125mg 875-125 mg per tablet  Commonly known as: AUGMENTIN  Take 1 tablet by mouth 2 (two) times daily.     predniSONE 20 MG tablet  Commonly known as: DELTASONE  Take 2 tablets (40 mg total) by mouth once daily. for 5 days  Start taking on: September 18, 2024            CONTINUE taking these medications      azelastine 137 mcg (0.1 %) nasal spray  Commonly known as: ASTELIN  1 spray (137 mcg total) by Nasal route 2 (two) times daily.     valsartan 80 MG tablet  Commonly known as: DIOVAN  Take 1 tablet (80 mg total) by mouth once daily.            STOP taking these medications      TRELEGY ELLIPTA 200-62.5-25 mcg inhaler  Generic drug: fluticasone-umeclidin-vilanter              Indwelling Lines/Drains at time of discharge:   Lines/Drains/Airways       None                   Time spent on the discharge of patient: 31 minutes         Myra Contreras MD  Department of Hospital Medicine  [facility]   The patient is a 59y Male complaining of shoulder pain/injury.

## 2024-09-17 NOTE — PLAN OF CARE
09/17/24 1111   Post-Acute Status   Post-Acute Authorization E   HME Status Set-up Complete/Auth obtained     Home O2 delivered to bedside

## 2024-09-17 NOTE — PLAN OF CARE
Pt resting in bed .   Pt awake and alert . Hob elevated .   Vitals stable .   POC discussed .   IV intact   Safety Measures in place   Chart check complete .   Will  continue to monitor .

## 2024-09-17 NOTE — RESPIRATORY THERAPY
Home Oxygen Evaluation - [facility] - Cardiopulmonary Department      Date Performed: 9/17/2024      1) Patient's Home O2 Sat on room air, while at rest: Room Air SpO2 At Rest: 92 %        If O2 sats on room air at rest are 88% or below, patient qualifies.  Document O2 liter flow needed in Step 2.  If O2 sats are 89% or above, complete Step 3.        2)  If patient is not ambulated and O2 sats are <88%, what is the O2 liter flow required to meet ordered saturation?      If O2 sats on room air while exercising remain 89% or above patient does not qualify, no further testing needed Document N/A in step 3. If O2 sats on room air while exercising are 88% or below, continue to Step 4.    3) Patient's O2 Sat on room air while exercising: Room Air SpO2 During Ambulation: (!) 85 %        4) Patient's O2 Sat while exercising on O2: SpO2 During Ambulation on O2: 92 % at Ambulation O2 LPM: 2 LPM         (Must show improvement from #4 for patients to qualify)

## 2024-09-17 NOTE — PLAN OF CARE
[facility]  Discharge Final Note    Primary Care Provider: Shlomo Villatoro MD    Expected Discharge Date: 9/17/2024    Final Discharge Note (most recent)       Final Note - 09/17/24 1127          Final Note    Assessment Type Final Discharge Note     Anticipated Discharge Disposition Home or Self Care     Hospital Resources/Appts/Education Provided Post-Acute resouces added to AVS;Appointments scheduled and added to AVS        Post-Acute Status    Post-Acute Authorization HME     E Status Set-up Complete/Auth obtained     Discharge Delays None known at this time                     Important Message from Medicare             Contact Info       Shlomo Villatoro MD   Specialty: Internal Medicine, Pediatrics   Relationship: PCP - General    [id number] [place] [address]  [place] [address]   Phone: [phone]       Next Steps: Follow up    Fabio Jimenez MD   Specialty: Pulmonary Disease, Critical Care Medicine    [address] [place] [address] [address] [place] [address]   Phone: [phone]       Next Steps: Follow up    [facility] home medical equipment    [phone]       Next Steps: Follow up    Instructions: For home oxygen needs          DC disposition: home with family  Oxygen delivered to bedside   PCP hospital follow up scheduled for:  Hospital Follow Up with Jami Szymanski NP  Tuesday Sep 24, 2024 9:20 AM  No other CM needs.

## 2024-09-17 NOTE — PLAN OF CARE
Discussed poc with pt, pt verbalized understanding    Purposeful rounding every 2hours    VS wnl  Cardiac monitoring in use, pt is NSR, tele monitor #[id number]    Fall precautions in place, remains injury free  Pt denies c/o pain    IVFs saline locked  Accurate I&Os  No Abx given as prescribed  Bed locked at lowest position  Call light within reach    Chart check complete  Will cont with POC

## 2024-09-18 ENCOUNTER — PATIENT MESSAGE (OUTPATIENT)
Dept: PULMONOLOGY | Facility: CLINIC | Age: 69
End: 2024-09-18
Payer: MEDICARE

## 2024-09-18 ENCOUNTER — PATIENT OUTREACH (OUTPATIENT)
Dept: ADMINISTRATIVE | Facility: CLINIC | Age: 69
End: 2024-09-18
Payer: MEDICARE

## 2024-09-18 NOTE — PROGRESS NOTES
C3 nurse spoke with Aurelio Daniels  for a [facility] post hospital discharge follow up call. The patient has a scheduled HOSFU appointment with TONE Szymanski on 09/24/2024 @ 3080.    Message sent to PCP staff.

## 2024-09-19 LAB
BACTERIA BLD CULT: NORMAL
BACTERIA BLD CULT: NORMAL

## 2024-09-24 ENCOUNTER — OFFICE VISIT (OUTPATIENT)
Dept: INTERNAL MEDICINE | Facility: CLINIC | Age: 69
End: 2024-09-24
Payer: MEDICARE

## 2024-09-24 ENCOUNTER — HOSPITAL ENCOUNTER (OUTPATIENT)
Dept: RADIOLOGY | Facility: HOSPITAL | Age: 69
Discharge: HOME | End: 2024-09-24
Attending: NURSE PRACTITIONER
Payer: MEDICARE

## 2024-09-24 ENCOUNTER — CLINICAL SUPPORT (OUTPATIENT)
Dept: PULMONOLOGY | Facility: CLINIC | Age: 69
End: 2024-09-24
Payer: MEDICARE

## 2024-09-24 VITALS
TEMPERATURE: 98 F | DIASTOLIC BLOOD PRESSURE: 82 MMHG | WEIGHT: 158.31 LBS | BODY MASS INDEX: 23.45 KG/M2 | SYSTOLIC BLOOD PRESSURE: 128 MMHG | HEART RATE: 75 BPM | HEIGHT: 69 IN | OXYGEN SATURATION: 96 %

## 2024-09-24 VITALS
OXYGEN SATURATION: 95 % | HEIGHT: 69 IN | WEIGHT: 158.31 LBS | HEART RATE: 74 BPM | BODY MASS INDEX: 23.45 KG/M2 | RESPIRATION RATE: 16 BRPM

## 2024-09-24 DIAGNOSIS — J41.0 SIMPLE CHRONIC BRONCHITIS: Primary | ICD-10-CM

## 2024-09-24 DIAGNOSIS — I10 PRIMARY HYPERTENSION: ICD-10-CM

## 2024-09-24 DIAGNOSIS — J43.2 CENTRILOBULAR EMPHYSEMA: ICD-10-CM

## 2024-09-24 DIAGNOSIS — J18.9 PNEUMONIA OF BOTH LOWER LOBES DUE TO INFECTIOUS ORGANISM: ICD-10-CM

## 2024-09-24 DIAGNOSIS — Z09 HOSPITAL DISCHARGE FOLLOW-UP: Primary | ICD-10-CM

## 2024-09-24 DIAGNOSIS — R93.89 ABNORMAL CXR: Primary | ICD-10-CM

## 2024-09-24 PROCEDURE — 1126F AMNT PAIN NOTED NONE PRSNT: CPT | Mod: CPTII,S$GLB,, | Performed by: NURSE PRACTITIONER

## 2024-09-24 PROCEDURE — 3008F BODY MASS INDEX DOCD: CPT | Mod: CPTII,S$GLB,, | Performed by: NURSE PRACTITIONER

## 2024-09-24 PROCEDURE — 3074F SYST BP LT 130 MM HG: CPT | Mod: CPTII,S$GLB,, | Performed by: NURSE PRACTITIONER

## 2024-09-24 PROCEDURE — 4010F ACE/ARB THERAPY RXD/TAKEN: CPT | Mod: CPTII,S$GLB,, | Performed by: NURSE PRACTITIONER

## 2024-09-24 PROCEDURE — 3079F DIAST BP 80-89 MM HG: CPT | Mod: CPTII,S$GLB,, | Performed by: NURSE PRACTITIONER

## 2024-09-24 PROCEDURE — 99999 PR PBB SHADOW E&M-EST. PATIENT-LVL IV: CPT | Mod: PBBFAC,,, | Performed by: NURSE PRACTITIONER

## 2024-09-24 PROCEDURE — 71046 X-RAY EXAM CHEST 2 VIEWS: CPT | Mod: TC

## 2024-09-24 PROCEDURE — 99214 OFFICE O/P EST MOD 30 MIN: CPT | Mod: S$GLB,,, | Performed by: NURSE PRACTITIONER

## 2024-09-24 PROCEDURE — 1111F DSCHRG MED/CURRENT MED MERGE: CPT | Mod: CPTII,S$GLB,, | Performed by: NURSE PRACTITIONER

## 2024-09-24 PROCEDURE — 1159F MED LIST DOCD IN RCRD: CPT | Mod: CPTII,S$GLB,, | Performed by: NURSE PRACTITIONER

## 2024-09-24 PROCEDURE — 1101F PT FALLS ASSESS-DOCD LE1/YR: CPT | Mod: CPTII,S$GLB,, | Performed by: NURSE PRACTITIONER

## 2024-09-24 PROCEDURE — 99999 PR PBB SHADOW E&M-EST. PATIENT-LVL III: CPT | Mod: PBBFAC,,,

## 2024-09-24 PROCEDURE — 3288F FALL RISK ASSESSMENT DOCD: CPT | Mod: CPTII,S$GLB,, | Performed by: NURSE PRACTITIONER

## 2024-09-24 NOTE — PATIENT INSTRUCTIONS
What is COPD?  COPD stands for chronic obstructive pulmonary disease. It means the airways in your lungs are blocked (obstructed). Because of this, it is hard to breathe. You may have trouble with daily activities because of shortness of breath. Over time the shortness of breath usually worsens making it more and more difficult to take care of yourself and take part in activities. Chronic bronchitis and emphysema are two common types of COPD.  What happens in chronic bronchitis?  The cells in the airways make more mucus than normal. The mucus builds up, narrowing the airways. This means less air travels into and out of the lungs. The lining of the airways may also become inflamed (swollen) and causes the airways to narrow even more.            What happens in emphysema?  The small airways are damaged and lose their stretchiness. The airways collapse when you exhale, causing air to get trapped in the air sacs. This means that less oxygen enters the blood vessels and less oxygen is delivered to all of the cells of your body. This makes it hard to breathe.         Damage to cilia  Cilia are small hairs that line and protect the airways. Smoking damages the cilia. Damaged cilia can't sweep mucus and particles away. Some of the cilia are destroyed. This damage worsens COPD.      How did I get COPD?  Most people get COPD from smoking. Cigarette smoke damages lungs, which can develop into COPD over many years.  How COPD affects you  COPD makes you work harder to breathe. Air may get trapped in the lungs, which prevents your lungs from filling completely with fresh oxygen-filled air when you inhale (breathe in). It's harder to take deep breaths especially when you are active and start breathing faster. Over time, your lungs may become enlarged filling the lung with air that does not transfer oxygen into the blood. These problems cause you to have shortness of breath (also called dyspnea). Wheezing (hoarse, whistling  breathing), chronic cough, and fatigue (feeling tired and worn out) are also common.   © 7908-8458 [employer]. [address] [place] [address], [place] [address]. All rights reserved. This information is not intended as a substitute for professional medical care. Always follow your healthcare professional's instructions.          Using an Inhaler with a Spacer  To control asthma, you need to use your medicines the right way. Some medicines are inhaled using a device called a metered-dose inhaler (MDI). Metered-dose inhalers deliver medicine with a fine spray. You may be asked to use a spacer (holding tube) with your inhaler. The spacer helps make sure all the medicine you need goes into your lungs.   Steps for using an inhaler with a spacer  Step 1:  Remove the caps from the inhaler and spacer.  Shake the inhaler well and attach the spacer. If the inhaler is being used for the first time or has not been used for a while, prime it as directed by the product maker.  Step 2:  Breathe out normally.  Put the spacer between your teeth. Close your lips tightly around it.  Keep your chin up.  Step 3:  Spray 1 puff into the spacer by pressing down on the inhaler.  Then breathe in through your mouth as slowly and deeply as you can. This should take about 5-10 seconds. If you breathe too quickly, you may hear a whistling sound in certain spacers.  Step 4:  Take the spacer out of your mouth.  Hold your breath for a count of 10.  Then hold your lips together and slowly breathe out through your mouthACTION PLAN    GREEN ZONE  My sputum is clear/white/usual color and easily cleared.  My breathing is no harder than usual.  I can do my usual activities.  I can think clearly.   Take your usual medicines, including oxygen, as you are told to do so by your health care provider.   YELLOW ZONE  My sputum has change (color, thickness, amount).  I am more short of breath than usual.  I cough or wheeze more.  I weigh more and my  legs/feet swell.  I cannot do my usual activities without resting.   Call your health care provider. You will probably be told to begin taking an antibiotic and prednisone. Have your pharmacy phone number available.   RED ZONE  I cannot cough out my sputum.  I am much more short of breath than normal.  I need to sit up to breathe  I cannot do my usual activities.  I am unable to speak more than one or two words at a time.  I am confused.   Call your health care provider. You may be asked to come in to be seen, told to go to the emergency room, or told to call 9-1-1.

## 2024-09-24 NOTE — PROGRESS NOTES
Subjective:       Patient ID: Aurelio Daniels is a 68 y.o. male.    Chief Complaint: Follow-up    HPI    Hospital Course:   The patient was admitted with pneumonia with hypoxia. He was placed on supplemental oxygen. He was started on scheduled duonebs every 6 hours and IV Rocephin. Patient with minimal improvement on day 2, still with a lot of coughing. Robitussin was scheduled and he received dose of IV lasix. His symptoms improved. His oxygen requirements were weaned to 2L NC. Robitussin changed to as needed morning of 9/16. He was continued on oral steroids and scheduled breathing treatments. Home oxygen evaluation obtained. Patient qualified for home oxygen. Home oxygen arranged prior to discharge. He was transitioned to oral antibiotics upon discharge. Outpatient pulmonology follow-up. Patient not prescribed nicotine patches as he has not smoked in a month and did not feel they were warranted. Patient seen and examined, stable for discharge. F/U with PCP- instructed to check blood pressure twice a day and record log. Bring record to PCP visit. Holding home BP medication for SBP less than 130.        Rare use of O2 at home  Completed antibiotics as prescribed  Blood pressure has been running 1 teens to 120s over 70s to 80s at home without the use of valsartan.  Parameters discussed with patient that until the need to restart this medication.  Low-salt diet advised  Patient has a follow-up appointment in the next few months with pulmonology he has restarted his trilogy per pulmonology instruction  He denies any chest pain ,fever , or severe shortness of breath/wheezing  Eating and drinking as normal and state that weakness has resolved          Review of Systems   Constitutional:  Negative for activity change, appetite change, chills, diaphoresis, fatigue, fever and unexpected weight change.   HENT:  Negative for congestion, ear pain, postnasal drip, rhinorrhea, sinus pressure, sinus pain, sneezing, sore  throat, tinnitus, trouble swallowing and voice change.    Eyes:  Negative for photophobia, pain and visual disturbance.   Respiratory:  Negative for cough, chest tightness, shortness of breath and wheezing.    Cardiovascular:  Negative for chest pain, palpitations and leg swelling.   Gastrointestinal:  Negative for abdominal distention, abdominal pain, constipation, diarrhea, nausea and vomiting.   Genitourinary:  Negative for decreased urine volume, difficulty urinating, dysuria, flank pain, frequency, hematuria and urgency.   Musculoskeletal:  Negative for arthralgias, back pain, joint swelling, neck pain and neck stiffness.   Allergic/Immunologic: Negative for immunocompromised state.   Neurological:  Negative for dizziness, tremors, seizures, syncope, facial asymmetry, speech difficulty, weakness, light-headedness, numbness and headaches.   Hematological:  Negative for adenopathy. Does not bruise/bleed easily.   Psychiatric/Behavioral:  Negative for confusion and sleep disturbance.        Objective:      Physical Exam  HENT:      Head: Normocephalic.   Cardiovascular:      Rate and Rhythm: Normal rate and regular rhythm.      Heart sounds: Normal heart sounds.   Pulmonary:      Effort: Pulmonary effort is normal.      Breath sounds: Normal breath sounds.   Abdominal:      General: Bowel sounds are normal.      Palpations: Abdomen is soft.   Musculoskeletal:         General: Normal range of motion.      Cervical back: Normal range of motion and neck supple.   Skin:     General: Skin is warm and dry.   Neurological:      Mental Status: He is alert and oriented to person, place, and time.         Assessment:     Vitals:    09/24/24 0913   BP: 128/82   Pulse: 75   Temp: 97.7 °F (36.5 °C)         1. Hospital discharge follow-up    2. Centrilobular emphysema    3. Pneumonia of both lower lobes due to infectious organism    4. Primary hypertension        Plan:   Hospital discharge follow-up    Centrilobular  emphysema    Pneumonia of both lower lobes due to infectious organism  -     X-Ray Chest PA And Lateral; Future; Expected date: 09/24/2024    Primary hypertension  Comments:  pt reports bp at home 110s-120s/70s-80s has not taken BP med since early this month  Orders:  -     Comprehensive Metabolic Panel; Future; Expected date: 03/23/2025  -     Lipid Panel; Future; Expected date: 09/24/2024  -     CBC Auto Differential; Future; Expected date: 03/23/2025  -     TSH; Future; Expected date: 03/23/2025      Monitor blood pressure at home restart blood pressure medicine her blood pressure becomes more than 130s over 80s  Chest x-ray now   Follow up with pulmonology as scheduled   Return In February with labs prior for routine visit    Transitional Care Note    Family and/or Caretaker present at visit?  Yes.  Diagnostic tests reviewed/disposition: No diagnosic tests pending after this hospitalization.  Disease/illness education:  Yes  Home health/community services discussion/referrals: Patient does not have home health established from hospital visit.  They do not need home health.  If needed, we will set up home health for the patient.   Establishment or re-establishment of referral orders for community resources: No other necessary community resources.   Discussion with other health care providers: No discussion with other health care providers necessary.

## 2024-09-24 NOTE — PROGRESS NOTES
"Pulmonary Disease Management  [facility]  Initial Visit       Diagnosis: Chronic bronchitis, emphysema   Last Hospital Admission: 24  Last provider visit: 24      Current Outpatient Medications:     amoxicillin-clavulanate 875-125mg (AUGMENTIN) 875-125 mg per tablet, Take 1 tablet by mouth 2 (two) times daily., Disp: 12 tablet, Rfl: 0    azelastine (ASTELIN) 137 mcg (0.1 %) nasal spray, 1 spray (137 mcg total) by Nasal route 2 (two) times daily., Disp: 30 mL, Rfl: 3    valsartan (DIOVAN) 80 MG tablet, Take 1 tablet (80 mg total) by mouth once daily. (Patient not taking: Reported on 2024), Disp: 90 tablet, Rfl: 3    Review of patient's allergies indicates:  No Known Allergies    Smoking history:   Social History     Tobacco Use   Smoking Status Some Days    Current packs/day: 0.00    Types: Cigarettes    Last attempt to quit: 1/15/2022    Years since quittin.6    Passive exposure: Current   Smokeless Tobacco Never       Pulse: 74  SpO2: 95 %  Resp: 16  Height: 5' 9" (175.3 cm)  Weight: 71.8 kg (158 lb 4.6 oz)  BMI (kg/m2): 23.42  Current Oxygen Use: Yes  Device: POC  Liter Flow: 2  Oxygen Usage Duration: As needed    COPD Questionnaire:  COPD Questionnaire  How often do you cough?: Almost never  How often do you have phlegm (mucus) in your chest?: Never  How often does your chest feel tight?: Almost never  When you walk up a hill or one flight of stairs, how often are you breathless?: Some of the time  How often are you limited doing any activities at home?: A little of the time  How often are you confident leaving the house despite your lung condition?: All of the time  How often do you sleep soundly?: Most of the time  How often do you have energy?: Most of the time  Total score: 9       PFT Results:  Pre FVC   Date/Time Value Ref Range Status   2023 05:17 PM 4.08 3.06 - 5.17 L Final     Pre FEV1   Date/Time Value Ref Range Status   2023 05:17 PM 1.76 (L) 2.27 - 3.99 L Final "     Pre FEV1 FVC   Date/Time Value Ref Range Status   03/08/2023 05:17 PM 43.09 (L) 62.95 - 89.60 % Final     Pre TLC   Date/Time Value Ref Range Status   03/08/2023 05:17 PM 8.11 (H) 5.75 - 8.05 L Final     Pre DLCO   Date/Time Value Ref Range Status   03/08/2023 05:17 PM 16.22 (L) 19.52 - 33.38 ml/(min*mmHg) Final         Educational assessment:   [x]            Good  []            Fair  []            Poor    Readiness to learn:   [x]            Good  []            Fair  []            Poor    Vision Status:   [x]            Good  []            Fair  []            Poor    Reading Ability:  [x]            Good  []            Fair  []            Poor    Knowledge of condition:   [x]            Good  []            Fair  []            Poor    Language Barriers:   []            Good  []            Fair  []            Poor  [x]            None    Cognitive/ Physical Barriers:   []            Good  []            Fair  []            Poor  [x]            None    Learning best by:                       [x]            Seeing  []            Hearing  []            Reading                         [x]            Doing    Describe any barrier /Limitation or financial implications of care choices identified     []            Financial  []            Emotional  []            Education  []            Vision/Hearing  []            Physical  [x]            None  []                TOPIC /CONTENT FOR TODAY:    [x]            MDI with or without spacer  [x]            Dry powder inhaler  []            Acapella   []           Peak Flow meter  [x]            COPD action plan  []            Nebulizer use  [x]            Oxygen use safety  [x]            Breathing and cough techniques  [x]            Energy conservation  [x]            Infection prevention  []           OTHER________________________        Learner:    [x]            Patient   []            Caregiver    Method:    [x]            Verbal explanation  [x]            Audio visual     [x]            Literature  [x]            Teach back      Evaluation:    [x]            Teach back  [x]            Demonstrate  [x]            Follow up phone call    []            2 weeks     [x]            4 weeks   [x]            PRN    Therapist Comments:   Patient was seen today to review respiratory medication purpose and proper technique for use of inhalers. Patient practiced proper technique using MDI with valved holding chamber (spacer) and DPI inhalers. Patient demonstrated understanding. Literature was given to patient.    Educated patient on the importance of utilizing supplemental oxygen when prescribed. Reviewed strategies for maximizing energy. Patient verbalized understanding. Literature given to patient.      Asthma trigger checklist was verbally reviewed and literature given to patient.     Infection prevention was discussed. Patient was reminded to get RSV vaccine. Hand hygiene and cleaning of respiratory equipment was also discussed. Patient verbalized understanding.      COPD action plan was reviewed and literature was given to patient. Patient verbalized understanding.       Plan:  Monthly Pulmonary Disease Management Questionnaire  Follow-up PDM appointment scheduled for  2/19/25  Reinforce education  Meds: Trelegy, albuterol   DME Needs: [facility]  Action Plan  Immunization: Pneumococcal- current, Flu-current  Next Provider Visit: 12/17/24  Next Spirometry/CPFT: 12/17/24  Approximate time spent with patient: 45 mins

## 2024-09-25 ENCOUNTER — PATIENT MESSAGE (OUTPATIENT)
Dept: INTERNAL MEDICINE | Facility: CLINIC | Age: 69
End: 2024-09-25
Payer: MEDICARE

## 2024-09-25 ENCOUNTER — HOSPITAL ENCOUNTER (OUTPATIENT)
Dept: RADIOLOGY | Facility: HOSPITAL | Age: 69
Discharge: HOME | End: 2024-09-25
Attending: NURSE PRACTITIONER
Payer: MEDICARE

## 2024-09-25 ENCOUNTER — TELEPHONE (OUTPATIENT)
Dept: INTERNAL MEDICINE | Facility: CLINIC | Age: 69
End: 2024-09-25
Payer: MEDICARE

## 2024-09-25 DIAGNOSIS — R93.89 ABNORMAL CXR: ICD-10-CM

## 2024-09-26 ENCOUNTER — HOSPITAL ENCOUNTER (INPATIENT)
Facility: HOSPITAL | Age: 69
LOS: 12 days | Discharge: HOME | DRG: 190 | End: 2024-10-09
Attending: EMERGENCY MEDICINE | Admitting: INTERNAL MEDICINE
Payer: MEDICARE

## 2024-09-26 DIAGNOSIS — R91.8 MASS OF LEFT LUNG: ICD-10-CM

## 2024-09-26 DIAGNOSIS — J93.9 PNEUMOTHORAX ON LEFT: ICD-10-CM

## 2024-09-26 DIAGNOSIS — J96.21 ACUTE ON CHRONIC RESPIRATORY FAILURE WITH HYPOXEMIA: Primary | ICD-10-CM

## 2024-09-26 DIAGNOSIS — R06.02 SOB (SHORTNESS OF BREATH): ICD-10-CM

## 2024-09-26 DIAGNOSIS — R07.9 CHEST PAIN: ICD-10-CM

## 2024-09-26 DIAGNOSIS — R79.89 ELEVATED TROPONIN I LEVEL: ICD-10-CM

## 2024-09-26 DIAGNOSIS — R79.89 TROPONIN I ABOVE REFERENCE RANGE: ICD-10-CM

## 2024-09-26 PROCEDURE — 99285 EMERGENCY DEPT VISIT HI MDM: CPT

## 2024-09-27 ENCOUNTER — PATIENT MESSAGE (OUTPATIENT)
Dept: INTERNAL MEDICINE | Facility: CLINIC | Age: 69
End: 2024-09-27
Payer: MEDICARE

## 2024-09-27 ENCOUNTER — PATIENT MESSAGE (OUTPATIENT)
Dept: PULMONOLOGY | Facility: CLINIC | Age: 69
End: 2024-09-27
Payer: MEDICARE

## 2024-09-27 PROBLEM — J18.9 BILATERAL PNEUMONIA: Status: ACTIVE | Noted: 2024-09-27

## 2024-09-27 PROBLEM — R91.8 MASS OF LEFT LUNG: Status: ACTIVE | Noted: 2024-09-27

## 2024-09-27 PROBLEM — J93.83 SPONTANEOUS PNEUMOTHORAX: Status: ACTIVE | Noted: 2024-09-27

## 2024-09-27 PROBLEM — D75.839 THROMBOCYTOSIS: Status: ACTIVE | Noted: 2024-09-27

## 2024-09-27 PROBLEM — D53.9 MACROCYTIC ANEMIA: Status: ACTIVE | Noted: 2024-09-27

## 2024-09-27 PROBLEM — Z72.0 TOBACCO ABUSE: Status: ACTIVE | Noted: 2024-09-27

## 2024-09-27 PROBLEM — J96.21 ACUTE ON CHRONIC RESPIRATORY FAILURE WITH HYPOXIA: Status: ACTIVE | Noted: 2024-09-27

## 2024-09-27 PROBLEM — R79.89 ELEVATED TROPONIN: Status: ACTIVE | Noted: 2024-09-27

## 2024-09-27 PROBLEM — R09.02 HYPOXIA: Status: RESOLVED | Noted: 2024-09-27 | Resolved: 2024-09-27

## 2024-09-27 PROBLEM — R09.02 HYPOXIA: Status: ACTIVE | Noted: 2024-09-27

## 2024-09-27 LAB
ALBUMIN SERPL BCP-MCNC: 3.2 G/DL (ref 3.5–5.2)
ALLENS TEST: ABNORMAL
ALP SERPL-CCNC: 113 U/L (ref 55–135)
ALT SERPL W/O P-5'-P-CCNC: 21 U/L (ref 10–44)
ANION GAP SERPL CALC-SCNC: 8 MMOL/L (ref 8–16)
AORTIC ROOT ANNULUS: 3.17 CM
ASCENDING AORTA: 3.39 CM
AST SERPL-CCNC: 21 U/L (ref 10–40)
AV INDEX (PROSTH): 0.69
AV MEAN GRADIENT: 4 MMHG
AV PEAK GRADIENT: 5 MMHG
AV VALVE AREA BY VELOCITY RATIO: 2.43 CM²
AV VALVE AREA: 2.24 CM²
AV VELOCITY RATIO: 0.75
BASOPHILS # BLD AUTO: 0.07 K/UL (ref 0–0.2)
BASOPHILS NFR BLD: 0.6 % (ref 0–1.9)
BILIRUB SERPL-MCNC: 0.2 MG/DL (ref 0.1–1)
BNP SERPL-MCNC: 64 PG/ML (ref 0–99)
BSA FOR ECHO PROCEDURE: 1.87 M2
BUN SERPL-MCNC: 14 MG/DL (ref 8–23)
CALCIUM SERPL-MCNC: 9 MG/DL (ref 8.7–10.5)
CHLORIDE SERPL-SCNC: 107 MMOL/L (ref 95–110)
CO2 SERPL-SCNC: 22 MMOL/L (ref 23–29)
CREAT SERPL-MCNC: 0.8 MG/DL (ref 0.5–1.4)
CV ECHO LV RWT: 0.56 CM
DELSYS: ABNORMAL
DIFFERENTIAL METHOD BLD: ABNORMAL
DOP CALC AO PEAK VEL: 1.12 M/S
DOP CALC AO VTI: 27.5 CM
DOP CALC LVOT AREA: 3.2 CM2
DOP CALC LVOT DIAMETER: 2.03 CM
DOP CALC LVOT PEAK VEL: 0.84 M/S
DOP CALC LVOT STROKE VOLUME: 61.46 CM3
DOP CALC RVOT PEAK VEL: 0.57 M/S
DOP CALC RVOT VTI: 10.3 CM
DOP CALCLVOT PEAK VEL VTI: 19 CM
E WAVE DECELERATION TIME: 212.74 MSEC
E/A RATIO: 0.88
E/E' RATIO: 6.73 M/S
ECHO LV POSTERIOR WALL: 1.15 CM (ref 0.6–1.1)
EOSINOPHIL # BLD AUTO: 0.1 K/UL (ref 0–0.5)
EOSINOPHIL NFR BLD: 0.7 % (ref 0–8)
ERYTHROCYTE [DISTWIDTH] IN BLOOD BY AUTOMATED COUNT: 13.5 % (ref 11.5–14.5)
EST. GFR  (NO RACE VARIABLE): >60 ML/MIN/1.73 M^2
FLOW: 2
FOLATE SERPL-MCNC: 9.8 NG/ML (ref 4–24)
FRACTIONAL SHORTENING: 31 % (ref 28–44)
GLUCOSE SERPL-MCNC: 137 MG/DL (ref 70–110)
HCO3 UR-SCNC: 22.4 MMOL/L (ref 24–28)
HCT VFR BLD AUTO: 34.4 % (ref 40–54)
HGB BLD-MCNC: 11.2 G/DL (ref 14–18)
IMM GRANULOCYTES # BLD AUTO: 0.21 K/UL (ref 0–0.04)
IMM GRANULOCYTES NFR BLD AUTO: 1.7 % (ref 0–0.5)
INR PPP: 1 (ref 0.8–1.2)
INTERVENTRICULAR SEPTUM: 1.13 CM (ref 0.6–1.1)
IVC DIAMETER: 0.91 CM
IVRT: 79.92 MSEC
LA MAJOR: 4.44 CM
LA MINOR: 3.89 CM
LA WIDTH: 3.3 CM
LACTATE SERPL-SCNC: 4 MMOL/L (ref 0.5–2.2)
LACTATE SERPL-SCNC: 4.1 MMOL/L (ref 0.5–2.2)
LEFT ATRIUM SIZE: 2.82 CM
LEFT ATRIUM VOLUME INDEX: 17.5 ML/M2
LEFT ATRIUM VOLUME: 32.8 CM3
LEFT INTERNAL DIMENSION IN SYSTOLE: 2.83 CM (ref 2.1–4)
LEFT VENTRICLE DIASTOLIC VOLUME INDEX: 40.42 ML/M2
LEFT VENTRICLE DIASTOLIC VOLUME: 75.58 ML
LEFT VENTRICLE MASS INDEX: 86 G/M2
LEFT VENTRICLE SYSTOLIC VOLUME INDEX: 16.3 ML/M2
LEFT VENTRICLE SYSTOLIC VOLUME: 30.42 ML
LEFT VENTRICULAR INTERNAL DIMENSION IN DIASTOLE: 4.13 CM (ref 3.5–6)
LEFT VENTRICULAR MASS: 161.12 G
LV LATERAL E/E' RATIO: 6.73 M/S
LV SEPTAL E/E' RATIO: 6.73 M/S
LVED V (TEICH): 75.58 ML
LVES V (TEICH): 30.42 ML
LVOT MG: 1.76 MMHG
LVOT MV: 0.64 CM/S
LYMPHOCYTES # BLD AUTO: 0.7 K/UL (ref 1–4.8)
LYMPHOCYTES NFR BLD: 6.1 % (ref 18–48)
MCH RBC QN AUTO: 32.3 PG (ref 27–31)
MCHC RBC AUTO-ENTMCNC: 32.6 G/DL (ref 32–36)
MCV RBC AUTO: 99 FL (ref 82–98)
MODE: ABNORMAL
MONOCYTES # BLD AUTO: 0.3 K/UL (ref 0.3–1)
MONOCYTES NFR BLD: 2.5 % (ref 4–15)
MV PEAK A VEL: 0.84 M/S
MV PEAK E VEL: 0.74 M/S
MV STENOSIS PRESSURE HALF TIME: 61.69 MS
MV VALVE AREA P 1/2 METHOD: 3.57 CM2
NEUTROPHILS # BLD AUTO: 10.7 K/UL (ref 1.8–7.7)
NEUTROPHILS NFR BLD: 88.4 % (ref 38–73)
NRBC BLD-RTO: 0 /100 WBC
OHS QRS DURATION: 64 MS
OHS QTC CALCULATION: 452 MS
PCO2 BLDA: 35.4 MMHG (ref 35–45)
PH SMN: 7.41 [PH] (ref 7.35–7.45)
PISA TR MAX VEL: 2.63 M/S
PLATELET # BLD AUTO: 463 K/UL (ref 150–450)
PMV BLD AUTO: 8.4 FL (ref 9.2–12.9)
PO2 BLDA: 67 MMHG (ref 80–100)
POC BE: -2 MMOL/L
POC SATURATED O2: 93 % (ref 95–100)
POTASSIUM SERPL-SCNC: 4.3 MMOL/L (ref 3.5–5.1)
PROCALCITONIN SERPL IA-MCNC: 0.04 NG/ML
PROT SERPL-MCNC: 6.3 G/DL (ref 6–8.4)
PROTHROMBIN TIME: 10.9 SEC (ref 9–12.5)
PV MEAN GRADIENT: 1 MMHG
RA MAJOR: 4.34 CM
RA PRESSURE ESTIMATED: 3 MMHG
RA WIDTH: 2.9 CM
RBC # BLD AUTO: 3.47 M/UL (ref 4.6–6.2)
RV TB RVSP: 6 MMHG
SAMPLE: ABNORMAL
SARS-COV-2 RDRP RESP QL NAA+PROBE: NEGATIVE
SITE: ABNORMAL
SODIUM SERPL-SCNC: 137 MMOL/L (ref 136–145)
STJ: 3.32 CM
TDI LATERAL: 0.11 M/S
TDI SEPTAL: 0.11 M/S
TDI: 0.11 M/S
TR MAX PG: 28 MMHG
TRICUSPID ANNULAR PLANE SYSTOLIC EXCURSION: 2.01 CM
TROPONIN I SERPL DL<=0.01 NG/ML-MCNC: 0.21 NG/ML (ref 0–0.03)
TROPONIN I SERPL DL<=0.01 NG/ML-MCNC: 0.31 NG/ML (ref 0–0.03)
TROPONIN I SERPL DL<=0.01 NG/ML-MCNC: 0.33 NG/ML (ref 0–0.03)
TV REST PULMONARY ARTERY PRESSURE: 31 MMHG
VIT B12 SERPL-MCNC: >2000 PG/ML (ref 210–950)
WBC # BLD AUTO: 12.07 K/UL (ref 3.9–12.7)
Z-SCORE OF LEFT VENTRICULAR DIMENSION IN END DIASTOLE: -2.33
Z-SCORE OF LEFT VENTRICULAR DIMENSION IN END SYSTOLE: -1.01

## 2024-09-27 PROCEDURE — 25000003 PHARM REV CODE 250: Performed by: EMERGENCY MEDICINE

## 2024-09-27 PROCEDURE — 84484 ASSAY OF TROPONIN QUANT: CPT | Performed by: INTERNAL MEDICINE

## 2024-09-27 PROCEDURE — 36600 WITHDRAWAL OF ARTERIAL BLOOD: CPT

## 2024-09-27 PROCEDURE — 85025 COMPLETE CBC W/AUTO DIFF WBC: CPT | Performed by: EMERGENCY MEDICINE

## 2024-09-27 PROCEDURE — U0002 COVID-19 LAB TEST NON-CDC: HCPCS | Performed by: EMERGENCY MEDICINE

## 2024-09-27 PROCEDURE — 25000003 PHARM REV CODE 250: Performed by: INTERNAL MEDICINE

## 2024-09-27 PROCEDURE — 63600175 PHARM REV CODE 636 W HCPCS: Performed by: INTERNAL MEDICINE

## 2024-09-27 PROCEDURE — 27000221 HC OXYGEN, UP TO 24 HOURS

## 2024-09-27 PROCEDURE — 83605 ASSAY OF LACTIC ACID: CPT | Performed by: INTERNAL MEDICINE

## 2024-09-27 PROCEDURE — 25000242 PHARM REV CODE 250 ALT 637 W/ HCPCS: Performed by: INTERNAL MEDICINE

## 2024-09-27 PROCEDURE — 84484 ASSAY OF TROPONIN QUANT: CPT | Mod: 91 | Performed by: EMERGENCY MEDICINE

## 2024-09-27 PROCEDURE — 82803 BLOOD GASES ANY COMBINATION: CPT

## 2024-09-27 PROCEDURE — 25500020 PHARM REV CODE 255: Performed by: EMERGENCY MEDICINE

## 2024-09-27 PROCEDURE — 96372 THER/PROPH/DIAG INJ SC/IM: CPT | Performed by: EMERGENCY MEDICINE

## 2024-09-27 PROCEDURE — 25000003 PHARM REV CODE 250: Performed by: STUDENT IN AN ORGANIZED HEALTH CARE EDUCATION/TRAINING PROGRAM

## 2024-09-27 PROCEDURE — 83880 ASSAY OF NATRIURETIC PEPTIDE: CPT | Performed by: EMERGENCY MEDICINE

## 2024-09-27 PROCEDURE — 82607 VITAMIN B-12: CPT | Performed by: INTERNAL MEDICINE

## 2024-09-27 PROCEDURE — 84484 ASSAY OF TROPONIN QUANT: CPT | Mod: 91 | Performed by: STUDENT IN AN ORGANIZED HEALTH CARE EDUCATION/TRAINING PROGRAM

## 2024-09-27 PROCEDURE — 25000242 PHARM REV CODE 250 ALT 637 W/ HCPCS: Performed by: EMERGENCY MEDICINE

## 2024-09-27 PROCEDURE — 32556 INSERT CATH PLEURA W/O IMAGE: CPT

## 2024-09-27 PROCEDURE — 99900035 HC TECH TIME PER 15 MIN (STAT)

## 2024-09-27 PROCEDURE — 94761 N-INVAS EAR/PLS OXIMETRY MLT: CPT | Mod: XB

## 2024-09-27 PROCEDURE — 0W9B30Z DRAINAGE OF LEFT PLEURAL CAVITY WITH DRAINAGE DEVICE, PERCUTANEOUS APPROACH: ICD-10-PCS | Performed by: EMERGENCY MEDICINE

## 2024-09-27 PROCEDURE — 94640 AIRWAY INHALATION TREATMENT: CPT

## 2024-09-27 PROCEDURE — 85610 PROTHROMBIN TIME: CPT | Performed by: INTERNAL MEDICINE

## 2024-09-27 PROCEDURE — 83605 ASSAY OF LACTIC ACID: CPT | Mod: 91 | Performed by: STUDENT IN AN ORGANIZED HEALTH CARE EDUCATION/TRAINING PROGRAM

## 2024-09-27 PROCEDURE — 93010 ELECTROCARDIOGRAM REPORT: CPT | Mod: ,,, | Performed by: INTERNAL MEDICINE

## 2024-09-27 PROCEDURE — 80053 COMPREHEN METABOLIC PANEL: CPT | Performed by: EMERGENCY MEDICINE

## 2024-09-27 PROCEDURE — 84145 PROCALCITONIN (PCT): CPT | Performed by: INTERNAL MEDICINE

## 2024-09-27 PROCEDURE — 21400001 HC TELEMETRY ROOM

## 2024-09-27 PROCEDURE — 93005 ELECTROCARDIOGRAM TRACING: CPT

## 2024-09-27 PROCEDURE — 82746 ASSAY OF FOLIC ACID SERUM: CPT | Performed by: INTERNAL MEDICINE

## 2024-09-27 PROCEDURE — 63600175 PHARM REV CODE 636 W HCPCS: Performed by: EMERGENCY MEDICINE

## 2024-09-27 RX ORDER — HYDRALAZINE HYDROCHLORIDE 20 MG/ML
10 INJECTION INTRAMUSCULAR; INTRAVENOUS EVERY 6 HOURS PRN
Status: DISCONTINUED | OUTPATIENT
Start: 2024-09-27 | End: 2024-10-09 | Stop reason: HOSPADM

## 2024-09-27 RX ORDER — ACETAMINOPHEN 325 MG/1
650 TABLET ORAL EVERY 6 HOURS PRN
Status: DISCONTINUED | OUTPATIENT
Start: 2024-09-27 | End: 2024-10-09 | Stop reason: HOSPADM

## 2024-09-27 RX ORDER — ARFORMOTEROL TARTRATE 15 UG/2ML
15 SOLUTION RESPIRATORY (INHALATION) 2 TIMES DAILY
Status: DISCONTINUED | OUTPATIENT
Start: 2024-09-27 | End: 2024-10-09 | Stop reason: HOSPADM

## 2024-09-27 RX ORDER — SODIUM CHLORIDE 9 MG/ML
INJECTION, SOLUTION INTRAVENOUS
Status: DISCONTINUED | OUTPATIENT
Start: 2024-09-27 | End: 2024-10-09 | Stop reason: HOSPADM

## 2024-09-27 RX ORDER — BUDESONIDE 0.5 MG/2ML
0.5 INHALANT ORAL EVERY 12 HOURS
Status: DISCONTINUED | OUTPATIENT
Start: 2024-09-27 | End: 2024-10-09 | Stop reason: HOSPADM

## 2024-09-27 RX ORDER — PREDNISONE 20 MG/1
60 TABLET ORAL
Status: COMPLETED | OUTPATIENT
Start: 2024-09-27 | End: 2024-09-27

## 2024-09-27 RX ORDER — IBUPROFEN 200 MG
24 TABLET ORAL
Status: DISCONTINUED | OUTPATIENT
Start: 2024-09-27 | End: 2024-10-09 | Stop reason: HOSPADM

## 2024-09-27 RX ORDER — LIDOCAINE HYDROCHLORIDE 10 MG/ML
10 INJECTION, SOLUTION EPIDURAL; INFILTRATION; INTRACAUDAL; PERINEURAL
Status: COMPLETED | OUTPATIENT
Start: 2024-09-27 | End: 2024-09-27

## 2024-09-27 RX ORDER — IPRATROPIUM BROMIDE AND ALBUTEROL SULFATE 2.5; .5 MG/3ML; MG/3ML
3 SOLUTION RESPIRATORY (INHALATION)
Status: COMPLETED | OUTPATIENT
Start: 2024-09-27 | End: 2024-09-27

## 2024-09-27 RX ORDER — IPRATROPIUM BROMIDE AND ALBUTEROL SULFATE 2.5; .5 MG/3ML; MG/3ML
3 SOLUTION RESPIRATORY (INHALATION) EVERY 6 HOURS PRN
Status: DISCONTINUED | OUTPATIENT
Start: 2024-09-27 | End: 2024-10-09 | Stop reason: HOSPADM

## 2024-09-27 RX ORDER — BENZONATATE 100 MG/1
100 CAPSULE ORAL 3 TIMES DAILY PRN
Status: DISCONTINUED | OUTPATIENT
Start: 2024-09-27 | End: 2024-09-30

## 2024-09-27 RX ORDER — GUAIFENESIN 100 MG/5ML
200 SOLUTION ORAL EVERY 4 HOURS PRN
Status: DISCONTINUED | OUTPATIENT
Start: 2024-09-27 | End: 2024-10-01

## 2024-09-27 RX ORDER — ONDANSETRON HYDROCHLORIDE 2 MG/ML
4 INJECTION, SOLUTION INTRAVENOUS EVERY 6 HOURS PRN
Status: DISCONTINUED | OUTPATIENT
Start: 2024-09-27 | End: 2024-10-09 | Stop reason: HOSPADM

## 2024-09-27 RX ORDER — TERBUTALINE SULFATE 1 MG/ML
0.25 INJECTION SUBCUTANEOUS ONCE
Status: COMPLETED | OUTPATIENT
Start: 2024-09-27 | End: 2024-09-27

## 2024-09-27 RX ORDER — NALOXONE HCL 0.4 MG/ML
0.02 VIAL (ML) INJECTION
Status: DISCONTINUED | OUTPATIENT
Start: 2024-09-27 | End: 2024-10-09 | Stop reason: HOSPADM

## 2024-09-27 RX ORDER — SODIUM CHLORIDE 0.9 % (FLUSH) 0.9 %
10 SYRINGE (ML) INJECTION EVERY 12 HOURS PRN
Status: DISCONTINUED | OUTPATIENT
Start: 2024-09-27 | End: 2024-10-09 | Stop reason: HOSPADM

## 2024-09-27 RX ORDER — MORPHINE SULFATE 4 MG/ML
2 INJECTION, SOLUTION INTRAMUSCULAR; INTRAVENOUS EVERY 6 HOURS PRN
Status: DISCONTINUED | OUTPATIENT
Start: 2024-09-27 | End: 2024-10-01

## 2024-09-27 RX ORDER — GLUCAGON 1 MG
1 KIT INJECTION
Status: DISCONTINUED | OUTPATIENT
Start: 2024-09-27 | End: 2024-10-09 | Stop reason: HOSPADM

## 2024-09-27 RX ORDER — IBUPROFEN 200 MG
16 TABLET ORAL
Status: DISCONTINUED | OUTPATIENT
Start: 2024-09-27 | End: 2024-10-09 | Stop reason: HOSPADM

## 2024-09-27 RX ORDER — SODIUM CHLORIDE, SODIUM LACTATE, POTASSIUM CHLORIDE, CALCIUM CHLORIDE 600; 310; 30; 20 MG/100ML; MG/100ML; MG/100ML; MG/100ML
INJECTION, SOLUTION INTRAVENOUS CONTINUOUS
Status: ACTIVE | OUTPATIENT
Start: 2024-09-27 | End: 2024-09-27

## 2024-09-27 RX ADMIN — PIPERACILLIN SODIUM AND TAZOBACTAM SODIUM 4.5 G: 4; .5 INJECTION, POWDER, FOR SOLUTION INTRAVENOUS at 10:09

## 2024-09-27 RX ADMIN — TERBUTALINE SULFATE 0.25 MG: 1 INJECTION SUBCUTANEOUS at 12:09

## 2024-09-27 RX ADMIN — PREDNISONE 60 MG: 20 TABLET ORAL at 12:09

## 2024-09-27 RX ADMIN — GUAIFENESIN 200 MG: 200 SOLUTION ORAL at 06:09

## 2024-09-27 RX ADMIN — PIPERACILLIN SODIUM AND TAZOBACTAM SODIUM 4.5 G: 4; .5 INJECTION, POWDER, FOR SOLUTION INTRAVENOUS at 05:09

## 2024-09-27 RX ADMIN — IPRATROPIUM BROMIDE AND ALBUTEROL SULFATE 3 ML: 2.5; .5 SOLUTION RESPIRATORY (INHALATION) at 12:09

## 2024-09-27 RX ADMIN — SODIUM CHLORIDE: 9 INJECTION, SOLUTION INTRAVENOUS at 03:09

## 2024-09-27 RX ADMIN — GUAIFENESIN 200 MG: 200 SOLUTION ORAL at 01:09

## 2024-09-27 RX ADMIN — LIDOCAINE HYDROCHLORIDE 100 MG: 10 INJECTION, SOLUTION EPIDURAL; INFILTRATION; INTRACAUDAL at 12:09

## 2024-09-27 RX ADMIN — IOHEXOL 100 ML: 350 INJECTION, SOLUTION INTRAVENOUS at 02:09

## 2024-09-27 RX ADMIN — PIPERACILLIN SODIUM AND TAZOBACTAM SODIUM 4.5 G: 4; .5 INJECTION, POWDER, FOR SOLUTION INTRAVENOUS at 03:09

## 2024-09-27 RX ADMIN — ARFORMOTEROL TARTRATE 15 MCG: 15 SOLUTION RESPIRATORY (INHALATION) at 07:09

## 2024-09-27 RX ADMIN — BUDESONIDE 0.5 MG: 0.5 INHALANT ORAL at 07:09

## 2024-09-27 RX ADMIN — SODIUM CHLORIDE, POTASSIUM CHLORIDE, SODIUM LACTATE AND CALCIUM CHLORIDE: 600; 310; 30; 20 INJECTION, SOLUTION INTRAVENOUS at 05:09

## 2024-09-27 RX ADMIN — GUAIFENESIN 200 MG: 200 SOLUTION ORAL at 05:09

## 2024-09-27 RX ADMIN — GUAIFENESIN 200 MG: 200 SOLUTION ORAL at 09:09

## 2024-09-27 NOTE — HPI
Mr Daniels is a 67 y/o gentleman with a long smoking history, COPD (recently established with Dr Jimenez), prior prostate cancer, and HTN who is currently admitted to the Hospitalist service after coming in overnight with shortness of breath and being found to have a large left sided PTX.  Pt was recently admitted to the hospital for PNA (9/13 - 9/17) and was recovering well from that when he suddenly had a large coughing fit last night.  Became markedly short of breath after this, which prompted his presentation to the ER.  Chest tube was placed by the ER physician with good re-expansion, though he continues to have a brisk air leak.    CT after the chest tube placement shows trace remaining pneumothorax, large bleb on the left, approx 3.5 cm spiculated mass on the left, and some remaining bilateral consolidations.  Mr Daniels says that he is feeling much better after the chest tube placement, though still with some cough.    Interval History:  9/28 - continuous air leak with some reaccumulation on CXR late this morning.   Adjusted tube to try and optimize drainage.  It was a bit kinked at the skin, so resecured in a better position. Remains on suction.  9/29 - re-expansion improved following tube adjustments yesterday.  Continuous brisk air leak remains on suction.  No  new complaints.  9/30: CXR with lung up, continues with large volume air leak with and without suction, CTS consulted for eval and recs, no new issues or c/o noted on exam, POC and questions answered with pt and his wife  10/1 - no acute events overnight, pt continues with air leak to chest tube, detailed conversation with pt and wife this AM about treatment and possible plans moving forward - all questions answered wife bedside  10/2 - on RA today, pain better controlled with PO meds, plans for IR CT guided lung bx today   10/4 - remains on RA, feels better, s/p CT guided lung bx 10/3, CXR with extensive SQ air, CT remains with air leak but seems a  little less agressive than before   10/5 - essentially uncharged, feels well and is eating well, pain controlled with less meds, no new issues or c/o, remains on RA, walking the halls during the day   10/6 - doing well, continue with CT to left side with air leak that continues to slow, may be able to trial water seal in the coming 24 hours or so  10/7 - Chest tube now to water seal per CTS this am. Air leak minimal. Feels comfortable in bed just some irritation at chest tube insertion site. Dressing c/d/i.   10/8 - No complaints today. CT has been removed. Chest wall irritation improved. Awaiting pathology

## 2024-09-27 NOTE — ASSESSMENT & PLAN NOTE
-CT scan of chest with consolidations and left upper lobe and posteromedial right lower lobe concerning for pneumonia  -white blood cell count 12.07, COVID-19 negative, afebrile  -empiric IV Zosyn, O2 supplementation, p.r.n. DuoNebs  -check lactic acid level, procalcitonin level, sputum culture  -aspiration and fall precautions, speech therapy evaluation  -NPO, LR at 100 mL/hr for 1 L only  -pulmonary consult

## 2024-09-27 NOTE — HPI
68-year-old man with history of prostate cancer, tobacco abuse (quit 1-1/2 once ago), COPD (discharged with home oxygen via 2 L nasal cannula as of 09/17/2024), centrilobular emphysema, hypertension, anemia, and recent bilateral lower lobe pneumonia who presented to the emergency department for acute onset shortness a breath after a coughing spell.  Symptom onset occurred after the football game at approximately 10:00 p.m. in the evening, constant, and moderate-to-severe in nature.  Patient denies any associated chest pain, nausea, vomiting, fevers, chills.  Patient was originally 82% on room air on ER arrival.  He improved with nebulizer treatment, steroids, and CPAP.  Chest x-ray revealed left pneumothorax and pigtail catheter/chest tube was placed by ER physician.  Repeat chest x-ray showed improved lung inflation however there were significant abnormalities with underlying pneumonia and possible lung mass.  Stat CT scan of chest with contrast was ordered and showed spiculated left perihilar mass 3.7 x 2.5 x 3.6 cm suspicious for neoplasm; left upper lobe and posteromedial right lower lobe consolidations concerning for pneumonia; nonspecific mildly prominent mediastinal and left hilar lymph nodes with metastases possible; small hypodensity in right liver that is nonspecific but metastases not excluded; osseous metastases not excluded; small left and trace right pleural effusion; trace left apical pneumothorax; emphysematous changes with bulla in the left upper lobe.  Patient's wife is a retired nurse and they have been  for 25 years.  Patient still works as a  for [employer].    Recent hospital admit 9/13-09/17/2024 with bilateral pneumonia, hypoxia.  Chest x-ray 09/13/2024 with multifocal interstitial pneumonia.  Patient was discharged with Augmentin.  Patient was discharged with home oxygen via 2 L nasal cannula.  He had previously not required oxygen.  Of note, chest x-ray on  "09/09/2024 was read as negative for an acute process.    Patient was seen by primary care physician 09/24/2024 with repeat CXR showing "Findings suspicious for collapse of the left upper lobe with persistent consolidations and atelectasis on the left. Additionally a opaque masses seen at the level of the midthoracic spine. Recommend follow-up evaluation with CT with contrast."  Primary care physician reports speaking with patient's pulmonologist and with plan to order CT scan of chest without contrast as soon as possible.      "

## 2024-09-27 NOTE — PROGRESS NOTES
ST swallowing evaluation orders received and chart reviewed.  Pt currently NPO for medical management.  ST will f/u for assessment once diet initiated.

## 2024-09-27 NOTE — ASSESSMENT & PLAN NOTE
Patient's COPD is controlled currently.  Patient is currently off COPD Pathway. Continue p.r.n. DuoNebs, O2 supplementation, and antibiotics for pneumonia.  monitor respiratory status closely.   -patient did receive DuoNeb, prednisone 60 mg p.o., and terbutaline 0.25 mg while in the emergency department.

## 2024-09-27 NOTE — ASSESSMENT & PLAN NOTE
Patient with Hypoxic Respiratory failure which is Acute on chronic.  he is on home oxygen at 2 LPM. Supplemental oxygen was provided and noted-      .   Signs/symptoms of respiratory failure include- tachypnea. Contributing diagnoses includes - COPD, Pneumonia, and pneumothorax  Labs and images were reviewed. Patient Has recent ABG, which has been reviewed. Will treat underlying causes and adjust management of respiratory failure as follows:  -continuous pulse oximetry monitoring, O2 supplementation, p.r.n. Ayana  -left-sided chest tube/pigtail catheter in place  -empiric IV antibiotics with Zosyn  -pulmonary consult

## 2024-09-27 NOTE — ASSESSMENT & PLAN NOTE
- will ultimately needs a bx as concern for malignancy is high  - his acute illness with his PTX needs to be a bit more stable to come up with a good plan for bx

## 2024-09-27 NOTE — ASSESSMENT & PLAN NOTE
- I think is likely some residual findings from his recent infection and hospitalization  - empiric Abx are reasonable up front, but I think we can likely de-escalate quickly soon

## 2024-09-27 NOTE — ASSESSMENT & PLAN NOTE
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

## 2024-09-27 NOTE — ASSESSMENT & PLAN NOTE
Chronic, controlled. Latest blood pressure and vitals reviewed-     Temp:  [97.9 °F (36.6 °C)-98.2 °F (36.8 °C)]   Pulse:  [102-111]   Resp:  [18-34]   BP: (126-153)/()   SpO2:  [93 %-95 %] .   Home meds for hypertension were reviewed and noted below.   Hypertension Medications               valsartan (DIOVAN) 80 MG tablet Take 1 tablet (80 mg total) by mouth once daily.            While in the hospital, will manage blood pressure as follows; Adjust home antihypertensive regimen as follows- hold Diovan (patient only takes it p.r.n.) while NPO.  P.r.n. IV hydralazine with parameters.    Will utilize p.r.n. blood pressure medication only if patient's blood pressure greater than 160/100 and he develops symptoms such as worsening chest pain or shortness of breath.

## 2024-09-27 NOTE — ASSESSMENT & PLAN NOTE
Patient with Hypoxic Respiratory failure which is Acute.  he is not on home oxygen. Supplemental oxygen was provided and noted-      .   Signs/symptoms of respiratory failure include- respiratory distress. Contributing diagnoses includes - COPD and pneumothorax  Labs and images were reviewed. Patient Has recent ABG, which has been reviewed. Will treat underlying causes and adjust management of respiratory failure as follows- chest tube, supplemental oxygen.    Wean for goal SpO2 > 88%  Avoid high flow oxygen or NIPPV given PTX and ongoing air leak

## 2024-09-27 NOTE — ASSESSMENT & PLAN NOTE
Patient has not smoked in 1-1/2 months.  Counseled continued cessation.  No nicotine patch needed.

## 2024-09-27 NOTE — ASSESSMENT & PLAN NOTE
Anemia is likely due to  unclear etiology . Most recent hemoglobin and hematocrit are listed below.  Recent Labs     09/27/24  0037   HGB 11.2*   HCT 34.4*     Plan  - Monitor serial CBC: Daily  - Transfuse PRBC if patient becomes hemodynamically unstable, symptomatic or H/H drops below 7/21.  - Patient has not received any PRBC transfusions to date  - Patient's anemia is currently stable  - check B12 and folate levels

## 2024-09-27 NOTE — ASSESSMENT & PLAN NOTE
Patient's COPD is controlled currently.  Patient is currently off COPD Pathway. Continue scheduled inhalers  Pulmicort/Brovana nebs and Supplemental oxygen and monitor respiratory status closely.

## 2024-09-27 NOTE — ASSESSMENT & PLAN NOTE
-pigtail catheter/chest tube in place, continuous suction  -chest x-ray on ER arrival with large left-sided pneumothorax  -CT scan of chest with trace left apical pneumothorax now  -pulmonary consult  -O2 via 2 L nasal cannula  -continuous pulse oximetry monitoring

## 2024-09-27 NOTE — PHARMACY MED REC
"Admission Medication History     The home medication history was taken by Crispin Flowers.    You may go to "Admission" then "Reconcile Home Medications" tabs to review and/or act upon these items.     The home medication list has been updated by the Pharmacy department.   Please read ALL comments highlighted in yellow.   Please address this information as you see fit.    Feel free to contact us if you have any questions or require assistance.      The medications listed below were removed from the home medication list. Please reorder if appropriate:  Patient reports no longer taking the following medication(s):  AUGMENTIN 875MG    Medications listed below were obtained from: [employer]- [employer], Medical records, and Patient's pharmacy  (Not in a hospital admission)        Crispin Flowers  [id number]      Current Outpatient Medications on File Prior to Encounter   Medication Sig Dispense Refill Last Dose    azelastine (ASTELIN) 137 mcg (0.1 %) nasal spray 1 spray (137 mcg total) by Nasal route 2 (two) times daily. 30 mL 3     valsartan (DIOVAN) 80 MG tablet Take 1 tablet (80 mg total) by mouth once daily. 90 tablet 3    .          "

## 2024-09-27 NOTE — CONSULTS
[facility] - Telemetry ([facility])  Pulmonology  Consult Note    Patient Name: Aurelio Daniels  MRN: [id number]  Admission Date: 9/26/2024  Hospital Length of Stay: 0 days  Code Status: Full Code  Attending Physician: Kimmy Horan MD  Primary Care Provider: Shlomo Villatoro MD   Principal Problem: Spontaneous pneumothorax    Inpatient consult to Pulmonology  Consult performed by: Navneet Lester MD  Consult ordered by: Kimmy Horan MD        Subjective:     HPI:  Mr Daniels is a 69 y/o gentleman with a long smoking history, COPD (recently established with Dr Jimenez), prior prostate cancer, and HTN who is currently admitted to the Hospitalist service after coming in overnight with shortness of breath and being found to have a large left sided PTX.  Pt was recently admitted to the hospital for PNA (9/13 - 9/17) and was recovering well from that when he suddenly had a large coughing fit last night.  Became markedly short of breath after this, which prompted his presentation to the ER.  Chest tube was placed by the ER physician with good re-expansion, though he continues to have a brisk air leak.    CT after the chest tube placement shows trace remaining pneumothorax, large bleb on the left, approx 3.5 cm spiculated mass on the left, and some remaining bilateral consolidations.  Mr Daniels says that he is feeling much better after the chest tube placement, though still with some cough.    No new subjective & objective note has been filed under this hospital service since the last note was generated.      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  he is not on home oxygen. Supplemental oxygen was provided and noted-      .   Signs/symptoms of respiratory failure include- respiratory distress. Contributing diagnoses includes - COPD and pneumothorax  Labs and images were reviewed. Patient Has recent ABG, which has been reviewed. Will treat underlying causes and adjust management of respiratory failure as follows- chest tube, supplemental oxygen.    Wean for goal SpO2 > 88%  Avoid high flow oxygen or NIPPV given PTX and ongoing air leak    Mass of left lung  - will ultimately needs a bx as concern for malignancy is high  - his acute illness with his PTX needs to be a bit more stable to come up with a good plan for bx    Bilateral pneumonia  - I think is likely some residual findings from his recent infection and hospitalization  - empiric Abx are reasonable up front, but I think we can likely de-escalate quickly soon      Centrilobular emphysema  Patient's COPD is controlled currently.  Patient is currently off COPD Pathway. Continue scheduled inhalers  Pulmicort/Brovana nebs and Supplemental oxygen and monitor respiratory status closely.     Other  Tobacco abuse  - counseled on cessation and he is motivated to quit  - declined nicotine patch          Thank you for your consult. I will follow-up with patient. Please contact us if you have any additional questions.     Navneet Lester MD  Pulmonology  [facility] - Telemetry ([facility])

## 2024-09-27 NOTE — ED PROVIDER NOTES
SCRIBE #1 NOTE: IGeorge am scribing for, and in the presence of, Rodríguez Salazar Jr., MD. I have scribed the HPI, ROS, and PEx.     SCRIBE #2 NOTE: IAnselmo, am scribing for, and in the presence of,  Amanda Segura MD. I have scribed the remaining portions of the note not scribed by Scribe #1.      History     Chief Complaint   Patient presents with    Shortness of Breath     Episode of SOB that started after a coughing fit tonight. Recently admitted for pneumonia. 82% SpO2 upon EMS arrival with improvement after breathing treatment, steroids, and CPAP.     Review of patient's allergies indicates:  No Known Allergies      History of Present Illness     HPI    9/27/2024, 12:18 AM  History obtained from the patient      History of Present Illness: Aurelio Daniels is a 68 y.o. male patient with a PMHx of prostate cancer who presents to the Emergency Department for evaluation of worsening SOB which onset suddenly after a coughing fit tonight. Pt states he has not been on his O2 for the past 2 days. Pt states he was recently hospitalized for pneumonia. Symptoms are constant and moderate in severity. No mitigating or exacerbating factors reported. Associated sxs include diaphoresis. Patient denies any fever, chills, CP, HA, and all other sxs at this time. Prior Tx includes breathing treatment with EMS with improvement. No further complaints or concerns at this time.       Arrival mode: Ambulance Service     PCP: Shlomo Villatoro MD        Past Medical History:  Past Medical History:   Diagnosis Date    Cancer     Prostate    Tobacco use        Past Surgical History:  Past Surgical History:   Procedure Laterality Date    COLONOSCOPY      COLONOSCOPY N/A 5/20/2022    Procedure: COLONOSCOPY;  Surgeon: Jamia Alfaro MD;  Location: [facility];  Service: Endoscopy;  Laterality: N/A;    VASECTOMY  1985         Family History:  Family History   Problem Relation Name Age of Onset    No Known Problems  Mother      Heart disease Father      No Known Problems Brother      No Known Problems Brother      No Known Problems Daughter      No Known Problems Daughter         Social History:  Social History     Tobacco Use    Smoking status: Some Days     Current packs/day: 0.00     Types: Cigarettes     Last attempt to quit: 1/15/2022     Years since quittin.7     Passive exposure: Current    Smokeless tobacco: Never   Substance and Sexual Activity    Alcohol use: Yes     Alcohol/week: 2.0 standard drinks of alcohol     Types: 2 Shots of liquor per week    Drug use: Never    Sexual activity: Not Currently     Partners: Female        Review of Systems     Review of Systems   Constitutional:  Positive for diaphoresis. Negative for chills and fever.   HENT:  Negative for sore throat.    Respiratory:  Positive for cough and shortness of breath.    Cardiovascular:  Negative for chest pain.   Gastrointestinal:  Negative for abdominal pain, nausea and vomiting.   Genitourinary:  Negative for dysuria.   Musculoskeletal:  Negative for back pain.   Skin:  Negative for rash.   Neurological:  Negative for weakness and headaches.   Hematological:  Does not bruise/bleed easily.   All other systems reviewed and are negative.     Physical Exam     Initial Vitals [24 2334]   BP Pulse Resp Temp SpO2   (!) 153/102 (!) 111 18 98.2 °F (36.8 °C) (!) 94 %      MAP       --          Physical Exam  Nursing Notes and Vital Signs Reviewed.  Constitutional: Patient is in no acute distress. Well-developed and well-nourished.  Head: Atraumatic. Normocephalic.  Eyes:  EOM intact.  No scleral icterus.  ENT: Mucous membranes are moist.  Nares clear   Neck:  Full ROM. No JVD.  Cardiovascular: Regular rate. Regular rhythm No murmurs, rubs, or gallops. Distal pulses are 2+ and symmetric  Pulmonary/Chest: No respiratory distress. Clear to auscultation bilaterally. No wheezing or rales.  Equal chest wall rise bilaterally  Abdominal: Soft and  non-distended.  There is no tenderness.  No rebound, guarding, or rigidity. Good bowel sounds.  Genitourinary: No CVA tenderness.  No suprapubic tenderness  Musculoskeletal: Moves all extremities. No obvious deformities.  5 x 5 strength in all extremities   Skin: Warm and dry.  Neurological:  Alert, awake, and appropriate.  Normal speech.  No acute focal neurological deficits are appreciated.  Two through 12 intact bilaterally.  Psychiatric: Normal affect. Good eye contact. Appropriate in content.     ED Course   Critical Care    Date/Time: 9/27/2024 1:26 AM    Performed by: Rodríguez Salazar Jr., MD  Authorized by: Rodríguez Salazar Jr., MD  Direct patient critical care time: 15 minutes  Additional history critical care time: 5 minutes  Ordering / reviewing critical care time: 7 minutes  Documentation critical care time: 10 minutes  Consulting other physicians critical care time: 5 minutes  Consult with family critical care time: 8 minutes  Total critical care time (exclusive of procedural time) : 50 minutes  Critical care time was exclusive of separately billable procedures and treating other patients and teaching time.  Critical care was necessary to treat or prevent imminent or life-threatening deterioration of the following conditions: respiratory failure.  Critical care was time spent personally by me on the following activities: discussions with consultants, development of treatment plan with patient or surrogate, evaluation of patient's response to treatment, interpretation of cardiac output measurements, obtaining history from patient or surrogate, examination of patient, ordering and performing treatments and interventions, ordering and review of laboratory studies, ordering and review of radiographic studies, pulse oximetry, re-evaluation of patient's condition and review of old charts.      Chest Tube    Date/Time: 9/27/2024 1:27 AM  Location procedure was performed: [facility] EMERGENCY  "DEPARTMENT    Performed by: Rodríguez Salazar Jr., MD  Authorized by: Rodríguez Salazar Jr., MD  Post-operative diagnosis: Same  Pre-operative diagnosis: Pneumothorax left  Consent Done: Yes  Consent: Written consent obtained.  Risks and benefits: risks, benefits and alternatives were discussed  Consent given by: patient  Patient understanding: patient states understanding of the procedure being performed  Patient consent: the patient's understanding of the procedure matches consent given  Procedure consent: procedure consent matches procedure scheduled  Relevant documents: relevant documents present and verified  Test results: test results available and properly labeled  Imaging studies: imaging studies available  Required items: required blood products, implants, devices, and special equipment available  Patient identity confirmed: verbally with patient,  and name  Time out: Immediately prior to procedure a "time out" was called to verify the correct patient, procedure, equipment, support staff and site/side marked as required.  Indications: pneumothorax    Patient sedated: no  Anesthesia: local infiltration    Anesthesia:  Local Anesthetic: lidocaine 1% with epinephrine  Anesthetic total: 8 mL  Preparation: skin prepped with ChloraPrep and skin prepped with Betadine  Placement location: left lateral  Scalpel size: 11  Tube size: minicatheter  Tension pneumothorax heard: no  Tube connected to: suction  Drainage characteristics: clear  Suture material: 0 silk  Dressing: 4x4 sterile gauze and Xeroform gauze  Post-insertion x-ray findings: tube in good position  Complications: No  Specimens: No  Implants: No        ED Vital Signs:  Vitals:    24 2325 24 0015 24 0300 24 0430   BP:  128/82  (!) 146/78   Pulse: 88 88 70 75   Resp:  18  18   Temp:  98.3 °F (36.8 °C)  97.5 °F (36.4 °C)   TempSrc:  Oral  Oral   SpO2:  96%  96%   Weight:       Height:        24 0500 24 0705 24 " 0731 09/28/24 0732   BP:   (!) 141/86    Pulse: 78 69 75 82   Resp:   18 18   Temp:   98.1 °F (36.7 °C)    TempSrc:   Oral    SpO2:   96% 96%   Weight:       Height:        09/28/24 0734 09/28/24 0806 09/28/24 0900 09/28/24 1100   BP:       Pulse: 75 69 77 77   Resp: 16      Temp:       TempSrc:       SpO2:       Weight:       Height:        09/28/24 1119 09/28/24 1513 09/28/24 1543   BP: 124/72  126/79   Pulse: 74 70 71   Resp: 18  18   Temp: 98 °F (36.7 °C)  98.5 °F (36.9 °C)   TempSrc: Oral  Oral   SpO2: 96%  96%   Weight:      Height:          Abnormal Lab Results:  Labs Reviewed   CBC W/ AUTO DIFFERENTIAL - Abnormal       Result Value    WBC 12.07      RBC 3.47 (*)     Hemoglobin 11.2 (*)     Hematocrit 34.4 (*)     MCV 99 (*)     MCH 32.3 (*)     MCHC 32.6      RDW 13.5      Platelets 463 (*)     MPV 8.4 (*)     Immature Granulocytes 1.7 (*)     Gran # (ANC) 10.7 (*)     Immature Grans (Abs) 0.21 (*)     Lymph # 0.7 (*)     Mono # 0.3      Eos # 0.1      Baso # 0.07      nRBC 0      Gran % 88.4 (*)     Lymph % 6.1 (*)     Mono % 2.5 (*)     Eosinophil % 0.7      Basophil % 0.6      Differential Method Automated     COMPREHENSIVE METABOLIC PANEL - Abnormal    Sodium 137      Potassium 4.3      Chloride 107      CO2 22 (*)     Glucose 137 (*)     BUN 14      Creatinine 0.8      Calcium 9.0      Total Protein 6.3      Albumin 3.2 (*)     Total Bilirubin 0.2      Alkaline Phosphatase 113      AST 21      ALT 21      eGFR >60      Anion Gap 8     TROPONIN I - Abnormal    Troponin I 0.214 (*)    LACTIC ACID, PLASMA - Abnormal    Lactate (Lactic Acid) 4.1 (*)     Narrative:      LACTIC ACID  critical result(s) called and verbal readback obtained   from YULISSA SIERRA RN by HARSHIL 09/27/2024 06:04   TROPONIN I - Abnormal    Troponin I 0.327 (*)    TROPONIN I - Abnormal    Troponin I 0.309 (*)    LACTIC ACID, PLASMA - Abnormal    Lactate (Lactic Acid) 4.0 (*)     Narrative:     LACTIC ACID   critical result(s) called and  verbal readback obtained   from JEAN CARLOS SERRATO RN by HARSHIL 09/27/2024 08:47   ISTAT PROCEDURE - Abnormal    POC PH 7.408      POC PCO2 35.4      POC PO2 67 (*)     POC HCO3 22.4 (*)     POC BE -2      POC SATURATED O2 93      Sample ARTERIAL      Site LR      Allens Test Pass      DelSys Nasal Can      Mode SPONT      Flow 2     B-TYPE NATRIURETIC PEPTIDE    BNP 64     SARS-COV-2 RNA AMPLIFICATION, QUAL    SARS-CoV-2 RNA, Amplification, Qual Negative     PROCALCITONIN    Procalcitonin 0.04     PROTIME-INR    Prothrombin Time 10.9      INR 1.0          All Lab Results:  Results for orders placed or performed during the hospital encounter of 09/26/24   EKG 12-lead    Collection Time: 09/27/24 12:08 AM   Result Value Ref Range    QRS Duration 64 ms    [facility] QTC Calculation 452 ms   COVID-19 Rapid Screening    Collection Time: 09/27/24 12:31 AM   Result Value Ref Range    SARS-CoV-2 RNA, Amplification, Qual Negative Negative   ISTAT PROCEDURE    Collection Time: 09/27/24 12:31 AM   Result Value Ref Range    POC PH 7.408 7.35 - 7.45    POC PCO2 35.4 35 - 45 mmHg    POC PO2 67 (L) 80 - 100 mmHg    POC HCO3 22.4 (L) 24 - 28 mmol/L    POC BE -2 -2 to 2 mmol/L    POC SATURATED O2 93 95 - 100 %    Sample ARTERIAL     Site LR     Allens Test Pass     DelSys Nasal Can     Mode SPONT     Flow 2    CBC auto differential    Collection Time: 09/27/24 12:37 AM   Result Value Ref Range    WBC 12.07 3.90 - 12.70 K/uL    RBC 3.47 (L) 4.60 - 6.20 M/uL    Hemoglobin 11.2 (L) 14.0 - 18.0 g/dL    Hematocrit 34.4 (L) 40.0 - 54.0 %    MCV 99 (H) 82 - 98 fL    MCH 32.3 (H) 27.0 - 31.0 pg    MCHC 32.6 32.0 - 36.0 g/dL    RDW 13.5 11.5 - 14.5 %    Platelets 463 (H) 150 - 450 K/uL    MPV 8.4 (L) 9.2 - 12.9 fL    Immature Granulocytes 1.7 (H) 0.0 - 0.5 %    Gran # (ANC) 10.7 (H) 1.8 - 7.7 K/uL    Immature Grans (Abs) 0.21 (H) 0.00 - 0.04 K/uL    Lymph # 0.7 (L) 1.0 - 4.8 K/uL    Mono # 0.3 0.3 - 1.0 K/uL    Eos # 0.1 0.0 - 0.5 K/uL    Baso # 0.07  0.00 - 0.20 K/uL    nRBC 0 0 /100 WBC    Gran % 88.4 (H) 38.0 - 73.0 %    Lymph % 6.1 (L) 18.0 - 48.0 %    Mono % 2.5 (L) 4.0 - 15.0 %    Eosinophil % 0.7 0.0 - 8.0 %    Basophil % 0.6 0.0 - 1.9 %    Differential Method Automated    Comprehensive metabolic panel    Collection Time: 09/27/24 12:37 AM   Result Value Ref Range    Sodium 137 136 - 145 mmol/L    Potassium 4.3 3.5 - 5.1 mmol/L    Chloride 107 95 - 110 mmol/L    CO2 22 (L) 23 - 29 mmol/L    Glucose 137 (H) 70 - 110 mg/dL    BUN 14 8 - 23 mg/dL    Creatinine 0.8 0.5 - 1.4 mg/dL    Calcium 9.0 8.7 - 10.5 mg/dL    Total Protein 6.3 6.0 - 8.4 g/dL    Albumin 3.2 (L) 3.5 - 5.2 g/dL    Total Bilirubin 0.2 0.1 - 1.0 mg/dL    Alkaline Phosphatase 113 55 - 135 U/L    AST 21 10 - 40 U/L    ALT 21 10 - 44 U/L    eGFR >60 >60 mL/min/1.73 m^2    Anion Gap 8 8 - 16 mmol/L   Brain natriuretic peptide    Collection Time: 09/27/24 12:37 AM   Result Value Ref Range    BNP 64 0 - 99 pg/mL   Troponin I    Collection Time: 09/27/24 12:37 AM   Result Value Ref Range    Troponin I 0.214 (H) 0.000 - 0.026 ng/mL   Vitamin B12    Collection Time: 09/27/24  5:31 AM   Result Value Ref Range    Vitamin B-12 >2000 (H) 210 - 950 pg/mL   Folate    Collection Time: 09/27/24  5:31 AM   Result Value Ref Range    Folate 9.8 4.0 - 24.0 ng/mL   Lactic acid, plasma    Collection Time: 09/27/24  5:31 AM   Result Value Ref Range    Lactate (Lactic Acid) 4.1 (HH) 0.5 - 2.2 mmol/L   Procalcitonin    Collection Time: 09/27/24  5:31 AM   Result Value Ref Range    Procalcitonin 0.04 <0.25 ng/mL   Troponin I    Collection Time: 09/27/24  5:31 AM   Result Value Ref Range    Troponin I 0.327 (H) 0.000 - 0.026 ng/mL   Protime-INR    Collection Time: 09/27/24  5:31 AM   Result Value Ref Range    Prothrombin Time 10.9 9.0 - 12.5 sec    INR 1.0 0.8 - 1.2   Troponin I    Collection Time: 09/27/24  8:20 AM   Result Value Ref Range    Troponin I 0.309 (H) 0.000 - 0.026 ng/mL   Lactic acid, plasma    Collection  Time: 09/27/24  8:20 AM   Result Value Ref Range    Lactate (Lactic Acid) 4.0 (HH) 0.5 - 2.2 mmol/L   Echo    Collection Time: 09/27/24  8:28 AM   Result Value Ref Range    BSA 1.87 m2    LVOT stroke volume 61.46 cm3    LVIDd 4.13 3.5 - 6.0 cm    LV Systolic Volume 30.42 mL    LV Systolic Volume Index 16.3 mL/m2    LVIDs 2.83 2.1 - 4.0 cm    LV Diastolic Volume 75.58 mL    LV Diastolic Volume Index 40.42 mL/m2    Left Ventricular End Systolic Volume by Teichholz Method 30.42 mL    Left Ventricular End Diastolic Volume by Teichholz Method 75.58 mL    IVS 1.13 (A) 0.6 - 1.1 cm    LVOT diameter 2.03 cm    LVOT area 3.2 cm2    FS 31 28 - 44 %    Left Ventricle Relative Wall Thickness 0.56 cm    PW 1.15 (A) 0.6 - 1.1 cm    LV mass 161.12 g    LV Mass Index 86 g/m2    MV Peak E Poncho 0.74 m/s    TDI LATERAL 0.11 m/s    TDI SEPTAL 0.11 m/s    E/E' ratio 6.73 m/s    MV Peak A Poncho 0.84 m/s    TR Max Poncho 2.63 m/s    E/A ratio 0.88     IVRT 79.92 msec    E wave deceleration time 212.74 msec    LV SEPTAL E/E' RATIO 6.73 m/s    LV LATERAL E/E' RATIO 6.73 m/s    LVOT peak poncho 0.84 m/s    Left Ventricular Outflow Tract Mean Velocity 0.64 cm/s    Left Ventricular Outflow Tract Mean Gradient 1.76 mmHg    RVOT peak VTI 10.3 cm    TAPSE 2.01 cm    LA size 2.82 cm    Left Atrium Minor Axis 3.89 cm    Left Atrium Major Axis 4.44 cm    RA Major Axis 4.34 cm    AV mean gradient 4 mmHg    AV peak gradient 5 mmHg    Ao peak poncho 1.12 m/s    Ao VTI 27.50 cm    LVOT peak VTI 19.00 cm    AV valve area 2.24 cm²    AV Velocity Ratio 0.75     AV index (prosthetic) 0.69     MARJORIE by Velocity Ratio 2.43 cm²    MV stenosis pressure 1/2 time 61.69 ms    MV valve area p 1/2 method 3.57 cm2    Triscuspid Valve Regurgitation Peak Gradient 28 mmHg    PV mean gradient 1 mmHg    RVOT peak poncho 0.57 m/s    Ao root annulus 3.17 cm    STJ 3.32 cm    Ascending aorta 3.39 cm    IVC diameter 0.91 cm    Mean e' 0.11 m/s    ZLVIDS -1.01     ZLVIDD -2.33     DOT 17.5  mL/m2    LA Vol 32.80 cm3    LA WIDTH 3.3 cm    RA Width 2.9 cm    TV resting pulmonary artery pressure 31 mmHg    RV TB RVSP 6 mmHg    Est. RA pres 3 mmHg   CBC Auto Differential    Collection Time: 09/28/24  4:48 AM   Result Value Ref Range    WBC 13.47 (H) 3.90 - 12.70 K/uL    RBC 3.49 (L) 4.60 - 6.20 M/uL    Hemoglobin 11.1 (L) 14.0 - 18.0 g/dL    Hematocrit 34.4 (L) 40.0 - 54.0 %    MCV 99 (H) 82 - 98 fL    MCH 31.8 (H) 27.0 - 31.0 pg    MCHC 32.3 32.0 - 36.0 g/dL    RDW 14.1 11.5 - 14.5 %    Platelets 429 150 - 450 K/uL    MPV 8.4 (L) 9.2 - 12.9 fL    Immature Granulocytes 0.7 (H) 0.0 - 0.5 %    Gran # (ANC) 11.5 (H) 1.8 - 7.7 K/uL    Immature Grans (Abs) 0.10 (H) 0.00 - 0.04 K/uL    Lymph # 1.2 1.0 - 4.8 K/uL    Mono # 0.6 0.3 - 1.0 K/uL    Eos # 0.1 0.0 - 0.5 K/uL    Baso # 0.03 0.00 - 0.20 K/uL    nRBC 0 0 /100 WBC    Gran % 85.4 (H) 38.0 - 73.0 %    Lymph % 9.1 (L) 18.0 - 48.0 %    Mono % 4.2 4.0 - 15.0 %    Eosinophil % 0.4 0.0 - 8.0 %    Basophil % 0.2 0.0 - 1.9 %    Differential Method Automated    Comprehensive metabolic panel    Collection Time: 09/28/24  4:48 AM   Result Value Ref Range    Sodium 140 136 - 145 mmol/L    Potassium 4.1 3.5 - 5.1 mmol/L    Chloride 109 95 - 110 mmol/L    CO2 23 23 - 29 mmol/L    Glucose 104 70 - 110 mg/dL    BUN 12 8 - 23 mg/dL    Creatinine 0.8 0.5 - 1.4 mg/dL    Calcium 8.8 8.7 - 10.5 mg/dL    Total Protein 5.6 (L) 6.0 - 8.4 g/dL    Albumin 2.9 (L) 3.5 - 5.2 g/dL    Total Bilirubin 0.5 0.1 - 1.0 mg/dL    Alkaline Phosphatase 101 55 - 135 U/L    AST 17 10 - 40 U/L    ALT 22 10 - 44 U/L    eGFR >60 >60 mL/min/1.73 m^2    Anion Gap 8 8 - 16 mmol/L   Culture, Respiratory with Gram Stain    Collection Time: 09/28/24  6:00 AM    Specimen: Sputum   Result Value Ref Range    Gram Stain (Respiratory) <10 epithelial cells per low power field.     Gram Stain (Respiratory) No WBC's     Gram Stain (Respiratory) Few Gram positive cocci     Gram Stain (Respiratory) Rare Gram  negative rods     Gram Stain (Respiratory) Rare yeast    Lactic acid, plasma    Collection Time: 09/28/24  6:03 AM   Result Value Ref Range    Lactate (Lactic Acid) 2.2 0.5 - 2.2 mmol/L         Imaging Results:  Imaging Results              CT Chest With Contrast (Final result)  Result time 09/27/24 07:49:20      Final result by Stew Wells MD (09/27/24 07:49:20)                   Impression:      1. Spiculated left perihilar mass measuring approximately 3.7 x 2.5 x 3.6 cm, concerning for neoplasm.  2. Consolidations in the left upper lobe and posterior medial right lower lobe, concerning for pneumonia.  3. Nonspecific mildly prominent mediastinal and left hilar lymph nodes. Gabrielle metastases are possible.  4. Small hypodensity in the right liver is nonspecific. Hepatic metastasis is non-excluded.  5.  Small sclerotic lesions at T12 and L1, metastatic disease is not excluded.  6. Small left and trace right pleural effusions. Trace left apical pneumothorax with chest tube in place.  7. Emphysematous changes. Bulla noted in the left upper lobe.      Electronically signed by: Stew Wells  Date:    09/27/2024  Time:    07:49               Narrative:    EXAMINATION:  CT CHEST WITH CONTRAST    CLINICAL HISTORY:  Abnormal xray - lung nodule, >= 1 cm;    COMPARISON:  Chest radiograph from earlier today.    TECHNIQUE:  Axial CT images were obtained of the CHEST.  Iterative reconstruction technique was used. The CT exam was performed using one or more of the following dose reduction techniques- Automated exposure control, adjustment of the mA and/or kV according to patient size, and/or use of iterative reconstructed technique.    FINDINGS:  Lungs: Spiculated left perihilar mass measuring approximately 3.7 x 2.5 x 3.6 cm, concerning for neoplasm. Consolidations in the left upper lobe and posterior medial right lower lobe, concerning for pneumonia. Emphysematous changes. Bulla noted in the left upper lobe.  Pleural  space: Small left and trace right pleural effusions. Trace left apical pneumothorax.  Heart: Unremarkable. No cardiomegaly. No significant pericardial effusion. No significant coronary artery calcifications.  Mediastinum: Nonspecific mildly prominent mediastinal and left hilar lymph nodes. Gabrielle metastases are possible. Small hiatal hernia.  Bones/joints: Degenerative changes of the spine. Nonspecific small sclerotic foci in the T12 and L1 vertebral bodies are nonspecific, metastatic disease not excluded..  No acute fracture. No dislocation.  Soft tissues: Minimal subcutaneous emphysema along the chest tube at the left chest wall.  Vasculature: Unremarkable. No thoracic aortic aneurysm.  Lymph nodes: See above.  Liver: Small hypodensity in the right liver is nonspecific. Hepatic metastasis is non-excluded.  Spleen: Small splenule.  Tubes, lines and devices: Left apical chest tube in place.                                         X-Ray Chest AP Portable (Final result)  Result time 09/27/24 06:43:54      Final result by Ottoniel Breaux MD (09/27/24 06:43:54)                   Impression:      1.  Overall, there is been interval improvement with resolution of the left pneumothorax with placement of a small bore chest tube.      Electronically signed by: Ottoniel Breaux MD  Date:    09/27/2024  Time:    06:43               Narrative:    EXAMINATION:  XR CHEST AP PORTABLE    CLINICAL HISTORY:  s/p thoracostomy tube;    COMPARISON:  A study performed 1 hour earlier    FINDINGS:  There is been interval placement of a pneumo dart in the left pleural space with resolution of the left pneumothorax.  Redemonstration of the large angular mid and lower left lung opacity when compared to September 24, 2024. The right lung is clear.  The hilar and mediastinal contours and osseous structures are unchanged.                                       X-Ray Chest AP Portable (Final result)  Result time 09/27/24 06:45:17      Final result by  Ottoniel Breaux MD (09/27/24 06:45:17)                   Impression:      1.  Large left pneumothorax.  I agree with the preliminary interpretation rendered.      Electronically signed by: Ottoniel Breaux MD  Date:    09/27/2024  Time:    06:45               Narrative:    EXAMINATION:  XR CHEST AP PORTABLE    CLINICAL HISTORY:  dyspnea;    COMPARISON:  September 24, 2024    FINDINGS:  There is been interval development of a large left pneumothorax, with near complete collapse of the left lung.  The right lung is clear.  The cardiac silhouette size is normal. The trachea is midline and the mediastinal width is normal. Negative for right effusion or pneumothorax.  Pulmonary vasculature is normal. Negative for osseous abnormalities. Convex right curvature of the thoracic spine and tortuous aorta again seen.                        Wet Read by Rodríguez Salazar Jr., MD (09/27/24 00:35:26, [facility] - Emergency Dept., Emergency Medicine)    Left-sided pneumothorax                                     The EKG was ordered, reviewed, and independently interpreted by the ED provider.  Interpretation time: 00:08  Rate: 110 BPM  Rhythm: sinus tachycardia  Interpretation: No acute ST changes. No STEMI.           The Emergency Provider reviewed the vital signs and test results, which are outlined above.     ED Discussion     2:00 AM: Dr. Salazar transfers care of patient to Dr. Segura pending CT chest.    3:33 AM: Discussed case with Dr. Horan ([facility]). Dr. horan agrees with current care and management of pt and accepts admission.   Admitting Service: Hospital Medicine  Admitting Physician: Dr. Horan  Admit to: inpt/tele   3:33 AM: Re-evaluated pt. I have discussed test results, shared treatment plan, and the need for admission with patient and family at bedside. Pt and family express understanding at this time and agree with all information. All questions answered. Pt and family have no further questions or concerns at this  time. Pt is ready for admit.     ED Course as of 09/28/24 1611   Fri Sep 27, 2024   0024 Cardiac monitor interpretation  Independent interpretation  Indication: Shortness breath  Sinus tachycardia.  Rate 106.  No STEMI [RT]      ED Course User Index  [RT] Rodríguez Salazar Jr., MD     Medical Decision Making  Amount and/or Complexity of Data Reviewed  Labs: ordered. Decision-making details documented in ED Course.  Radiology: ordered and independent interpretation performed. Decision-making details documented in ED Course.  ECG/medicine tests: ordered and independent interpretation performed. Decision-making details documented in ED Course.    Risk  Prescription drug management.  Decision regarding hospitalization.                ED Medication(s):  Medications   sodium chloride 0.9% flush 10 mL (has no administration in time range)   naloxone 0.4 mg/mL injection 0.02 mg (has no administration in time range)   glucose chewable tablet 16 g (has no administration in time range)   glucose chewable tablet 24 g (has no administration in time range)   glucagon (human recombinant) injection 1 mg (has no administration in time range)   lactated ringers infusion (0 mL/hr Intravenous Stopped 9/27/24 1500)   piperacillin-tazobactam (ZOSYN) 4.5 g in D5W 100 mL IVPB (MB+) (4.5 g Intravenous New Bag 9/28/24 1528)   benzonatate capsule 100 mg (100 mg Oral Not Given 9/27/24 0520)   guaiFENesin 100 mg/5 ml syrup 200 mg (200 mg Oral Given 9/28/24 1532)   dextrose 10% bolus 125 mL 125 mL (has no administration in time range)   dextrose 10% bolus 250 mL 250 mL (has no administration in time range)   hydrALAZINE injection 10 mg (has no administration in time range)   ondansetron injection 4 mg (has no administration in time range)   morphine injection 2 mg (has no administration in time range)   acetaminophen tablet 650 mg (has no administration in time range)   albuterol-ipratropium 2.5 mg-0.5 mg/3 mL nebulizer solution 3 mL (has no  administration in time range)   budesonide nebulizer solution 0.5 mg (0.5 mg Nebulization Given 9/28/24 0734)   arformoteroL nebulizer solution 15 mcg (15 mcg Nebulization Given 9/28/24 0732)   0.9%  NaCl infusion ( Intravenous New Bag 9/28/24 0633)   linezolid tablet 600 mg (600 mg Oral Given 9/28/24 0928)   albuterol-ipratropium 2.5 mg-0.5 mg/3 mL nebulizer solution 3 mL (3 mLs Nebulization Given 9/27/24 0030)   terbutaline injection 0.25 mg (0.25 mg Subcutaneous Given 9/27/24 0039)   predniSONE tablet 60 mg (60 mg Oral Given 9/27/24 0039)   LIDOcaine (PF) 10 mg/ml (1%) injection 100 mg (100 mg Infiltration Given 9/27/24 0052)   iohexoL (OMNIPAQUE 350) injection 100 mL (100 mLs Intravenous Given 9/27/24 0239)       Current Discharge Medication List                  Scribe Attestation:   Scribe #1: I performed the above scribed service and the documentation accurately describes the services I performed. I attest to the accuracy of the note.     Attending:   Physician Attestation Statement for Scribe #1: I, Rodríguez Salazar Jr., MD, personally performed the services described in this documentation, as scribed by George Diamond, in my presence, and it is both accurate and complete.       Scribe Attestation:   Scribe #2: I performed the above scribed service and the documentation accurately describes the services I performed. I attest to the accuracy of the note.    Attending Attestation:           Physician Attestation for Scribe:    Physician Attestation Statement for Scribe #2: I, Amanda Segura MD, reviewed documentation, as scribed by Anselmo Samuels in my presence, and it is both accurate and complete. I also acknowledge and confirm the content of the note done by Scribe #1.           Clinical Impression       ICD-10-CM ICD-9-CM   1. Acute on chronic respiratory failure with hypoxemia  J96.21 518.84   2. Pneumothorax on left  J93.9 512.89   3. Troponin I above reference range  R79.89 790.6   4. Mass of left lung  R91.8  786.6   5. SOB (shortness of breath)  R06.02 786.05   6. Chest pain  R07.9 786.50   7. Elevated troponin I level  R79.89 790.6       Disposition:   Disposition: Admitted  Condition: Amanda Garcia MD  09/28/24 3507

## 2024-09-27 NOTE — PLAN OF CARE
[facility] - Emergency Dept.  Initial Discharge Assessment       Primary Care Provider: Shlomo Villatoro MD    Admission Diagnosis: Acute on chronic respiratory failure with hypoxemia [J96.21]    Admission Date: 9/26/2024  Expected Discharge Date:     Transition of Care Barriers: None    Payor: [employer] / Plan: [employer] CHOICES / Product Type: Medicare Advantage /     Extended Emergency Contact Information  Primary Emergency Contact: Eladia Daniels  Mobile Phone: [phone]  Relation: Spouse  Preferred language: English   needed? No    Discharge Plan A: Home with family  Discharge Plan B: Home Health      [facility] - [place], [place] - [address] [address]  [address] [address]  [facility] [address]  Phone: [phone] Fax: [phone]      Initial Assessment (most recent)       Adult Discharge Assessment - 09/27/24 1137          Discharge Assessment    Assessment Type Discharge Planning Assessment     Confirmed/corrected address, phone number and insurance Yes     Confirmed Demographics Correct on Facesheet     Source of Information patient;family     Communicated YUE with patient/caregiver Date not available/Unable to determine     People in Home spouse     Do you expect to return to your current living situation? Yes     Do you have help at home or someone to help you manage your care at home? Yes     Prior to hospitilization cognitive status: Alert/Oriented     Current cognitive status: Alert/Oriented     Walking or Climbing Stairs Difficulty no     Equipment Currently Used at Home oxygen     Readmission within 30 days? No     Patient currently being followed by outpatient case management? No     Do you currently have service(s) that help you manage your care at home? No     Do you take prescription medications? Yes     Do you have prescription coverage? Yes     Do you have any problems affording any of your prescribed medications? No     Is the patient taking medications as  prescribed? yes     Who is going to help you get home at discharge? wife     How do you get to doctors appointments? family or friend will provide     Are you on dialysis? No     Do you take coumadin? No     Discharge Plan A Home with family     Discharge Plan B Home Health     DME Needed Upon Discharge  none     Discharge Plan discussed with: Patient;Spouse/sig other     Transition of Care Barriers None        Physical Activity    On average, how many days per week do you engage in moderate to strenuous exercise (like a brisk walk)? 0 days     On average, how many minutes do you engage in exercise at this level? 0 min        Financial Resource Strain    How hard is it for you to pay for the very basics like food, housing, medical care, and heating? Not hard at all        Housing Stability    In the last 12 months, was there a time when you were not able to pay the mortgage or rent on time? No     At any time in the past 12 months, were you homeless or living in a shelter (including now)? No        Transportation Needs    Has the lack of transportation kept you from medical appointments, meetings, work or from getting things needed for daily living? No        Food Insecurity    Within the past 12 months, you worried that your food would run out before you got the money to buy more. Never true     Within the past 12 months, the food you bought just didn't last and you didn't have money to get more. Never true        Stress    Do you feel stress - tense, restless, nervous, or anxious, or unable to sleep at night because your mind is troubled all the time - these days? Not at all        Social Isolation    How often do you feel lonely or isolated from those around you?  Never        Alcohol Use    Q1: How often do you have a drink containing alcohol? 2-4 times a month     Q2: How many drinks containing alcohol do you have on a typical day when you are drinking? 1 or 2     Q3: How often do you have six or more drinks on  one occasion? Never        Utilities    In the past 12 months has the electric, gas, oil, or water company threatened to shut off services in your home? No        Health Literacy    How often do you need to have someone help you when you read instructions, pamphlets, or other written material from your doctor or pharmacy? Never

## 2024-09-27 NOTE — SUBJECTIVE & OBJECTIVE
Past Medical History:   Diagnosis Date    Cancer     Prostate    Tobacco use        Past Surgical History:   Procedure Laterality Date    COLONOSCOPY      COLONOSCOPY N/A 2022    Procedure: COLONOSCOPY;  Surgeon: Jamia Alfaro MD;  Location: [facility];  Service: Endoscopy;  Laterality: N/A;    VASECTOMY         Review of patient's allergies indicates:  No Known Allergies    No current facility-administered medications on file prior to encounter.     Current Outpatient Medications on File Prior to Encounter   Medication Sig    amoxicillin-clavulanate 875-125mg (AUGMENTIN) 875-125 mg per tablet Take 1 tablet by mouth 2 (two) times daily. (Patient not taking: Reported on 2024)    azelastine (ASTELIN) 137 mcg (0.1 %) nasal spray 1 spray (137 mcg total) by Nasal route 2 (two) times daily.    valsartan (DIOVAN) 80 MG tablet Take 1 tablet (80 mg total) by mouth once daily.     Family History       Problem Relation (Age of Onset)    Heart disease Father    No Known Problems Mother, Brother, Brother, Daughter, Daughter          Tobacco Use    Smoking status: Some Days     Current packs/day: 0.00     Types: Cigarettes     Last attempt to quit: 1/15/2022     Years since quittin.7     Passive exposure: Current    Smokeless tobacco: Never   Substance and Sexual Activity    Alcohol use: Yes     Alcohol/week: 2.0 standard drinks of alcohol     Types: 2 Shots of liquor per week    Drug use: Never    Sexual activity: Not Currently     Partners: Female     Review of Systems   Constitutional:  Negative for chills and fever.   Respiratory:  Positive for cough and shortness of breath. Negative for wheezing.    Cardiovascular:  Negative for chest pain and leg swelling.   Gastrointestinal:  Negative for nausea and vomiting.   All other systems reviewed and are negative.    Objective:     Vital Signs (Most Recent):  Temp: 97.9 °F (36.6 °C) (24)  Pulse: 102 (24)  Resp: (!) 29 (24)  BP:  (!) 145/76 (09/27/24 0340)  SpO2: (!) 93 % (09/27/24 0340) Vital Signs (24h Range):  Temp:  [97.9 °F (36.6 °C)-98.2 °F (36.8 °C)] 97.9 °F (36.6 °C)  Pulse:  [102-111] 102  Resp:  [18-34] 29  SpO2:  [93 %-95 %] 93 %  BP: (126-153)/() 145/76     Weight: 71.8 kg (158 lb 4.6 oz)  Body mass index is 23.38 kg/m².     Physical Exam  Vitals reviewed.   Constitutional:       General: He is not in acute distress.     Appearance: He is ill-appearing. He is not diaphoretic.   HENT:      Nose: Nose normal.   Eyes:      Conjunctiva/sclera: Conjunctivae normal.   Cardiovascular:      Rate and Rhythm: Tachycardia present.   Pulmonary:      Effort: Respiratory distress (mild tachypnea, persistent coughing) present.      Breath sounds: No wheezing.   Abdominal:      General: There is no distension.      Tenderness: There is no abdominal tenderness.   Musculoskeletal:         General: No swelling.   Skin:     General: Skin is warm and dry.   Neurological:      Mental Status: He is alert and oriented to person, place, and time.   Psychiatric:         Mood and Affect: Mood normal.         Behavior: Behavior normal.                Significant Labs: All pertinent labs within the past 24 hours have been reviewed.  Recent Lab Results         09/27/24  0037   09/27/24  0031   09/27/24  0031        Albumin 3.2                      Allens Test   Pass         ALT 21           Anion Gap 8           AST 21           Baso # 0.07           Basophil % 0.6           BILIRUBIN TOTAL 0.2  Comment: For infants and newborns, interpretation of results should be based  on gestational age, weight and in agreement with clinical  observations.    Premature Infant recommended reference ranges:  Up to 24 hours.............<8.0 mg/dL  Up to 48 hours............<12.0 mg/dL  3-5 days..................<15.0 mg/dL  6-29 days.................<15.0 mg/dL             BNP 64  Comment: Values of less than 100 pg/ml are consistent with non-CHF populations.            Site   LR         BUN 14           Calcium 9.0           Chloride 107           CO2 22           Creatinine 0.8           DelSys   Nasal Can         Differential Method Automated           eGFR >60           Eos # 0.1           Eos % 0.7           Flow   2         Glucose 137           Gran # (ANC) 10.7           Gran % 88.4           Hematocrit 34.4           Hemoglobin 11.2           Immature Grans (Abs) 0.21  Comment: Mild elevation in immature granulocytes is non specific and   can be seen in a variety of conditions including stress response,   acute inflammation, trauma and pregnancy. Correlation with other   laboratory and clinical findings is essential.             Immature Granulocytes 1.7           Lymph # 0.7           Lymph % 6.1           MCH 32.3           MCHC 32.6           MCV 99           Mode   SPONT         Mono # 0.3           Mono % 2.5           MPV 8.4           nRBC 0           Platelet Count 463           POC BE   -2         POC HCO3   22.4         POC PCO2   35.4         POC PH   7.408         POC PO2   67         POC SATURATED O2   93         Potassium 4.3           PROTEIN TOTAL 6.3           RBC 3.47           RDW 13.5           Sample   ARTERIAL         SARS-CoV-2 RNA, Amplification, Qual     Negative  Comment: This test utilizes isothermal nucleic acid amplification technology   to   detect the SARS-CoV-2 RdRp nucleic acid segment. The analytical   sensitivity   (limit of detection) is 500 copies/swab.    A POSITIVE result is indicative of the presence of SARS-CoV-2 RNA;   clinical   correlation with patient history and other diagnostic information is   necessary to determine patient infection status.    A NEGATIVE result means that SARS-CoV-2 nucleic acids are not present   above   the limit of detection. A NEGATIVE result should be treated as   presumptive.   It does not rule out the possibility of COVID-19 and should not be   the sole   basis for treatment decisions.    If  COVID-19 is strongly suspected based on clinical and exposure   history,   re-testing using an alternate molecular assay should be considered.    This test is Food and Drug Administration (FDA) approved. Performance   characteristics of this has been independently verified by [facility] Department of Pathology and Laboratory Medicine.         Sodium 137           Troponin I 0.214  Comment: The reference interval for Troponin I represents the 99th percentile   cutoff   for our facility and is consistent with 3rd generation assay   performance.             WBC 12.07                   Significant Imaging: I have reviewed all pertinent imaging results/findings within the past 24 hours.  X-Ray Chest AP Portable   ED Interpretation   Left-sided pneumothorax      X-Ray Chest AP Portable    (Results Pending)   CT Chest With Contrast    (Results Pending)

## 2024-09-27 NOTE — ASSESSMENT & PLAN NOTE
High-risk prostate cancer  -seen by Urology Dr. Smith 07/10/2024 with plans for follow up visit in 6 months and repeat PSA  -status post radiation therapy and ADT, PSA undetectable  -done with 2 years of Lupron

## 2024-09-27 NOTE — ASSESSMENT & PLAN NOTE
-patient denies chest pain  -troponin elevation most likely related to pneumothorax and underlying pneumonia  -troponin 0.214, BNP 64  -check serial cardiac enzymes  -check echocardiogram  -telemetry monitoring

## 2024-09-27 NOTE — ASSESSMENT & PLAN NOTE
Left lung mass with presumed lung cancer with possible metastases  -CT scan of chest shows left perihilar mass 3.7 x 2.5 x 3.6 cm concerning for neoplasm; nonspecific mildly prominent mediastinal and left hilar lymph nodes with metastases possible; small hypodensity in right liver that is nonspecific but metastases not excluded; osseous metastases not excluded  -NPO with 1 L of IV fluids  -check INR  -DVT prophylaxis with SCDs only.  Lovenox has not been initiated until patient is evaluated by Pulmonary.  -pulmonary consult for potential lung biopsy versus bronchoscopy

## 2024-09-27 NOTE — H&P
[facility] - Emergency Dept.  [facility]  History & Physical    Patient Name: Aurelio Daniels  MRN: [id number]  Patient Class: IP- Inpatient  Admission Date: 9/26/2024  Attending Physician:  Kimmy Horan MD  Primary Care Provider: Shlomo Villatoro MD         Patient information was obtained from patient, spouse/SO, ER records, and ERE physician .     Subjective:     Principal Problem:Spontaneous pneumothorax    Chief Complaint:   Chief Complaint   Patient presents with    Shortness of Breath     Episode of SOB that started after a coughing fit tonight. Recently admitted for pneumonia. 82% SpO2 upon EMS arrival with improvement after breathing treatment, steroids, and CPAP.        HPI: 68-year-old man with history of prostate cancer, tobacco abuse (quit 1-1/2 once ago), COPD (discharged with home oxygen via 2 L nasal cannula as of 09/17/2024), centrilobular emphysema, hypertension, anemia, and recent bilateral lower lobe pneumonia who presented to the emergency department for acute onset shortness a breath after a coughing spell.  Symptom onset occurred after the football game at approximately 10:00 p.m. in the evening, constant, and moderate-to-severe in nature.  Patient denies any associated chest pain, nausea, vomiting, fevers, chills.  Patient was originally 82% on room air on ER arrival.  He improved with nebulizer treatment, steroids, and CPAP.  Chest x-ray revealed left pneumothorax and pigtail catheter/chest tube was placed by ER physician.  Repeat chest x-ray showed improved lung inflation however there were significant abnormalities with underlying pneumonia and possible lung mass.  Stat CT scan of chest with contrast was ordered and showed spiculated left perihilar mass 3.7 x 2.5 x 3.6 cm suspicious for neoplasm; left upper lobe and posteromedial right lower lobe consolidations concerning for pneumonia; nonspecific mildly prominent mediastinal and left hilar lymph nodes with metastases  "possible; small hypodensity in right liver that is nonspecific but metastases not excluded; osseous metastases not excluded; small left and trace right pleural effusion; trace left apical pneumothorax; emphysematous changes with bulla in the left upper lobe.  Patient's wife is a retired nurse and they have been  for 25 years.  Patient still works as a  for [employer].    Recent hospital admit 9/13-09/17/2024 with bilateral pneumonia, hypoxia.  Chest x-ray 09/13/2024 with multifocal interstitial pneumonia.  Patient was discharged with Augmentin.  Patient was discharged with home oxygen via 2 L nasal cannula.  He had previously not required oxygen.  Of note, chest x-ray on 09/09/2024 was read as negative for an acute process.    Patient was seen by primary care physician 09/24/2024 with repeat CXR showing "Findings suspicious for collapse of the left upper lobe with persistent consolidations and atelectasis on the left. Additionally a opaque masses seen at the level of the midthoracic spine. Recommend follow-up evaluation with CT with contrast."  Primary care physician reports speaking with patient's pulmonologist and with plan to order CT scan of chest without contrast as soon as possible.        Past Medical History:   Diagnosis Date    Cancer     Prostate    Tobacco use        Past Surgical History:   Procedure Laterality Date    COLONOSCOPY      COLONOSCOPY N/A 5/20/2022    Procedure: COLONOSCOPY;  Surgeon: Jamia Alfaro MD;  Location: [facility];  Service: Endoscopy;  Laterality: N/A;    VASECTOMY  1985       Review of patient's allergies indicates:  No Known Allergies    No current facility-administered medications on file prior to encounter.     Current Outpatient Medications on File Prior to Encounter   Medication Sig    amoxicillin-clavulanate 875-125mg (AUGMENTIN) 875-125 mg per tablet Take 1 tablet by mouth 2 (two) times daily. (Patient not taking: Reported on 9/24/2024) "    azelastine (ASTELIN) 137 mcg (0.1 %) nasal spray 1 spray (137 mcg total) by Nasal route 2 (two) times daily.    valsartan (DIOVAN) 80 MG tablet Take 1 tablet (80 mg total) by mouth once daily.     Family History       Problem Relation (Age of Onset)    Heart disease Father    No Known Problems Mother, Brother, Brother, Daughter, Daughter          Tobacco Use    Smoking status: Some Days     Current packs/day: 0.00     Types: Cigarettes     Last attempt to quit: 1/15/2022     Years since quittin.7     Passive exposure: Current    Smokeless tobacco: Never   Substance and Sexual Activity    Alcohol use: Yes     Alcohol/week: 2.0 standard drinks of alcohol     Types: 2 Shots of liquor per week    Drug use: Never    Sexual activity: Not Currently     Partners: Female     Review of Systems   Constitutional:  Negative for chills and fever.   Respiratory:  Positive for cough and shortness of breath. Negative for wheezing.    Cardiovascular:  Negative for chest pain and leg swelling.   Gastrointestinal:  Negative for nausea and vomiting.   All other systems reviewed and are negative.    Objective:     Vital Signs (Most Recent):  Temp: 97.9 °F (36.6 °C) (24)  Pulse: 102 (24)  Resp: (!) 29 (24)  BP: (!) 145/76 (24)  SpO2: (!) 93 % (24) Vital Signs (24h Range):  Temp:  [97.9 °F (36.6 °C)-98.2 °F (36.8 °C)] 97.9 °F (36.6 °C)  Pulse:  [102-111] 102  Resp:  [18-34] 29  SpO2:  [93 %-95 %] 93 %  BP: (126-153)/() 145/76     Weight: 71.8 kg (158 lb 4.6 oz)  Body mass index is 23.38 kg/m².     Physical Exam  Vitals reviewed.   Constitutional:       General: He is not in acute distress.     Appearance: He is ill-appearing. He is not diaphoretic.   HENT:      Nose: Nose normal.   Eyes:      Conjunctiva/sclera: Conjunctivae normal.   Cardiovascular:      Rate and Rhythm: Tachycardia present.   Pulmonary:      Effort: Respiratory distress (mild tachypnea, persistent  coughing) present.      Breath sounds: No wheezing.   Abdominal:      General: There is no distension.      Tenderness: There is no abdominal tenderness.   Musculoskeletal:         General: No swelling.   Skin:     General: Skin is warm and dry.   Neurological:      Mental Status: He is alert and oriented to person, place, and time.   Psychiatric:         Mood and Affect: Mood normal.         Behavior: Behavior normal.                Significant Labs: All pertinent labs within the past 24 hours have been reviewed.  Recent Lab Results         09/27/24  0037   09/27/24  0031   09/27/24  0031        Albumin 3.2                      Allens Test   Pass         ALT 21           Anion Gap 8           AST 21           Baso # 0.07           Basophil % 0.6           BILIRUBIN TOTAL 0.2  Comment: For infants and newborns, interpretation of results should be based  on gestational age, weight and in agreement with clinical  observations.    Premature Infant recommended reference ranges:  Up to 24 hours.............<8.0 mg/dL  Up to 48 hours............<12.0 mg/dL  3-5 days..................<15.0 mg/dL  6-29 days.................<15.0 mg/dL             BNP 64  Comment: Values of less than 100 pg/ml are consistent with non-CHF populations.           Site   LR         BUN 14           Calcium 9.0           Chloride 107           CO2 22           Creatinine 0.8           DelSys   Nasal Can         Differential Method Automated           eGFR >60           Eos # 0.1           Eos % 0.7           Flow   2         Glucose 137           Gran # (ANC) 10.7           Gran % 88.4           Hematocrit 34.4           Hemoglobin 11.2           Immature Grans (Abs) 0.21  Comment: Mild elevation in immature granulocytes is non specific and   can be seen in a variety of conditions including stress response,   acute inflammation, trauma and pregnancy. Correlation with other   laboratory and clinical findings is essential.              Immature Granulocytes 1.7           Lymph # 0.7           Lymph % 6.1           MCH 32.3           MCHC 32.6           MCV 99           Mode   SPONT         Mono # 0.3           Mono % 2.5           MPV 8.4           nRBC 0           Platelet Count 463           POC BE   -2         POC HCO3   22.4         POC PCO2   35.4         POC PH   7.408         POC PO2   67         POC SATURATED O2   93         Potassium 4.3           PROTEIN TOTAL 6.3           RBC 3.47           RDW 13.5           Sample   ARTERIAL         SARS-CoV-2 RNA, Amplification, Qual     Negative  Comment: This test utilizes isothermal nucleic acid amplification technology   to   detect the SARS-CoV-2 RdRp nucleic acid segment. The analytical   sensitivity   (limit of detection) is 500 copies/swab.    A POSITIVE result is indicative of the presence of SARS-CoV-2 RNA;   clinical   correlation with patient history and other diagnostic information is   necessary to determine patient infection status.    A NEGATIVE result means that SARS-CoV-2 nucleic acids are not present   above   the limit of detection. A NEGATIVE result should be treated as   presumptive.   It does not rule out the possibility of COVID-19 and should not be   the sole   basis for treatment decisions.    If COVID-19 is strongly suspected based on clinical and exposure   history,   re-testing using an alternate molecular assay should be considered.    This test is Food and Drug Administration (FDA) approved. Performance   characteristics of this has been independently verified by [facility] Department of Pathology and Laboratory Medicine.         Sodium 137           Troponin I 0.214  Comment: The reference interval for Troponin I represents the 99th percentile   cutoff   for our facility and is consistent with 3rd generation assay   performance.             WBC 12.07                   Significant Imaging: I have reviewed all pertinent imaging results/findings within  the past 24 hours.  X-Ray Chest AP Portable   ED Interpretation   Left-sided pneumothorax      X-Ray Chest AP Portable    (Results Pending)   CT Chest With Contrast    (Results Pending)      Assessment/Plan:     * Spontaneous pneumothorax  -pigtail catheter/chest tube in place, continuous suction  -chest x-ray on ER arrival with large left-sided pneumothorax  -CT scan of chest with trace left apical pneumothorax now  -pulmonary consult  -O2 via 2 L nasal cannula  -continuous pulse oximetry monitoring      Acute on chronic respiratory failure with hypoxia  Patient with Hypoxic Respiratory failure which is Acute on chronic.  he is on home oxygen at 2 LPM. Supplemental oxygen was provided and noted-      .   Signs/symptoms of respiratory failure include- tachypnea. Contributing diagnoses includes - COPD, Pneumonia, and pneumothorax  Labs and images were reviewed. Patient Has recent ABG, which has been reviewed. Will treat underlying causes and adjust management of respiratory failure as follows:  -continuous pulse oximetry monitoring, O2 supplementation, p.r.n. DuoNebs  -left-sided chest tube/pigtail catheter in place  -empiric IV antibiotics with Zosyn  -pulmonary consult    Bilateral pneumonia  -CT scan of chest with consolidations and left upper lobe and posteromedial right lower lobe concerning for pneumonia  -white blood cell count 12.07, COVID-19 negative, afebrile  -empiric IV Zosyn, O2 supplementation, p.r.n. DuoNebs  -check lactic acid level, procalcitonin level, sputum culture  -aspiration and fall precautions, speech therapy evaluation  -NPO, LR at 100 mL/hr for 1 L only  -pulmonary consult    Mass of left lung  Left lung mass with presumed lung cancer with possible metastases  -CT scan of chest shows left perihilar mass 3.7 x 2.5 x 3.6 cm concerning for neoplasm; nonspecific mildly prominent mediastinal and left hilar lymph nodes with metastases possible; small hypodensity in right liver that is nonspecific  but metastases not excluded; osseous metastases not excluded  -NPO with 1 L of IV fluids  -check INR  -DVT prophylaxis with SCDs only.  Lovenox has not been initiated until patient is evaluated by Pulmonary.  -pulmonary consult for potential lung biopsy versus bronchoscopy      Centrilobular emphysema  Patient's COPD is controlled currently.  Patient is currently off COPD Pathway. Continue p.r.n. DuoNebs, O2 supplementation, and antibiotics for pneumonia.  monitor respiratory status closely.   -patient did receive DuoNeb, prednisone 60 mg p.o., and terbutaline 0.25 mg while in the emergency department.    Elevated troponin  -patient denies chest pain  -troponin elevation most likely related to pneumothorax and underlying pneumonia  -troponin 0.214, BNP 64  -check serial cardiac enzymes  -check echocardiogram  -telemetry monitoring      Thrombocytosis  Likely reactive thrombocytosis  -platelet count 463  -check CBC in a.m.      Macrocytic anemia  Anemia is likely due to  unclear etiology . Most recent hemoglobin and hematocrit are listed below.  Recent Labs     09/27/24  0037   HGB 11.2*   HCT 34.4*     Plan  - Monitor serial CBC: Daily  - Transfuse PRBC if patient becomes hemodynamically unstable, symptomatic or H/H drops below 7/21.  - Patient has not received any PRBC transfusions to date  - Patient's anemia is currently stable  - check B12 and folate levels    Tobacco abuse  Patient has not smoked in 1-1/2 months.  Counseled continued cessation.  No nicotine patch needed.      Primary hypertension  Chronic, controlled. Latest blood pressure and vitals reviewed-     Temp:  [97.9 °F (36.6 °C)-98.2 °F (36.8 °C)]   Pulse:  [102-111]   Resp:  [18-34]   BP: (126-153)/()   SpO2:  [93 %-95 %] .   Home meds for hypertension were reviewed and noted below.   Hypertension Medications               valsartan (DIOVAN) 80 MG tablet Take 1 tablet (80 mg total) by mouth once daily.            While in the hospital, will  manage blood pressure as follows; Adjust home antihypertensive regimen as follows- hold Diovan (patient only takes it p.r.n.) while NPO.  P.r.n. IV hydralazine with parameters.    Will utilize p.r.n. blood pressure medication only if patient's blood pressure greater than 160/100 and he develops symptoms such as worsening chest pain or shortness of breath.    Prostate cancer  High-risk prostate cancer  -seen by Urology Dr. Smith 07/10/2024 with plans for follow up visit in 6 months and repeat PSA  -status post radiation therapy and ADT, PSA undetectable  -done with 2 years of Lupron        VTE Risk Mitigation (From admission, onward)           Ordered     Reason for No Pharmacological VTE Prophylaxis  Once        Comments: No Lovenox until patient is seen by Pulmonary as they may decide to pursue lung biopsy   Question:  Reasons:  Answer:  Physician Provided (leave comment)    09/27/24 0401     IP VTE HIGH RISK PATIENT  Once         09/27/24 0401     Place sequential compression device  Until discontinued         09/27/24 0401                                    Kimmy Horan MD  Department of Hospital Medicine  '[place] - Emergency Dept.

## 2024-09-28 LAB
ALBUMIN SERPL BCP-MCNC: 2.9 G/DL (ref 3.5–5.2)
ALP SERPL-CCNC: 101 U/L (ref 55–135)
ALT SERPL W/O P-5'-P-CCNC: 22 U/L (ref 10–44)
ANION GAP SERPL CALC-SCNC: 8 MMOL/L (ref 8–16)
AST SERPL-CCNC: 17 U/L (ref 10–40)
BASOPHILS # BLD AUTO: 0.03 K/UL (ref 0–0.2)
BASOPHILS NFR BLD: 0.2 % (ref 0–1.9)
BILIRUB SERPL-MCNC: 0.5 MG/DL (ref 0.1–1)
BUN SERPL-MCNC: 12 MG/DL (ref 8–23)
CALCIUM SERPL-MCNC: 8.8 MG/DL (ref 8.7–10.5)
CHLORIDE SERPL-SCNC: 109 MMOL/L (ref 95–110)
CO2 SERPL-SCNC: 23 MMOL/L (ref 23–29)
CREAT SERPL-MCNC: 0.8 MG/DL (ref 0.5–1.4)
DIFFERENTIAL METHOD BLD: ABNORMAL
EOSINOPHIL # BLD AUTO: 0.1 K/UL (ref 0–0.5)
EOSINOPHIL NFR BLD: 0.4 % (ref 0–8)
ERYTHROCYTE [DISTWIDTH] IN BLOOD BY AUTOMATED COUNT: 14.1 % (ref 11.5–14.5)
EST. GFR  (NO RACE VARIABLE): >60 ML/MIN/1.73 M^2
GLUCOSE SERPL-MCNC: 104 MG/DL (ref 70–110)
HCT VFR BLD AUTO: 34.4 % (ref 40–54)
HGB BLD-MCNC: 11.1 G/DL (ref 14–18)
IMM GRANULOCYTES # BLD AUTO: 0.1 K/UL (ref 0–0.04)
IMM GRANULOCYTES NFR BLD AUTO: 0.7 % (ref 0–0.5)
LACTATE SERPL-SCNC: 2.2 MMOL/L (ref 0.5–2.2)
LYMPHOCYTES # BLD AUTO: 1.2 K/UL (ref 1–4.8)
LYMPHOCYTES NFR BLD: 9.1 % (ref 18–48)
MCH RBC QN AUTO: 31.8 PG (ref 27–31)
MCHC RBC AUTO-ENTMCNC: 32.3 G/DL (ref 32–36)
MCV RBC AUTO: 99 FL (ref 82–98)
MONOCYTES # BLD AUTO: 0.6 K/UL (ref 0.3–1)
MONOCYTES NFR BLD: 4.2 % (ref 4–15)
NEUTROPHILS # BLD AUTO: 11.5 K/UL (ref 1.8–7.7)
NEUTROPHILS NFR BLD: 85.4 % (ref 38–73)
NRBC BLD-RTO: 0 /100 WBC
PLATELET # BLD AUTO: 429 K/UL (ref 150–450)
PMV BLD AUTO: 8.4 FL (ref 9.2–12.9)
POTASSIUM SERPL-SCNC: 4.1 MMOL/L (ref 3.5–5.1)
PROT SERPL-MCNC: 5.6 G/DL (ref 6–8.4)
RBC # BLD AUTO: 3.49 M/UL (ref 4.6–6.2)
SODIUM SERPL-SCNC: 140 MMOL/L (ref 136–145)
WBC # BLD AUTO: 13.47 K/UL (ref 3.9–12.7)

## 2024-09-28 PROCEDURE — 80053 COMPREHEN METABOLIC PANEL: CPT | Performed by: INTERNAL MEDICINE

## 2024-09-28 PROCEDURE — 63600175 PHARM REV CODE 636 W HCPCS: Performed by: INTERNAL MEDICINE

## 2024-09-28 PROCEDURE — 94761 N-INVAS EAR/PLS OXIMETRY MLT: CPT

## 2024-09-28 PROCEDURE — 94640 AIRWAY INHALATION TREATMENT: CPT

## 2024-09-28 PROCEDURE — 87205 SMEAR GRAM STAIN: CPT | Performed by: INTERNAL MEDICINE

## 2024-09-28 PROCEDURE — 87070 CULTURE OTHR SPECIMN AEROBIC: CPT | Performed by: INTERNAL MEDICINE

## 2024-09-28 PROCEDURE — 25000242 PHARM REV CODE 250 ALT 637 W/ HCPCS: Performed by: INTERNAL MEDICINE

## 2024-09-28 PROCEDURE — 99900035 HC TECH TIME PER 15 MIN (STAT)

## 2024-09-28 PROCEDURE — 27000221 HC OXYGEN, UP TO 24 HOURS

## 2024-09-28 PROCEDURE — 92610 EVALUATE SWALLOWING FUNCTION: CPT

## 2024-09-28 PROCEDURE — 25000003 PHARM REV CODE 250: Performed by: INTERNAL MEDICINE

## 2024-09-28 PROCEDURE — 36415 COLL VENOUS BLD VENIPUNCTURE: CPT | Performed by: INTERNAL MEDICINE

## 2024-09-28 PROCEDURE — 87077 CULTURE AEROBIC IDENTIFY: CPT | Performed by: INTERNAL MEDICINE

## 2024-09-28 PROCEDURE — 87186 SC STD MICRODIL/AGAR DIL: CPT | Performed by: INTERNAL MEDICINE

## 2024-09-28 PROCEDURE — 87081 CULTURE SCREEN ONLY: CPT | Performed by: STUDENT IN AN ORGANIZED HEALTH CARE EDUCATION/TRAINING PROGRAM

## 2024-09-28 PROCEDURE — 25000003 PHARM REV CODE 250: Performed by: STUDENT IN AN ORGANIZED HEALTH CARE EDUCATION/TRAINING PROGRAM

## 2024-09-28 PROCEDURE — 36415 COLL VENOUS BLD VENIPUNCTURE: CPT | Performed by: STUDENT IN AN ORGANIZED HEALTH CARE EDUCATION/TRAINING PROGRAM

## 2024-09-28 PROCEDURE — 85025 COMPLETE CBC W/AUTO DIFF WBC: CPT | Performed by: INTERNAL MEDICINE

## 2024-09-28 PROCEDURE — 21400001 HC TELEMETRY ROOM

## 2024-09-28 PROCEDURE — 83605 ASSAY OF LACTIC ACID: CPT | Performed by: STUDENT IN AN ORGANIZED HEALTH CARE EDUCATION/TRAINING PROGRAM

## 2024-09-28 RX ORDER — LINEZOLID 600 MG/1
600 TABLET, FILM COATED ORAL EVERY 12 HOURS
Status: DISCONTINUED | OUTPATIENT
Start: 2024-09-28 | End: 2024-09-30

## 2024-09-28 RX ADMIN — ARFORMOTEROL TARTRATE 15 MCG: 15 SOLUTION RESPIRATORY (INHALATION) at 07:09

## 2024-09-28 RX ADMIN — BUDESONIDE 0.5 MG: 0.5 INHALANT ORAL at 07:09

## 2024-09-28 RX ADMIN — LINEZOLID 600 MG: 600 TABLET, FILM COATED ORAL at 09:09

## 2024-09-28 RX ADMIN — GUAIFENESIN 200 MG: 200 SOLUTION ORAL at 03:09

## 2024-09-28 RX ADMIN — SODIUM CHLORIDE: 9 INJECTION, SOLUTION INTRAVENOUS at 06:09

## 2024-09-28 RX ADMIN — GUAIFENESIN 200 MG: 200 SOLUTION ORAL at 06:09

## 2024-09-28 RX ADMIN — PIPERACILLIN SODIUM AND TAZOBACTAM SODIUM 4.5 G: 4; .5 INJECTION, POWDER, FOR SOLUTION INTRAVENOUS at 10:09

## 2024-09-28 RX ADMIN — PIPERACILLIN SODIUM AND TAZOBACTAM SODIUM 4.5 G: 4; .5 INJECTION, POWDER, FOR SOLUTION INTRAVENOUS at 06:09

## 2024-09-28 RX ADMIN — PIPERACILLIN SODIUM AND TAZOBACTAM SODIUM 4.5 G: 4; .5 INJECTION, POWDER, FOR SOLUTION INTRAVENOUS at 03:09

## 2024-09-28 RX ADMIN — GUAIFENESIN 200 MG: 200 SOLUTION ORAL at 10:09

## 2024-09-28 RX ADMIN — GUAIFENESIN 200 MG: 200 SOLUTION ORAL at 09:09

## 2024-09-28 NOTE — PROGRESS NOTES
[facility] Medicine  Progress Note    Patient Name: Aurelio Daniels  MRN: [id number]  Patient Class: IP- Inpatient   Admission Date: 9/26/2024  Length of Stay: 1 days  Attending Physician: Juan Disla DO  Primary Care Provider: Shlomo Villatoro MD        Subjective:     Principal Problem:Spontaneous pneumothorax        HPI:  68-year-old man with history of prostate cancer, tobacco abuse (quit 1-1/2 once ago), COPD (discharged with home oxygen via 2 L nasal cannula as of 09/17/2024), centrilobular emphysema, hypertension, anemia, and recent bilateral lower lobe pneumonia who presented to the emergency department for acute onset shortness a breath after a coughing spell.  Symptom onset occurred after the football game at approximately 10:00 p.m. in the evening, constant, and moderate-to-severe in nature.  Patient denies any associated chest pain, nausea, vomiting, fevers, chills.  Patient was originally 82% on room air on ER arrival.  He improved with nebulizer treatment, steroids, and CPAP.  Chest x-ray revealed left pneumothorax and pigtail catheter/chest tube was placed by ER physician.  Repeat chest x-ray showed improved lung inflation however there were significant abnormalities with underlying pneumonia and possible lung mass.  Stat CT scan of chest with contrast was ordered and showed spiculated left perihilar mass 3.7 x 2.5 x 3.6 cm suspicious for neoplasm; left upper lobe and posteromedial right lower lobe consolidations concerning for pneumonia; nonspecific mildly prominent mediastinal and left hilar lymph nodes with metastases possible; small hypodensity in right liver that is nonspecific but metastases not excluded; osseous metastases not excluded; small left and trace right pleural effusion; trace left apical pneumothorax; emphysematous changes with bulla in the left upper lobe.  Patient's wife is a retired nurse and they have been  for 25 years.  Patient  "still works as a  for [employer].    Recent hospital admit 9/13-09/17/2024 with bilateral pneumonia, hypoxia.  Chest x-ray 09/13/2024 with multifocal interstitial pneumonia.  Patient was discharged with Augmentin.  Patient was discharged with home oxygen via 2 L nasal cannula.  He had previously not required oxygen.  Of note, chest x-ray on 09/09/2024 was read as negative for an acute process.    Patient was seen by primary care physician 09/24/2024 with repeat CXR showing "Findings suspicious for collapse of the left upper lobe with persistent consolidations and atelectasis on the left. Additionally a opaque masses seen at the level of the midthoracic spine. Recommend follow-up evaluation with CT with contrast."  Primary care physician reports speaking with patient's pulmonologist and with plan to order CT scan of chest without contrast as soon as possible.        Overview/Hospital Course:  Hospital medicine for evaluation of pneumothorax after placement of chest tube in ED.  Pulmonology consulted for chest tube management.  Repeat chest x-ray noted concerns for reaccumulation of pneumothorax despite ongoing suction drainage, suspect likely secondary to ruptured bleb leading to continuous air leak with high concerns for bronchopleural fistula.  CT imaging also noted concerns for consolidation in the left upper lobe and posteromedial right lower lobe concerning for pneumonia, labs with worsening leukocytosis, so started on broad-spectrum antibiotics given risk factors of recent hospitalization.  MRSA cultures pending.  Pulmonology following for chest tube management.    Interval History: No acute events overnight, afebrile, hemodynamically stable.   Pulmonology consulted for chest tube management.  Repeat chest x-ray noted concerns for reaccumulation of pneumothorax despite ongoing suction drainage, suspect likely secondary to ruptured bleb leading to continuous air leak with high concerns " for bronchopleural fistula.  Worsening leukocytosis, CT imaging also noted concerns for consolidation in the left upper lobe and posteromedial right lower lobe concerning for pneumonia so started on broad-spectrum antibiotics given risk factors of recent hospitalization.  MRSA cultures pending.  Pulmonology following for chest tube management.      Objective:     Vital Signs (Most Recent):  Temp: 98 °F (36.7 °C) (09/28/24 1119)  Pulse: 70 (09/28/24 1513)  Resp: 18 (09/28/24 1119)  BP: 124/72 (09/28/24 1119)  SpO2: 96 % (09/28/24 1119) Vital Signs (24h Range):  Temp:  [97.5 °F (36.4 °C)-98.3 °F (36.8 °C)] 98 °F (36.7 °C)  Pulse:  [] 70  Resp:  [16-20] 18  SpO2:  [94 %-96 %] 96 %  BP: (124-146)/(71-86) 124/72     Weight: 71 kg (156 lb 8.4 oz)  Body mass index is 23.11 kg/m².    Intake/Output Summary (Last 24 hours) at 9/28/2024 1527  Last data filed at 9/28/2024 1002  Gross per 24 hour   Intake 1013.68 ml   Output 1675 ml   Net -661.32 ml         Physical Exam  Vitals and nursing note reviewed.   Constitutional:       General: He is not in acute distress.     Appearance: He is not ill-appearing, toxic-appearing or diaphoretic.   HENT:      Head: Normocephalic and atraumatic.      Mouth/Throat:      Mouth: Mucous membranes are moist.   Eyes:      General: No scleral icterus.        Right eye: No discharge.         Left eye: No discharge.   Cardiovascular:      Rate and Rhythm: Normal rate and regular rhythm.      Heart sounds: Normal heart sounds.   Pulmonary:      Effort: Pulmonary effort is normal. No respiratory distress.      Breath sounds: Rales (Left-sided) present. No wheezing.      Comments: Left-sided chest tube in place with overlying dressing intact  Abdominal:      General: Bowel sounds are normal.      Tenderness: There is no abdominal tenderness.   Musculoskeletal:      Cervical back: No rigidity.      Right lower leg: No edema.      Left lower leg: No edema.   Skin:     General: Skin is warm and  dry.      Coloration: Skin is not jaundiced.   Neurological:      Mental Status: He is alert and oriented to person, place, and time. Mental status is at baseline.   Psychiatric:         Mood and Affect: Mood normal.         Behavior: Behavior normal.             Significant Labs: All pertinent labs within the past 24 hours have been reviewed.  CBC:   Recent Labs   Lab 09/27/24  0037 09/28/24  0448   WBC 12.07 13.47*   HGB 11.2* 11.1*   HCT 34.4* 34.4*   * 429     CMP:   Recent Labs   Lab 09/27/24  0037 09/28/24  0448    140   K 4.3 4.1    109   CO2 22* 23   * 104   BUN 14 12   CREATININE 0.8 0.8   CALCIUM 9.0 8.8   PROT 6.3 5.6*   ALBUMIN 3.2* 2.9*   BILITOT 0.2 0.5   ALKPHOS 113 101   AST 21 17   ALT 21 22   ANIONGAP 8 8       Significant Imaging: I have reviewed all pertinent imaging results/findings within the past 24 hours.    Assessment/Plan:      * Spontaneous pneumothorax  -pigtail catheter/chest tube in place, continuous suction  -chest x-ray on ER arrival with large left-sided pneumothorax  -CT scan of chest with trace left apical pneumothorax   While on suction, repeat chest x-ray noted concerns for accumulation      -Pulmonology consulted, follow-up recs  -O2 via 2 L nasal cannula  -continuous pulse oximetry monitoring      Elevated troponin  -patient denies chest pain  -troponin elevation most likely related to pneumothorax and underlying pneumonia      Recent Labs     09/27/24  0037 09/27/24  0531 09/27/24  0820   TROPONINI 0.214* 0.327* 0.309*     -TTE nonacute    -telemetry monitoring      Acute on chronic respiratory failure with hypoxia  Patient with Hypoxic Respiratory failure which is Acute on chronic.  he is on home oxygen at 2 LPM. Supplemental oxygen was provided and noted- Oxygen Concentration (%):  [28] 28    .   Signs/symptoms of respiratory failure include- tachypnea. Contributing diagnoses includes - COPD, Pneumonia, and pneumothorax  Labs and images were  reviewed. Patient Has recent ABG, which has been reviewed. Will treat underlying causes and adjust management of respiratory failure as follows:      -continuous pulse oximetry monitoring, O2 supplementation, p.r.n. DuoNebs  -left-sided chest tube/pigtail catheter in place  -empiric IV antibiotics with Zosyn/Linezolid.  Follow up cultures and deescalate antibiotics as appropriate  -pulmonary consult, follow-up recs    Thrombocytosis  Likely reactive thrombocytosis    Recent Labs     09/27/24  0037 09/28/24  0448   * 429         -check CBC in a.m.      Macrocytic anemia  Anemia is likely due to  unclear etiology . Most recent hemoglobin and hematocrit are listed below.  Recent Labs     09/27/24  0037 09/28/24  0448   HGB 11.2* 11.1*   HCT 34.4* 34.4*       Plan  - Monitor serial CBC: Daily  - Transfuse PRBC if patient becomes hemodynamically unstable, symptomatic or H/H drops below 7/21.  - Patient has not received any PRBC transfusions to date  - Patient's anemia is currently stable  - check B12 and folate levels    Tobacco abuse  Patient has not smoked in 1-1/2 months.  Counseled continued cessation.  No nicotine patch needed.      Mass of left lung  Left lung mass with presumed lung cancer with possible metastases  -CT scan of chest shows left perihilar mass 3.7 x 2.5 x 3.6 cm concerning for neoplasm; nonspecific mildly prominent mediastinal and left hilar lymph nodes with metastases possible; small hypodensity in right liver that is nonspecific but metastases not excluded; osseous metastases not excluded      -pulmonary consulted, plans for future evaluation after respiratory status is more stable    Bilateral pneumonia  -CT scan of chest with consolidations and left upper lobe and posteromedial right lower lobe concerning for pneumonia  -white blood cell count 12.07, COVID-19 negative, afebrile      -empiric IV Linelozid/Zosyn, O2 supplementation, p.r.n. DuoNebs  -follow up cultures  -aspiration and fall  precautions  -pulmonary consult    Centrilobular emphysema  Patient's COPD is controlled currently.  Patient is currently off COPD Pathway. Continue p.r.n. DuoNebs, O2 supplementation, and antibiotics for pneumonia.  monitor respiratory status closely.   -patient did receive DuoNeb, prednisone 60 mg p.o., and terbutaline 0.25 mg while in the emergency department.    Primary hypertension  Chronic, controlled. Latest blood pressure and vitals reviewed-     Temp:  [97.5 °F (36.4 °C)-98.3 °F (36.8 °C)]   Pulse:  []   Resp:  [16-20]   BP: (124-146)/(71-86)   SpO2:  [94 %-96 %] .   Home meds for hypertension were reviewed and noted below.   Hypertension Medications               valsartan (DIOVAN) 80 MG tablet Take 1 tablet (80 mg total) by mouth once daily.            While in the hospital, will manage blood pressure as follows; Adjust home antihypertensive regimen as follows- hold Diovan (patient only takes it p.r.n.) while NPO.  P.r.n. IV hydralazine with parameters.    Will utilize p.r.n. blood pressure medication only if patient's blood pressure greater than 160/100 and he develops symptoms such as worsening chest pain or shortness of breath.    Prostate cancer  High-risk prostate cancer  -seen by Urology Dr. Smith 07/10/2024 with plans for follow up visit in 6 months and repeat PSA  -status post radiation therapy and ADT, PSA undetectable  -done with 2 years of Lupron        VTE Risk Mitigation (From admission, onward)           Ordered     Reason for No Pharmacological VTE Prophylaxis  Once        Comments: No Lovenox until patient is seen by Pulmonary as they may decide to pursue lung biopsy   Question:  Reasons:  Answer:  Physician Provided (leave comment)    09/27/24 0401     IP VTE HIGH RISK PATIENT  Once         09/27/24 0401     Place sequential compression device  Until discontinued         09/27/24 0401                    Discharge Planning   YUE:      Code Status: Full Code   Is the patient  medically ready for discharge?:     Reason for patient still in hospital (select all that apply): Patient trending condition, Laboratory test, Treatment, Imaging, and Consult recommendations  Discharge Plan A: Home with family                  Juan Disla DO  Department of Hospital Medicine   [facility] ([facility])      Voice recognition software was used in the creation of this note/communication and any sound-alike/typographical errors which may have occurred despite initial review prior to signing should be taken in context when interpreting.  If such errors prevent a clear understanding of the note/communication, please contact the provider/office for clarification.

## 2024-09-28 NOTE — PT/OT/SLP EVAL
Speech Language Pathology Evaluation  Bedside Swallow    Patient Name:  Aurelio Daniels   MRN:  [id number]  Admitting Diagnosis: Spontaneous pneumothorax    Recommendations:                 General Recommendations:  Follow-up not indicated  Diet recommendations:  Regular Diet - IDDSI Level 7, Thin liquids - IDDSI Level 0   Aspiration Precautions: 1 bite/sip at a time, Remain upright 30 minutes post meal, Small bites/sips, and Standard aspiration precautions   General Precautions: Standard, fall      Assessment:     Aurelio Daniels is a 68 y.o. male with an admitting diagnosis of spontaneous pneumothorax. Patient with recent hospitalization for bilateral lower lobe pneumonia. He reported occasional difficulty swallowing with new dentures. Education provided on dysphagia strategies. Patient with delayed, dry cough post po trials most likely related to PNA and not po trials. Recommend continue current diet with d/c ST.  History:     Past Medical History:   Diagnosis Date    Cancer     Prostate    Tobacco use        Past Surgical History:   Procedure Laterality Date    COLONOSCOPY      COLONOSCOPY N/A 5/20/2022    Procedure: COLONOSCOPY;  Surgeon: Jamia Alfaro MD;  Location: [facility];  Service: Endoscopy;  Laterality: N/A;    VASECTOMY  1985       Social History: Patient lives with spouse.    Prior Intubation HX:  N/A this admission    Chest X-Rays: EXAMINATION:  CT CHEST WITH CONTRAST     CLINICAL HISTORY:  Abnormal xray - lung nodule, >= 1 cm;     COMPARISON:  Chest radiograph from earlier today.     TECHNIQUE:  Axial CT images were obtained of the CHEST.  Iterative reconstruction technique was used. The CT exam was performed using one or more of the following dose reduction techniques- Automated exposure control, adjustment of the mA and/or kV according to patient size, and/or use of iterative reconstructed technique.     FINDINGS:  Lungs: Spiculated left perihilar mass measuring approximately 3.7 x 2.5 x  3.6 cm, concerning for neoplasm. Consolidations in the left upper lobe and posterior medial right lower lobe, concerning for pneumonia. Emphysematous changes. Bulla noted in the left upper lobe.  Pleural space: Small left and trace right pleural effusions. Trace left apical pneumothorax.  Heart: Unremarkable. No cardiomegaly. No significant pericardial effusion. No significant coronary artery calcifications.  Mediastinum: Nonspecific mildly prominent mediastinal and left hilar lymph nodes. Gabrielle metastases are possible. Small hiatal hernia.  Bones/joints: Degenerative changes of the spine. Nonspecific small sclerotic foci in the T12 and L1 vertebral bodies are nonspecific, metastatic disease not excluded..  No acute fracture. No dislocation.  Soft tissues: Minimal subcutaneous emphysema along the chest tube at the left chest wall.  Vasculature: Unremarkable. No thoracic aortic aneurysm.  Lymph nodes: See above.  Liver: Small hypodensity in the right liver is nonspecific. Hepatic metastasis is non-excluded.  Spleen: Small splenule.  Tubes, lines and devices: Left apical chest tube in place.        Impression:     1. Spiculated left perihilar mass measuring approximately 3.7 x 2.5 x 3.6 cm, concerning for neoplasm.  2. Consolidations in the left upper lobe and posterior medial right lower lobe, concerning for pneumonia.  3. Nonspecific mildly prominent mediastinal and left hilar lymph nodes. Gabrielle metastases are possible.  4. Small hypodensity in the right liver is nonspecific. Hepatic metastasis is non-excluded.  5.  Small sclerotic lesions at T12 and L1, metastatic disease is not excluded.  6. Small left and trace right pleural effusions. Trace left apical pneumothorax with chest tube in place.  7. Emphysematous changes. Bulla noted in the left upper lobe.        Electronically signed by:Stew Aragon  Date:                                            09/27/2024  Time:                                            07:49    Prior diet: Regular consistency solid with thin liquids     Subjective     Patient alert, cooperative and seen at bedside. He reported occasional difficulty swallowing at home with new dentures.     Patient goals: put on my [employer] shirt     Pain/Comfort:  Pain Rating 1: 0/10  Pain Rating Post-Intervention 1: 0/10    Respiratory Status: Nasal cannula, flow 2 L/min    Objective:     Oral Musculature Evaluation  Oral Musculature: general weakness  Dentition: upper dentures, lower dentures      Clinical Swallow Evaluation:   Predisposing dysphagia risk factors: COPD  Clinical signs of possible chronic dysphagia: recent PNA    [place] Swallow Protocol:  [place] Swallow Protocol dictates patient remain NPO if fail screener (Alyssar et al. 2014).  The [place] Swallow Protocol was administered. The patient was alert and provided the instructions prior to the beginning of the protocol. The patient consumed 3 oz before putting the cup down. Patient drank with consecutive swallows. No overt s/s of pharyngeal dysphagia or aspiration were observed during today's assessment     Clinical Swallow Examination:   Of note, patient self-fed throughout evaluation. Patient presented with:     CONSISTENCY  NOTES   THIN (IDDSI 0) 3 oz water challenge    No overt s/s of aspiration   PUREE (IDDSI 4/Extremely Thick)   TSP bites of pudding- whole container  No overt s/s of aspiration   SOLID (IDDSI 7/Regular) Bite of rice cake x 4   Delayed dry cough     Thickened liquids were not used in this assessment. Abdullahi (2018) reported that thickened liquids have no sound evidence at reducing the risk of pneumonia in patients with dysphagia and can cause harm by increasing their risk of dehydration. It also presents an increased risk of UTI, electrolyte imbalance, constipation, fecal impaction, cognitive impairment, functional decline and even death (Langmore, 2002; Nik, 2016).  Thickened liquids are associated with risks including dehydration,  increased pharyngeal residue, potential interference with medication absorption, and decreased quality of life (Gaurav, 2013). Thickened liquids are also more likely to be silently aspirated than thin liquids (David et al., 2018). This supports the assertion that we should confirm a patient requires thickened liquids with an instrumental swallow study prior to recommending them.    References:   Gaurav GARCIA (2013). Thickening agents used for dysphagia management: Effect on bioavailability of water, medication and feelings of satiety. Nutrition Journal, 12, 54. https://doi.org/10.1186/5265-7453-07-54    RADHA Hernandez, MELISSA Galvin, MICHELLE George, & RADHA De Guzman (2018). Cough response to aspiration in thin and thick fluids during FEES in hospitalized inpatients. International journal of language & communication disorders, 53(5), 909-918. https://doi.org/10.1111/6535-2540.67610    INTERPRETATION AND RISK ASSESSMENT:  Clinical swallow evaluation (CSE) revealed oral phase characterized by lingual, labial, and buccal strength and range of motion adequate for lip closure, bolus preparation and propulsion. The patient had no anterior loss of the bolus with appropriate closure of the lips around the utensil. Patient with delayed, dry cough post po trials most likely related to PNA not po intake. Contributing risk factors for dysphagia include deficits in respiratory-swallow coordination. Patient with increased risk for silent aspiration given potential sensory deficits related to COPD.    Instrumental swallow study is not indicated to assess the swallowing physiology and rule out aspiration of various consistencies.     Goals:   Multidisciplinary Problems       SLP Goals       Not on file                    Plan:     Patient to be seen:      Plan of Care expires:     Plan of Care reviewed with:  patient, spouse   SLP Follow-Up:  No       Discharge recommendations:  Low Intensity Therapy   Barriers to Discharge:  None    Time  Tracking:     SLP Treatment Date:   09/28/24  Speech Start Time:  0949  Speech Stop Time:  1007     Speech Total Time (min):  18 min    Billable Minutes: Eval Swallow and Oral Function 18 09/28/2024

## 2024-09-28 NOTE — ASSESSMENT & PLAN NOTE
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  If continues to reaccumulate, will need large bore chest tube  - repeat CXR in a couple hours

## 2024-09-28 NOTE — ASSESSMENT & PLAN NOTE
Patient with Hypoxic Respiratory failure which is Acute on chronic.  he is on home oxygen at 2 LPM. Supplemental oxygen was provided and noted- Oxygen Concentration (%):  [28] 28    .   Signs/symptoms of respiratory failure include- tachypnea. Contributing diagnoses includes - COPD, Pneumonia, and pneumothorax  Labs and images were reviewed. Patient Has recent ABG, which has been reviewed. Will treat underlying causes and adjust management of respiratory failure as follows:      -continuous pulse oximetry monitoring, O2 supplementation, p.r.n. Ayana  -left-sided chest tube/pigtail catheter in place  -empiric IV antibiotics with Zosyn/Linezolid.  Follow up cultures and deescalate antibiotics as appropriate  -pulmonary consult, follow-up recs

## 2024-09-28 NOTE — ASSESSMENT & PLAN NOTE
Anemia is likely due to  unclear etiology . Most recent hemoglobin and hematocrit are listed below.  Recent Labs     09/27/24  0037 09/28/24  0448   HGB 11.2* 11.1*   HCT 34.4* 34.4*       Plan  - Monitor serial CBC: Daily  - Transfuse PRBC if patient becomes hemodynamically unstable, symptomatic or H/H drops below 7/21.  - Patient has not received any PRBC transfusions to date  - Patient's anemia is currently stable  - check B12 and folate levels

## 2024-09-28 NOTE — ASSESSMENT & PLAN NOTE
Chronic, controlled. Latest blood pressure and vitals reviewed-     Temp:  [97.5 °F (36.4 °C)-98.3 °F (36.8 °C)]   Pulse:  []   Resp:  [16-20]   BP: (124-146)/(71-86)   SpO2:  [94 %-96 %] .   Home meds for hypertension were reviewed and noted below.   Hypertension Medications               valsartan (DIOVAN) 80 MG tablet Take 1 tablet (80 mg total) by mouth once daily.            While in the hospital, will manage blood pressure as follows; Adjust home antihypertensive regimen as follows- hold Diovan (patient only takes it p.r.n.) while NPO.  P.r.n. IV hydralazine with parameters.    Will utilize p.r.n. blood pressure medication only if patient's blood pressure greater than 160/100 and he develops symptoms such as worsening chest pain or shortness of breath.

## 2024-09-28 NOTE — ASSESSMENT & PLAN NOTE
Likely reactive thrombocytosis    Recent Labs     09/27/24  0037 09/28/24  0448   * 429         -check CBC in a.m.

## 2024-09-28 NOTE — PLAN OF CARE
POC reviewed with pt. Pt verbalizes understanding of POC. No questions at this time.  AAOx4. NADN.  NSR on cardiac monitor.  Pt receiving oxygen @2L NC   Iv abx given as scheduled  Pt remains free of falls.  No complaints at this time.  Safety measures in place.   Informed pt to call for assistance before getting up. Pt verbalizes understanding.  Hourly rounding  completed.   Will continue to monitor.    A213/A213 RADHA Daniels is a 68 y.o.male admitted on 9/26/2024 for Spontaneous pneumothorax   Code Status: Full Code MRN: [id number]   Review of patient's allergies indicates:  No Known Allergies  Past Medical History:   Diagnosis Date    Cancer     Prostate    Tobacco use       PRN meds    0.9% NaCl, , PRN  acetaminophen, 650 mg, Q6H PRN  albuterol-ipratropium, 3 mL, Q6H PRN  benzonatate, 100 mg, TID PRN  dextrose 10%, 12.5 g, PRN  dextrose 10%, 25 g, PRN  glucagon (human recombinant), 1 mg, PRN  glucose, 16 g, PRN  glucose, 24 g, PRN  guaiFENesin 100 mg/5 ml, 200 mg, Q4H PRN  hydrALAZINE, 10 mg, Q6H PRN  morphine, 2 mg, Q6H PRN  naloxone, 0.02 mg, PRN  ondansetron, 4 mg, Q6H PRN  sodium chloride 0.9%, 10 mL, Q12H PRN      Chart check completed. Will continue plan of care.         [place] Coma Scale Score: 15     Lead Monitored: Lead II Rhythm: normal sinus rhythm    Cardiac/Telemetry Box Number: [id number]    Last Bowel Movement: 09/26/24  Diet Adult Regular  Voiding Characteristics: external catheter  Syed Score: 18  Fall Risk Score: 7  Accucheck []   Freq?      Lines/Drains/Airways       Drain  Duration                  Chest Tube 09/27/24 0130 Left Midaxillary 1 day              Peripheral Intravenous Line  Duration                  Peripheral IV - Single Lumen 09/27/24 1453 20 G Anterior;Right Forearm <1 day

## 2024-09-28 NOTE — ASSESSMENT & PLAN NOTE
Patient with Hypoxic Respiratory failure which is Acute.  he is not on home oxygen. Supplemental oxygen was provided and noted- Oxygen Concentration (%):  [28] 28    .   Signs/symptoms of respiratory failure include- respiratory distress. Contributing diagnoses includes - COPD and pneumothorax  Labs and images were reviewed. Patient Has recent ABG, which has been reviewed. Will treat underlying causes and adjust management of respiratory failure as follows- chest tube, supplemental oxygen.    Wean for goal SpO2 > 88%  Avoid high flow oxygen or NIPPV given PTX and ongoing air leak

## 2024-09-28 NOTE — ASSESSMENT & PLAN NOTE
-patient denies chest pain  -troponin elevation most likely related to pneumothorax and underlying pneumonia      Recent Labs     09/27/24  0037 09/27/24  0531 09/27/24  0820   TROPONINI 0.214* 0.327* 0.309*     -TTE nonacute    -telemetry monitoring

## 2024-09-28 NOTE — PROGRESS NOTES
[facility] - Telemetry ([facility])  Pulmonology  Progress Note    Patient Name: Aurelio Daniels  MRN: [id number]  Admission Date: 9/26/2024  Hospital Length of Stay: 1 days  Code Status: Full Code  Attending Provider: Juan Disla DO  Primary Care Provider: Shlomo Villatoro MD   Principal Problem: Spontaneous pneumothorax    Subjective:     Interval History: No acute events.  CXR late morning with reaccumulation of PTX despite ongoing suction drainage.  Adjusted tube and resecured.  Remains on suction.  No new complaints from pt.      Objective:     Vital Signs (Most Recent):  Temp: 98 °F (36.7 °C) (09/28/24 1119)  Pulse: 74 (09/28/24 1119)  Resp: 18 (09/28/24 1119)  BP: 124/72 (09/28/24 1119)  SpO2: 96 % (09/28/24 1119) Vital Signs (24h Range):  Temp:  [97.5 °F (36.4 °C)-98.5 °F (36.9 °C)] 98 °F (36.7 °C)  Pulse:  [] 74  Resp:  [16-20] 18  SpO2:  [94 %-96 %] 96 %  BP: (124-146)/(71-86) 124/72     Weight: 71 kg (156 lb 8.4 oz)  Body mass index is 23.11 kg/m².      Intake/Output Summary (Last 24 hours) at 9/28/2024 1427  Last data filed at 9/28/2024 1002  Gross per 24 hour   Intake 1013.68 ml   Output 2570 ml   Net -1556.32 ml        Physical Exam  Vitals and nursing note reviewed.   Constitutional:       General: He is not in acute distress.  Cardiovascular:      Rate and Rhythm: Normal rate and regular rhythm.      Pulses: Normal pulses.      Heart sounds: No murmur heard.  Pulmonary:      Effort: Pulmonary effort is normal. No respiratory distress.      Breath sounds: Rales (ALFREDO) present. No wheezing or rhonchi.   Abdominal:      General: Abdomen is flat. There is no distension.      Palpations: Abdomen is soft.      Tenderness: There is no abdominal tenderness.   Musculoskeletal:      Right lower leg: No edema.      Left lower leg: No edema.   Neurological:      General: No focal deficit present.      Mental Status: He is alert and oriented to person, place, and time. Mental status is at baseline.            Review of Systems    Vents:  Oxygen Concentration (%): 28 (09/27/24 1915)    Lines/Drains/Airways       Drain  Duration                  Chest Tube 09/27/24 0130 Left Midaxillary 1 day              Peripheral Intravenous Line  Duration                  Peripheral IV - Single Lumen 09/27/24 1453 20 G Anterior;Right Forearm <1 day                    Significant Labs:    CBC/Anemia Profile:  Recent Labs   Lab 09/27/24  0037 09/27/24  0531 09/28/24  0448   WBC 12.07  --  13.47*   HGB 11.2*  --  11.1*   HCT 34.4*  --  34.4*   *  --  429   MCV 99*  --  99*   RDW 13.5  --  14.1   FOLATE  --  9.8  --    [id number]  --  >2000*  --         Chemistries:  Recent Labs   Lab 09/27/24  0037 09/28/24  0448    140   K 4.3 4.1    109   CO2 22* 23   BUN 14 12   CREATININE 0.8 0.8   CALCIUM 9.0 8.8   ALBUMIN 3.2* 2.9*   PROT 6.3 5.6*   BILITOT 0.2 0.5   ALKPHOS 113 101   ALT 21 22   AST 21 17       All pertinent labs within the past 24 hours have been reviewed.    Significant Imaging:  I have reviewed all pertinent imaging results/findings within the past 24 hours.    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  If continues to reaccumulate, will need large bore chest tube  - repeat CXR in a couple hours      Acute on chronic respiratory failure with hypoxia  Patient with Hypoxic Respiratory failure which is Acute.  he is not on home oxygen. Supplemental oxygen was provided and noted- Oxygen Concentration (%):  [28] 28    .   Signs/symptoms of respiratory  failure include- respiratory distress. Contributing diagnoses includes - COPD and pneumothorax  Labs and images were reviewed. Patient Has recent ABG, which has been reviewed. Will treat underlying causes and adjust management of respiratory failure as follows- chest tube, supplemental oxygen.    Wean for goal SpO2 > 88%  Avoid high flow oxygen or NIPPV given PTX and ongoing air leak    Mass of left lung  - will ultimately needs a bx as concern for malignancy is high  - his acute illness with his PTX needs to be a bit more stable to come up with a good plan for bx    Bilateral pneumonia  - I think is likely some residual findings from his recent infection and hospitalization  - empiric Abx are reasonable up front, but I think we can likely de-escalate quickly soon      Centrilobular emphysema  Patient's COPD is controlled currently.  Patient is currently off COPD Pathway. Continue scheduled inhalers  Pulmicort/Brovana nebs and Supplemental oxygen and monitor respiratory status closely.     Other  Tobacco abuse  - counseled on cessation and he is motivated to quit  - declined nicotine patch           Navneet Lester MD  Pulmonology  [facility] - Telemetry ([facility])

## 2024-09-28 NOTE — SUBJECTIVE & OBJECTIVE
Interval History: No acute events overnight, afebrile, hemodynamically stable.   Pulmonology consulted for chest tube management.  Repeat chest x-ray noted concerns for reaccumulation of pneumothorax despite ongoing suction drainage, suspect likely secondary to ruptured bleb leading to continuous air leak with high concerns for bronchopleural fistula.  Worsening leukocytosis, CT imaging also noted concerns for consolidation in the left upper lobe and posteromedial right lower lobe concerning for pneumonia so started on broad-spectrum antibiotics given risk factors of recent hospitalization.  MRSA cultures pending.  Pulmonology following for chest tube management.      Objective:     Vital Signs (Most Recent):  Temp: 98 °F (36.7 °C) (09/28/24 1119)  Pulse: 70 (09/28/24 1513)  Resp: 18 (09/28/24 1119)  BP: 124/72 (09/28/24 1119)  SpO2: 96 % (09/28/24 1119) Vital Signs (24h Range):  Temp:  [97.5 °F (36.4 °C)-98.3 °F (36.8 °C)] 98 °F (36.7 °C)  Pulse:  [] 70  Resp:  [16-20] 18  SpO2:  [94 %-96 %] 96 %  BP: (124-146)/(71-86) 124/72     Weight: 71 kg (156 lb 8.4 oz)  Body mass index is 23.11 kg/m².    Intake/Output Summary (Last 24 hours) at 9/28/2024 1527  Last data filed at 9/28/2024 1002  Gross per 24 hour   Intake 1013.68 ml   Output 1675 ml   Net -661.32 ml         Physical Exam  Vitals and nursing note reviewed.   Constitutional:       General: He is not in acute distress.     Appearance: He is not ill-appearing, toxic-appearing or diaphoretic.   HENT:      Head: Normocephalic and atraumatic.      Mouth/Throat:      Mouth: Mucous membranes are moist.   Eyes:      General: No scleral icterus.        Right eye: No discharge.         Left eye: No discharge.   Cardiovascular:      Rate and Rhythm: Normal rate and regular rhythm.      Heart sounds: Normal heart sounds.   Pulmonary:      Effort: Pulmonary effort is normal. No respiratory distress.      Breath sounds: Rales (Left-sided) present. No wheezing.       Comments: Left-sided chest tube in place with overlying dressing intact  Abdominal:      General: Bowel sounds are normal.      Tenderness: There is no abdominal tenderness.   Musculoskeletal:      Cervical back: No rigidity.      Right lower leg: No edema.      Left lower leg: No edema.   Skin:     General: Skin is warm and dry.      Coloration: Skin is not jaundiced.   Neurological:      Mental Status: He is alert and oriented to person, place, and time. Mental status is at baseline.   Psychiatric:         Mood and Affect: Mood normal.         Behavior: Behavior normal.             Significant Labs: All pertinent labs within the past 24 hours have been reviewed.  CBC:   Recent Labs   Lab 09/27/24 0037 09/28/24 0448   WBC 12.07 13.47*   HGB 11.2* 11.1*   HCT 34.4* 34.4*   * 429     CMP:   Recent Labs   Lab 09/27/24 0037 09/28/24 0448    140   K 4.3 4.1    109   CO2 22* 23   * 104   BUN 14 12   CREATININE 0.8 0.8   CALCIUM 9.0 8.8   PROT 6.3 5.6*   ALBUMIN 3.2* 2.9*   BILITOT 0.2 0.5   ALKPHOS 113 101   AST 21 17   ALT 21 22   ANIONGAP 8 8       Significant Imaging: I have reviewed all pertinent imaging results/findings within the past 24 hours.

## 2024-09-28 NOTE — ASSESSMENT & PLAN NOTE
-CT scan of chest with consolidations and left upper lobe and posteromedial right lower lobe concerning for pneumonia  -white blood cell count 12.07, COVID-19 negative, afebrile      -empiric IV Linelozid/Zosyn, O2 supplementation, p.r.n. DuoNebs  -follow up cultures  -aspiration and fall precautions  -pulmonary consult

## 2024-09-28 NOTE — PLAN OF CARE
A213/A213 RADHA Daniels is a 68 y.o.male admitted on 9/26/2024 for Spontaneous pneumothorax   Code Status: Full Code MRN: [id number]   Review of patient's allergies indicates:  No Known Allergies  Past Medical History:   Diagnosis Date    Cancer     Prostate    Tobacco use       PRN meds    0.9% NaCl, , PRN  acetaminophen, 650 mg, Q6H PRN  albuterol-ipratropium, 3 mL, Q6H PRN  benzonatate, 100 mg, TID PRN  dextrose 10%, 12.5 g, PRN  dextrose 10%, 25 g, PRN  glucagon (human recombinant), 1 mg, PRN  glucose, 16 g, PRN  glucose, 24 g, PRN  guaiFENesin 100 mg/5 ml, 200 mg, Q4H PRN  hydrALAZINE, 10 mg, Q6H PRN  morphine, 2 mg, Q6H PRN  naloxone, 0.02 mg, PRN  ondansetron, 4 mg, Q6H PRN  sodium chloride 0.9%, 10 mL, Q12H PRN      Chart check completed. Will continue plan of care.         [place] Coma Scale Score: 15     Lead Monitored: Lead II Rhythm: normal sinus rhythm    Cardiac/Telemetry Box Number: [id number]    Last Bowel Movement: 09/26/24  Diet Adult Regular  Voiding Characteristics: external catheter  Syed Score: 18  Fall Risk Score: 6  Accucheck []   Freq?      Lines/Drains/Airways       Drain  Duration                  Chest Tube 09/27/24 0130 Left Midaxillary <1 day              Peripheral Intravenous Line  Duration                  Peripheral IV - Single Lumen 09/27/24 1453 20 G Anterior;Right Forearm <1 day

## 2024-09-28 NOTE — ASSESSMENT & PLAN NOTE
Left lung mass with presumed lung cancer with possible metastases  -CT scan of chest shows left perihilar mass 3.7 x 2.5 x 3.6 cm concerning for neoplasm; nonspecific mildly prominent mediastinal and left hilar lymph nodes with metastases possible; small hypodensity in right liver that is nonspecific but metastases not excluded; osseous metastases not excluded      -pulmonary consulted, plans for future evaluation after respiratory status is more stable

## 2024-09-28 NOTE — HOSPITAL COURSE
"Hospital medicine for evaluation of pneumothorax after placement of chest tube in ED.   Repeat chest x-ray noted concerns for reaccumulation of pneumothorax despite ongoing suction drainage, suspect likely secondary to ruptured bleb leading to continuous air leak with high concerns for bronchopleural fistula.  CT imaging also noted concerns for consolidation in the left upper lobe and posteromedial right lower lobe concerning for pneumonia, labs with worsening leukocytosis, so started on broad-spectrum antibiotics given risk factors of recent hospitalization.  MRSA cultures pending. Repeat chest x-ray 9/29 noted "near complete resolution of the left pneumothorax, with only a trace pneumothorax currently. " CXR 9/30 noted "No significant pneumothorax.". CXR 10/1 noted "No residual pneumothorax". Underwent placement of left-sided 28F chest tube on 10/1 by Cardiothoracic surgery. Pulmonology and Cardiothoracic surgery following. Low suspicion for MRSA given MRSA nares negative, deescalated antibiotics to cefazolin.     10/2- looks and feels better, no CP, SOB better since large CT placed by Dr. Gavin yesterday, connected to water seal, air leak still present. IR could not perform Lung Bx today, hence tomorrow. Pt agreeable  and otherwise comfortable. Pain controlled with analgesics. NPO again after MN tonight.      10/3- appreciate IR and CTS- pt resting comfortably, CT in place with air leak present. Underwent L Lung Bx by IR. Tolerated procedure well. CP under control with meds. Cont present care. Per CT surgery- may need Heimlich valve upon discharge, pt has severe pan acinar emphysema- not a candidate for open lung procedure.       10/4- appreciate Dr. Rodriguez- doing better, still connected to Chest tube with -20 cm suction via waterseal. Air leak a little better today. L sided CP also better controlled. He ambulated twice in the halls today. Sputum Cx yesterday grew MRSA- hence Ancef changed to oral Zyvox. Remains " HD stable, pleasant, eating drinking well.      10/5- Appreciate Dr. Cali- doing well, resting in bed on RA. CP much better controlled. Still has SQ emphysema L chest- CT in placed- to suction, air leak appears better, CXR L apical PTX. Labs stable. Pt ambulates well. Cont same tx, await Lung Bx results next week.     10/6- looks better, comfortable in bed on RA. CT remains on -20 cm suction, air leak appears better. Pulm plans water seal trial soon. CXR does not show any PTX. Cont present care. Await Lung bx results.     10/7- looks much better, sitting up in chair, off suction, on water seal only with minimal air leak by CTS. Ambulating well. No cough or sputum, VS afeb. Hopefully, CT clamp in am and removal soon. Await Lung Bx results.     10/8- Appreciate Dr. Rdoriguez- pt looks and feels much better, no air leak on water seal, no PTX on CXR. CT was clamped for 3 hrs, no PTX. CT pulled, dressing applied. SQ emphysema also better, CP under control. Dr. Rodriguez wants to monitor pt in  hospital another day. Await Lung Bx results.     10/9- NAEON, remains stable on room air. CXR this AM no PTX. Stable for discharge home. F/U with CTS and Pulmonary in 1-2 weeks for further management.

## 2024-09-28 NOTE — SUBJECTIVE & OBJECTIVE
Interval History: No acute events.  CXR late morning with reaccumulation of PTX despite ongoing suction drainage.  Adjusted tube and resecured.  Remains on suction.  No new complaints from pt.      Objective:     Vital Signs (Most Recent):  Temp: 98 °F (36.7 °C) (09/28/24 1119)  Pulse: 74 (09/28/24 1119)  Resp: 18 (09/28/24 1119)  BP: 124/72 (09/28/24 1119)  SpO2: 96 % (09/28/24 1119) Vital Signs (24h Range):  Temp:  [97.5 °F (36.4 °C)-98.5 °F (36.9 °C)] 98 °F (36.7 °C)  Pulse:  [] 74  Resp:  [16-20] 18  SpO2:  [94 %-96 %] 96 %  BP: (124-146)/(71-86) 124/72     Weight: 71 kg (156 lb 8.4 oz)  Body mass index is 23.11 kg/m².      Intake/Output Summary (Last 24 hours) at 9/28/2024 1427  Last data filed at 9/28/2024 1002  Gross per 24 hour   Intake 1013.68 ml   Output 2570 ml   Net -1556.32 ml        Physical Exam  Vitals and nursing note reviewed.   Constitutional:       General: He is not in acute distress.  Cardiovascular:      Rate and Rhythm: Normal rate and regular rhythm.      Pulses: Normal pulses.      Heart sounds: No murmur heard.  Pulmonary:      Effort: Pulmonary effort is normal. No respiratory distress.      Breath sounds: Rales (ALFREDO) present. No wheezing or rhonchi.   Abdominal:      General: Abdomen is flat. There is no distension.      Palpations: Abdomen is soft.      Tenderness: There is no abdominal tenderness.   Musculoskeletal:      Right lower leg: No edema.      Left lower leg: No edema.   Neurological:      General: No focal deficit present.      Mental Status: He is alert and oriented to person, place, and time. Mental status is at baseline.           Review of Systems    Vents:  Oxygen Concentration (%): 28 (09/27/24 1915)    Lines/Drains/Airways       Drain  Duration                  Chest Tube 09/27/24 0130 Left Midaxillary 1 day              Peripheral Intravenous Line  Duration                  Peripheral IV - Single Lumen 09/27/24 1453 20 G Anterior;Right Forearm <1 day                     Significant Labs:    CBC/Anemia Profile:  Recent Labs   Lab 09/27/24  0037 09/27/24  0531 09/28/24  0448   WBC 12.07  --  13.47*   HGB 11.2*  --  11.1*   HCT 34.4*  --  34.4*   *  --  429   MCV 99*  --  99*   RDW 13.5  --  14.1   FOLATE  --  9.8  --    [id number]  --  >2000*  --         Chemistries:  Recent Labs   Lab 09/27/24  0037 09/28/24  0448    140   K 4.3 4.1    109   CO2 22* 23   BUN 14 12   CREATININE 0.8 0.8   CALCIUM 9.0 8.8   ALBUMIN 3.2* 2.9*   PROT 6.3 5.6*   BILITOT 0.2 0.5   ALKPHOS 113 101   ALT 21 22   AST 21 17       All pertinent labs within the past 24 hours have been reviewed.    Significant Imaging:  I have reviewed all pertinent imaging results/findings within the past 24 hours.

## 2024-09-28 NOTE — NURSING TRANSFER
Nursing Transfer Note      9/27/2024   12:59 PM    Nurse giving handoff:ERIC Bird  Nurse receiving handoff:Cintia    Reason patient is being transferred: continued care    Transfer To: Tele Bed 213    Transfer via stretcher    Transfer with Chest tube, Cardiac Monitoring    Transported by Marge    Transfer Vital Signs:  Blood Pressure:152/78  Heart Rate:108  O2:96  Temperature:98  Respirations:18    Telemetry: Box Number [id number], Rate 108, and Rhythm Sinus  Tach  Order for Tele Monitor? Yes    Additional Lines: Chest Tube    4eyes on Skin: yes    Medicines sent: n/a    Any special needs or follow-up needed: n/a    Patient belongings transferred with patient: Yes    Chart send with patient: No    Notified: spouse    Patient reassessed at: 9/27/24, 1259    Upon arrival to floor: cardiac monitor applied, patient oriented to room, call bell in reach, and bed in lowest position

## 2024-09-28 NOTE — PLAN OF CARE
A213/A213 RADHA Daniels is a 68 y.o.male admitted on 9/26/2024 for Spontaneous pneumothorax   Code Status: Full Code MRN: [id number]   Review of patient's allergies indicates:  No Known Allergies  Past Medical History:   Diagnosis Date    Cancer     Prostate    Tobacco use       PRN meds    0.9% NaCl, , PRN  acetaminophen, 650 mg, Q6H PRN  albuterol-ipratropium, 3 mL, Q6H PRN  benzonatate, 100 mg, TID PRN  dextrose 10%, 12.5 g, PRN  dextrose 10%, 25 g, PRN  glucagon (human recombinant), 1 mg, PRN  glucose, 16 g, PRN  glucose, 24 g, PRN  guaiFENesin 100 mg/5 ml, 200 mg, Q4H PRN  hydrALAZINE, 10 mg, Q6H PRN  morphine, 2 mg, Q6H PRN  naloxone, 0.02 mg, PRN  ondansetron, 4 mg, Q6H PRN  sodium chloride 0.9%, 10 mL, Q12H PRN      Chart check completed. Will continue plan of care.         [place] Coma Scale Score: 15     Lead Monitored: Lead II Rhythm: normal sinus rhythm    Cardiac/Telemetry Box Number: [id number]  VTE Required Core Measure: (SCDs) Sequential compression device initiated/maintained Last Bowel Movement: 09/26/24  Diet Adult Regular  Voiding Characteristics: external catheter  Syed Score: 18  Fall Risk Score: 10  Accucheck []   Freq?      Lines/Drains/Airways       Drain  Duration                  Chest Tube 09/27/24 0130 Left Midaxillary 1 day              Peripheral Intravenous Line  Duration                  Peripheral IV - Single Lumen 09/27/24 1453 20 G Anterior;Right Forearm 1 day

## 2024-09-28 NOTE — ASSESSMENT & PLAN NOTE
-pigtail catheter/chest tube in place, continuous suction  -chest x-ray on ER arrival with large left-sided pneumothorax  -CT scan of chest with trace left apical pneumothorax   While on suction, repeat chest x-ray noted concerns for accumulation      -Pulmonology consulted, follow-up recs  -O2 via 2 L nasal cannula  -continuous pulse oximetry monitoring

## 2024-09-28 NOTE — ASSESSMENT & PLAN NOTE
- I think is likely some residual findings from his recent infection and hospitalization  - empiric Abx are reasonable up front, but I think we can likely de-escalate quickly soon     Pt. complaining of periods of being dizzy. Pt. states on initial ambulation to bathroom @ around 1600, pt. felt like the "world was spinning" and then had severe nausea and threw up. Pt. states she has had dizziness during pregnancy. Pt. states she is feeling better now, and that dizziness "comes and goes".

## 2024-09-29 LAB
ALBUMIN SERPL BCP-MCNC: 3 G/DL (ref 3.5–5.2)
ALP SERPL-CCNC: 107 U/L (ref 55–135)
ALT SERPL W/O P-5'-P-CCNC: 21 U/L (ref 10–44)
ANION GAP SERPL CALC-SCNC: 8 MMOL/L (ref 8–16)
AST SERPL-CCNC: 17 U/L (ref 10–40)
BASOPHILS # BLD AUTO: 0.06 K/UL (ref 0–0.2)
BASOPHILS NFR BLD: 0.6 % (ref 0–1.9)
BILIRUB SERPL-MCNC: 0.8 MG/DL (ref 0.1–1)
BUN SERPL-MCNC: 12 MG/DL (ref 8–23)
CALCIUM SERPL-MCNC: 9 MG/DL (ref 8.7–10.5)
CHLORIDE SERPL-SCNC: 105 MMOL/L (ref 95–110)
CO2 SERPL-SCNC: 25 MMOL/L (ref 23–29)
CREAT SERPL-MCNC: 0.8 MG/DL (ref 0.5–1.4)
DIFFERENTIAL METHOD BLD: ABNORMAL
EOSINOPHIL # BLD AUTO: 0.1 K/UL (ref 0–0.5)
EOSINOPHIL NFR BLD: 0.9 % (ref 0–8)
ERYTHROCYTE [DISTWIDTH] IN BLOOD BY AUTOMATED COUNT: 14 % (ref 11.5–14.5)
EST. GFR  (NO RACE VARIABLE): >60 ML/MIN/1.73 M^2
GLUCOSE SERPL-MCNC: 114 MG/DL (ref 70–110)
HCT VFR BLD AUTO: 38 % (ref 40–54)
HGB BLD-MCNC: 12.2 G/DL (ref 14–18)
IMM GRANULOCYTES # BLD AUTO: 0.09 K/UL (ref 0–0.04)
IMM GRANULOCYTES NFR BLD AUTO: 0.9 % (ref 0–0.5)
LYMPHOCYTES # BLD AUTO: 1.1 K/UL (ref 1–4.8)
LYMPHOCYTES NFR BLD: 11.7 % (ref 18–48)
MAGNESIUM SERPL-MCNC: 2.2 MG/DL (ref 1.6–2.6)
MCH RBC QN AUTO: 32 PG (ref 27–31)
MCHC RBC AUTO-ENTMCNC: 32.1 G/DL (ref 32–36)
MCV RBC AUTO: 100 FL (ref 82–98)
MONOCYTES # BLD AUTO: 0.5 K/UL (ref 0.3–1)
MONOCYTES NFR BLD: 5.6 % (ref 4–15)
NEUTROPHILS # BLD AUTO: 7.7 K/UL (ref 1.8–7.7)
NEUTROPHILS NFR BLD: 80.3 % (ref 38–73)
NRBC BLD-RTO: 0 /100 WBC
PHOSPHATE SERPL-MCNC: 2.9 MG/DL (ref 2.7–4.5)
PLATELET # BLD AUTO: 418 K/UL (ref 150–450)
PMV BLD AUTO: 8.5 FL (ref 9.2–12.9)
POTASSIUM SERPL-SCNC: 4 MMOL/L (ref 3.5–5.1)
PROT SERPL-MCNC: 6 G/DL (ref 6–8.4)
RBC # BLD AUTO: 3.81 M/UL (ref 4.6–6.2)
SODIUM SERPL-SCNC: 138 MMOL/L (ref 136–145)
WBC # BLD AUTO: 9.54 K/UL (ref 3.9–12.7)

## 2024-09-29 PROCEDURE — 84100 ASSAY OF PHOSPHORUS: CPT | Performed by: STUDENT IN AN ORGANIZED HEALTH CARE EDUCATION/TRAINING PROGRAM

## 2024-09-29 PROCEDURE — 36415 COLL VENOUS BLD VENIPUNCTURE: CPT | Performed by: STUDENT IN AN ORGANIZED HEALTH CARE EDUCATION/TRAINING PROGRAM

## 2024-09-29 PROCEDURE — 63600175 PHARM REV CODE 636 W HCPCS: Performed by: INTERNAL MEDICINE

## 2024-09-29 PROCEDURE — 80053 COMPREHEN METABOLIC PANEL: CPT | Performed by: STUDENT IN AN ORGANIZED HEALTH CARE EDUCATION/TRAINING PROGRAM

## 2024-09-29 PROCEDURE — 94761 N-INVAS EAR/PLS OXIMETRY MLT: CPT

## 2024-09-29 PROCEDURE — 25000003 PHARM REV CODE 250: Performed by: INTERNAL MEDICINE

## 2024-09-29 PROCEDURE — 94640 AIRWAY INHALATION TREATMENT: CPT

## 2024-09-29 PROCEDURE — 85025 COMPLETE CBC W/AUTO DIFF WBC: CPT | Performed by: STUDENT IN AN ORGANIZED HEALTH CARE EDUCATION/TRAINING PROGRAM

## 2024-09-29 PROCEDURE — 99900035 HC TECH TIME PER 15 MIN (STAT)

## 2024-09-29 PROCEDURE — 25000003 PHARM REV CODE 250: Performed by: STUDENT IN AN ORGANIZED HEALTH CARE EDUCATION/TRAINING PROGRAM

## 2024-09-29 PROCEDURE — 25000242 PHARM REV CODE 250 ALT 637 W/ HCPCS: Performed by: INTERNAL MEDICINE

## 2024-09-29 PROCEDURE — 21400001 HC TELEMETRY ROOM

## 2024-09-29 PROCEDURE — 83735 ASSAY OF MAGNESIUM: CPT | Performed by: STUDENT IN AN ORGANIZED HEALTH CARE EDUCATION/TRAINING PROGRAM

## 2024-09-29 PROCEDURE — 27000221 HC OXYGEN, UP TO 24 HOURS

## 2024-09-29 RX ADMIN — ARFORMOTEROL TARTRATE 15 MCG: 15 SOLUTION RESPIRATORY (INHALATION) at 07:09

## 2024-09-29 RX ADMIN — SODIUM CHLORIDE: 9 INJECTION, SOLUTION INTRAVENOUS at 03:09

## 2024-09-29 RX ADMIN — BUDESONIDE 0.5 MG: 0.5 INHALANT ORAL at 07:09

## 2024-09-29 RX ADMIN — LINEZOLID 600 MG: 600 TABLET, FILM COATED ORAL at 11:09

## 2024-09-29 RX ADMIN — GUAIFENESIN 200 MG: 200 SOLUTION ORAL at 11:09

## 2024-09-29 RX ADMIN — PIPERACILLIN SODIUM AND TAZOBACTAM SODIUM 4.5 G: 4; .5 INJECTION, POWDER, FOR SOLUTION INTRAVENOUS at 06:09

## 2024-09-29 RX ADMIN — PIPERACILLIN SODIUM AND TAZOBACTAM SODIUM 4.5 G: 4; .5 INJECTION, POWDER, FOR SOLUTION INTRAVENOUS at 10:09

## 2024-09-29 RX ADMIN — PIPERACILLIN SODIUM AND TAZOBACTAM SODIUM 4.5 G: 4; .5 INJECTION, POWDER, FOR SOLUTION INTRAVENOUS at 03:09

## 2024-09-29 RX ADMIN — GUAIFENESIN 200 MG: 200 SOLUTION ORAL at 08:09

## 2024-09-29 RX ADMIN — GUAIFENESIN 200 MG: 200 SOLUTION ORAL at 04:09

## 2024-09-29 RX ADMIN — LINEZOLID 600 MG: 600 TABLET, FILM COATED ORAL at 08:09

## 2024-09-29 NOTE — ASSESSMENT & PLAN NOTE
"-pigtail catheter/chest tube in place, continuous suction  -chest x-ray on ER arrival with large left-sided pneumothorax  -CT scan of chest with trace left apical pneumothorax   -While on suction, repeat chest x-ray noted concerns for accumulation  -CXR 09/29 noted, "near complete resolution of the left pneumothorax, with only a trace pneumothorax currently. "    -Pulmonology consulted, follow-up recs  -O2 via 2 L nasal cannula  -continuous pulse oximetry monitoring  "

## 2024-09-29 NOTE — SUBJECTIVE & OBJECTIVE
"Interval History: No acute events overnight, afebrile, hemodynamically stable.   Repeat chest x-ray this morning noted "near complete resolution of the left pneumothorax, with only a trace pneumothorax currently. " Leukocytosis resolving after broadening antibiotics yesterday.  MRSA cultures pending.  Pulmonology following for chest tube management.      Objective:     Vital Signs (Most Recent):  Temp: 98.8 °F (37.1 °C) (09/29/24 1204)  Pulse: 83 (09/29/24 1204)  Resp: 16 (09/29/24 1204)  BP: 115/70 (09/29/24 1204)  SpO2: 96 % (09/29/24 1204) Vital Signs (24h Range):  Temp:  [97.9 °F (36.6 °C)-98.8 °F (37.1 °C)] 98.8 °F (37.1 °C)  Pulse:  [64-98] 83  Resp:  [16-19] 16  SpO2:  [93 %-96 %] 96 %  BP: (115-133)/(69-79) 115/70     Weight: 71 kg (156 lb 8.4 oz)  Body mass index is 23.11 kg/m².    Intake/Output Summary (Last 24 hours) at 9/29/2024 1452  Last data filed at 9/29/2024 0707  Gross per 24 hour   Intake 222.43 ml   Output 2205 ml   Net -1982.57 ml         Physical Exam  Vitals and nursing note reviewed.   Constitutional:       General: He is not in acute distress.     Appearance: He is not ill-appearing, toxic-appearing or diaphoretic.   HENT:      Head: Normocephalic and atraumatic.      Mouth/Throat:      Mouth: Mucous membranes are moist.   Eyes:      General: No scleral icterus.        Right eye: No discharge.         Left eye: No discharge.   Cardiovascular:      Rate and Rhythm: Normal rate and regular rhythm.      Heart sounds: Normal heart sounds.   Pulmonary:      Effort: Pulmonary effort is normal. No respiratory distress.      Breath sounds: Rales (Left-sided) present. No wheezing.      Comments: Left-sided chest tube in place with overlying dressing intact  Abdominal:      General: Bowel sounds are normal.      Tenderness: There is no abdominal tenderness.   Musculoskeletal:      Cervical back: No rigidity.      Right lower leg: No edema.      Left lower leg: No edema.   Skin:     General: Skin is " warm and dry.      Coloration: Skin is not jaundiced.   Neurological:      Mental Status: He is alert and oriented to person, place, and time. Mental status is at baseline.   Psychiatric:         Mood and Affect: Mood normal.         Behavior: Behavior normal.             Significant Labs: All pertinent labs within the past 24 hours have been reviewed.  CBC:   Recent Labs   Lab 09/28/24 0448 09/29/24  0825   WBC 13.47* 9.54   HGB 11.1* 12.2*   HCT 34.4* 38.0*    418     CMP:   Recent Labs   Lab 09/28/24  0448 09/29/24  0825    138   K 4.1 4.0    105   CO2 23 25    114*   BUN 12 12   CREATININE 0.8 0.8   CALCIUM 8.8 9.0   PROT 5.6* 6.0   ALBUMIN 2.9* 3.0*   BILITOT 0.5 0.8   ALKPHOS 101 107   AST 17 17   ALT 22 21   ANIONGAP 8 8       Significant Imaging: I have reviewed all pertinent imaging results/findings within the past 24 hours.

## 2024-09-29 NOTE — PROGRESS NOTES
[facility] [facility] ([facility])  Pulmonology  Progress Note    Patient Name: Aurelio Daniels  MRN: [id number]  Admission Date: 9/26/2024  Hospital Length of Stay: 2 days  Code Status: Full Code  Attending Provider: Juan Disla DO  Primary Care Provider: Shlomo Villatoro MD   Principal Problem: Spontaneous pneumothorax    Subjective:     Interval History: No acute events.  Lung re-expanded following tube adjustment yesterday.  Brisk continuous air leak continues.  Atrium on suction.  No complaints.      Objective:     Vital Signs (Most Recent):  Temp: 98.8 °F (37.1 °C) (09/29/24 1204)  Pulse: 83 (09/29/24 1204)  Resp: 16 (09/29/24 1204)  BP: 115/70 (09/29/24 1204)  SpO2: 96 % (09/29/24 1204) Vital Signs (24h Range):  Temp:  [97.9 °F (36.6 °C)-98.8 °F (37.1 °C)] 98.8 °F (37.1 °C)  Pulse:  [64-98] 83  Resp:  [16-19] 16  SpO2:  [93 %-96 %] 96 %  BP: (115-133)/(69-79) 115/70     Weight: 71 kg (156 lb 8.4 oz)  Body mass index is 23.11 kg/m².      Intake/Output Summary (Last 24 hours) at 9/29/2024 1229  Last data filed at 9/29/2024 0707  Gross per 24 hour   Intake 462.43 ml   Output 2205 ml   Net -1742.57 ml        Physical Exam  Vitals and nursing note reviewed.   Constitutional:       General: He is not in acute distress.  Cardiovascular:      Rate and Rhythm: Normal rate and regular rhythm.      Pulses: Normal pulses.      Heart sounds: No murmur heard.  Pulmonary:      Effort: Pulmonary effort is normal. No respiratory distress.      Breath sounds: No wheezing, rhonchi or rales.   Abdominal:      General: Abdomen is flat. There is no distension.      Palpations: Abdomen is soft.      Tenderness: There is no abdominal tenderness.   Musculoskeletal:      Right lower leg: No edema.      Left lower leg: No edema.   Neurological:      General: No focal deficit present.      Mental Status: He is alert and oriented to person, place, and time. Mental status is at baseline.           Review of  Systems    Vents:  Oxygen Concentration (%): 28 (09/28/24 1910)    Lines/Drains/Airways       Drain  Duration                  Chest Tube 09/27/24 0130 Left Midaxillary 2 days              Peripheral Intravenous Line  Duration                  Peripheral IV - Single Lumen 09/27/24 1453 20 G Anterior;Right Forearm 1 day                    Significant Labs:    CBC/Anemia Profile:  Recent Labs   Lab 09/28/24  0448 09/29/24  0825   WBC 13.47* 9.54   HGB 11.1* 12.2*   HCT 34.4* 38.0*    418   MCV 99* 100*   RDW 14.1 14.0        Chemistries:  Recent Labs   Lab 09/28/24  0448 09/29/24  0825    138   K 4.1 4.0    105   CO2 23 25   BUN 12 12   CREATININE 0.8 0.8   CALCIUM 8.8 9.0   ALBUMIN 2.9* 3.0*   PROT 5.6* 6.0   BILITOT 0.5 0.8   ALKPHOS 101 107   ALT 22 21   AST 17 17   MG  --  2.2   PHOS  --  2.9       All pertinent labs within the past 24 hours have been reviewed.    Significant Imaging:  I have reviewed all pertinent imaging results/findings within the past 24 hours.    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  he is not on home oxygen. Supplemental oxygen was provided and noted- Oxygen Concentration (%):  [28] 28    .   Signs/symptoms of respiratory failure include- respiratory distress. Contributing diagnoses includes - COPD and pneumothorax  Labs and images were reviewed. Patient Has recent ABG, which has been reviewed. Will treat underlying causes and adjust  management of respiratory failure as follows- chest tube, supplemental oxygen.    Wean for goal SpO2 > 88%  Avoid high flow oxygen or NIPPV given PTX and ongoing air leak    Mass of left lung  - will ultimately needs a bx as concern for malignancy is high  - his acute illness with his PTX needs to be a bit more stable to come up with a good plan for bx    Bilateral pneumonia  - I think is likely some residual findings from his recent infection and hospitalization  - empiric Abx are reasonable up front, but I think we can likely de-escalate quickly soon      Centrilobular emphysema  Patient's COPD is controlled currently.  Patient is currently off COPD Pathway. Continue scheduled inhalers  Pulmicort/Brovana nebs and Supplemental oxygen and monitor respiratory status closely.     Other  Tobacco abuse  - counseled on cessation and he is motivated to quit  - declined nicotine patch         Navneet Lester MD  Pulmonology  [facility] - Telemetry ([facility])

## 2024-09-29 NOTE — SUBJECTIVE & OBJECTIVE
Interval History: No acute events.  Lung re-expanded following tube adjustment yesterday.  Brisk continuous air leak continues.  Atrium on suction.  No complaints.      Objective:     Vital Signs (Most Recent):  Temp: 98.8 °F (37.1 °C) (09/29/24 1204)  Pulse: 83 (09/29/24 1204)  Resp: 16 (09/29/24 1204)  BP: 115/70 (09/29/24 1204)  SpO2: 96 % (09/29/24 1204) Vital Signs (24h Range):  Temp:  [97.9 °F (36.6 °C)-98.8 °F (37.1 °C)] 98.8 °F (37.1 °C)  Pulse:  [64-98] 83  Resp:  [16-19] 16  SpO2:  [93 %-96 %] 96 %  BP: (115-133)/(69-79) 115/70     Weight: 71 kg (156 lb 8.4 oz)  Body mass index is 23.11 kg/m².      Intake/Output Summary (Last 24 hours) at 9/29/2024 1229  Last data filed at 9/29/2024 0707  Gross per 24 hour   Intake 462.43 ml   Output 2205 ml   Net -1742.57 ml        Physical Exam  Vitals and nursing note reviewed.   Constitutional:       General: He is not in acute distress.  Cardiovascular:      Rate and Rhythm: Normal rate and regular rhythm.      Pulses: Normal pulses.      Heart sounds: No murmur heard.  Pulmonary:      Effort: Pulmonary effort is normal. No respiratory distress.      Breath sounds: No wheezing, rhonchi or rales.   Abdominal:      General: Abdomen is flat. There is no distension.      Palpations: Abdomen is soft.      Tenderness: There is no abdominal tenderness.   Musculoskeletal:      Right lower leg: No edema.      Left lower leg: No edema.   Neurological:      General: No focal deficit present.      Mental Status: He is alert and oriented to person, place, and time. Mental status is at baseline.           Review of Systems    Vents:  Oxygen Concentration (%): 28 (09/28/24 1910)    Lines/Drains/Airways       Drain  Duration                  Chest Tube 09/27/24 0130 Left Midaxillary 2 days              Peripheral Intravenous Line  Duration                  Peripheral IV - Single Lumen 09/27/24 1453 20 G Anterior;Right Forearm 1 day                    Significant Labs:    CBC/Anemia  Profile:  Recent Labs   Lab 09/28/24  0448 09/29/24  0825   WBC 13.47* 9.54   HGB 11.1* 12.2*   HCT 34.4* 38.0*    418   MCV 99* 100*   RDW 14.1 14.0        Chemistries:  Recent Labs   Lab 09/28/24  0448 09/29/24  0825    138   K 4.1 4.0    105   CO2 23 25   BUN 12 12   CREATININE 0.8 0.8   CALCIUM 8.8 9.0   ALBUMIN 2.9* 3.0*   PROT 5.6* 6.0   BILITOT 0.5 0.8   ALKPHOS 101 107   ALT 22 21   AST 17 17   MG  --  2.2   PHOS  --  2.9       All pertinent labs within the past 24 hours have been reviewed.    Significant Imaging:  I have reviewed all pertinent imaging results/findings within the past 24 hours.

## 2024-09-29 NOTE — ASSESSMENT & PLAN NOTE
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

## 2024-09-29 NOTE — ASSESSMENT & PLAN NOTE
Likely reactive thrombocytosis    Recent Labs     09/27/24  0037 09/28/24  0448 09/29/24  0825   * [phone]           -check CBC in a.m.

## 2024-09-29 NOTE — ASSESSMENT & PLAN NOTE
Anemia is likely due to  unclear etiology . Most recent hemoglobin and hematocrit are listed below.  Recent Labs     09/27/24  0037 09/28/24  0448 09/29/24  0825   HGB 11.2* 11.1* 12.2*   HCT 34.4* 34.4* 38.0*       Plan  - Monitor serial CBC: Daily  - Transfuse PRBC if patient becomes hemodynamically unstable, symptomatic or H/H drops below 7/21.  - Patient has not received any PRBC transfusions to date  - Patient's anemia is currently stable  - check B12 and folate levels

## 2024-09-29 NOTE — PROGRESS NOTES
[facility] Medicine  Progress Note    Patient Name: Aurelio Daniels  MRN: [id number]  Patient Class: IP- Inpatient   Admission Date: 9/26/2024  Length of Stay: 2 days  Attending Physician: Juan Disla DO  Primary Care Provider: Shlomo Villatoro MD        Subjective:     Principal Problem:Spontaneous pneumothorax        HPI:  68-year-old man with history of prostate cancer, tobacco abuse (quit 1-1/2 once ago), COPD (discharged with home oxygen via 2 L nasal cannula as of 09/17/2024), centrilobular emphysema, hypertension, anemia, and recent bilateral lower lobe pneumonia who presented to the emergency department for acute onset shortness a breath after a coughing spell.  Symptom onset occurred after the football game at approximately 10:00 p.m. in the evening, constant, and moderate-to-severe in nature.  Patient denies any associated chest pain, nausea, vomiting, fevers, chills.  Patient was originally 82% on room air on ER arrival.  He improved with nebulizer treatment, steroids, and CPAP.  Chest x-ray revealed left pneumothorax and pigtail catheter/chest tube was placed by ER physician.  Repeat chest x-ray showed improved lung inflation however there were significant abnormalities with underlying pneumonia and possible lung mass.  Stat CT scan of chest with contrast was ordered and showed spiculated left perihilar mass 3.7 x 2.5 x 3.6 cm suspicious for neoplasm; left upper lobe and posteromedial right lower lobe consolidations concerning for pneumonia; nonspecific mildly prominent mediastinal and left hilar lymph nodes with metastases possible; small hypodensity in right liver that is nonspecific but metastases not excluded; osseous metastases not excluded; small left and trace right pleural effusion; trace left apical pneumothorax; emphysematous changes with bulla in the left upper lobe.  Patient's wife is a retired nurse and they have been  for 25 years.  Patient  "still works as a  for [employer] apartments.    Recent hospital admit 9/13-09/17/2024 with bilateral pneumonia, hypoxia.  Chest x-ray 09/13/2024 with multifocal interstitial pneumonia.  Patient was discharged with Augmentin.  Patient was discharged with home oxygen via 2 L nasal cannula.  He had previously not required oxygen.  Of note, chest x-ray on 09/09/2024 was read as negative for an acute process.    Patient was seen by primary care physician 09/24/2024 with repeat CXR showing "Findings suspicious for collapse of the left upper lobe with persistent consolidations and atelectasis on the left. Additionally a opaque masses seen at the level of the midthoracic spine. Recommend follow-up evaluation with CT with contrast."  Primary care physician reports speaking with patient's pulmonologist and with plan to order CT scan of chest without contrast as soon as possible.        Overview/Hospital Course:  Hospital medicine for evaluation of pneumothorax after placement of chest tube in ED.   Repeat chest x-ray noted concerns for reaccumulation of pneumothorax despite ongoing suction drainage, suspect likely secondary to ruptured bleb leading to continuous air leak with high concerns for bronchopleural fistula.  CT imaging also noted concerns for consolidation in the left upper lobe and posteromedial right lower lobe concerning for pneumonia, labs with worsening leukocytosis, so started on broad-spectrum antibiotics given risk factors of recent hospitalization.  MRSA cultures pending. Repeat chest x-ray 9/29 noted "near complete resolution of the left pneumothorax, with only a trace pneumothorax currently. " Pulmonology following for chest tube management.    Interval History: No acute events overnight, afebrile, hemodynamically stable.   Repeat chest x-ray this morning noted "near complete resolution of the left pneumothorax, with only a trace pneumothorax currently. " Leukocytosis resolving after " broadening antibiotics yesterday.  MRSA cultures pending.  Pulmonology following for chest tube management.      Objective:     Vital Signs (Most Recent):  Temp: 98.8 °F (37.1 °C) (09/29/24 1204)  Pulse: 83 (09/29/24 1204)  Resp: 16 (09/29/24 1204)  BP: 115/70 (09/29/24 1204)  SpO2: 96 % (09/29/24 1204) Vital Signs (24h Range):  Temp:  [97.9 °F (36.6 °C)-98.8 °F (37.1 °C)] 98.8 °F (37.1 °C)  Pulse:  [64-98] 83  Resp:  [16-19] 16  SpO2:  [93 %-96 %] 96 %  BP: (115-133)/(69-79) 115/70     Weight: 71 kg (156 lb 8.4 oz)  Body mass index is 23.11 kg/m².    Intake/Output Summary (Last 24 hours) at 9/29/2024 1452  Last data filed at 9/29/2024 0707  Gross per 24 hour   Intake 222.43 ml   Output 2205 ml   Net -1982.57 ml         Physical Exam  Vitals and nursing note reviewed.   Constitutional:       General: He is not in acute distress.     Appearance: He is not ill-appearing, toxic-appearing or diaphoretic.   HENT:      Head: Normocephalic and atraumatic.      Mouth/Throat:      Mouth: Mucous membranes are moist.   Eyes:      General: No scleral icterus.        Right eye: No discharge.         Left eye: No discharge.   Cardiovascular:      Rate and Rhythm: Normal rate and regular rhythm.      Heart sounds: Normal heart sounds.   Pulmonary:      Effort: Pulmonary effort is normal. No respiratory distress.      Breath sounds: Rales (Left-sided) present. No wheezing.      Comments: Left-sided chest tube in place with overlying dressing intact  Abdominal:      General: Bowel sounds are normal.      Tenderness: There is no abdominal tenderness.   Musculoskeletal:      Cervical back: No rigidity.      Right lower leg: No edema.      Left lower leg: No edema.   Skin:     General: Skin is warm and dry.      Coloration: Skin is not jaundiced.   Neurological:      Mental Status: He is alert and oriented to person, place, and time. Mental status is at baseline.   Psychiatric:         Mood and Affect: Mood normal.         Behavior:  "Behavior normal.             Significant Labs: All pertinent labs within the past 24 hours have been reviewed.  CBC:   Recent Labs   Lab 09/28/24  0448 09/29/24  0825   WBC 13.47* 9.54   HGB 11.1* 12.2*   HCT 34.4* 38.0*    418     CMP:   Recent Labs   Lab 09/28/24  0448 09/29/24  0825    138   K 4.1 4.0    105   CO2 23 25    114*   BUN 12 12   CREATININE 0.8 0.8   CALCIUM 8.8 9.0   PROT 5.6* 6.0   ALBUMIN 2.9* 3.0*   BILITOT 0.5 0.8   ALKPHOS 101 107   AST 17 17   ALT 22 21   ANIONGAP 8 8       Significant Imaging: I have reviewed all pertinent imaging results/findings within the past 24 hours.    Assessment/Plan:      * Spontaneous pneumothorax  -pigtail catheter/chest tube in place, continuous suction  -chest x-ray on ER arrival with large left-sided pneumothorax  -CT scan of chest with trace left apical pneumothorax   -While on suction, repeat chest x-ray noted concerns for accumulation  -CXR 09/29 noted, "near complete resolution of the left pneumothorax, with only a trace pneumothorax currently. "    -Pulmonology consulted, follow-up recs  -O2 via 2 L nasal cannula  -continuous pulse oximetry monitoring    Elevated troponin  -patient denies chest pain  -troponin elevation most likely related to pneumothorax and underlying pneumonia      Recent Labs     09/27/24  0037 09/27/24  0531 09/27/24  0820   TROPONINI 0.214* 0.327* 0.309*       -TTE nonacute    -telemetry monitoring      Acute on chronic respiratory failure with hypoxia  Patient with Hypoxic Respiratory failure which is Acute on chronic.  he is on home oxygen at 2 LPM. Supplemental oxygen was provided and noted- Oxygen Concentration (%):  [28] 28    .   Signs/symptoms of respiratory failure include- tachypnea. Contributing diagnoses includes - COPD, Pneumonia, and pneumothorax  Labs and images were reviewed. Patient Has recent ABG, which has been reviewed. Will treat underlying causes and adjust management of respiratory " failure as follows:      -continuous pulse oximetry monitoring, O2 supplementation, p.r.n. DuoNebs  -left-sided chest tube/pigtail catheter in place  -empiric IV antibiotics with Zosyn/Linezolid.  Follow up cultures and deescalate antibiotics as appropriate  -pulmonary consult, follow-up recs    Thrombocytosis  Likely reactive thrombocytosis    Recent Labs     09/27/24  0037 09/28/24  0448 09/29/24  0825   * [phone]           -check CBC in a.m.      Macrocytic anemia  Anemia is likely due to  unclear etiology . Most recent hemoglobin and hematocrit are listed below.  Recent Labs     09/27/24  0037 09/28/24  0448 09/29/24  0825   HGB 11.2* 11.1* 12.2*   HCT 34.4* 34.4* 38.0*       Plan  - Monitor serial CBC: Daily  - Transfuse PRBC if patient becomes hemodynamically unstable, symptomatic or H/H drops below 7/21.  - Patient has not received any PRBC transfusions to date  - Patient's anemia is currently stable  - check B12 and folate levels    Tobacco abuse  Patient has not smoked in 1-1/2 months.  Counseled continued cessation.  No nicotine patch needed.      Mass of left lung  Left lung mass with presumed lung cancer with possible metastases  -CT scan of chest shows left perihilar mass 3.7 x 2.5 x 3.6 cm concerning for neoplasm; nonspecific mildly prominent mediastinal and left hilar lymph nodes with metastases possible; small hypodensity in right liver that is nonspecific but metastases not excluded; osseous metastases not excluded      -pulmonary consulted, plans for future evaluation after respiratory status is more stable    Bilateral pneumonia  -CT scan of chest with consolidations and left upper lobe and posteromedial right lower lobe concerning for pneumonia  -white blood cell count 12.07, COVID-19 negative, afebrile      -empiric IV Linelozid/Zosyn, O2 supplementation, p.r.n. DuoNebs  -follow up cultures  -aspiration and fall precautions  -pulmonary consult    Centrilobular emphysema  Patient's COPD is  controlled currently.  Patient is currently off COPD Pathway. Continue p.r.n. DuoNebs, O2 supplementation, and antibiotics for pneumonia.  monitor respiratory status closely.   -patient did receive DuoNeb, prednisone 60 mg p.o., and terbutaline 0.25 mg while in the emergency department.    Primary hypertension  Chronic, controlled. Latest blood pressure and vitals reviewed-     Temp:  [97.5 °F (36.4 °C)-98.3 °F (36.8 °C)]   Pulse:  []   Resp:  [16-20]   BP: (124-146)/(71-86)   SpO2:  [94 %-96 %] .   Home meds for hypertension were reviewed and noted below.   Hypertension Medications               valsartan (DIOVAN) 80 MG tablet Take 1 tablet (80 mg total) by mouth once daily.            While in the hospital, will manage blood pressure as follows; Adjust home antihypertensive regimen as follows- hold Diovan (patient only takes it p.r.n.) while NPO.  P.r.n. IV hydralazine with parameters.    Will utilize p.r.n. blood pressure medication only if patient's blood pressure greater than 160/100 and he develops symptoms such as worsening chest pain or shortness of breath.    Prostate cancer  High-risk prostate cancer  -seen by Urology Dr. Smith 07/10/2024 with plans for follow up visit in 6 months and repeat PSA  -status post radiation therapy and ADT, PSA undetectable  -done with 2 years of Lupron        VTE Risk Mitigation (From admission, onward)           Ordered     Reason for No Pharmacological VTE Prophylaxis  Once        Comments: No Lovenox until patient is seen by Pulmonary as they may decide to pursue lung biopsy   Question:  Reasons:  Answer:  Physician Provided (leave comment)    09/27/24 0401     IP VTE HIGH RISK PATIENT  Once         09/27/24 0401     Place sequential compression device  Until discontinued         09/27/24 0401                    Discharge Planning   YUE:      Code Status: Full Code   Is the patient medically ready for discharge?:     Reason for patient still in hospital (select all  that apply): Patient trending condition, Treatment, Imaging, and Consult recommendations  Discharge Plan A: Home with family        Juan Abimael Disla DO  Department of Hospital Medicine   [facility] - Tele[facility] ([facility])    Voice recognition software was used in the creation of this note/communication and any sound-alike/typographical errors which may have occurred despite initial review prior to signing should be taken in context when interpreting.  If such errors prevent a clear understanding of the note/communication, please contact the provider/office for clarification.

## 2024-09-29 NOTE — PLAN OF CARE
Problem: Adult Inpatient Plan of Care  Goal: Plan of Care Review  Outcome: Progressing  Goal: Patient-Specific Goal (Individualized)  Outcome: Progressing  Goal: Absence of Hospital-Acquired Illness or Injury  Outcome: Progressing  Goal: Optimal Comfort and Wellbeing  Outcome: Progressing  Goal: Readiness for Transition of Care  Outcome: Progressing     Problem: Pneumonia  Goal: Fluid Balance  Outcome: Progressing  Goal: Resolution of Infection Signs and Symptoms  Outcome: Progressing  Goal: Effective Oxygenation and Ventilation  Outcome: Progressing     Problem: Skin Injury Risk Increased  Goal: Skin Health and Integrity  Outcome: Progressing     Problem: Fall Injury Risk  Goal: Absence of Fall and Fall-Related Injury  Outcome: Progressing

## 2024-09-30 LAB
ALBUMIN SERPL BCP-MCNC: 2.9 G/DL (ref 3.5–5.2)
ALP SERPL-CCNC: 115 U/L (ref 55–135)
ALT SERPL W/O P-5'-P-CCNC: 34 U/L (ref 10–44)
ANION GAP SERPL CALC-SCNC: 8 MMOL/L (ref 8–16)
AST SERPL-CCNC: 26 U/L (ref 10–40)
BASOPHILS # BLD AUTO: 0.06 K/UL (ref 0–0.2)
BASOPHILS NFR BLD: 0.7 % (ref 0–1.9)
BILIRUB SERPL-MCNC: 0.9 MG/DL (ref 0.1–1)
BUN SERPL-MCNC: 11 MG/DL (ref 8–23)
CALCIUM SERPL-MCNC: 9.1 MG/DL (ref 8.7–10.5)
CHLORIDE SERPL-SCNC: 106 MMOL/L (ref 95–110)
CO2 SERPL-SCNC: 24 MMOL/L (ref 23–29)
CREAT SERPL-MCNC: 0.8 MG/DL (ref 0.5–1.4)
DIFFERENTIAL METHOD BLD: ABNORMAL
EOSINOPHIL # BLD AUTO: 0.2 K/UL (ref 0–0.5)
EOSINOPHIL NFR BLD: 2.4 % (ref 0–8)
ERYTHROCYTE [DISTWIDTH] IN BLOOD BY AUTOMATED COUNT: 13.5 % (ref 11.5–14.5)
EST. GFR  (NO RACE VARIABLE): >60 ML/MIN/1.73 M^2
GLUCOSE SERPL-MCNC: 100 MG/DL (ref 70–110)
HCT VFR BLD AUTO: 36.5 % (ref 40–54)
HGB BLD-MCNC: 11.7 G/DL (ref 14–18)
IMM GRANULOCYTES # BLD AUTO: 0.12 K/UL (ref 0–0.04)
IMM GRANULOCYTES NFR BLD AUTO: 1.5 % (ref 0–0.5)
LYMPHOCYTES # BLD AUTO: 1.3 K/UL (ref 1–4.8)
LYMPHOCYTES NFR BLD: 15.6 % (ref 18–48)
MAGNESIUM SERPL-MCNC: 2.3 MG/DL (ref 1.6–2.6)
MCH RBC QN AUTO: 32.1 PG (ref 27–31)
MCHC RBC AUTO-ENTMCNC: 32.1 G/DL (ref 32–36)
MCV RBC AUTO: 100 FL (ref 82–98)
MONOCYTES # BLD AUTO: 0.6 K/UL (ref 0.3–1)
MONOCYTES NFR BLD: 7.1 % (ref 4–15)
MRSA SPEC QL CULT: NORMAL
NEUTROPHILS # BLD AUTO: 5.8 K/UL (ref 1.8–7.7)
NEUTROPHILS NFR BLD: 72.7 % (ref 38–73)
NRBC BLD-RTO: 0 /100 WBC
PHOSPHATE SERPL-MCNC: 3.4 MG/DL (ref 2.7–4.5)
PLATELET # BLD AUTO: 381 K/UL (ref 150–450)
PMV BLD AUTO: 8.6 FL (ref 9.2–12.9)
POTASSIUM SERPL-SCNC: 4.2 MMOL/L (ref 3.5–5.1)
PROT SERPL-MCNC: 5.9 G/DL (ref 6–8.4)
RBC # BLD AUTO: 3.65 M/UL (ref 4.6–6.2)
SODIUM SERPL-SCNC: 138 MMOL/L (ref 136–145)
WBC # BLD AUTO: 8.02 K/UL (ref 3.9–12.7)

## 2024-09-30 PROCEDURE — 94761 N-INVAS EAR/PLS OXIMETRY MLT: CPT

## 2024-09-30 PROCEDURE — 83735 ASSAY OF MAGNESIUM: CPT | Performed by: STUDENT IN AN ORGANIZED HEALTH CARE EDUCATION/TRAINING PROGRAM

## 2024-09-30 PROCEDURE — 99232 SBSQ HOSP IP/OBS MODERATE 35: CPT | Mod: ,,, | Performed by: INTERNAL MEDICINE

## 2024-09-30 PROCEDURE — 80053 COMPREHEN METABOLIC PANEL: CPT | Performed by: STUDENT IN AN ORGANIZED HEALTH CARE EDUCATION/TRAINING PROGRAM

## 2024-09-30 PROCEDURE — 63600175 PHARM REV CODE 636 W HCPCS: Performed by: INTERNAL MEDICINE

## 2024-09-30 PROCEDURE — 99900035 HC TECH TIME PER 15 MIN (STAT)

## 2024-09-30 PROCEDURE — 27000221 HC OXYGEN, UP TO 24 HOURS

## 2024-09-30 PROCEDURE — 94640 AIRWAY INHALATION TREATMENT: CPT

## 2024-09-30 PROCEDURE — 21400001 HC TELEMETRY ROOM

## 2024-09-30 PROCEDURE — 84100 ASSAY OF PHOSPHORUS: CPT | Performed by: STUDENT IN AN ORGANIZED HEALTH CARE EDUCATION/TRAINING PROGRAM

## 2024-09-30 PROCEDURE — 25000003 PHARM REV CODE 250: Performed by: STUDENT IN AN ORGANIZED HEALTH CARE EDUCATION/TRAINING PROGRAM

## 2024-09-30 PROCEDURE — 25000003 PHARM REV CODE 250: Performed by: INTERNAL MEDICINE

## 2024-09-30 PROCEDURE — 36415 COLL VENOUS BLD VENIPUNCTURE: CPT | Performed by: STUDENT IN AN ORGANIZED HEALTH CARE EDUCATION/TRAINING PROGRAM

## 2024-09-30 PROCEDURE — 25000242 PHARM REV CODE 250 ALT 637 W/ HCPCS: Performed by: INTERNAL MEDICINE

## 2024-09-30 PROCEDURE — 85025 COMPLETE CBC W/AUTO DIFF WBC: CPT | Performed by: STUDENT IN AN ORGANIZED HEALTH CARE EDUCATION/TRAINING PROGRAM

## 2024-09-30 RX ORDER — LIDOCAINE HYDROCHLORIDE 10 MG/ML
10 INJECTION, SOLUTION EPIDURAL; INFILTRATION; INTRACAUDAL; PERINEURAL ONCE
Status: COMPLETED | OUTPATIENT
Start: 2024-10-01 | End: 2024-10-01

## 2024-09-30 RX ORDER — LIDOCAINE HYDROCHLORIDE 10 MG/ML
10 INJECTION, SOLUTION EPIDURAL; INFILTRATION; INTRACAUDAL; PERINEURAL ONCE
Status: DISCONTINUED | OUTPATIENT
Start: 2024-09-30 | End: 2024-09-30

## 2024-09-30 RX ADMIN — BUDESONIDE 0.5 MG: 0.5 INHALANT ORAL at 07:09

## 2024-09-30 RX ADMIN — PIPERACILLIN SODIUM AND TAZOBACTAM SODIUM 4.5 G: 4; .5 INJECTION, POWDER, FOR SOLUTION INTRAVENOUS at 04:09

## 2024-09-30 RX ADMIN — GUAIFENESIN 200 MG: 200 SOLUTION ORAL at 10:09

## 2024-09-30 RX ADMIN — ARFORMOTEROL TARTRATE 15 MCG: 15 SOLUTION RESPIRATORY (INHALATION) at 07:09

## 2024-09-30 RX ADMIN — PIPERACILLIN SODIUM AND TAZOBACTAM SODIUM 4.5 G: 4; .5 INJECTION, POWDER, FOR SOLUTION INTRAVENOUS at 10:09

## 2024-09-30 RX ADMIN — PIPERACILLIN SODIUM AND TAZOBACTAM SODIUM 4.5 G: 4; .5 INJECTION, POWDER, FOR SOLUTION INTRAVENOUS at 06:09

## 2024-09-30 RX ADMIN — LINEZOLID 600 MG: 600 TABLET, FILM COATED ORAL at 08:09

## 2024-09-30 NOTE — PLAN OF CARE
A213/A213 RADHA Daniels is a 68 y.o.male admitted on 9/26/2024 for Spontaneous pneumothorax   Code Status: Full Code MRN: [id number]   Review of patient's allergies indicates:  No Known Allergies  Past Medical History:   Diagnosis Date    Cancer     Prostate    Tobacco use       PRN meds    0.9% NaCl, , PRN  acetaminophen, 650 mg, Q6H PRN  albuterol-ipratropium, 3 mL, Q6H PRN  benzonatate, 100 mg, TID PRN  dextrose 10%, 12.5 g, PRN  dextrose 10%, 25 g, PRN  glucagon (human recombinant), 1 mg, PRN  glucose, 16 g, PRN  glucose, 24 g, PRN  guaiFENesin 100 mg/5 ml, 200 mg, Q4H PRN  hydrALAZINE, 10 mg, Q6H PRN  morphine, 2 mg, Q6H PRN  naloxone, 0.02 mg, PRN  ondansetron, 4 mg, Q6H PRN  sodium chloride 0.9%, 10 mL, Q12H PRN      Chart check completed. Will continue plan of care.         Taswell Coma Scale Score: 15     Lead Monitored: Lead II Rhythm: normal sinus rhythm Frequency/Ectopy: frequent, PVCs  Cardiac/Telemetry Box Number: [id number]  VTE Required Core Measure: (SCDs) Sequential compression device initiated/maintained Last Bowel Movement: 09/26/24  Diet Adult Regular  Voiding Characteristics: external catheter  Syed Score: 18  Fall Risk Score: 10  Accucheck []   Freq?      Lines/Drains/Airways       Drain  Duration                  Chest Tube 09/27/24 0130 Left Midaxillary 2 days              Peripheral Intravenous Line  Duration                  Peripheral IV - Single Lumen 09/27/24 1453 20 G Anterior;Right Forearm 2 days

## 2024-09-30 NOTE — ASSESSMENT & PLAN NOTE
-CT scan of chest with consolidations and left upper lobe and posteromedial right lower lobe concerning for pneumonia  -white blood cell count 12.07, COVID-19 negative, afebrile    Microbiology: Sputum Culture   Lab Results   Component Value Date    GSRESP <10 epithelial cells per low power field. 09/28/2024    GSRESP No WBC's 09/28/2024    GSRESP Few Gram positive cocci 09/28/2024    GSRESP Rare Gram negative rods 09/28/2024    GSRESP Rare yeast 09/28/2024    RESPIRATORYC (A) 09/28/2024     STAPHYLOCOCCUS AUREUS  Rare  Susceptibility pending         -empiric IV Linelozid/Zosyn, O2 supplementation, p.r.n. DuoNebs  -follow up cultures  -aspiration and fall precautions  -pulmonary consulted, following

## 2024-09-30 NOTE — ASSESSMENT & PLAN NOTE
Anesthesia Evaluation     Patient summary reviewed and Nursing notes reviewed   no history of anesthetic complications:   NPO Solid Status: > 8 hours  NPO Liquid Status: > 2 hours           Airway   Mallampati: II  TM distance: >3 FB  Neck ROM: full  No difficulty expected  Dental      Pulmonary - negative pulmonary ROS and normal exam    breath sounds clear to auscultation  Cardiovascular - negative cardio ROS and normal exam  Exercise tolerance: good (4-7 METS)    Rhythm: regular  Rate: normal        Neuro/Psych- negative ROS  GI/Hepatic/Renal/Endo - negative ROS     Musculoskeletal (-) negative ROS    Abdominal    Substance History - negative use     OB/GYN    (+) Pregnant        Other - negative ROS       ROS/Med Hx Other: PAT Nursing Notes unavailable.                 Anesthesia Plan    ASA 3     spinal     (Patient understands anesthesia not responsible for dental damage.  Plts 196)  intravenous induction     Anesthetic plan, risks, benefits, and alternatives have been provided, discussed and informed consent has been obtained with: patient.  Pre-procedure education provided  Use of blood products discussed with patient .    Plan discussed with CRNA.    CODE STATUS:    Level Of Support Discussed With: Patient  Code Status (Patient has no pulse and is not breathing): CPR (Attempt to Resuscitate)  Medical Interventions (Patient has pulse or is breathing): Full       - likely some residual findings from his recent infection and hospitalization  - MRSA swab negative, stop zyvox  - remains on zosyn for now, can likely stop in the coming 24-48 hours pending clinical course and culture data

## 2024-09-30 NOTE — ASSESSMENT & PLAN NOTE
- will ultimately need a bx as concern for malignancy is high, but his acute illness with his PTX needs to be a bit more stable to come up with a good plan for bx

## 2024-09-30 NOTE — ASSESSMENT & PLAN NOTE
Patient's COPD is controlled currently.  Patient is currently off COPD Pathway. Continue scheduled inhalers  Pulmicort/Brovana nebs and Supplemental oxygen and monitor respiratory status closely.   - follow with Dr. Jimenez in clinic, last saw 9/5/24

## 2024-09-30 NOTE — H&P (VIEW-ONLY)
[facility] ([facility])  Cardiothoracic Surgery  Consult Note    Patient Name: Aurelio Daniels  MRN: [id number]  Admission Date: 9/26/2024  Attending Physician: Juan Disla DO  Referring Provider: Self, Aaareferral    Patient information was obtained from patient, ER records, and primary team.     Inpatient consult to Cardiothoracic Surgery  Consult performed by: Romaine Rodriguez MD  Consult ordered by: Lucian Lowe NP        Subjective:     Chief Complaint/Reason for Admission:  Difficulty in breathing    History of Present Illness: 68-year-old man with history of prostate cancer, tobacco abuse (quit 1-1/2 once ago), COPD (discharged with home oxygen via 2 L nasal cannula as of 09/17/2024), centrilobular emphysema, hypertension, anemia, and recent bilateral lower lobe pneumonia who presented to the emergency department for acute onset shortness a breath after a coughing spell.   The patient was admitted through the emergency room had a pigtail catheter placed for pneumothorax on the left side the pneumothorax resolved.  Continues to have continuous air leak.  CT scan showed a large bullous lesion and a 3.2 cm spiculated mass in the right upper lobe.  Central location  Bilateral pneumonias and compressive atelectasis.  Multiple mediastinal lymphadenopathy.  The patient feels better after the pigtail catheter.  Catheter continues to have air leak  Repeat chest x-ray shows expanded lung fields mostly with areas of compressive atelectasis pneumothorax resolved.    No current facility-administered medications on file prior to encounter.     Current Outpatient Medications on File Prior to Encounter   Medication Sig    azelastine (ASTELIN) 137 mcg (0.1 %) nasal spray 1 spray (137 mcg total) by Nasal route 2 (two) times daily.    valsartan (DIOVAN) 80 MG tablet Take 1 tablet (80 mg total) by mouth once daily.       Review of patient's allergies indicates:  No Known Allergies    Past Medical  History:   Diagnosis Date    Cancer     Prostate    Tobacco use      Past Surgical History:   Procedure Laterality Date    COLONOSCOPY      COLONOSCOPY N/A 2022    Procedure: COLONOSCOPY;  Surgeon: Jamia Alfaro MD;  Location: [facility];  Service: Endoscopy;  Laterality: N/A;    VASECTOMY  1985     Family History       Problem Relation (Age of Onset)    Heart disease Father    No Known Problems Mother, Brother, Brother, Daughter, Daughter          Tobacco Use    Smoking status: Some Days     Current packs/day: 0.00     Types: Cigarettes     Last attempt to quit: 1/15/2022     Years since quittin.7     Passive exposure: Current    Smokeless tobacco: Never   Substance and Sexual Activity    Alcohol use: Yes     Alcohol/week: 2.0 standard drinks of alcohol     Types: 2 Shots of liquor per week    Drug use: Never    Sexual activity: Not Currently     Partners: Female     Review of Systems   Constitutional:  Positive for fatigue. Negative for activity change and appetite change.   HENT:  Negative for dental problem, nosebleeds and sore throat.    Eyes:  Negative for discharge and visual disturbance.   Respiratory:  Positive for cough, chest tightness, shortness of breath and wheezing. Negative for stridor.    Cardiovascular:  Negative for leg swelling.   Gastrointestinal:  Negative for abdominal distention and abdominal pain.   Genitourinary:  Negative for difficulty urinating and dysuria.   Musculoskeletal:  Negative for arthralgias, back pain and joint swelling.   Allergic/Immunologic: Negative for environmental allergies.   Neurological:  Negative for dizziness, syncope and headaches.   Hematological:  Does not bruise/bleed easily.   Psychiatric/Behavioral:  Negative for behavioral problems.      Objective:     Vital Signs (Most Recent):  Temp: 99.4 °F (37.4 °C) (24)  Pulse: 78 (2420)  Resp: 18 (2419)  BP: 120/70 (24 0738)  SpO2: 95 % (24 0900) Vital Signs (24h  Range):  Temp:  [97.9 °F (36.6 °C)-99.4 °F (37.4 °C)] 99.4 °F (37.4 °C)  Pulse:  [65-83] 78  Resp:  [16-19] 18  SpO2:  [94 %-96 %] 95 %  BP: (110-124)/(66-81) 120/70     Weight: 74.1 kg (163 lb 5.8 oz)  Body mass index is 24.12 kg/m².    SpO2: 95 %        Intake/Output - Last 3 Shifts         09/28 0700  09/29 0659 09/29 0700 09/30 0659 09/30 0700  10/01 0659    P.O. 600 660     I.V. (mL/kg) 49.1 (0.7) 26.7 (0.4)     IV Piggyback 173.3 181.8     Total Intake(mL/kg) 822.4 (11.6) 868.6 (11.7)     Urine (mL/kg/hr) 3825 (2.2) 3150 (1.8)     Chest Tube 25 97     Total Output 3850 3247     Net -3027.6 -2378.4                     Lines/Drains/Airways       Drain  Duration                  Chest Tube 09/27/24 0130 Left Midaxillary 3 days              Peripheral Intravenous Line  Duration                  Peripheral IV - Single Lumen 09/27/24 1453 20 G Anterior;Right Forearm 2 days                     STS Risk Score: na    Physical Exam  Vitals reviewed.   Constitutional:       Appearance: He is ill-appearing.   HENT:      Head: Normocephalic and atraumatic.      Mouth/Throat:      Mouth: Mucous membranes are moist.   Eyes:      Extraocular Movements: Extraocular movements intact.   Cardiovascular:      Rate and Rhythm: Normal rate and regular rhythm.      Pulses: Normal pulses.      Heart sounds: Normal heart sounds.   Pulmonary:      Effort: Pulmonary effort is normal.      Breath sounds: Rhonchi and rales present.      Comments: Left chest has a pigtail catheter with continuous air leak.  Abdominal:      Palpations: Abdomen is soft.   Musculoskeletal:         General: Normal range of motion.      Cervical back: Normal range of motion and neck supple.   Skin:     General: Skin is warm.      Capillary Refill: Capillary refill takes less than 2 seconds.   Neurological:      General: No focal deficit present.      Mental Status: He is alert and oriented to person, place, and time.   Psychiatric:         Mood and Affect: Mood  normal.         Significant Labs:  BMP:   Recent Labs   Lab 09/30/24  0442         K 4.2      CO2 24   BUN 11   CREATININE 0.8   CALCIUM 9.1   MG 2.3     CBC:   Recent Labs   Lab 09/30/24  0442   WBC 8.02   RBC 3.65*   HGB 11.7*   HCT 36.5*      *   MCH 32.1*   MCHC 32.1       Significant Diagnostics:  CT: I have reviewed all pertinent results/findings within the past 24 hours    Assessment/Plan:   Left-sided perihilar mass spiculated suspicious for malignancy multiple hilar lymphadenopathy also in the background of pannus in her emphysema and upper lobe large predominant bullous disease and bilateral pneumonias.  Patient is oxygen dependent before he came to the hospital from his COPD and deterioration of his lung functions.  We will plan to upsized the chest tube to a 24 Kos[place] if he continues to have air leak and convert to Heimlich valve.  He will need a bronchoscopy and workup of his left upper lobe mass since it is suspicious for lung primary  malignancy and also has a history of prostate cancer treated with XRT recently.    NYHA Score: NYHA I: cardiac disease, but without resulting limitations of physical activity    Active Diagnoses:    Diagnosis Date Noted POA    PRINCIPAL PROBLEM:  Spontaneous pneumothorax [J93.83] 09/27/2024 Yes    Bilateral pneumonia [J18.9] 09/27/2024 Yes    Mass of left lung [R91.8] 09/27/2024 Yes    Tobacco abuse [Z72.0] 09/27/2024 Yes    Macrocytic anemia [D53.9] 09/27/2024 Yes    Thrombocytosis [D75.839] 09/27/2024 Yes    Acute on chronic respiratory failure with hypoxia [J96.21] 09/27/2024 Yes    Elevated troponin [R79.89] 09/27/2024 Yes    Centrilobular emphysema [J43.2] 09/14/2024 Yes    Primary hypertension [I10] 10/24/2023 Yes    Prostate cancer [C61] 05/30/2022 Yes      Problems Resolved During this Admission:    Diagnosis Date Noted Date Resolved POA    Hypoxia [R09.02] 09/27/2024 09/27/2024 Yes       Thank you for your consult. I will  follow-up with patient. Please contact us if you have any additional questions.    Romaine Rodriguez MD  Cardiothoracic Surgery  [facility] ([facility])

## 2024-09-30 NOTE — CONSULTS
[facility] ([facility])  Cardiothoracic Surgery  Consult Note    Patient Name: Aurelio Daniels  MRN: [id number]  Admission Date: 9/26/2024  Attending Physician: Juan Disla DO  Referring Provider: Self, Aaareferral    Patient information was obtained from patient, ER records, and primary team.     Inpatient consult to Cardiothoracic Surgery  Consult performed by: Romaine Rodriguez MD  Consult ordered by: Lucian Lowe NP        Subjective:     Chief Complaint/Reason for Admission:  Difficulty in breathing    History of Present Illness: 68-year-old man with history of prostate cancer, tobacco abuse (quit 1-1/2 once ago), COPD (discharged with home oxygen via 2 L nasal cannula as of 09/17/2024), centrilobular emphysema, hypertension, anemia, and recent bilateral lower lobe pneumonia who presented to the emergency department for acute onset shortness a breath after a coughing spell.   The patient was admitted through the emergency room had a pigtail catheter placed for pneumothorax on the left side the pneumothorax resolved.  Continues to have continuous air leak.  CT scan showed a large bullous lesion and a 3.2 cm spiculated mass in the right upper lobe.  Central location  Bilateral pneumonias and compressive atelectasis.  Multiple mediastinal lymphadenopathy.  The patient feels better after the pigtail catheter.  Catheter continues to have air leak  Repeat chest x-ray shows expanded lung fields mostly with areas of compressive atelectasis pneumothorax resolved.    No current facility-administered medications on file prior to encounter.     Current Outpatient Medications on File Prior to Encounter   Medication Sig    azelastine (ASTELIN) 137 mcg (0.1 %) nasal spray 1 spray (137 mcg total) by Nasal route 2 (two) times daily.    valsartan (DIOVAN) 80 MG tablet Take 1 tablet (80 mg total) by mouth once daily.       Review of patient's allergies indicates:  No Known Allergies    Past Medical  History:   Diagnosis Date    Cancer     Prostate    Tobacco use      Past Surgical History:   Procedure Laterality Date    COLONOSCOPY      COLONOSCOPY N/A 2022    Procedure: COLONOSCOPY;  Surgeon: Jamia Alfaro MD;  Location: [facility];  Service: Endoscopy;  Laterality: N/A;    VASECTOMY  1985     Family History       Problem Relation (Age of Onset)    Heart disease Father    No Known Problems Mother, Brother, Brother, Daughter, Daughter          Tobacco Use    Smoking status: Some Days     Current packs/day: 0.00     Types: Cigarettes     Last attempt to quit: 1/15/2022     Years since quittin.7     Passive exposure: Current    Smokeless tobacco: Never   Substance and Sexual Activity    Alcohol use: Yes     Alcohol/week: 2.0 standard drinks of alcohol     Types: 2 Shots of liquor per week    Drug use: Never    Sexual activity: Not Currently     Partners: Female     Review of Systems   Constitutional:  Positive for fatigue. Negative for activity change and appetite change.   HENT:  Negative for dental problem, nosebleeds and sore throat.    Eyes:  Negative for discharge and visual disturbance.   Respiratory:  Positive for cough, chest tightness, shortness of breath and wheezing. Negative for stridor.    Cardiovascular:  Negative for leg swelling.   Gastrointestinal:  Negative for abdominal distention and abdominal pain.   Genitourinary:  Negative for difficulty urinating and dysuria.   Musculoskeletal:  Negative for arthralgias, back pain and joint swelling.   Allergic/Immunologic: Negative for environmental allergies.   Neurological:  Negative for dizziness, syncope and headaches.   Hematological:  Does not bruise/bleed easily.   Psychiatric/Behavioral:  Negative for behavioral problems.      Objective:     Vital Signs (Most Recent):  Temp: 99.4 °F (37.4 °C) (24)  Pulse: 78 (2420)  Resp: 18 (2419)  BP: 120/70 (24 0738)  SpO2: 95 % (24 0900) Vital Signs (24h  Range):  Temp:  [97.9 °F (36.6 °C)-99.4 °F (37.4 °C)] 99.4 °F (37.4 °C)  Pulse:  [65-83] 78  Resp:  [16-19] 18  SpO2:  [94 %-96 %] 95 %  BP: (110-124)/(66-81) 120/70     Weight: 74.1 kg (163 lb 5.8 oz)  Body mass index is 24.12 kg/m².    SpO2: 95 %        Intake/Output - Last 3 Shifts         09/28 0700  09/29 0659 09/29 0700 09/30 0659 09/30 0700  10/01 0659    P.O. 600 660     I.V. (mL/kg) 49.1 (0.7) 26.7 (0.4)     IV Piggyback 173.3 181.8     Total Intake(mL/kg) 822.4 (11.6) 868.6 (11.7)     Urine (mL/kg/hr) 3825 (2.2) 3150 (1.8)     Chest Tube 25 97     Total Output 3850 3247     Net -3027.6 -2378.4                     Lines/Drains/Airways       Drain  Duration                  Chest Tube 09/27/24 0130 Left Midaxillary 3 days              Peripheral Intravenous Line  Duration                  Peripheral IV - Single Lumen 09/27/24 1453 20 G Anterior;Right Forearm 2 days                     STS Risk Score: na    Physical Exam  Vitals reviewed.   Constitutional:       Appearance: He is ill-appearing.   HENT:      Head: Normocephalic and atraumatic.      Mouth/Throat:      Mouth: Mucous membranes are moist.   Eyes:      Extraocular Movements: Extraocular movements intact.   Cardiovascular:      Rate and Rhythm: Normal rate and regular rhythm.      Pulses: Normal pulses.      Heart sounds: Normal heart sounds.   Pulmonary:      Effort: Pulmonary effort is normal.      Breath sounds: Rhonchi and rales present.      Comments: Left chest has a pigtail catheter with continuous air leak.  Abdominal:      Palpations: Abdomen is soft.   Musculoskeletal:         General: Normal range of motion.      Cervical back: Normal range of motion and neck supple.   Skin:     General: Skin is warm.      Capillary Refill: Capillary refill takes less than 2 seconds.   Neurological:      General: No focal deficit present.      Mental Status: He is alert and oriented to person, place, and time.   Psychiatric:         Mood and Affect: Mood  normal.         Significant Labs:  BMP:   Recent Labs   Lab 09/30/24  0442         K 4.2      CO2 24   BUN 11   CREATININE 0.8   CALCIUM 9.1   MG 2.3     CBC:   Recent Labs   Lab 09/30/24  0442   WBC 8.02   RBC 3.65*   HGB 11.7*   HCT 36.5*      *   MCH 32.1*   MCHC 32.1       Significant Diagnostics:  CT: I have reviewed all pertinent results/findings within the past 24 hours    Assessment/Plan:   Left-sided perihilar mass spiculated suspicious for malignancy multiple hilar lymphadenopathy also in the background of pannus in her emphysema and upper lobe large predominant bullous disease and bilateral pneumonias.  Patient is oxygen dependent before he came to the hospital from his COPD and deterioration of his lung functions.  We will plan to upsized the chest tube to a 24 Swazi if he continues to have air leak and convert to Heimlich valve.  He will need a bronchoscopy and workup of his left upper lobe mass since it is suspicious for lung primary  malignancy and also has a history of prostate cancer treated with XRT recently.    NYHA Score: NYHA I: cardiac disease, but without resulting limitations of physical activity    Active Diagnoses:    Diagnosis Date Noted POA    PRINCIPAL PROBLEM:  Spontaneous pneumothorax [J93.83] 09/27/2024 Yes    Bilateral pneumonia [J18.9] 09/27/2024 Yes    Mass of left lung [R91.8] 09/27/2024 Yes    Tobacco abuse [Z72.0] 09/27/2024 Yes    Macrocytic anemia [D53.9] 09/27/2024 Yes    Thrombocytosis [D75.839] 09/27/2024 Yes    Acute on chronic respiratory failure with hypoxia [J96.21] 09/27/2024 Yes    Elevated troponin [R79.89] 09/27/2024 Yes    Centrilobular emphysema [J43.2] 09/14/2024 Yes    Primary hypertension [I10] 10/24/2023 Yes    Prostate cancer [C61] 05/30/2022 Yes      Problems Resolved During this Admission:    Diagnosis Date Noted Date Resolved POA    Hypoxia [R09.02] 09/27/2024 09/27/2024 Yes       Thank you for your consult. I will  follow-up with patient. Please contact us if you have any additional questions.    Romaine Rodriguez MD  Cardiothoracic Surgery  [facility] [facility]

## 2024-09-30 NOTE — PLAN OF CARE
A213/A213 RADHA Daniels is a 68 y.o.male admitted on 9/26/2024 for Spontaneous pneumothorax   Code Status: Full Code MRN: [id number]   Review of patient's allergies indicates:  No Known Allergies  Past Medical History:   Diagnosis Date    Cancer     Prostate    Tobacco use       PRN meds    0.9% NaCl, , PRN  acetaminophen, 650 mg, Q6H PRN  albuterol-ipratropium, 3 mL, Q6H PRN  dextrose 10%, 12.5 g, PRN  dextrose 10%, 25 g, PRN  glucagon (human recombinant), 1 mg, PRN  glucose, 16 g, PRN  glucose, 24 g, PRN  guaiFENesin 100 mg/5 ml, 200 mg, Q4H PRN  hydrALAZINE, 10 mg, Q6H PRN  morphine, 2 mg, Q6H PRN  naloxone, 0.02 mg, PRN  ondansetron, 4 mg, Q6H PRN  sodium chloride 0.9%, 10 mL, Q12H PRN      Chart check completed. Will continue plan of care.        Pt oriented x4.  VSS.  Pt remained afebrile throughout this shift.   All meds administered per order.   Pt remained free of falls this shift.   Plan of care reviewed. Patient verbalizes understanding.   Pt moving/turning independent but is x1 assist for transfers  Pt has external catheter on with collection bag on bed side.  Chest tube has an air leak and MD has bee notified. Pt was going to have a bronch and a larger chest tube placed today but providers change their minds.  Bed low, side rails up x 2, wheels locked, call light in reach.   Patient instructed to call for assistance.  Patient education provided  Will continue to monitor.   Resp. Culture resulted as + for staph                           Deric Coma Scale Score: 15     Lead Monitored: Lead II Rhythm: normal sinus rhythm Frequency/Ectopy: frequent, PVCs  Cardiac/Telemetry Box Number: [id number]  VTE Required Core Measure: (SCDs) Sequential compression device initiated/maintained Last Bowel Movement: 09/30/24  Diet Adult Regular  Voiding Characteristics: external catheter  Syed Score: 18  Fall Risk Score: 7  Accucheck []   Freq?      Lines/Drains/Airways       Drain  Duration                  Chest  Tube 09/27/24 0130 Left Midaxillary 3 days    Male External Urinary Catheter 09/29/24 1000 Medium 1 day              Peripheral Intravenous Line  Duration                  Peripheral IV - Single Lumen 09/27/24 1453 20 G Anterior;Right Forearm 3 days

## 2024-09-30 NOTE — ASSESSMENT & PLAN NOTE
Patient with Hypoxic Respiratory failure which is Acute.  he is not on home oxygen. Supplemental oxygen was provided and noted- Oxygen Concentration (%):  [28] 28. Signs/symptoms of respiratory failure include- respiratory distress. Contributing diagnoses includes - COPD and pneumothorax  Labs and images were reviewed. Patient Has recent ABG, which has been reviewed. Will treat underlying causes and adjust management of respiratory failure as follows  - chest tube with resolution of ptx, continued with air leak   - wean O2 for goal SpO2 > 88%  - avoid high flow oxygen or NIPPV given PTX and ongoing air leak

## 2024-09-30 NOTE — PROGRESS NOTES
[facility] - Telemetry ([facility])  Pulmonology  Progress Note    Patient Name: Aurelio Daniels  MRN: [id number]  Admission Date: 9/26/2024  Hospital Length of Stay: 3 days  Code Status: Full Code  Attending Provider: Juan Disla DO  Primary Care Provider: Shlomo Villatoro MD   Principal Problem: Spontaneous pneumothorax    Subjective:     Mr Daniels is a 69 y/o gentleman with a long smoking history, COPD (recently established with Dr Jimenez), prior prostate cancer, and HTN who is currently admitted to the Hospitalist service after coming in overnight with shortness of breath and being found to have a large left sided PTX.  Pt was recently admitted to the hospital for PNA (9/13 - 9/17) and was recovering well from that when he suddenly had a large coughing fit last night.  Became markedly short of breath after this, which prompted his presentation to the ER.  Chest tube was placed by the ER physician with good re-expansion, though he continues to have a brisk air leak.    CT after the chest tube placement shows trace remaining pneumothorax, large bleb on the left, approx 3.5 cm spiculated mass on the left, and some remaining bilateral consolidations.  Mr Daniels says that he is feeling much better after the chest tube placement, though still with some cough.    Interval History:  9/28 - continuous air leak with some reaccumulation on CXR late this morning.   Adjusted tube to try and optimize drainage.  It was a bit kinked at the skin, so resecured in a better position. Remains on suction.  9/29 - re-expansion improved following tube adjustments yesterday.  Continuous brisk air leak remains on suction.  No  new complaints.  9/30: CXR with lung up, continues with large volume air leak with and without suction, CTS consulted for eval and recs, no new issues or c/o noted on exam, POC and questions answered with pt and his wife    ROS complete and negative unless stated in the interval HPI     Objective:      Vital Signs (Most Recent):  Temp: 99.4 °F (37.4 °C) (09/30/24 0738)  Pulse: 76 (09/30/24 0738)  Resp: 18 (09/30/24 0719)  BP: 120/70 (09/30/24 0738)  SpO2: (!) 94 % (09/30/24 0738) Vital Signs (24h Range):  Temp:  [97.9 °F (36.6 °C)-99.4 °F (37.4 °C)] 99.4 °F (37.4 °C)  Pulse:  [65-83] 76  Resp:  [16-19] 18  SpO2:  [94 %-96 %] 94 %  BP: (110-124)/(66-81) 120/70     Weight: 74.1 kg (163 lb 5.8 oz)  Body mass index is 24.12 kg/m².      Intake/Output Summary (Last 24 hours) at 9/30/2024 1014  Last data filed at 9/30/2024 0630  Gross per 24 hour   Intake 868.57 ml   Output 3217 ml   Net -2348.43 ml        Physical Exam  Vitals reviewed.   Constitutional:       General: He is awake. He is not in acute distress.     Appearance: He is well-developed.   Pulmonary:      Effort: Pulmonary effort is normal.      Breath sounds: Decreased breath sounds present. No wheezing, rhonchi or rales.      Comments: SQ air to right upper chest, R pigtail CT in place to suction with continuous air leak   Neurological:      Mental Status: He is alert.   Psychiatric:         Behavior: Behavior is cooperative.               Vents:  Oxygen Concentration (%): 28 (09/30/24 0718)    Lines/Drains/Airways       Drain  Duration                  Chest Tube 09/27/24 0130 Left Midaxillary 3 days              Peripheral Intravenous Line  Duration                  Peripheral IV - Single Lumen 09/27/24 1453 20 G Anterior;Right Forearm 2 days                    Significant Labs:    CBC/Anemia Profile:  Recent Labs   Lab 09/29/24 0825 09/30/24 0442   WBC 9.54 8.02   HGB 12.2* 11.7*   HCT 38.0* 36.5*    381   * 100*   RDW 14.0 13.5        Chemistries:  Recent Labs   Lab 09/29/24  0825 09/30/24  0442    138   K 4.0 4.2    106   CO2 25 24   BUN 12 11   CREATININE 0.8 0.8   CALCIUM 9.0 9.1   ALBUMIN 3.0* 2.9*   PROT 6.0 5.9*   BILITOT 0.8 0.9   ALKPHOS 107 115   ALT 21 34   AST 17 26   MG 2.2 2.3   PHOS 2.9 3.4       All  pertinent labs within the past 24 hours have been reviewed.    Significant Imaging:  I have reviewed all pertinent imaging results/findings within the past 24 hours.    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  he is not on home oxygen. Supplemental oxygen was provided and noted- Oxygen Concentration (%):  [28] 28. Signs/symptoms of respiratory failure include- respiratory distress. Contributing diagnoses includes - COPD and pneumothorax  Labs and images were reviewed. Patient Has recent ABG, which has been reviewed. Will treat underlying causes and adjust management of respiratory failure as follows  - chest tube with resolution of ptx, continued with air leak   - wean O2 for goal SpO2 > 88%  - avoid high flow oxygen or NIPPV given PTX and ongoing air leak    Mass of left lung  - will ultimately need a bx as concern for malignancy is high, but his acute illness with his PTX needs to be a bit more stable to come up with a good plan for bx    Bilateral pneumonia  - likely some residual findings from his recent infection and hospitalization  - MRSA swab negative, stop zyvox  - remains on zosyn for now, can likely stop in the coming 24-48 hours pending clinical course and culture data       Centrilobular emphysema  Patient's COPD is controlled currently.  Patient is currently off COPD Pathway. Continue scheduled inhalers  Pulmicort/Brovana nebs and Supplemental  oxygen and monitor respiratory status closely.   - follow with Dr. Jimenez in clinic, last saw 9/5/24    Other  Tobacco abuse  - counseled on cessation and he is motivated to quit  - declined nicotine patch, order if he changes his mind     Still has substantial continuous air leak and on 2 LPM  Parenchymal mass doesn't appear or suggest will be directly visible on bronchoscopy: in secondary  airways  Anatomy distortion by atelectasis and air leak would alter reference points however it would be benefial to have tissue acqusition before pleurodesis if needed      Tha Cali MD  Pulmonology  [facility] - Tele[facility] ([facility])

## 2024-09-30 NOTE — ASSESSMENT & PLAN NOTE
Likely reactive thrombocytosis    Recent Labs     09/28/24  0448 09/29/24  0825 09/30/24  0442    [phone]           -check CBC in a.m.

## 2024-09-30 NOTE — ASSESSMENT & PLAN NOTE
-patient denies chest pain  -troponin elevation most likely related to pneumothorax and underlying pneumonia  -TTE nonacute    -telemetry monitoring

## 2024-09-30 NOTE — SUBJECTIVE & OBJECTIVE
"Interval History: No acute events overnight, afebrile, hemodynamically stable.   Repeat chest x-ray this morning noted " No significant pneumothorax." MRSA nares negative, but respiratory cultures growing staph aureus(susceptibility pending). Leukocytosis continues to resolve.  Pulmonology following for chest tube management and Cardiothoracic surgery also consulted.       Objective:     Vital Signs (Most Recent):  Temp: 98.2 °F (36.8 °C) (09/30/24 1610)  Pulse: 96 (09/30/24 1611)  Resp: 18 (09/30/24 1610)  BP: 120/74 (09/30/24 1610)  SpO2: 95 % (09/30/24 1610) Vital Signs (24h Range):  Temp:  [98.1 °F (36.7 °C)-99.4 °F (37.4 °C)] 98.2 °F (36.8 °C)  Pulse:  [65-96] 96  Resp:  [17-18] 18  SpO2:  [94 %-96 %] 95 %  BP: (110-124)/(66-78) 120/74     Weight: 74.1 kg (163 lb 5.8 oz)  Body mass index is 24.12 kg/m².    Intake/Output Summary (Last 24 hours) at 9/30/2024 1718  Last data filed at 9/30/2024 1619  Gross per 24 hour   Intake 628.57 ml   Output 3717 ml   Net -3088.43 ml         Physical Exam  Vitals and nursing note reviewed.   Constitutional:       General: He is not in acute distress.     Appearance: He is not ill-appearing, toxic-appearing or diaphoretic.   HENT:      Head: Normocephalic and atraumatic.      Mouth/Throat:      Mouth: Mucous membranes are moist.   Eyes:      General: No scleral icterus.        Right eye: No discharge.         Left eye: No discharge.   Cardiovascular:      Rate and Rhythm: Normal rate and regular rhythm.      Heart sounds: Normal heart sounds.   Pulmonary:      Effort: Pulmonary effort is normal. No respiratory distress.      Breath sounds: Rales (Left-sided) present. No wheezing.      Comments: Left-sided chest tube in place with overlying dressing intact  Abdominal:      General: Bowel sounds are normal.      Tenderness: There is no abdominal tenderness.   Musculoskeletal:      Cervical back: No rigidity.      Right lower leg: No edema.      Left lower leg: No edema.   Skin:    "  General: Skin is warm and dry.      Coloration: Skin is not jaundiced.   Neurological:      Mental Status: He is alert and oriented to person, place, and time. Mental status is at baseline.   Psychiatric:         Mood and Affect: Mood normal.         Behavior: Behavior normal.             Significant Labs: All pertinent labs within the past 24 hours have been reviewed.  CBC:   Recent Labs   Lab 09/29/24  0825 09/30/24  0442   WBC 9.54 8.02   HGB 12.2* 11.7*   HCT 38.0* 36.5*    381     CMP:   Recent Labs   Lab 09/29/24  0825 09/30/24  0442    138   K 4.0 4.2    106   CO2 25 24   * 100   BUN 12 11   CREATININE 0.8 0.8   CALCIUM 9.0 9.1   PROT 6.0 5.9*   ALBUMIN 3.0* 2.9*   BILITOT 0.8 0.9   ALKPHOS 107 115   AST 17 26   ALT 21 34   ANIONGAP 8 8       Significant Imaging: I have reviewed all pertinent imaging results/findings within the past 24 hours.

## 2024-09-30 NOTE — ASSESSMENT & PLAN NOTE
"-pigtail catheter/chest tube in place, continuous suction  -chest x-ray on ER arrival with large left-sided pneumothorax  -CT scan of chest with trace left apical pneumothorax   -While on suction, repeat chest x-ray noted concerns for accumulation  -CXR 09/29 noted, "near complete resolution of the left pneumothorax, with only a trace pneumothorax currently. "  -CXR 9/30 noted "No significant pneumothorax.".       -Pulmonology following for chest tube management, Cardiothoracic surgery consulted for further evaluation given concerns for surgical intervention.   -O2 via 2 L nasal cannula.  Wean as tolerated to target SaO2 88-92%  "

## 2024-09-30 NOTE — ASSESSMENT & PLAN NOTE
Anemia is likely due to  unclear etiology . Most recent hemoglobin and hematocrit are listed below.  Recent Labs     09/28/24  0448 09/29/24  0825 09/30/24  0442   HGB 11.1* 12.2* 11.7*   HCT 34.4* 38.0* 36.5*       Plan  - Monitor serial CBC: Daily  - Transfuse PRBC if patient becomes hemodynamically unstable, symptomatic or H/H drops below 7/21.  - Patient has not received any PRBC transfusions to date  - Patient's anemia is currently stable  - check B12 and folate levels

## 2024-09-30 NOTE — PLAN OF CARE
09/30/24 1523   Rounds   Attendance Provider;Nurse ;Charge nurse   Discharge Plan A Home with family   Why the patient remains in the hospital Requires continued medical care   Transition of Care Barriers None     Remains with chest tube; may need surgery

## 2024-09-30 NOTE — ASSESSMENT & PLAN NOTE
- counseled on cessation and he is motivated to quit  - declined nicotine patch, order if he changes his mind

## 2024-09-30 NOTE — SUBJECTIVE & OBJECTIVE
Mr Daniels is a 67 y/o gentleman with a long smoking history, COPD (recently established with Dr Jimenez), prior prostate cancer, and HTN who is currently admitted to the Hospitalist service after coming in overnight with shortness of breath and being found to have a large left sided PTX.  Pt was recently admitted to the hospital for PNA (9/13 - 9/17) and was recovering well from that when he suddenly had a large coughing fit last night.  Became markedly short of breath after this, which prompted his presentation to the ER.  Chest tube was placed by the ER physician with good re-expansion, though he continues to have a brisk air leak.    CT after the chest tube placement shows trace remaining pneumothorax, large bleb on the left, approx 3.5 cm spiculated mass on the left, and some remaining bilateral consolidations.  Mr Daniels says that he is feeling much better after the chest tube placement, though still with some cough.    Interval History:  9/28 - continuous air leak with some reaccumulation on CXR late this morning.   Adjusted tube to try and optimize drainage.  It was a bit kinked at the skin, so resecured in a better position. Remains on suction.  9/29 - re-expansion improved following tube adjustments yesterday.  Continuous brisk air leak remains on suction.  No  new complaints.  9/30: CXR with lung up, continues with large volume air leak with and without suction, CTS consulted for eval and recs, no new issues or c/o noted on exam, POC and questions answered with pt and his wife    ROS complete and negative unless stated in the interval HPI     Objective:     Vital Signs (Most Recent):  Temp: 99.4 °F (37.4 °C) (09/30/24 0738)  Pulse: 76 (09/30/24 0738)  Resp: 18 (09/30/24 0719)  BP: 120/70 (09/30/24 0738)  SpO2: (!) 94 % (09/30/24 0738) Vital Signs (24h Range):  Temp:  [97.9 °F (36.6 °C)-99.4 °F (37.4 °C)] 99.4 °F (37.4 °C)  Pulse:  [65-83] 76  Resp:  [16-19] 18  SpO2:  [94 %-96 %] 94 %  BP:  (110-124)/(66-81) 120/70     Weight: 74.1 kg (163 lb 5.8 oz)  Body mass index is 24.12 kg/m².      Intake/Output Summary (Last 24 hours) at 9/30/2024 1014  Last data filed at 9/30/2024 0630  Gross per 24 hour   Intake 868.57 ml   Output 3217 ml   Net -2348.43 ml        Physical Exam  Vitals reviewed.   Constitutional:       General: He is awake. He is not in acute distress.     Appearance: He is well-developed.   Pulmonary:      Effort: Pulmonary effort is normal.      Breath sounds: Decreased breath sounds present. No wheezing, rhonchi or rales.      Comments: SQ air to right upper chest, R pigtail CT in place to suction with continuous air leak   Neurological:      Mental Status: He is alert.   Psychiatric:         Behavior: Behavior is cooperative.               Vents:  Oxygen Concentration (%): 28 (09/30/24 0718)    Lines/Drains/Airways       Drain  Duration                  Chest Tube 09/27/24 0130 Left Midaxillary 3 days              Peripheral Intravenous Line  Duration                  Peripheral IV - Single Lumen 09/27/24 1453 20 G Anterior;Right Forearm 2 days                    Significant Labs:    CBC/Anemia Profile:  Recent Labs   Lab 09/29/24  0825 09/30/24  0442   WBC 9.54 8.02   HGB 12.2* 11.7*   HCT 38.0* 36.5*    381   * 100*   RDW 14.0 13.5        Chemistries:  Recent Labs   Lab 09/29/24  0825 09/30/24  0442    138   K 4.0 4.2    106   CO2 25 24   BUN 12 11   CREATININE 0.8 0.8   CALCIUM 9.0 9.1   ALBUMIN 3.0* 2.9*   PROT 6.0 5.9*   BILITOT 0.8 0.9   ALKPHOS 107 115   ALT 21 34   AST 17 26   MG 2.2 2.3   PHOS 2.9 3.4       All pertinent labs within the past 24 hours have been reviewed.    Significant Imaging:  I have reviewed all pertinent imaging results/findings within the past 24 hours.

## 2024-09-30 NOTE — ASSESSMENT & PLAN NOTE
Chronic, controlled. Latest blood pressure and vitals reviewed-     Temp:  [98.1 °F (36.7 °C)-99.4 °F (37.4 °C)]   Pulse:  [65-96]   Resp:  [17-18]   BP: (110-124)/(66-78)   SpO2:  [94 %-96 %] .   Home meds for hypertension were reviewed and noted below.   Hypertension Medications               valsartan (DIOVAN) 80 MG tablet Take 1 tablet (80 mg total) by mouth once daily.            While in the hospital, will manage blood pressure as follows; Adjust home antihypertensive regimen as follows- normotensive on admission, resume home medications as appropriate; P.r.n. IV hydralazine with parameters.    Will utilize p.r.n. blood pressure medication only if patient's blood pressure greater than 180/110 and he develops symptoms such as worsening chest pain or shortness of breath.

## 2024-09-30 NOTE — ASSESSMENT & PLAN NOTE
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

## 2024-10-01 PROBLEM — D75.839 THROMBOCYTOSIS: Status: RESOLVED | Noted: 2024-09-27 | Resolved: 2024-10-01

## 2024-10-01 LAB
ALBUMIN SERPL BCP-MCNC: 2.8 G/DL (ref 3.5–5.2)
ALP SERPL-CCNC: 109 U/L (ref 55–135)
ALT SERPL W/O P-5'-P-CCNC: 31 U/L (ref 10–44)
ANION GAP SERPL CALC-SCNC: 9 MMOL/L (ref 8–16)
AST SERPL-CCNC: 20 U/L (ref 10–40)
BASOPHILS # BLD AUTO: 0.03 K/UL (ref 0–0.2)
BASOPHILS NFR BLD: 0.4 % (ref 0–1.9)
BILIRUB SERPL-MCNC: 0.6 MG/DL (ref 0.1–1)
BUN SERPL-MCNC: 11 MG/DL (ref 8–23)
CALCIUM SERPL-MCNC: 8.9 MG/DL (ref 8.7–10.5)
CHLORIDE SERPL-SCNC: 109 MMOL/L (ref 95–110)
CO2 SERPL-SCNC: 21 MMOL/L (ref 23–29)
CREAT SERPL-MCNC: 0.8 MG/DL (ref 0.5–1.4)
DIFFERENTIAL METHOD BLD: ABNORMAL
EOSINOPHIL # BLD AUTO: 0.2 K/UL (ref 0–0.5)
EOSINOPHIL NFR BLD: 2.7 % (ref 0–8)
ERYTHROCYTE [DISTWIDTH] IN BLOOD BY AUTOMATED COUNT: 13.2 % (ref 11.5–14.5)
EST. GFR  (NO RACE VARIABLE): >60 ML/MIN/1.73 M^2
GLUCOSE SERPL-MCNC: 105 MG/DL (ref 70–110)
HCT VFR BLD AUTO: 35.8 % (ref 40–54)
HGB BLD-MCNC: 11.7 G/DL (ref 14–18)
IMM GRANULOCYTES # BLD AUTO: 0.13 K/UL (ref 0–0.04)
IMM GRANULOCYTES NFR BLD AUTO: 1.9 % (ref 0–0.5)
LYMPHOCYTES # BLD AUTO: 0.9 K/UL (ref 1–4.8)
LYMPHOCYTES NFR BLD: 13.9 % (ref 18–48)
MAGNESIUM SERPL-MCNC: 2.2 MG/DL (ref 1.6–2.6)
MCH RBC QN AUTO: 32.1 PG (ref 27–31)
MCHC RBC AUTO-ENTMCNC: 32.7 G/DL (ref 32–36)
MCV RBC AUTO: 98 FL (ref 82–98)
MONOCYTES # BLD AUTO: 0.4 K/UL (ref 0.3–1)
MONOCYTES NFR BLD: 5.4 % (ref 4–15)
NEUTROPHILS # BLD AUTO: 5.1 K/UL (ref 1.8–7.7)
NEUTROPHILS NFR BLD: 75.7 % (ref 38–73)
NRBC BLD-RTO: 0 /100 WBC
PHOSPHATE SERPL-MCNC: 2.9 MG/DL (ref 2.7–4.5)
PLATELET # BLD AUTO: 328 K/UL (ref 150–450)
PMV BLD AUTO: 8.4 FL (ref 9.2–12.9)
POTASSIUM SERPL-SCNC: 4 MMOL/L (ref 3.5–5.1)
PROT SERPL-MCNC: 5.8 G/DL (ref 6–8.4)
RBC # BLD AUTO: 3.65 M/UL (ref 4.6–6.2)
SODIUM SERPL-SCNC: 139 MMOL/L (ref 136–145)
WBC # BLD AUTO: 6.7 K/UL (ref 3.9–12.7)

## 2024-10-01 PROCEDURE — 63600175 PHARM REV CODE 636 W HCPCS: Performed by: INTERNAL MEDICINE

## 2024-10-01 PROCEDURE — 94640 AIRWAY INHALATION TREATMENT: CPT

## 2024-10-01 PROCEDURE — 94761 N-INVAS EAR/PLS OXIMETRY MLT: CPT

## 2024-10-01 PROCEDURE — 99900035 HC TECH TIME PER 15 MIN (STAT)

## 2024-10-01 PROCEDURE — 84100 ASSAY OF PHOSPHORUS: CPT | Performed by: STUDENT IN AN ORGANIZED HEALTH CARE EDUCATION/TRAINING PROGRAM

## 2024-10-01 PROCEDURE — 25000242 PHARM REV CODE 250 ALT 637 W/ HCPCS: Performed by: INTERNAL MEDICINE

## 2024-10-01 PROCEDURE — 80053 COMPREHEN METABOLIC PANEL: CPT | Performed by: STUDENT IN AN ORGANIZED HEALTH CARE EDUCATION/TRAINING PROGRAM

## 2024-10-01 PROCEDURE — 83735 ASSAY OF MAGNESIUM: CPT | Performed by: STUDENT IN AN ORGANIZED HEALTH CARE EDUCATION/TRAINING PROGRAM

## 2024-10-01 PROCEDURE — 25000003 PHARM REV CODE 250: Performed by: INTERNAL MEDICINE

## 2024-10-01 PROCEDURE — 63600175 PHARM REV CODE 636 W HCPCS: Performed by: STUDENT IN AN ORGANIZED HEALTH CARE EDUCATION/TRAINING PROGRAM

## 2024-10-01 PROCEDURE — 94799 UNLISTED PULMONARY SVC/PX: CPT

## 2024-10-01 PROCEDURE — 25000003 PHARM REV CODE 250: Performed by: NURSE PRACTITIONER

## 2024-10-01 PROCEDURE — 25000003 PHARM REV CODE 250: Performed by: STUDENT IN AN ORGANIZED HEALTH CARE EDUCATION/TRAINING PROGRAM

## 2024-10-01 PROCEDURE — 63600175 PHARM REV CODE 636 W HCPCS: Performed by: THORACIC SURGERY (CARDIOTHORACIC VASCULAR SURGERY)

## 2024-10-01 PROCEDURE — 0W9B30Z DRAINAGE OF LEFT PLEURAL CAVITY WITH DRAINAGE DEVICE, PERCUTANEOUS APPROACH: ICD-10-PCS | Performed by: THORACIC SURGERY (CARDIOTHORACIC VASCULAR SURGERY)

## 2024-10-01 PROCEDURE — 27000221 HC OXYGEN, UP TO 24 HOURS

## 2024-10-01 PROCEDURE — 85025 COMPLETE CBC W/AUTO DIFF WBC: CPT | Performed by: STUDENT IN AN ORGANIZED HEALTH CARE EDUCATION/TRAINING PROGRAM

## 2024-10-01 PROCEDURE — 21400001 HC TELEMETRY ROOM

## 2024-10-01 PROCEDURE — 99233 SBSQ HOSP IP/OBS HIGH 50: CPT | Mod: ,,, | Performed by: INTERNAL MEDICINE

## 2024-10-01 PROCEDURE — 36415 COLL VENOUS BLD VENIPUNCTURE: CPT | Performed by: STUDENT IN AN ORGANIZED HEALTH CARE EDUCATION/TRAINING PROGRAM

## 2024-10-01 RX ORDER — GUAIFENESIN 100 MG/5ML
200 SOLUTION ORAL EVERY 4 HOURS PRN
Status: DISCONTINUED | OUTPATIENT
Start: 2024-10-01 | End: 2024-10-09 | Stop reason: HOSPADM

## 2024-10-01 RX ORDER — MORPHINE SULFATE 4 MG/ML
2 INJECTION, SOLUTION INTRAMUSCULAR; INTRAVENOUS EVERY 6 HOURS PRN
Status: DISCONTINUED | OUTPATIENT
Start: 2024-10-01 | End: 2024-10-09 | Stop reason: HOSPADM

## 2024-10-01 RX ORDER — HYDROCODONE BITARTRATE AND ACETAMINOPHEN 7.5; 325 MG/1; MG/1
1 TABLET ORAL EVERY 6 HOURS PRN
Status: DISCONTINUED | OUTPATIENT
Start: 2024-10-01 | End: 2024-10-09 | Stop reason: HOSPADM

## 2024-10-01 RX ADMIN — GUAIFENESIN 200 MG: 200 SOLUTION ORAL at 11:10

## 2024-10-01 RX ADMIN — HYDROCODONE BITARTRATE AND ACETAMINOPHEN 1 TABLET: 7.5; 325 TABLET ORAL at 07:10

## 2024-10-01 RX ADMIN — BUDESONIDE 0.5 MG: 0.5 INHALANT ORAL at 07:10

## 2024-10-01 RX ADMIN — CEFAZOLIN 2 G: 2 INJECTION, POWDER, FOR SOLUTION INTRAMUSCULAR; INTRAVENOUS at 11:10

## 2024-10-01 RX ADMIN — ACETAMINOPHEN 650 MG: 325 TABLET ORAL at 11:10

## 2024-10-01 RX ADMIN — LIDOCAINE HYDROCHLORIDE 100 MG: 10 INJECTION, SOLUTION EPIDURAL; INFILTRATION; INTRACAUDAL at 11:10

## 2024-10-01 RX ADMIN — MORPHINE SULFATE 2 MG: 4 INJECTION INTRAVENOUS at 02:10

## 2024-10-01 RX ADMIN — PIPERACILLIN SODIUM AND TAZOBACTAM SODIUM 4.5 G: 4; .5 INJECTION, POWDER, FOR SOLUTION INTRAVENOUS at 02:10

## 2024-10-01 RX ADMIN — PIPERACILLIN SODIUM AND TAZOBACTAM SODIUM 4.5 G: 4; .5 INJECTION, POWDER, FOR SOLUTION INTRAVENOUS at 06:10

## 2024-10-01 RX ADMIN — ARFORMOTEROL TARTRATE 15 MCG: 15 SOLUTION RESPIRATORY (INHALATION) at 07:10

## 2024-10-01 NOTE — PLAN OF CARE
A213/A213 RADHA Daniels is a 68 y.o.male admitted on 9/26/2024 for Spontaneous pneumothorax   Code Status: Full Code MRN: [id number]   Review of patient's allergies indicates:  No Known Allergies  Past Medical History:   Diagnosis Date    Cancer     Prostate    Tobacco use       PRN meds    0.9% NaCl, , PRN  acetaminophen, 650 mg, Q6H PRN  albuterol-ipratropium, 3 mL, Q6H PRN  dextrose 10%, 12.5 g, PRN  dextrose 10%, 25 g, PRN  glucagon (human recombinant), 1 mg, PRN  glucose, 16 g, PRN  glucose, 24 g, PRN  guaiFENesin 100 mg/5 ml, 200 mg, Q4H PRN  hydrALAZINE, 10 mg, Q6H PRN  HYDROcodone-acetaminophen, 1 tablet, Q6H PRN  morphine, 2 mg, Q6H PRN  naloxone, 0.02 mg, PRN  ondansetron, 4 mg, Q6H PRN  sodium chloride 0.9%, 10 mL, Q12H PRN      Chart check completed. Will continue plan of care.        Pt oriented x4.  VSS.  Pt remained afebrile throughout this shift.   All meds administered per order.   Pt remained free of falls this shift.   Plan of care reviewed. Patient verbalizes understanding.   Pt moving/turning independently.   Bed low, side rails up x 2, wheels locked, call light in reach.   Patient instructed to call for assistance.  Patient education provided  Will continue to monitor.   Chest tube was changed and the seal water chamber is still bubbling.                           Deric Coma Scale Score: 15     Lead Monitored: Lead II Rhythm: normal sinus rhythm Frequency/Ectopy: frequent, PVCs  Cardiac/Telemetry Box Number: [id number]  VTE Required Core Measure: (SCDs) Sequential compression device initiated/maintained Last Bowel Movement: 09/30/24  Diet Adult Regular  Diet NPO Except for: Sips with Medication  Voiding Characteristics: external catheter  Syed Score: 20  Fall Risk Score: 11  Accucheck []   Freq?      Lines/Drains/Airways       Drain  Duration                  Chest Tube 09/27/24 0130 Left Midaxillary 4 days    Male External Urinary Catheter 09/29/24 1000 Medium 2 days               Peripheral Intravenous Line  Duration                  Peripheral IV - Single Lumen 10/01/24 0920 20 G Anterior;Left Forearm <1 day

## 2024-10-01 NOTE — ASSESSMENT & PLAN NOTE
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

## 2024-10-01 NOTE — ASSESSMENT & PLAN NOTE
Patient with Hypoxic Respiratory failure which is Acute.  he is not on home oxygen. Supplemental oxygen was provided and noted- Oxygen Concentration (%):  [28] 28. Signs/symptoms of respiratory failure include- respiratory distress. Contributing diagnoses includes - COPD and pneumothorax  Labs and images were reviewed. Patient Has recent ABG, which has been reviewed. Will treat underlying causes and adjust management of respiratory failure as follows  - chest tube in place with resolution of ptx, continued with air leak   - wean O2 for goal SpO2 > 88%  - avoid high flow oxygen or NIPPV given PTX and ongoing air leak

## 2024-10-01 NOTE — ASSESSMENT & PLAN NOTE
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

## 2024-10-01 NOTE — SUBJECTIVE & OBJECTIVE
Interval History:   Interval History:  9/28 - continuous air leak with some reaccumulation on CXR late this morning.   Adjusted tube to try and optimize drainage.  It was a bit kinked at the skin, so resecured in a better position. Remains on suction.  9/29 - re-expansion improved following tube adjustments yesterday.  Continuous brisk air leak remains on suction.  No  new complaints.  9/30: CXR with lung up, continues with large volume air leak with and without suction, CTS consulted for eval and recs, no new issues or c/o noted on exam, POC and questions answered with pt and his wife  10/1: no acute events overnight, pt continues with air leak to chest tube, detailed conversation with pt and wife this AM about treatment and possible plans moving forward - all questions answered       Objective:     Vital Signs (Most Recent):  Temp: 98.1 °F (36.7 °C) (10/01/24 1136)  Pulse: 76 (10/01/24 1540)  Resp: (!) 22 (10/01/24 1440)  BP: 136/78 (10/01/24 1136)  SpO2: 95 % (10/01/24 1136) Vital Signs (24h Range):  Temp:  [97.9 °F (36.6 °C)-98.2 °F (36.8 °C)] 98.1 °F (36.7 °C)  Pulse:  [70-96] 76  Resp:  [14-22] 22  SpO2:  [93 %-97 %] 95 %  BP: (111-136)/(69-78) 136/78     Weight: 74.1 kg (163 lb 5.8 oz)  Body mass index is 24.12 kg/m².      Intake/Output Summary (Last 24 hours) at 10/1/2024 1552  Last data filed at 10/1/2024 0725  Gross per 24 hour   Intake 204.21 ml   Output 1900 ml   Net -1695.79 ml        Physical Exam  Vitals and nursing note reviewed.   Constitutional:       Appearance: He is well-developed.      Interventions: Nasal cannula in place.       HENT:      Head: Normocephalic and atraumatic.      Nose: Nose normal.   Eyes:      Pupils: Pupils are equal, round, and reactive to light.   Cardiovascular:      Rate and Rhythm: Normal rate and regular rhythm.      Heart sounds: Normal heart sounds.   Abdominal:      Palpations: Abdomen is soft.   Musculoskeletal:         General: Normal range of motion.       Cervical back: Normal range of motion and neck supple.   Skin:     General: Skin is warm and dry.      Capillary Refill: Capillary refill takes 2 to 3 seconds.   Neurological:      Mental Status: He is alert and oriented to person, place, and time.      Cranial Nerves: No cranial nerve deficit.           Review of Systems   Constitutional:  Positive for fatigue.   Respiratory:  Positive for cough.    Cardiovascular:  Positive for chest pain.       Vents:  Oxygen Concentration (%): 28 (10/01/24 0725)    Lines/Drains/Airways       Drain  Duration                  Chest Tube 09/27/24 0130 Left Midaxillary 4 days    Male External Urinary Catheter 09/29/24 1000 Medium 2 days              Peripheral Intravenous Line  Duration                  Peripheral IV - Single Lumen 10/01/24 0920 20 G Anterior;Left Forearm <1 day                    Significant Labs:    CBC/Anemia Profile:  Recent Labs   Lab 09/30/24  0442 10/01/24  0508   WBC 8.02 6.70   HGB 11.7* 11.7*   HCT 36.5* 35.8*    328   * 98   RDW 13.5 13.2        Chemistries:  Recent Labs   Lab 09/30/24  0442 10/01/24  0508    139   K 4.2 4.0    109   CO2 24 21*   BUN 11 11   CREATININE 0.8 0.8   CALCIUM 9.1 8.9   ALBUMIN 2.9* 2.8*   PROT 5.9* 5.8*   BILITOT 0.9 0.6   ALKPHOS 115 109   ALT 34 31   AST 26 20   MG 2.3 2.2   PHOS 3.4 2.9       All pertinent labs within the past 24 hours have been reviewed.    Significant Imaging:  I have reviewed all pertinent imaging results/findings within the past 24 hours.    X-Ray Chest AP Portable  Narrative: EXAMINATION:  XR CHEST AP PORTABLE    CLINICAL HISTORY:  , Chest tube placement;    COMPARISON:  Chest, 10/01/2024 at 06:16 hours.    FINDINGS:  One view.  Normal size heart.  Interval placement of a left pleural catheter.  No evidence of pneumothorax.  Persistent nodular opacity in the mid left lung.  Decreased subcutaneous emphysema of the left chest wall.  Right lung is clear.  Impression: Interval  placement of left pleural catheter.  No residual pneumothorax.  Follow-up is recommended.    Electronically signed by: Faith Rios MD  Date:    10/01/2024  Time:    11:57  X-Ray Chest 1 View  Narrative: EXAMINATION:  XR CHEST 1 VIEW    CLINICAL HISTORY:  left sided pneumonia; Acute and chronic respiratory failure with hypoxia    TECHNIQUE:  Single frontal view of the chest was performed.    COMPARISON:  09/30/2024    FINDINGS:  In comparison to the prior study, there is no adverse interval changes  Impression: In comparison to the prior study, there is no adverse interval changes    Electronically signed by: Navneet Oswald MD  Date:    10/01/2024  Time:    08:04

## 2024-10-01 NOTE — PROGRESS NOTES
[facility] - Tele[facility] ([facility])  Pulmonology  Progress Note    Patient Name: Aurelio Daniels  MRN: [id number]  Admission Date: 9/26/2024  Hospital Length of Stay: 4 days  Code Status: Full Code  Attending Provider: Juan Disla DO  Primary Care Provider: Shlomo Villatoro MD   Principal Problem: Spontaneous pneumothorax    Subjective:     Mr Daniels is a 69 y/o gentleman with a long smoking history, COPD (recently established with Dr Jimenez), prior prostate cancer, and HTN who is currently admitted to the Hospitalist service after coming in overnight with shortness of breath and being found to have a large left sided PTX.  Pt was recently admitted to the hospital for PNA (9/13 - 9/17) and was recovering well from that when he suddenly had a large coughing fit last night.  Became markedly short of breath after this, which prompted his presentation to the ER.  Chest tube was placed by the ER physician with good re-expansion, though he continues to have a brisk air leak.    CT after the chest tube placement shows trace remaining pneumothorax, large bleb on the left, approx 3.5 cm spiculated mass on the left, and some remaining bilateral consolidations.  Mr Daniels says that he is feeling much better after the chest tube placement, though still with some cough    Interval History:   Interval History:  9/28 - continuous air leak with some reaccumulation on CXR late this morning.   Adjusted tube to try and optimize drainage.  It was a bit kinked at the skin, so resecured in a better position. Remains on suction.  9/29 - re-expansion improved following tube adjustments yesterday.  Continuous brisk air leak remains on suction.  No  new complaints.  9/30: CXR with lung up, continues with large volume air leak with and without suction, CTS consulted for eval and recs, no new issues or c/o noted on exam, POC and questions answered with pt and his wife  10/1: no acute events overnight, pt continues with air leak to  chest tube, detailed conversation with pt and wife this AM about treatment and possible plans moving forward - all questions answered       Objective:     Vital Signs (Most Recent):  Temp: 98.1 °F (36.7 °C) (10/01/24 1136)  Pulse: 76 (10/01/24 1540)  Resp: (!) 22 (10/01/24 1440)  BP: 136/78 (10/01/24 1136)  SpO2: 95 % (10/01/24 1136) Vital Signs (24h Range):  Temp:  [97.9 °F (36.6 °C)-98.2 °F (36.8 °C)] 98.1 °F (36.7 °C)  Pulse:  [70-96] 76  Resp:  [14-22] 22  SpO2:  [93 %-97 %] 95 %  BP: (111-136)/(69-78) 136/78     Weight: 74.1 kg (163 lb 5.8 oz)  Body mass index is 24.12 kg/m².      Intake/Output Summary (Last 24 hours) at 10/1/2024 1552  Last data filed at 10/1/2024 0725  Gross per 24 hour   Intake 204.21 ml   Output 1900 ml   Net -1695.79 ml        Physical Exam  Vitals and nursing note reviewed.   Constitutional:       Appearance: He is well-developed.      Interventions: Nasal cannula in place.       HENT:      Head: Normocephalic and atraumatic.      Nose: Nose normal.   Eyes:      Pupils: Pupils are equal, round, and reactive to light.   Cardiovascular:      Rate and Rhythm: Normal rate and regular rhythm.      Heart sounds: Normal heart sounds.   Abdominal:      Palpations: Abdomen is soft.   Musculoskeletal:         General: Normal range of motion.      Cervical back: Normal range of motion and neck supple.   Skin:     General: Skin is warm and dry.      Capillary Refill: Capillary refill takes 2 to 3 seconds.   Neurological:      Mental Status: He is alert and oriented to person, place, and time.      Cranial Nerves: No cranial nerve deficit.           Review of Systems   Constitutional:  Positive for fatigue.   Respiratory:  Positive for cough.    Cardiovascular:  Positive for chest pain.       Vents:  Oxygen Concentration (%): 28 (10/01/24 0725)    Lines/Drains/Airways       Drain  Duration                  Chest Tube 09/27/24 0130 Left Midaxillary 4 days    Male External Urinary Catheter 09/29/24 1000  Medium 2 days              Peripheral Intravenous Line  Duration                  Peripheral IV - Single Lumen 10/01/24 0920 20 G Anterior;Left Forearm <1 day                    Significant Labs:    CBC/Anemia Profile:  Recent Labs   Lab 09/30/24  0442 10/01/24  0508   WBC 8.02 6.70   HGB 11.7* 11.7*   HCT 36.5* 35.8*    328   * 98   RDW 13.5 13.2        Chemistries:  Recent Labs   Lab 09/30/24  0442 10/01/24  0508    139   K 4.2 4.0    109   CO2 24 21*   BUN 11 11   CREATININE 0.8 0.8   CALCIUM 9.1 8.9   ALBUMIN 2.9* 2.8*   PROT 5.9* 5.8*   BILITOT 0.9 0.6   ALKPHOS 115 109   ALT 34 31   AST 26 20   MG 2.3 2.2   PHOS 3.4 2.9       All pertinent labs within the past 24 hours have been reviewed.    Significant Imaging:  I have reviewed all pertinent imaging results/findings within the past 24 hours.    X-Ray Chest AP Portable  Narrative: EXAMINATION:  XR CHEST AP PORTABLE    CLINICAL HISTORY:  , Chest tube placement;    COMPARISON:  Chest, 10/01/2024 at 06:16 hours.    FINDINGS:  One view.  Normal size heart.  Interval placement of a left pleural catheter.  No evidence of pneumothorax.  Persistent nodular opacity in the mid left lung.  Decreased subcutaneous emphysema of the left chest wall.  Right lung is clear.  Impression: Interval placement of left pleural catheter.  No residual pneumothorax.  Follow-up is recommended.    Electronically signed by: Faith Rios MD  Date:    10/01/2024  Time:    11:57  X-Ray Chest 1 View  Narrative: EXAMINATION:  XR CHEST 1 VIEW    CLINICAL HISTORY:  left sided pneumonia; Acute and chronic respiratory failure with hypoxia    TECHNIQUE:  Single frontal view of the chest was performed.    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  he is not on home oxygen. Supplemental oxygen was provided and noted- Oxygen Concentration (%):  [28] 28. Signs/symptoms of respiratory failure include- respiratory distress. Contributing diagnoses includes - COPD and pneumothorax  Labs and images were reviewed. Patient Has recent ABG, which has been reviewed. Will treat underlying causes and adjust management of respiratory failure as follows  - chest tube in place with resolution of ptx, continued with air leak   - wean O2 for goal SpO2 > 88%  - avoid high flow oxygen or NIPPV given PTX and ongoing air leak    Mass of left lung  - tentatively plan for IR CT guided bx 10/2, NPO after MN    Bilateral pneumonia  - likely some residual findings from his recent infection and hospitalization  - MRSA swab negative, stop zyvox, remains on zosyn, sputum culture with staph - follow sensitivities (presume MSSA given negative MRSA swab)  - PT/OT, IS, OOB, ambulate       Centrilobular emphysema  Patient's COPD is controlled currently.  Patient is currently off COPD Pathway. Continue scheduled inhalers  Pulmicort/Brovana nebs and Supplemental oxygen and monitor respiratory status closely.   - follow with Dr. Jimenez in clinic, last saw 9/5/24  - not in acute exac, monitor     Other  Tobacco abuse  - counseled on cessation and he is motivated to quit  - declined nicotine patch, order if he changes his mind             Tha Cali MD  Pulmonology  [facility] - Telemetry ([facility])

## 2024-10-01 NOTE — ASSESSMENT & PLAN NOTE
Patient's COPD is controlled currently.  Patient is currently off COPD Pathway. Continue scheduled inhalers  Pulmicort/Brovana nebs and Supplemental oxygen and monitor respiratory status closely.   - follow with Dr. Jimenez in clinic, last saw 9/5/24  - not in acute exac, monitor

## 2024-10-01 NOTE — CONSULTS
Chart reviewed by Dr. Wells.       ASSESSMENT/PLAN:    Left lung mass    The order for a CT biopsy has been placed and the procedure will be performed asap.          Thank you for the consult.

## 2024-10-01 NOTE — PROGRESS NOTES
[facility] - Tele[facility] ([facility])  Pulmonology  Progress Note    Patient Name: Aurelio Daniels  MRN: [id number]  Admission Date: 9/26/2024  Hospital Length of Stay: 4 days  Code Status: Full Code  Attending Provider: Juan Disla DO  Primary Care Provider: Shlomo Villatoro MD   Principal Problem: Spontaneous pneumothorax    Subjective:     Mr Daniels is a 67 y/o gentleman with a long smoking history, COPD (recently established with Dr Jimenez), prior prostate cancer, and HTN who is currently admitted to the Hospitalist service after coming in overnight with shortness of breath and being found to have a large left sided PTX.  Pt was recently admitted to the hospital for PNA (9/13 - 9/17) and was recovering well from that when he suddenly had a large coughing fit last night.  Became markedly short of breath after this, which prompted his presentation to the ER.  Chest tube was placed by the ER physician with good re-expansion, though he continues to have a brisk air leak.    CT after the chest tube placement shows trace remaining pneumothorax, large bleb on the left, approx 3.5 cm spiculated mass on the left, and some remaining bilateral consolidations.  Mr Daniels says that he is feeling much better after the chest tube placement, though still with some cough.    Interval History:  9/28 - continuous air leak with some reaccumulation on CXR late this morning.   Adjusted tube to try and optimize drainage.  It was a bit kinked at the skin, so resecured in a better position. Remains on suction.  9/29 - re-expansion improved following tube adjustments yesterday.  Continuous brisk air leak remains on suction.  No  new complaints.  9/30: CXR with lung up, continues with large volume air leak with and without suction, CTS consulted for eval and recs, no new issues or c/o noted on exam, POC and questions answered with pt and his wife  10/1: no acute events overnight, pt continues with air leak to chest tube, detailed  conversation with pt and wife this AM about treatment and possible plans moving forward - all questions answered     ROS complete and negative unless stated in the interval HPI     Objective:     Vital Signs (Most Recent):  Temp: 98.1 °F (36.7 °C) (10/01/24 1136)  Pulse: 86 (10/01/24 1136)  Resp: 20 (10/01/24 1136)  BP: 136/78 (10/01/24 1136)  SpO2: 95 % (10/01/24 1136) Vital Signs (24h Range):  Temp:  [97.9 °F (36.6 °C)-98.2 °F (36.8 °C)] 98.1 °F (36.7 °C)  Pulse:  [70-96] 86  Resp:  [14-20] 20  SpO2:  [93 %-97 %] 95 %  BP: (111-136)/(69-78) 136/78     Weight: 74.1 kg (163 lb 5.8 oz)  Body mass index is 24.12 kg/m².      Intake/Output Summary (Last 24 hours) at 10/1/2024 1237  Last data filed at 10/1/2024 0725  Gross per 24 hour   Intake 204.21 ml   Output 1900 ml   Net -1695.79 ml        Physical Exam  Vitals reviewed.   Constitutional:       General: He is awake. He is not in acute distress.     Appearance: He is well-developed.   Pulmonary:      Effort: Pulmonary effort is normal.      Breath sounds: Normal breath sounds. No wheezing, rhonchi or rales.      Comments: L pigtail CT in place with continuous air leak   Neurological:      Mental Status: He is alert.   Psychiatric:         Behavior: Behavior is cooperative.               Vents:  Oxygen Concentration (%): 28 (10/01/24 0725)    Lines/Drains/Airways       Drain  Duration                  Chest Tube 09/27/24 0130 Left Midaxillary 4 days    Male External Urinary Catheter 09/29/24 1000 Medium 2 days              Peripheral Intravenous Line  Duration                  Peripheral IV - Single Lumen 10/01/24 0920 20 G Anterior;Left Forearm <1 day                    Significant Labs:    CBC/Anemia Profile:  Recent Labs   Lab 09/30/24  0442 10/01/24  0508   WBC 8.02 6.70   HGB 11.7* 11.7*   HCT 36.5* 35.8*    328   * 98   RDW 13.5 13.2        Chemistries:  Recent Labs   Lab 09/30/24  0442 10/01/24  0508    139   K 4.2 4.0    109   CO2 24  21*   BUN 11 11   CREATININE 0.8 0.8   CALCIUM 9.1 8.9   ALBUMIN 2.9* 2.8*   PROT 5.9* 5.8*   BILITOT 0.9 0.6   ALKPHOS 115 109   ALT 34 31   AST 26 20   MG 2.3 2.2   PHOS 3.4 2.9       All pertinent labs within the past 24 hours have been reviewed.    Significant Imaging:  I have reviewed all pertinent imaging results/findings within the past 24 hours.    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  he is not on home oxygen. Supplemental oxygen was provided and noted- Oxygen Concentration (%):  [28] 28. Signs/symptoms of respiratory failure include- respiratory distress. Contributing diagnoses includes - COPD and pneumothorax  Labs and images were reviewed. Patient Has recent ABG, which has been reviewed. Will treat underlying causes and adjust management of respiratory failure as follows  - chest tube in place with resolution of ptx, continued with air leak   - wean O2 for goal SpO2 > 88%  - avoid high flow oxygen or NIPPV given PTX and ongoing air leak    Mass of left lung  - tentatively plan for IR CT guided bx 10/2, NPO after MN    Bilateral pneumonia  - likely some residual findings from his recent infection and hospitalization  - MRSA swab negative, stop zyvox, remains on zosyn, sputum culture with staph - follow sensitivities (presume MSSA given negative MRSA swab)  - PT/OT, IS, OOB, ambulate       Centrilobular emphysema  Patient's COPD is controlled currently.  Patient is currently off COPD Pathway. Continue scheduled inhalers   Pulmicort/Brovana nebs and Supplemental oxygen and monitor respiratory status closely.   - follow with Dr. Jimenez in clinic, last saw 9/5/24  - not in acute exac, monitor     Other  Tobacco abuse  - counseled on cessation and he is motivated to quit  - declined nicotine patch, order if he changes his mind        Lucian Lowe NP  Pulmonology  [facility] - Tele[facility] ([facility])

## 2024-10-01 NOTE — SUBJECTIVE & OBJECTIVE
Mr Daniels is a 67 y/o gentleman with a long smoking history, COPD (recently established with Dr Jimenez), prior prostate cancer, and HTN who is currently admitted to the Hospitalist service after coming in overnight with shortness of breath and being found to have a large left sided PTX.  Pt was recently admitted to the hospital for PNA (9/13 - 9/17) and was recovering well from that when he suddenly had a large coughing fit last night.  Became markedly short of breath after this, which prompted his presentation to the ER.  Chest tube was placed by the ER physician with good re-expansion, though he continues to have a brisk air leak.    CT after the chest tube placement shows trace remaining pneumothorax, large bleb on the left, approx 3.5 cm spiculated mass on the left, and some remaining bilateral consolidations.  Mr Daniels says that he is feeling much better after the chest tube placement, though still with some cough.    Interval History:  9/28 - continuous air leak with some reaccumulation on CXR late this morning.   Adjusted tube to try and optimize drainage.  It was a bit kinked at the skin, so resecured in a better position. Remains on suction.  9/29 - re-expansion improved following tube adjustments yesterday.  Continuous brisk air leak remains on suction.  No  new complaints.  9/30: CXR with lung up, continues with large volume air leak with and without suction, CTS consulted for eval and recs, no new issues or c/o noted on exam, POC and questions answered with pt and his wife  10/1: no acute events overnight, pt continues with air leak to chest tube, detailed conversation with pt and wife this AM about treatment and possible plans moving forward - all questions answered     ROS complete and negative unless stated in the interval HPI     Objective:     Vital Signs (Most Recent):  Temp: 98.1 °F (36.7 °C) (10/01/24 1136)  Pulse: 86 (10/01/24 1136)  Resp: 20 (10/01/24 1136)  BP: 136/78 (10/01/24 1136)  SpO2:  95 % (10/01/24 1136) Vital Signs (24h Range):  Temp:  [97.9 °F (36.6 °C)-98.2 °F (36.8 °C)] 98.1 °F (36.7 °C)  Pulse:  [70-96] 86  Resp:  [14-20] 20  SpO2:  [93 %-97 %] 95 %  BP: (111-136)/(69-78) 136/78     Weight: 74.1 kg (163 lb 5.8 oz)  Body mass index is 24.12 kg/m².      Intake/Output Summary (Last 24 hours) at 10/1/2024 1237  Last data filed at 10/1/2024 0725  Gross per 24 hour   Intake 204.21 ml   Output 1900 ml   Net -1695.79 ml        Physical Exam  Vitals reviewed.   Constitutional:       General: He is awake. He is not in acute distress.     Appearance: He is well-developed.   Pulmonary:      Effort: Pulmonary effort is normal.      Breath sounds: Normal breath sounds. No wheezing, rhonchi or rales.      Comments: L pigtail CT in place with continuous air leak   Neurological:      Mental Status: He is alert.   Psychiatric:         Behavior: Behavior is cooperative.               Vents:  Oxygen Concentration (%): 28 (10/01/24 0725)    Lines/Drains/Airways       Drain  Duration                  Chest Tube 09/27/24 0130 Left Midaxillary 4 days    Male External Urinary Catheter 09/29/24 1000 Medium 2 days              Peripheral Intravenous Line  Duration                  Peripheral IV - Single Lumen 10/01/24 0920 20 G Anterior;Left Forearm <1 day                    Significant Labs:    CBC/Anemia Profile:  Recent Labs   Lab 09/30/24  0442 10/01/24  0508   WBC 8.02 6.70   HGB 11.7* 11.7*   HCT 36.5* 35.8*    328   * 98   RDW 13.5 13.2        Chemistries:  Recent Labs   Lab 09/30/24  0442 10/01/24  0508    139   K 4.2 4.0    109   CO2 24 21*   BUN 11 11   CREATININE 0.8 0.8   CALCIUM 9.1 8.9   ALBUMIN 2.9* 2.8*   PROT 5.9* 5.8*   BILITOT 0.9 0.6   ALKPHOS 115 109   ALT 34 31   AST 26 20   MG 2.3 2.2   PHOS 3.4 2.9       All pertinent labs within the past 24 hours have been reviewed.    Significant Imaging:  I have reviewed all pertinent imaging results/findings within the past 24  hours.

## 2024-10-01 NOTE — ASSESSMENT & PLAN NOTE
- likely some residual findings from his recent infection and hospitalization  - MRSA swab negative, stop zyvox, remains on zosyn, sputum culture with staph - follow sensitivities (presume MSSA given negative MRSA swab)  - PT/OT, IS, OOB, ambulate

## 2024-10-01 NOTE — PROGRESS NOTES
Subjective:      Patient ID: Aurelio Daniels is a 68 y.o. male.    Chief Complaint: Shortness of Breath (Episode of SOB that started after a coughing fit tonight. Recently admitted for pneumonia. 82% SpO2 upon EMS arrival with improvement after breathing treatment, steroids, and CPAP.)    HPI: 68-year-old man with history of prostate cancer, tobacco abuse (quit 1-1/2 once ago), COPD (discharged with home oxygen via 2 L nasal cannula as of 09/17/2024), centrilobular emphysema, hypertension, anemia, and recent bilateral lower lobe pneumonia who presented to the emergency department for acute onset shortness a breath after a coughing spell.   The patient was admitted through the emergency room had a pigtail catheter placed for pneumothorax on the left side the pneumothorax resolved.  Continues to have continuous air leak.  CT scan showed a large bullous lesion and a 3.2 cm spiculated mass in the right upper lobe.  Central location  Bilateral pneumonias and compressive atelectasis.  Multiple mediastinal lymphadenopathy.  The patient feels better after the pigtail catheter.  Catheter continues to have air leak  Repeat chest x-ray shows expanded lung fields mostly with areas of compressive atelectasis pneumothorax resolved.  The pigtail catheter still has a massive air leak continuous.      Review of patient's allergies indicates:  No Known Allergies    Past Medical History:   Diagnosis Date    Cancer     Prostate    Tobacco use        Family History   Problem Relation Name Age of Onset    No Known Problems Mother      Heart disease Father      No Known Problems Brother      No Known Problems Brother      No Known Problems Daughter      No Known Problems Daughter         Social History     Socioeconomic History    Marital status:      Spouse name: YUE    Number of children: 2   Tobacco Use    Smoking status: Some Days     Current packs/day: 0.00     Types: Cigarettes     Last attempt to quit: 1/15/2022      Years since quittin.7     Passive exposure: Current    Smokeless tobacco: Never   Substance and Sexual Activity    Alcohol use: Yes     Alcohol/week: 2.0 standard drinks of alcohol     Types: 2 Shots of liquor per week    Drug use: Never    Sexual activity: Not Currently     Partners: Female     Social Drivers of Health     Financial Resource Strain: Low Risk  (2024)    Overall Financial Resource Strain (CARDIA)     Difficulty of Paying Living Expenses: Not hard at all   Food Insecurity: No Food Insecurity (2024)    Hunger Vital Sign     Worried About Running Out of Food in the Last Year: Never true     Ran Out of Food in the Last Year: Never true   Transportation Needs: No Transportation Needs (2024)    TRANSPORTATION NEEDS     Transportation : No   Physical Activity: Inactive (2024)    Exercise Vital Sign     Days of Exercise per Week: 0 days     Minutes of Exercise per Session: 0 min   Stress: No Stress Concern Present (2024)    [employer] - Occupational Stress Questionnaire     Feeling of Stress : Not at all   Housing Stability: Low Risk  (2024)    Housing Stability Vital Sign     Unable to Pay for Housing in the Last Year: No     Homeless in the Last Year: No       Past Surgical History:   Procedure Laterality Date    COLONOSCOPY      COLONOSCOPY N/A 2022    Procedure: COLONOSCOPY;  Surgeon: Jamia Alfaro MD;  Location: [facility];  Service: Endoscopy;  Laterality: N/A;    VASECTOMY  1985       Review of Systems   Constitutional:  Positive for activity change. Negative for appetite change.   HENT:  Negative for dental problem, nosebleeds and sore throat.    Eyes:  Negative for discharge and visual disturbance.   Respiratory:  Positive for chest tightness, shortness of breath and wheezing. Negative for cough and stridor.    Cardiovascular:  Negative for leg swelling.   Gastrointestinal:  Negative for abdominal distention and abdominal pain.  "  Genitourinary:  Negative for difficulty urinating and dysuria.   Musculoskeletal:  Negative for arthralgias, back pain and joint swelling.   Allergic/Immunologic: Negative for environmental allergies.   Neurological:  Negative for dizziness, syncope and headaches.   Hematological:  Does not bruise/bleed easily.   Psychiatric/Behavioral:  Negative for behavioral problems.           Objective:   /78 (BP Location: Right arm, Patient Position: Lying)   Pulse 86   Temp 98.1 °F (36.7 °C) (Oral)   Resp 20   Ht 5' 9" (1.753 m)   Wt 74.1 kg (163 lb 5.8 oz)   SpO2 95%   BMI 24.12 kg/m²     X-Ray Chest 1 View  Narrative: EXAMINATION:  XR CHEST 1 VIEW    CLINICAL HISTORY:  left sided pneumonia; Acute and chronic respiratory failure with hypoxia    TECHNIQUE:  Single frontal view of the chest was performed.    COMPARISON:  09/30/2024    FINDINGS:  In comparison to the prior study, there is no adverse interval changes  Impression: In comparison to the prior study, there is no adverse interval changes    Electronically signed by: Navneet Oswald MD  Date:    10/01/2024  Time:    08:04         Physical Exam  Vitals reviewed.   Constitutional:       Appearance: Normal appearance. He is ill-appearing.   HENT:      Head: Normocephalic and atraumatic.      Mouth/Throat:      Mouth: Mucous membranes are moist.   Eyes:      Extraocular Movements: Extraocular movements intact.   Cardiovascular:      Rate and Rhythm: Normal rate and regular rhythm.      Pulses: Normal pulses.      Heart sounds: Normal heart sounds.   Pulmonary:      Effort: Pulmonary effort is normal.      Breath sounds: Rhonchi and rales present.      Comments: Left-sided chest tube has a massive air leak continuous  Abdominal:      Palpations: Abdomen is soft.   Musculoskeletal:         General: Normal range of motion.      Cervical back: Normal range of motion and neck supple.   Skin:     General: Skin is warm.      Capillary Refill: Capillary refill takes " less than 2 seconds.   Neurological:      General: No focal deficit present.      Mental Status: He is alert and oriented to person, place, and time.   Psychiatric:         Mood and Affect: Mood normal.         Assessment:     1. Acute on chronic respiratory failure with hypoxemia    2. Pneumothorax on left    3. Troponin I above reference range    4. Mass of left lung    5. SOB (shortness of breath)    6. Chest pain    7. Elevated troponin I level          Plan   68-year-old male with a right upper lobe mass centrally located with bilateral Marcial sooner emphysema COPD and pneumonic consolidation.  The patient has a small bore pigtail catheter which will be upgraded to a 28 Welsh chest tube and plan to connected to a Heimlich valve.  Investigation for left upper lobe mass in progress the patient's it is centrally located and severe pannus in her lymph emphysema not a candidate for open surgical biopsy.          Romaine Rodriguez MD  [facility]  [place]

## 2024-10-01 NOTE — OP NOTE
[facility] [place] [facility]")  Cardiothoracic Surgery  Operative Note    SUMMARY     Date of Procedure:  10/1/24    Procedure: Left sided 28 F chest tube placement        Assisting Surgeon: Dr Rodriguez    Pre-Operative Diagnosis:  Severe COPD\  Left upper lobe mass  Bilateral pneumonias  Broncho pleural fistula continuous air leak    Post-Operative Diagnosis:  Same    Anesthesia:  1% lidocaine     Operative Findings (including complications, if any):  Continuous air leak persisted    Description of Technical Procedures:  Patient was time-out surgical site was prepared in the 5th intercostal space 1% lidocaine infiltration 10 cc was given made a small stab incision with a 15 blade knife hemostasis was checked and gently  with a hemostat placed a 28 [place] straight chest tube secured it in position with 1 0 silk x2 sterile dressing was applied the patient tolerated the procedure gush of air leak noticed when placing the chest tube and was continuously having air leak upon connecting to the pleural VAC    Significant Surgical Tasks Conducted by the Assistant(s), if Applicable:     Estimated Blood Loss (EBL): * No surgery found *           Implants: * No surgical log found *    Specimens:   Specimen (24h ago, onward)      None                    Condition: Fair    Disposition:  The patient is on the floor telemetry    Attestation: I was present for the entire procedure.

## 2024-10-02 ENCOUNTER — ANESTHESIA (OUTPATIENT)
Dept: ANESTHESIOLOGY | Facility: HOSPITAL | Age: 69
End: 2024-10-02
Payer: MEDICARE

## 2024-10-02 ENCOUNTER — ANESTHESIA EVENT (OUTPATIENT)
Dept: ANESTHESIOLOGY | Facility: HOSPITAL | Age: 69
End: 2024-10-02
Payer: MEDICARE

## 2024-10-02 LAB
ALBUMIN SERPL BCP-MCNC: 3 G/DL (ref 3.5–5.2)
ALP SERPL-CCNC: 112 U/L (ref 55–135)
ALT SERPL W/O P-5'-P-CCNC: 24 U/L (ref 10–44)
ANION GAP SERPL CALC-SCNC: 10 MMOL/L (ref 8–16)
AST SERPL-CCNC: 18 U/L (ref 10–40)
BASOPHILS # BLD AUTO: 0.05 K/UL (ref 0–0.2)
BASOPHILS NFR BLD: 0.6 % (ref 0–1.9)
BILIRUB SERPL-MCNC: 0.4 MG/DL (ref 0.1–1)
BUN SERPL-MCNC: 12 MG/DL (ref 8–23)
CALCIUM SERPL-MCNC: 8.9 MG/DL (ref 8.7–10.5)
CHLORIDE SERPL-SCNC: 106 MMOL/L (ref 95–110)
CO2 SERPL-SCNC: 21 MMOL/L (ref 23–29)
CREAT SERPL-MCNC: 0.8 MG/DL (ref 0.5–1.4)
DIFFERENTIAL METHOD BLD: ABNORMAL
EOSINOPHIL # BLD AUTO: 0.2 K/UL (ref 0–0.5)
EOSINOPHIL NFR BLD: 2.6 % (ref 0–8)
ERYTHROCYTE [DISTWIDTH] IN BLOOD BY AUTOMATED COUNT: 13.4 % (ref 11.5–14.5)
EST. GFR  (NO RACE VARIABLE): >60 ML/MIN/1.73 M^2
GLUCOSE SERPL-MCNC: 105 MG/DL (ref 70–110)
HCT VFR BLD AUTO: 38.8 % (ref 40–54)
HGB BLD-MCNC: 12.3 G/DL (ref 14–18)
IMM GRANULOCYTES # BLD AUTO: 0.12 K/UL (ref 0–0.04)
IMM GRANULOCYTES NFR BLD AUTO: 1.6 % (ref 0–0.5)
INR PPP: 0.9 (ref 0.8–1.2)
LYMPHOCYTES # BLD AUTO: 1.1 K/UL (ref 1–4.8)
LYMPHOCYTES NFR BLD: 13.7 % (ref 18–48)
MAGNESIUM SERPL-MCNC: 2.1 MG/DL (ref 1.6–2.6)
MCH RBC QN AUTO: 31.9 PG (ref 27–31)
MCHC RBC AUTO-ENTMCNC: 31.7 G/DL (ref 32–36)
MCV RBC AUTO: 101 FL (ref 82–98)
MONOCYTES # BLD AUTO: 0.4 K/UL (ref 0.3–1)
MONOCYTES NFR BLD: 5.3 % (ref 4–15)
NEUTROPHILS # BLD AUTO: 5.9 K/UL (ref 1.8–7.7)
NEUTROPHILS NFR BLD: 76.2 % (ref 38–73)
NRBC BLD-RTO: 0 /100 WBC
PHOSPHATE SERPL-MCNC: 2.8 MG/DL (ref 2.7–4.5)
PLATELET # BLD AUTO: 357 K/UL (ref 150–450)
PMV BLD AUTO: 8.6 FL (ref 9.2–12.9)
POTASSIUM SERPL-SCNC: 4.1 MMOL/L (ref 3.5–5.1)
PROT SERPL-MCNC: 6.2 G/DL (ref 6–8.4)
PROTHROMBIN TIME: 11 SEC (ref 9–12.5)
RBC # BLD AUTO: 3.85 M/UL (ref 4.6–6.2)
SODIUM SERPL-SCNC: 137 MMOL/L (ref 136–145)
WBC # BLD AUTO: 7.73 K/UL (ref 3.9–12.7)

## 2024-10-02 PROCEDURE — 83735 ASSAY OF MAGNESIUM: CPT | Performed by: STUDENT IN AN ORGANIZED HEALTH CARE EDUCATION/TRAINING PROGRAM

## 2024-10-02 PROCEDURE — 80053 COMPREHEN METABOLIC PANEL: CPT | Performed by: STUDENT IN AN ORGANIZED HEALTH CARE EDUCATION/TRAINING PROGRAM

## 2024-10-02 PROCEDURE — 94761 N-INVAS EAR/PLS OXIMETRY MLT: CPT

## 2024-10-02 PROCEDURE — 25000003 PHARM REV CODE 250: Performed by: NURSE PRACTITIONER

## 2024-10-02 PROCEDURE — 36415 COLL VENOUS BLD VENIPUNCTURE: CPT | Performed by: STUDENT IN AN ORGANIZED HEALTH CARE EDUCATION/TRAINING PROGRAM

## 2024-10-02 PROCEDURE — 99233 SBSQ HOSP IP/OBS HIGH 50: CPT | Mod: ,,, | Performed by: INTERNAL MEDICINE

## 2024-10-02 PROCEDURE — 85025 COMPLETE CBC W/AUTO DIFF WBC: CPT | Performed by: STUDENT IN AN ORGANIZED HEALTH CARE EDUCATION/TRAINING PROGRAM

## 2024-10-02 PROCEDURE — 94640 AIRWAY INHALATION TREATMENT: CPT

## 2024-10-02 PROCEDURE — 99900035 HC TECH TIME PER 15 MIN (STAT)

## 2024-10-02 PROCEDURE — 84100 ASSAY OF PHOSPHORUS: CPT | Performed by: STUDENT IN AN ORGANIZED HEALTH CARE EDUCATION/TRAINING PROGRAM

## 2024-10-02 PROCEDURE — 25000003 PHARM REV CODE 250: Performed by: STUDENT IN AN ORGANIZED HEALTH CARE EDUCATION/TRAINING PROGRAM

## 2024-10-02 PROCEDURE — 21400001 HC TELEMETRY ROOM

## 2024-10-02 PROCEDURE — 63600175 PHARM REV CODE 636 W HCPCS: Performed by: STUDENT IN AN ORGANIZED HEALTH CARE EDUCATION/TRAINING PROGRAM

## 2024-10-02 PROCEDURE — 25000242 PHARM REV CODE 250 ALT 637 W/ HCPCS: Performed by: INTERNAL MEDICINE

## 2024-10-02 PROCEDURE — 85610 PROTHROMBIN TIME: CPT | Performed by: NURSE PRACTITIONER

## 2024-10-02 RX ADMIN — ARFORMOTEROL TARTRATE 15 MCG: 15 SOLUTION RESPIRATORY (INHALATION) at 07:10

## 2024-10-02 RX ADMIN — HYDROCODONE BITARTRATE AND ACETAMINOPHEN 1 TABLET: 7.5; 325 TABLET ORAL at 11:10

## 2024-10-02 RX ADMIN — GUAIFENESIN 200 MG: 200 SOLUTION ORAL at 03:10

## 2024-10-02 RX ADMIN — CEFAZOLIN 2 G: 2 INJECTION, POWDER, FOR SOLUTION INTRAMUSCULAR; INTRAVENOUS at 11:10

## 2024-10-02 RX ADMIN — HYDROCODONE BITARTRATE AND ACETAMINOPHEN 1 TABLET: 7.5; 325 TABLET ORAL at 03:10

## 2024-10-02 RX ADMIN — CEFAZOLIN 2 G: 2 INJECTION, POWDER, FOR SOLUTION INTRAMUSCULAR; INTRAVENOUS at 06:10

## 2024-10-02 RX ADMIN — CEFAZOLIN 2 G: 2 INJECTION, POWDER, FOR SOLUTION INTRAMUSCULAR; INTRAVENOUS at 03:10

## 2024-10-02 RX ADMIN — ARFORMOTEROL TARTRATE 15 MCG: 15 SOLUTION RESPIRATORY (INHALATION) at 08:10

## 2024-10-02 RX ADMIN — SODIUM CHLORIDE: 9 INJECTION, SOLUTION INTRAVENOUS at 03:10

## 2024-10-02 RX ADMIN — HYDROCODONE BITARTRATE AND ACETAMINOPHEN 1 TABLET: 7.5; 325 TABLET ORAL at 04:10

## 2024-10-02 RX ADMIN — BUDESONIDE 0.5 MG: 0.5 INHALANT ORAL at 07:10

## 2024-10-02 RX ADMIN — BUDESONIDE 0.5 MG: 0.5 INHALANT ORAL at 08:10

## 2024-10-02 NOTE — ASSESSMENT & PLAN NOTE
Chronic, controlled. Latest blood pressure and vitals reviewed-     Temp:  [97.3 °F (36.3 °C)-98 °F (36.7 °C)]   Pulse:  [70-97]   Resp:  [16-20]   BP: (114-138)/(66-81)   SpO2:  [91 %-97 %] .   Home meds for hypertension were reviewed and noted below.   Hypertension Medications               valsartan (DIOVAN) 80 MG tablet Take 1 tablet (80 mg total) by mouth once daily.            While in the hospital, will manage blood pressure as follows; Adjust home antihypertensive regimen as follows- normotensive on admission, resume home medications as appropriate; P.r.n. IV hydralazine with parameters.    Will utilize p.r.n. blood pressure medication only if patient's blood pressure greater than 180/110 and he develops symptoms such as worsening chest pain or shortness of breath.

## 2024-10-02 NOTE — ASSESSMENT & PLAN NOTE
Anemia is likely due to  unclear etiology . Most recent hemoglobin and hematocrit are listed below.  Recent Labs     09/29/24  0825 09/30/24  0442 10/01/24  0508   HGB 12.2* 11.7* 11.7*   HCT 38.0* 36.5* 35.8*       Plan  - Monitor serial CBC: Daily  - Transfuse PRBC if patient becomes hemodynamically unstable, symptomatic or H/H drops below 7/21.  - Patient has not received any PRBC transfusions to date  - Patient's anemia is currently stable

## 2024-10-02 NOTE — PROGRESS NOTES
[facility] Medicine  Progress Note    Patient Name: Aurelio Daniels  MRN: [id number]  Patient Class: IP- Inpatient   Admission Date: 9/26/2024  Length of Stay: 5 days  Attending Physician: Apurva Singletary MD  Primary Care Provider: Shlomo Villatoro MD        Subjective:     Principal Problem:Spontaneous pneumothorax        HPI:  68-year-old man with history of prostate cancer, tobacco abuse (quit 1-1/2 once ago), COPD (discharged with home oxygen via 2 L nasal cannula as of 09/17/2024), centrilobular emphysema, hypertension, anemia, and recent bilateral lower lobe pneumonia who presented to the emergency department for acute onset shortness a breath after a coughing spell.  Symptom onset occurred after the football game at approximately 10:00 p.m. in the evening, constant, and moderate-to-severe in nature.  Patient denies any associated chest pain, nausea, vomiting, fevers, chills.  Patient was originally 82% on room air on ER arrival.  He improved with nebulizer treatment, steroids, and CPAP.  Chest x-ray revealed left pneumothorax and pigtail catheter/chest tube was placed by ER physician.  Repeat chest x-ray showed improved lung inflation however there were significant abnormalities with underlying pneumonia and possible lung mass.  Stat CT scan of chest with contrast was ordered and showed spiculated left perihilar mass 3.7 x 2.5 x 3.6 cm suspicious for neoplasm; left upper lobe and posteromedial right lower lobe consolidations concerning for pneumonia; nonspecific mildly prominent mediastinal and left hilar lymph nodes with metastases possible; small hypodensity in right liver that is nonspecific but metastases not excluded; osseous metastases not excluded; small left and trace right pleural effusion; trace left apical pneumothorax; emphysematous changes with bulla in the left upper lobe.  Patient's wife is a retired nurse and they have been  for 25 years.  Patient  "still works as a  for [employer].    Recent hospital admit 9/13-09/17/2024 with bilateral pneumonia, hypoxia.  Chest x-ray 09/13/2024 with multifocal interstitial pneumonia.  Patient was discharged with Augmentin.  Patient was discharged with home oxygen via 2 L nasal cannula.  He had previously not required oxygen.  Of note, chest x-ray on 09/09/2024 was read as negative for an acute process.    Patient was seen by primary care physician 09/24/2024 with repeat CXR showing "Findings suspicious for collapse of the left upper lobe with persistent consolidations and atelectasis on the left. Additionally a opaque masses seen at the level of the midthoracic spine. Recommend follow-up evaluation with CT with contrast."  Primary care physician reports speaking with patient's pulmonologist and with plan to order CT scan of chest without contrast as soon as possible.        Overview/Hospital Course:  Hospital medicine for evaluation of pneumothorax after placement of chest tube in ED.   Repeat chest x-ray noted concerns for reaccumulation of pneumothorax despite ongoing suction drainage, suspect likely secondary to ruptured bleb leading to continuous air leak with high concerns for bronchopleural fistula.  CT imaging also noted concerns for consolidation in the left upper lobe and posteromedial right lower lobe concerning for pneumonia, labs with worsening leukocytosis, so started on broad-spectrum antibiotics given risk factors of recent hospitalization.  MRSA cultures pending. Repeat chest x-ray 9/29 noted "near complete resolution of the left pneumothorax, with only a trace pneumothorax currently. " CXR 9/30 noted "No significant pneumothorax.". CXR 10/1 noted "No residual pneumothorax". Underwent placement of left-sided 28F chest tube on 10/1 by Cardiothoracic surgery. Pulmonology and Cardiothoracic surgery following. Low suspicion for MRSA given MRSA nares negative, deescalated antibiotics to " cefazolin.     10/2- looks and feels better, no CP, SOB better since large CT placed by Dr. Gavin yesterday, connected to water seal, air leak still present. IR could not perform Lung Bx today, hence tomorrow. Pt agreeable  and otherwise comfortable. Pain controlled with analgesics. NPO again after MN tonight.        Interval History: looks and feels better, no CP, SOB better since large CT placed by Dr. Gavin yesterday, connected to water seal, air leak still present. IR could not perform Lung Bx today, hence tomorrow. Pt agreeable  and otherwise comfortable. Pain controlled with analgesics. NPO again after MN tonight.      Review of Systems   Constitutional:  Positive for activity change and fatigue. Negative for chills, diaphoresis and fever.   HENT: Negative.     Eyes: Negative.    Respiratory:  Positive for cough. Negative for chest tightness, shortness of breath and wheezing.    Cardiovascular:  Positive for chest pain.   Gastrointestinal: Negative.    Endocrine: Negative.    Genitourinary: Negative.    Musculoskeletal:  Positive for gait problem.   Skin: Negative.    Allergic/Immunologic: Negative.    Hematological: Negative.    Psychiatric/Behavioral: Negative.       Objective:     Vital Signs (Most Recent):  Temp: 98 °F (36.7 °C) (10/02/24 1541)  Pulse: 83 (10/02/24 1613)  Resp: 19 (10/02/24 1541)  BP: 131/75 (10/02/24 1541)  SpO2: (!) 92 % (10/02/24 1541) Vital Signs (24h Range):  Temp:  [97.3 °F (36.3 °C)-98 °F (36.7 °C)] 98 °F (36.7 °C)  Pulse:  [70-97] 83  Resp:  [16-20] 19  SpO2:  [91 %-97 %] 92 %  BP: (114-138)/(66-81) 131/75     Weight: 74.1 kg (163 lb 5.8 oz)  Body mass index is 24.12 kg/m².    Intake/Output Summary (Last 24 hours) at 10/2/2024 0521  Last data filed at 10/2/2024 0732  Gross per 24 hour   Intake --   Output 890 ml   Net -890 ml         Physical Exam  Vitals and nursing note reviewed.   Constitutional:       General: He is awake. He is not in acute distress.     Appearance: He is  well-developed. He is not ill-appearing, toxic-appearing or diaphoretic.      Comments: On RA   HENT:      Head: Normocephalic and atraumatic.      Right Ear: External ear normal.      Left Ear: External ear normal.      Nose: Nose normal.      Mouth/Throat:      Mouth: Mucous membranes are moist.      Pharynx: Oropharynx is clear.   Eyes:      General: No scleral icterus.        Right eye: No discharge.         Left eye: No discharge.      Extraocular Movements: Extraocular movements intact.      Conjunctiva/sclera: Conjunctivae normal.      Pupils: Pupils are equal, round, and reactive to light.   Cardiovascular:      Rate and Rhythm: Normal rate and regular rhythm.      Pulses: Normal pulses.      Heart sounds: Normal heart sounds.   Pulmonary:      Effort: Pulmonary effort is normal. No respiratory distress.      Breath sounds: Decreased breath sounds present. No wheezing, rhonchi or rales.      Comments: R CT in place connected to suction, continuous air leak noted  Abdominal:      General: Abdomen is flat. Bowel sounds are normal.      Palpations: Abdomen is soft.      Tenderness: There is no abdominal tenderness.   Genitourinary:     Comments: deferred  Musculoskeletal:         General: Tenderness present. No swelling. Normal range of motion.      Cervical back: Normal range of motion and neck supple. No rigidity.      Right lower leg: No edema.      Left lower leg: No edema.   Skin:     General: Skin is warm and dry.      Capillary Refill: Capillary refill takes less than 2 seconds.      Coloration: Skin is not jaundiced.   Neurological:      General: No focal deficit present.      Mental Status: He is alert and oriented to person, place, and time.   Psychiatric:         Mood and Affect: Mood normal.         Behavior: Behavior normal. Behavior is cooperative.         Thought Content: Thought content normal.         Judgment: Judgment normal.             Significant Labs: All pertinent labs within the past 24  "hours have been reviewed.  BMP:   Recent Labs   Lab 10/02/24  0437         K 4.1      CO2 21*   BUN 12   CREATININE 0.8   CALCIUM 8.9   MG 2.1     CBC:   Recent Labs   Lab 10/01/24  0508 10/02/24  0437   WBC 6.70 7.73   HGB 11.7* 12.3*   HCT 35.8* 38.8*    357     CMP:   Recent Labs   Lab 10/01/24  0508 10/02/24  0437    137   K 4.0 4.1    106   CO2 21* 21*    105   BUN 11 12   CREATININE 0.8 0.8   CALCIUM 8.9 8.9   PROT 5.8* 6.2   ALBUMIN 2.8* 3.0*   BILITOT 0.6 0.4   ALKPHOS 109 112   AST 20 18   ALT 31 24   ANIONGAP 9 10       Significant Imaging: I have reviewed all pertinent imaging results/findings within the past 24 hours.    Assessment/Plan:      * Spontaneous pneumothorax  -pigtail catheter/chest tube in place, continuous suction  -chest x-ray on ER arrival with large left-sided pneumothorax  -CT scan of chest with trace left apical pneumothorax   -While on suction, repeat chest x-ray noted concerns for accumulation  -CXR 09/29 noted, "near complete resolution of the left pneumothorax, with only a trace pneumothorax currently. "  -CXR 9/30 noted "No significant pneumothorax.".   -CXR 10/1 noted "No residual pneumothorax".     Underwent placement of left-sided 28F chest tube on 10/1 by Cardiothoracic surgery.       -Pulmonology and Cardiothoracic surgery consulted, follow-up recs  -O2 via 2 L nasal cannula.  Wean as tolerated to target SaO2 88-92%    Still has air leak- CT to Waterseal- cont    Centrilobular emphysema  Patient's COPD is controlled currently.  Patient is currently off COPD Pathway. Continue p.r.n. DuoNebs, O2 supplementation, and antibiotics for pneumonia.  monitor respiratory status closely.   -patient did receive DuoNeb, prednisone 60 mg p.o., and terbutaline 0.25 mg while in the emergency department.    Cont present care    Bilateral pneumonia  -CT scan of chest with consolidations in the left upper lobe and posteromedial right lower lobe " concerning for pneumonia  -white blood cell count 12.07, COVID-19 negative, afebrile    Microbiology: Sputum Culture   Lab Results   Component Value Date    GSRESP <10 epithelial cells per low power field. 09/28/2024    GSRESP No WBC's 09/28/2024    GSRESP Few Gram positive cocci 09/28/2024    GSRESP Rare Gram negative rods 09/28/2024    GSRESP Rare yeast 09/28/2024    RESPIRATORYC No Pseudomonas isolated. 09/28/2024    RESPIRATORYC (A) 09/28/2024     STAPHYLOCOCCUS AUREUS  Rare  Susceptibility pending         -Low suspicion for MRSA given MRSA nares negative, deescalated antibiotics to cefazolin.  Follow up culture  -O2 supplementation, p.r.n. DuoNebs  -follow up cultures  -aspiration and fall precautions  -pulmonary consulted, following    Cont present care    Mass of left lung  Left lung mass with presumed lung cancer with possible metastases  -CT scan of chest shows left perihilar mass 3.7 x 2.5 x 3.6 cm concerning for neoplasm; nonspecific mildly prominent mediastinal and left hilar lymph nodes with metastases possible; small hypodensity in right liver that is nonspecific but metastases not excluded; osseous metastases not excluded      -pulmonary consulted, plans for future evaluation after respiratory status is more stable    Await Lung Bx tomorrow by IR    Tobacco abuse  Patient has not smoked in 1-1/2 months.  Counseled continued cessation.  No nicotine patch needed.      Macrocytic anemia  Anemia is likely due to  unclear etiology . Most recent hemoglobin and hematocrit are listed below.  Recent Labs     09/30/24  0442 10/01/24  0508 10/02/24  0437   HGB 11.7* 11.7* 12.3*   HCT 36.5* 35.8* 38.8*       Plan  - Monitor serial CBC: Daily  - Transfuse PRBC if patient becomes hemodynamically unstable, symptomatic or H/H drops below 7/21.  - Patient has not received any PRBC transfusions to date  - Patient's anemia is currently stable    Prostate cancer  High-risk prostate cancer  -seen by Urology Dr. Smith  "07/10/2024 with plans for follow up visit in 6 months and repeat PSA  -status post radiation therapy and ADT, PSA undetectable  -done with 2 years of Lupron      Primary hypertension  Chronic, controlled. Latest blood pressure and vitals reviewed-     Temp:  [97.3 °F (36.3 °C)-98 °F (36.7 °C)]   Pulse:  [70-97]   Resp:  [16-20]   BP: (114-138)/(66-81)   SpO2:  [91 %-97 %] .   Home meds for hypertension were reviewed and noted below.   Hypertension Medications               valsartan (DIOVAN) 80 MG tablet Take 1 tablet (80 mg total) by mouth once daily.            While in the hospital, will manage blood pressure as follows; Adjust home antihypertensive regimen as follows- normotensive on admission, resume home medications as appropriate; P.r.n. IV hydralazine with parameters.    Will utilize p.r.n. blood pressure medication only if patient's blood pressure greater than 180/110 and he develops symptoms such as worsening chest pain or shortness of breath.    Elevated troponin  -patient denies chest pain  -troponin elevation most likely related to pneumothorax and underlying pneumonia  -TTE nonacute    -telemetry monitoring      Acute on chronic respiratory failure with hypoxia  Patient with Hypoxic Respiratory failure which is Acute on chronic.  he is on home oxygen at 2 LPM. Supplemental oxygen was provided and noted- Oxygen Concentration (%):  [28] 28    .   Signs/symptoms of respiratory failure include- tachypnea. Contributing diagnoses includes - COPD, Pneumonia, and pneumothorax  Labs and images were reviewed. Patient Has recent ABG, which has been reviewed. Will treat underlying causes and adjust management of respiratory failure as follows:      -continuous pulse oximetry monitoring, O2 supplementation, p.r.n. DuoNebs  -left-sided chest tube/pigtail catheter in place  -See "Bilateral pneumonia" A&P. Follow up cultures and deescalate antibiotics as appropriate  -pulmonary and Cardiothoracic surgery consulted, " follow-up recs      VTE Risk Mitigation (From admission, onward)           Ordered     Reason for No Pharmacological VTE Prophylaxis  Once        Comments: No Lovenox until patient is seen by Pulmonary as they may decide to pursue lung biopsy   Question:  Reasons:  Answer:  Physician Provided (leave comment)    09/27/24 0401     IP VTE HIGH RISK PATIENT  Once         09/27/24 0401     Place sequential compression device  Until discontinued         09/27/24 0401                    Discharge Planning   YUE:      Code Status: Full Code   Is the patient medically ready for discharge?:     Reason for patient still in hospital (select all that apply): Patient trending condition, Laboratory test, Treatment, Imaging, and Consult recommendations  Discharge Plan A: Home with family          Apurva Singletary MD  Department of Hospital Medicine   [facility] - Telemetry ([facility])

## 2024-10-02 NOTE — ASSESSMENT & PLAN NOTE
"Patient with Hypoxic Respiratory failure which is Acute on chronic.  he is on home oxygen at 2 LPM. Supplemental oxygen was provided and noted- Oxygen Concentration (%):  [28] 28    .   Signs/symptoms of respiratory failure include- tachypnea. Contributing diagnoses includes - COPD, Pneumonia, and pneumothorax  Labs and images were reviewed. Patient Has recent ABG, which has been reviewed. Will treat underlying causes and adjust management of respiratory failure as follows:      -continuous pulse oximetry monitoring, O2 supplementation, p.r.n. Ayana  -left-sided chest tube/pigtail catheter in place  -See "Bilateral pneumonia" A&P. Follow up cultures and deescalate antibiotics as appropriate  -pulmonary and Cardiothoracic surgery consulted, follow-up recs  "

## 2024-10-02 NOTE — PROGRESS NOTES
[facility] Medicine  Progress Note    Patient Name: Aurelio Daniels  MRN: [id number]  Patient Class: IP- Inpatient   Admission Date: 9/26/2024  Length of Stay: 4 days  Attending Physician: Juan Disla DO  Primary Care Provider: Shlomo Villatoro MD        Subjective:     Principal Problem:Spontaneous pneumothorax        HPI:  68-year-old man with history of prostate cancer, tobacco abuse (quit 1-1/2 once ago), COPD (discharged with home oxygen via 2 L nasal cannula as of 09/17/2024), centrilobular emphysema, hypertension, anemia, and recent bilateral lower lobe pneumonia who presented to the emergency department for acute onset shortness a breath after a coughing spell.  Symptom onset occurred after the football game at approximately 10:00 p.m. in the evening, constant, and moderate-to-severe in nature.  Patient denies any associated chest pain, nausea, vomiting, fevers, chills.  Patient was originally 82% on room air on ER arrival.  He improved with nebulizer treatment, steroids, and CPAP.  Chest x-ray revealed left pneumothorax and pigtail catheter/chest tube was placed by ER physician.  Repeat chest x-ray showed improved lung inflation however there were significant abnormalities with underlying pneumonia and possible lung mass.  Stat CT scan of chest with contrast was ordered and showed spiculated left perihilar mass 3.7 x 2.5 x 3.6 cm suspicious for neoplasm; left upper lobe and posteromedial right lower lobe consolidations concerning for pneumonia; nonspecific mildly prominent mediastinal and left hilar lymph nodes with metastases possible; small hypodensity in right liver that is nonspecific but metastases not excluded; osseous metastases not excluded; small left and trace right pleural effusion; trace left apical pneumothorax; emphysematous changes with bulla in the left upper lobe.  Patient's wife is a retired nurse and they have been  for 25 years.  Patient  "still works as a  for [employer].    Recent hospital admit 9/13-09/17/2024 with bilateral pneumonia, hypoxia.  Chest x-ray 09/13/2024 with multifocal interstitial pneumonia.  Patient was discharged with Augmentin.  Patient was discharged with home oxygen via 2 L nasal cannula.  He had previously not required oxygen.  Of note, chest x-ray on 09/09/2024 was read as negative for an acute process.    Patient was seen by primary care physician 09/24/2024 with repeat CXR showing "Findings suspicious for collapse of the left upper lobe with persistent consolidations and atelectasis on the left. Additionally a opaque masses seen at the level of the midthoracic spine. Recommend follow-up evaluation with CT with contrast."  Primary care physician reports speaking with patient's pulmonologist and with plan to order CT scan of chest without contrast as soon as possible.        Overview/Hospital Course:  Hospital medicine for evaluation of pneumothorax after placement of chest tube in ED.   Repeat chest x-ray noted concerns for reaccumulation of pneumothorax despite ongoing suction drainage, suspect likely secondary to ruptured bleb leading to continuous air leak with high concerns for bronchopleural fistula.  CT imaging also noted concerns for consolidation in the left upper lobe and posteromedial right lower lobe concerning for pneumonia, labs with worsening leukocytosis, so started on broad-spectrum antibiotics given risk factors of recent hospitalization.  MRSA cultures pending. Repeat chest x-ray 9/29 noted "near complete resolution of the left pneumothorax, with only a trace pneumothorax currently. " CXR 9/30 noted "No significant pneumothorax.". CXR 10/1 noted "No residual pneumothorax". Underwent placement of left-sided 28F chest tube on 10/1 by Cardiothoracic surgery. Pulmonology and Cardiothoracic surgery following. Low suspicion for MRSA given MRSA nares negative, deescalated antibiotics to " "cefazolin.     Interval History: No acute events overnight, afebrile, hemodynamically stable.   Repeat chest x-ray this morning noted " No residual pneumothorax". Underwent placement of left-sided 28F chest tube  by Cardiothoracic surgery. Low suspicion for MRSA given MRSA nares negative, deescalating antibiotics to cefazolin.       Objective:     Vital Signs (Most Recent):  Temp: 97.7 °F (36.5 °C) (10/01/24 2042)  Pulse: 87 (10/01/24 2042)  Resp: 18 (10/01/24 2042)  BP: 114/66 (10/01/24 2042)  SpO2: 95 % (10/01/24 2042) Vital Signs (24h Range):  Temp:  [97.7 °F (36.5 °C)-98.3 °F (36.8 °C)] 97.7 °F (36.5 °C)  Pulse:  [70-90] 87  Resp:  [15-22] 18  SpO2:  [93 %-97 %] 95 %  BP: (111-136)/(66-78) 114/66     Weight: 74.1 kg (163 lb 5.8 oz)  Body mass index is 24.12 kg/m².    Intake/Output Summary (Last 24 hours) at 10/1/2024 2117  Last data filed at 10/1/2024 1850  Gross per 24 hour   Intake --   Output 1890 ml   Net -1890 ml         Physical Exam  Vitals and nursing note reviewed.   Constitutional:       General: He is not in acute distress.     Appearance: He is not ill-appearing, toxic-appearing or diaphoretic.   HENT:      Head: Normocephalic and atraumatic.      Mouth/Throat:      Mouth: Mucous membranes are moist.   Eyes:      General: No scleral icterus.        Right eye: No discharge.         Left eye: No discharge.   Cardiovascular:      Rate and Rhythm: Normal rate and regular rhythm.      Heart sounds: Normal heart sounds.   Pulmonary:      Effort: Pulmonary effort is normal. No respiratory distress.      Breath sounds: Rales (Left-sided) present. No wheezing.      Comments: Left-sided chest tube in place with overlying dressing intact  Abdominal:      General: Bowel sounds are normal.      Tenderness: There is no abdominal tenderness.   Musculoskeletal:      Cervical back: No rigidity.      Right lower leg: No edema.      Left lower leg: No edema.   Skin:     General: Skin is warm and dry.      Coloration: " "Skin is not jaundiced.   Neurological:      Mental Status: He is alert and oriented to person, place, and time. Mental status is at baseline.   Psychiatric:         Mood and Affect: Mood normal.         Behavior: Behavior normal.             Significant Labs: All pertinent labs within the past 24 hours have been reviewed.  CBC:   Recent Labs   Lab 09/30/24  0442 10/01/24  0508   WBC 8.02 6.70   HGB 11.7* 11.7*   HCT 36.5* 35.8*    328     CMP:   Recent Labs   Lab 09/30/24  0442 10/01/24  0508    139   K 4.2 4.0    109   CO2 24 21*    105   BUN 11 11   CREATININE 0.8 0.8   CALCIUM 9.1 8.9   PROT 5.9* 5.8*   ALBUMIN 2.9* 2.8*   BILITOT 0.9 0.6   ALKPHOS 115 109   AST 26 20   ALT 34 31   ANIONGAP 8 9       Significant Imaging: I have reviewed all pertinent imaging results/findings within the past 24 hours.    Assessment/Plan:      * Spontaneous pneumothorax  -pigtail catheter/chest tube in place, continuous suction  -chest x-ray on ER arrival with large left-sided pneumothorax  -CT scan of chest with trace left apical pneumothorax   -While on suction, repeat chest x-ray noted concerns for accumulation  -CXR 09/29 noted, "near complete resolution of the left pneumothorax, with only a trace pneumothorax currently. "  -CXR 9/30 noted "No significant pneumothorax.".   -CXR 10/1 noted "No residual pneumothorax".     Underwent placement of left-sided 28F chest tube on 10/1 by Cardiothoracic surgery.       -Pulmonology and Cardiothoracic surgery consulted, follow-up recs  -O2 via 2 L nasal cannula.  Wean as tolerated to target SaO2 88-92%    Elevated troponin  -patient denies chest pain  -troponin elevation most likely related to pneumothorax and underlying pneumonia  -TTE nonacute    -telemetry monitoring      Acute on chronic respiratory failure with hypoxia  Patient with Hypoxic Respiratory failure which is Acute on chronic.  he is on home oxygen at 2 LPM. Supplemental oxygen was provided and " "noted- Oxygen Concentration (%):  [28] 28    .   Signs/symptoms of respiratory failure include- tachypnea. Contributing diagnoses includes - COPD, Pneumonia, and pneumothorax  Labs and images were reviewed. Patient Has recent ABG, which has been reviewed. Will treat underlying causes and adjust management of respiratory failure as follows:      -continuous pulse oximetry monitoring, O2 supplementation, p.r.n. DuoNebs  -left-sided chest tube/pigtail catheter in place  -See "Bilateral pneumonia" A&P. Follow up cultures and deescalate antibiotics as appropriate  -pulmonary and Cardiothoracic surgery consulted, follow-up recs    Macrocytic anemia  Anemia is likely due to  unclear etiology . Most recent hemoglobin and hematocrit are listed below.  Recent Labs     09/29/24  0825 09/30/24  0442 10/01/24  0508   HGB 12.2* 11.7* 11.7*   HCT 38.0* 36.5* 35.8*       Plan  - Monitor serial CBC: Daily  - Transfuse PRBC if patient becomes hemodynamically unstable, symptomatic or H/H drops below 7/21.  - Patient has not received any PRBC transfusions to date  - Patient's anemia is currently stable    Tobacco abuse  Patient has not smoked in 1-1/2 months.  Counseled continued cessation.  No nicotine patch needed.      Mass of left lung  Left lung mass with presumed lung cancer with possible metastases  -CT scan of chest shows left perihilar mass 3.7 x 2.5 x 3.6 cm concerning for neoplasm; nonspecific mildly prominent mediastinal and left hilar lymph nodes with metastases possible; small hypodensity in right liver that is nonspecific but metastases not excluded; osseous metastases not excluded      -pulmonary consulted, plans for future evaluation after respiratory status is more stable    Bilateral pneumonia  -CT scan of chest with consolidations in the left upper lobe and posteromedial right lower lobe concerning for pneumonia  -white blood cell count 12.07, COVID-19 negative, afebrile    Microbiology: Sputum Culture   Lab Results "   Component Value Date    GSRESP <10 epithelial cells per low power field. 09/28/2024    GSRESP No WBC's 09/28/2024    GSRESP Few Gram positive cocci 09/28/2024    GSRESP Rare Gram negative rods 09/28/2024    GSRESP Rare yeast 09/28/2024    RESPIRATORYC No Pseudomonas isolated. 09/28/2024    RESPIRATORYC (A) 09/28/2024     STAPHYLOCOCCUS AUREUS  Rare  Susceptibility pending         -Low suspicion for MRSA given MRSA nares negative, deescalated antibiotics to cefazolin.  Follow up culture  -O2 supplementation, p.r.n. DuoNebs  -follow up cultures  -aspiration and fall precautions  -pulmonary consulted, following    Centrilobular emphysema  Patient's COPD is controlled currently.  Patient is currently off COPD Pathway. Continue p.r.n. DuoNebs, O2 supplementation, and antibiotics for pneumonia.  monitor respiratory status closely.   -patient did receive DuoNeb, prednisone 60 mg p.o., and terbutaline 0.25 mg while in the emergency department.    Primary hypertension  Chronic, controlled. Latest blood pressure and vitals reviewed-     Temp:  [97.7 °F (36.5 °C)-98.3 °F (36.8 °C)]   Pulse:  [70-90]   Resp:  [15-22]   BP: (111-136)/(66-78)   SpO2:  [93 %-97 %] .   Home meds for hypertension were reviewed and noted below.   Hypertension Medications               valsartan (DIOVAN) 80 MG tablet Take 1 tablet (80 mg total) by mouth once daily.            While in the hospital, will manage blood pressure as follows; Adjust home antihypertensive regimen as follows- normotensive on admission, resume home medications as appropriate; P.r.n. IV hydralazine with parameters.    Will utilize p.r.n. blood pressure medication only if patient's blood pressure greater than 180/110 and he develops symptoms such as worsening chest pain or shortness of breath.    Prostate cancer  High-risk prostate cancer  -seen by Urology Dr. Smith 07/10/2024 with plans for follow up visit in 6 months and repeat PSA  -status post radiation therapy and ADT,  PSA undetectable  -done with 2 years of Lupron        VTE Risk Mitigation (From admission, onward)           Ordered     Reason for No Pharmacological VTE Prophylaxis  Once        Comments: No Lovenox until patient is seen by Pulmonary as they may decide to pursue lung biopsy   Question:  Reasons:  Answer:  Physician Provided (leave comment)    09/27/24 0401     IP VTE HIGH RISK PATIENT  Once         09/27/24 0401     Place sequential compression device  Until discontinued         09/27/24 0401                    Discharge Planning   YUE:      Code Status: Full Code   Is the patient medically ready for discharge?:     Reason for patient still in hospital (select all that apply): Patient trending condition, Laboratory test, Treatment, Imaging, and Consult recommendations  Discharge Plan A: Home with family          Juan Disla DO  Department of Hospital Medicine   [facility] - Telemetry ([facility])      Voice recognition software was used in the creation of this note/communication and any sound-alike/typographical errors which may have occurred despite initial review prior to signing should be taken in context when interpreting.  If such errors prevent a clear understanding of the note/communication, please contact the provider/office for clarification.

## 2024-10-02 NOTE — SUBJECTIVE & OBJECTIVE
Mr Daniels is a 67 y/o gentleman with a long smoking history, COPD (recently established with Dr Jimenez), prior prostate cancer, and HTN who is currently admitted to the Hospitalist service after coming in overnight with shortness of breath and being found to have a large left sided PTX.  Pt was recently admitted to the hospital for PNA (9/13 - 9/17) and was recovering well from that when he suddenly had a large coughing fit last night.  Became markedly short of breath after this, which prompted his presentation to the ER.  Chest tube was placed by the ER physician with good re-expansion, though he continues to have a brisk air leak.    CT after the chest tube placement shows trace remaining pneumothorax, large bleb on the left, approx 3.5 cm spiculated mass on the left, and some remaining bilateral consolidations.  Mr Daniels says that he is feeling much better after the chest tube placement, though still with some cough.    Interval History:  9/28 - continuous air leak with some reaccumulation on CXR late this morning.   Adjusted tube to try and optimize drainage.  It was a bit kinked at the skin, so resecured in a better position. Remains on suction.  9/29 - re-expansion improved following tube adjustments yesterday.  Continuous brisk air leak remains on suction.  No  new complaints.  9/30: CXR with lung up, continues with large volume air leak with and without suction, CTS consulted for eval and recs, no new issues or c/o noted on exam, POC and questions answered with pt and his wife  10/1: no acute events overnight, pt continues with air leak to chest tube, detailed conversation with pt and wife this AM about treatment and possible plans moving forward - all questions answered wife bedside  10/2: on RA today, pain better controlled with PO meds, plans for IR CT guided lung bx today     ROS complete and negative unless stated in the interval HPI     Objective:     Vital Signs (Most Recent):  Temp: 97.8 °F (36.6  °C) (10/02/24 0732)  Pulse: 83 (10/02/24 0811)  Resp: 18 (10/02/24 0732)  BP: 121/71 (10/02/24 0732)  SpO2: (!) 92 % (10/02/24 0732) Vital Signs (24h Range):  Temp:  [97.3 °F (36.3 °C)-98.3 °F (36.8 °C)] 97.8 °F (36.6 °C)  Pulse:  [70-90] 83  Resp:  [16-22] 18  SpO2:  [92 %-97 %] 92 %  BP: (114-136)/(66-78) 121/71     Weight: 74.1 kg (163 lb 5.8 oz)  Body mass index is 24.12 kg/m².      Intake/Output Summary (Last 24 hours) at 10/2/2024 1022  Last data filed at 10/2/2024 0732  Gross per 24 hour   Intake --   Output 1080 ml   Net -1080 ml        Physical Exam  Vitals reviewed.   Constitutional:       General: He is awake. He is not in acute distress.     Appearance: He is well-developed. He is not ill-appearing.      Comments: On RA   Pulmonary:      Effort: Pulmonary effort is normal.      Breath sounds: Decreased breath sounds present. No wheezing, rhonchi or rales.      Comments: R CT in place connected to suction, continuous air leak noted  Neurological:      Mental Status: He is alert.   Psychiatric:         Behavior: Behavior is cooperative.               Vents:  Oxygen Concentration (%): 28 (10/01/24 1915)    Lines/Drains/Airways       Drain  Duration                  Chest Tube 09/27/24 0130 Left Midaxillary 5 days    Male External Urinary Catheter 09/29/24 1000 Medium 3 days              Peripheral Intravenous Line  Duration                  Peripheral IV - Single Lumen 10/01/24 0920 20 G Anterior;Left Forearm 1 day                    Significant Labs:    CBC/Anemia Profile:  Recent Labs   Lab 10/01/24  0508 10/02/24  0437   WBC 6.70 7.73   HGB 11.7* 12.3*   HCT 35.8* 38.8*    357   MCV 98 101*   RDW 13.2 13.4        Chemistries:  Recent Labs   Lab 10/01/24  0508 10/02/24  0437    137   K 4.0 4.1    106   CO2 21* 21*   BUN 11 12   CREATININE 0.8 0.8   CALCIUM 8.9 8.9   ALBUMIN 2.8* 3.0*   PROT 5.8* 6.2   BILITOT 0.6 0.4   ALKPHOS 109 112   ALT 31 24   AST 20 18   MG 2.2 2.1   PHOS 2.9 2.8        All pertinent labs within the past 24 hours have been reviewed.    Significant Imaging:  I have reviewed all pertinent imaging results/findings within the past 24 hours.

## 2024-10-02 NOTE — PROGRESS NOTES
[facility] - Telemetry ([facility])  Pulmonology  Progress Note    Patient Name: Aurelio Daniels  MRN: [id number]  Admission Date: 9/26/2024  Hospital Length of Stay: 5 days  Code Status: Full Code  Attending Provider: Apurva Singletary MD  Primary Care Provider: Shlomo Villatoro MD   Principal Problem: Spontaneous pneumothorax    Subjective:     Mr Daniels is a 67 y/o gentleman with a long smoking history, COPD (recently established with Dr Jimenez), prior prostate cancer, and HTN who is currently admitted to the Hospitalist service after coming in overnight with shortness of breath and being found to have a large left sided PTX.  Pt was recently admitted to the hospital for PNA (9/13 - 9/17) and was recovering well from that when he suddenly had a large coughing fit last night.  Became markedly short of breath after this, which prompted his presentation to the ER.  Chest tube was placed by the ER physician with good re-expansion, though he continues to have a brisk air leak.    CT after the chest tube placement shows trace remaining pneumothorax, large bleb on the left, approx 3.5 cm spiculated mass on the left, and some remaining bilateral consolidations.  Mr Daniels says that he is feeling much better after the chest tube placement, though still with some cough.    Interval History:  9/28 - continuous air leak with some reaccumulation on CXR late this morning.   Adjusted tube to try and optimize drainage.  It was a bit kinked at the skin, so resecured in a better position. Remains on suction.  9/29 - re-expansion improved following tube adjustments yesterday.  Continuous brisk air leak remains on suction.  No  new complaints.  9/30: CXR with lung up, continues with large volume air leak with and without suction, CTS consulted for eval and recs, no new issues or c/o noted on exam, POC and questions answered with pt and his wife  10/1: no acute events overnight, pt continues with air leak to chest tube, detailed  conversation with pt and wife this AM about treatment and possible plans moving forward - all questions answered wife bedside  10/2: on RA today, pain better controlled with PO meds, plans for IR CT guided lung bx today     ROS complete and negative unless stated in the interval HPI     Objective:     Vital Signs (Most Recent):  Temp: 97.8 °F (36.6 °C) (10/02/24 0732)  Pulse: 83 (10/02/24 0811)  Resp: 18 (10/02/24 0732)  BP: 121/71 (10/02/24 0732)  SpO2: (!) 92 % (10/02/24 0732) Vital Signs (24h Range):  Temp:  [97.3 °F (36.3 °C)-98.3 °F (36.8 °C)] 97.8 °F (36.6 °C)  Pulse:  [70-90] 83  Resp:  [16-22] 18  SpO2:  [92 %-97 %] 92 %  BP: (114-136)/(66-78) 121/71     Weight: 74.1 kg (163 lb 5.8 oz)  Body mass index is 24.12 kg/m².      Intake/Output Summary (Last 24 hours) at 10/2/2024 1022  Last data filed at 10/2/2024 0732  Gross per 24 hour   Intake --   Output 1080 ml   Net -1080 ml        Physical Exam  Vitals reviewed.   Constitutional:       General: He is awake. He is not in acute distress.     Appearance: He is well-developed. He is not ill-appearing.      Comments: On RA   Pulmonary:      Effort: Pulmonary effort is normal.      Breath sounds: Decreased breath sounds present. No wheezing, rhonchi or rales.      Comments: R CT in place connected to suction, continuous air leak noted  Neurological:      Mental Status: He is alert.   Psychiatric:         Behavior: Behavior is cooperative.               Vents:  Oxygen Concentration (%): 28 (10/01/24 1915)    Lines/Drains/Airways       Drain  Duration                  Chest Tube 09/27/24 0130 Left Midaxillary 5 days    Male External Urinary Catheter 09/29/24 1000 Medium 3 days              Peripheral Intravenous Line  Duration                  Peripheral IV - Single Lumen 10/01/24 0920 20 G Anterior;Left Forearm 1 day                    Significant Labs:    CBC/Anemia Profile:  Recent Labs   Lab 10/01/24  0508 10/02/24  0437   WBC 6.70 7.73   HGB 11.7* 12.3*   HCT  35.8* 38.8*    357   MCV 98 101*   RDW 13.2 13.4        Chemistries:  Recent Labs   Lab 10/01/24  0508 10/02/24  0437    137   K 4.0 4.1    106   CO2 21* 21*   BUN 11 12   CREATININE 0.8 0.8   CALCIUM 8.9 8.9   ALBUMIN 2.8* 3.0*   PROT 5.8* 6.2   BILITOT 0.6 0.4   ALKPHOS 109 112   ALT 31 24   AST 20 18   MG 2.2 2.1   PHOS 2.9 2.8       All pertinent labs within the past 24 hours have been reviewed.    Significant Imaging:  I have reviewed all pertinent imaging results/findings within the past 24 hours.    ABG  Recent Labs   Lab 09/27/24  0031   PH 7.408   PO2 67*   PCO2 35.4   HCO3 22.4*   BE -2   X-Ray Chest 1 View  Narrative: EXAMINATION:  XR CHEST 1 VIEW    CLINICAL HISTORY:  followup; Pneumothorax, unspecified    TECHNIQUE:  Single frontal view of the chest was performed.    COMPARISON:  10/01/24    FINDINGS:  Left-sided chest tube remains in position.  No significant pneumothorax.  Extensive chest wall subcutaneous emphysema.  Right lung is clear.  In comparison to the prior study, there is no adverse interval changes.  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  he is not on home oxygen. Supplemental oxygen was provided and noted- Oxygen Concentration (%):  [28] 28. Signs/symptoms of respiratory failure include- respiratory distress. Contributing diagnoses includes - COPD and pneumothorax  Labs and images were reviewed. Patient Has recent  ABG, which has been reviewed. Will treat underlying causes and adjust management of respiratory failure as follows  - chest tube in place with resolution of ptx, continued with air leak, CTS managing   - wean O2 for goal SpO2 > 88%  - avoid high flow oxygen or NIPPV given PTX and ongoing air leak    Mass of left lung  - plans for IR CT guided bx 10/2    Bilateral pneumonia  - likely some residual findings from his recent infection and hospitalization  - MRSA swab negative, stop zyvox, on ancef, sputum culture with staph - follow sensitivities (presume MSSA given negative MRSA swab)  - PT/OT, IS, OOB, ambulate       Centrilobular emphysema  Patient's COPD is controlled currently.  Patient is currently off COPD Pathway. Continue scheduled inhalers  Pulmicort/Brovana nebs and Supplemental oxygen and monitor respiratory status closely.   - follow with Dr. Jimenez in clinic, last saw 9/5/24  - not in acute exac, monitor     Other  Tobacco abuse  - counseled on cessation and he is motivated to quit  - declined nicotine patch, order if he changes his mind       For Biopsy 10/03  DW Dr Bobby and Dr Romaine Cali MD  Pulmonology  [facility] - Telemetry ([facility])

## 2024-10-02 NOTE — ASSESSMENT & PLAN NOTE
Patient with Hypoxic Respiratory failure which is Acute.  he is not on home oxygen. Supplemental oxygen was provided and noted- Oxygen Concentration (%):  [28] 28. Signs/symptoms of respiratory failure include- respiratory distress. Contributing diagnoses includes - COPD and pneumothorax  Labs and images were reviewed. Patient Has recent ABG, which has been reviewed. Will treat underlying causes and adjust management of respiratory failure as follows  - chest tube in place with resolution of ptx, continued with air leak, CTS managing   - wean O2 for goal SpO2 > 88%  - avoid high flow oxygen or NIPPV given PTX and ongoing air leak

## 2024-10-02 NOTE — ASSESSMENT & PLAN NOTE
-CT scan of chest with consolidations in the left upper lobe and posteromedial right lower lobe concerning for pneumonia  -white blood cell count 12.07, COVID-19 negative, afebrile    Microbiology: Sputum Culture   Lab Results   Component Value Date    GSRESP <10 epithelial cells per low power field. 09/28/2024    GSRESP No WBC's 09/28/2024    GSRESP Few Gram positive cocci 09/28/2024    GSRESP Rare Gram negative rods 09/28/2024    GSRESP Rare yeast 09/28/2024    RESPIRATORYC No Pseudomonas isolated. 09/28/2024    RESPIRATORYC (A) 09/28/2024     STAPHYLOCOCCUS AUREUS  Rare  Susceptibility pending         -Low suspicion for MRSA given MRSA nares negative, deescalated antibiotics to cefazolin.  Follow up culture  -O2 supplementation, p.r.n. Ayana  -follow up cultures  -aspiration and fall precautions  -pulmonary consulted, following

## 2024-10-02 NOTE — PLAN OF CARE
10/02/24 1516   Rounds   Attendance Provider;Nurse ;Charge nurse   Discharge Plan A Home with family   Why the patient remains in the hospital Requires continued medical care   Transition of Care Barriers None     Remains with chest tube.

## 2024-10-02 NOTE — ASSESSMENT & PLAN NOTE
- likely some residual findings from his recent infection and hospitalization  - MRSA swab negative, stop zyvox, on ancef, sputum culture with staph - follow sensitivities (presume MSSA given negative MRSA swab)  - PT/OT, IS, OOB, ambulate

## 2024-10-02 NOTE — ASSESSMENT & PLAN NOTE
Likely reactive thrombocytosis    Recent Labs     09/29/24  0825 09/30/24  0442 10/01/24  0508    [phone]           -check CBC in a.m.

## 2024-10-02 NOTE — PLAN OF CARE
Problem: Adult Inpatient Plan of Care  Goal: Plan of Care Review  Outcome: Progressing     Problem: Adult Inpatient Plan of Care  Goal: Patient-Specific Goal (Individualized)  Outcome: Progressing     Problem: Pneumonia  Goal: Fluid Balance  Outcome: Progressing     Problem: Pneumonia  Goal: Resolution of Infection Signs and Symptoms  Outcome: Progressing     Problem: Pneumonia  Goal: Effective Oxygenation and Ventilation  Outcome: Progressing     Problem: Skin Injury Risk Increased  Goal: Skin Health and Integrity  Outcome: Progressing

## 2024-10-02 NOTE — PLAN OF CARE
A213/A213 RADHA Daniels is a 68 y.o.male admitted on 9/26/2024 for Spontaneous pneumothorax   Code Status: Full Code MRN: [id number]   Review of patient's allergies indicates:  No Known Allergies  Past Medical History:   Diagnosis Date    Cancer     Prostate    Tobacco use       PRN meds    0.9% NaCl, , PRN  acetaminophen, 650 mg, Q6H PRN  albuterol-ipratropium, 3 mL, Q6H PRN  dextrose 10%, 12.5 g, PRN  dextrose 10%, 25 g, PRN  glucagon (human recombinant), 1 mg, PRN  glucose, 16 g, PRN  glucose, 24 g, PRN  guaiFENesin 100 mg/5 ml, 200 mg, Q4H PRN  hydrALAZINE, 10 mg, Q6H PRN  HYDROcodone-acetaminophen, 1 tablet, Q6H PRN  morphine, 2 mg, Q6H PRN  naloxone, 0.02 mg, PRN  ondansetron, 4 mg, Q6H PRN  sodium chloride 0.9%, 10 mL, Q12H PRN      Chart check completed. Will continue plan of care.        Pt oriented x4.  VSS.  Pt remained afebrile throughout this shift.   All meds administered per order.   Pt remained free of falls this shift.   Plan of care reviewed. Patient verbalizes understanding.   Pt moving/turning independently.   Bed low, side rails up x 2, wheels locked, call light in reach.   Patient instructed to call for assistance.  Patient education provided  Will continue to monitor.  chest tube still in place               [place] Coma Scale Score: 15     Lead Monitored: Lead II Rhythm: normal sinus rhythm Frequency/Ectopy: frequent, PVCs  Cardiac/Telemetry Box Number: [id number]  VTE Required Core Measure: (SCDs) Sequential compression device initiated/maintained Last Bowel Movement: 10/02/24  Diet Adult Regular  Diet NPO Except for: Sips with Medication  Voiding Characteristics: external catheter  Syed Score: 21  Fall Risk Score: 11  Accucheck []   Freq?      Lines/Drains/Airways       Drain  Duration                  Chest Tube 09/27/24 0130 Left Midaxillary 5 days    Male External Urinary Catheter 09/29/24 1000 Medium 3 days              Peripheral Intravenous Line  Duration                   Peripheral IV - Single Lumen 10/01/24 0920 20 G Anterior;Left Forearm 1 day

## 2024-10-02 NOTE — ASSESSMENT & PLAN NOTE
"-pigtail catheter/chest tube in place, continuous suction  -chest x-ray on ER arrival with large left-sided pneumothorax  -CT scan of chest with trace left apical pneumothorax   -While on suction, repeat chest x-ray noted concerns for accumulation  -CXR 09/29 noted, "near complete resolution of the left pneumothorax, with only a trace pneumothorax currently. "  -CXR 9/30 noted "No significant pneumothorax.".   -CXR 10/1 noted "No residual pneumothorax".     Underwent placement of left-sided 28F chest tube on 10/1 by Cardiothoracic surgery.       -Pulmonology and Cardiothoracic surgery consulted, follow-up recs  -O2 via 2 L nasal cannula.  Wean as tolerated to target SaO2 88-92%  "

## 2024-10-02 NOTE — ASSESSMENT & PLAN NOTE
Anemia is likely due to  unclear etiology . Most recent hemoglobin and hematocrit are listed below.  Recent Labs     09/30/24  0442 10/01/24  0508 10/02/24  0437   HGB 11.7* 11.7* 12.3*   HCT 36.5* 35.8* 38.8*       Plan  - Monitor serial CBC: Daily  - Transfuse PRBC if patient becomes hemodynamically unstable, symptomatic or H/H drops below 7/21.  - Patient has not received any PRBC transfusions to date  - Patient's anemia is currently stable

## 2024-10-02 NOTE — ASSESSMENT & PLAN NOTE
-CT scan of chest with consolidations in the left upper lobe and posteromedial right lower lobe concerning for pneumonia  -white blood cell count 12.07, COVID-19 negative, afebrile    Microbiology: Sputum Culture   Lab Results   Component Value Date    GSRESP <10 epithelial cells per low power field. 09/28/2024    GSRESP No WBC's 09/28/2024    GSRESP Few Gram positive cocci 09/28/2024    GSRESP Rare Gram negative rods 09/28/2024    GSRESP Rare yeast 09/28/2024    RESPIRATORYC No Pseudomonas isolated. 09/28/2024    RESPIRATORYC (A) 09/28/2024     STAPHYLOCOCCUS AUREUS  Rare  Susceptibility pending         -Low suspicion for MRSA given MRSA nares negative, deescalated antibiotics to cefazolin.  Follow up culture  -O2 supplementation, p.r.nSuman Piña  -follow up cultures  -aspiration and fall precautions  -pulmonary consulted, following    Cont present care

## 2024-10-02 NOTE — ASSESSMENT & PLAN NOTE
Chronic, controlled. Latest blood pressure and vitals reviewed-     Temp:  [97.7 °F (36.5 °C)-98.3 °F (36.8 °C)]   Pulse:  [70-90]   Resp:  [15-22]   BP: (111-136)/(66-78)   SpO2:  [93 %-97 %] .   Home meds for hypertension were reviewed and noted below.   Hypertension Medications               valsartan (DIOVAN) 80 MG tablet Take 1 tablet (80 mg total) by mouth once daily.            While in the hospital, will manage blood pressure as follows; Adjust home antihypertensive regimen as follows- normotensive on admission, resume home medications as appropriate; P.r.n. IV hydralazine with parameters.    Will utilize p.r.n. blood pressure medication only if patient's blood pressure greater than 180/110 and he develops symptoms such as worsening chest pain or shortness of breath.

## 2024-10-02 NOTE — ASSESSMENT & PLAN NOTE
"-pigtail catheter/chest tube in place, continuous suction  -chest x-ray on ER arrival with large left-sided pneumothorax  -CT scan of chest with trace left apical pneumothorax   -While on suction, repeat chest x-ray noted concerns for accumulation  -CXR 09/29 noted, "near complete resolution of the left pneumothorax, with only a trace pneumothorax currently. "  -CXR 9/30 noted "No significant pneumothorax.".   -CXR 10/1 noted "No residual pneumothorax".     Underwent placement of left-sided 28F chest tube on 10/1 by Cardiothoracic surgery.       -Pulmonology and Cardiothoracic surgery consulted, follow-up recs  -O2 via 2 L nasal cannula.  Wean as tolerated to target SaO2 88-92%    Still has air leak- CT to [facility]- cont  "

## 2024-10-02 NOTE — PROGRESS NOTES
Subjective:      Patient ID: Aurelio Daniels is a 68 y.o. male.    Chief Complaint: Shortness of Breath (Episode of SOB that started after a coughing fit tonight. Recently admitted for pneumonia. 82% SpO2 upon EMS arrival with improvement after breathing treatment, steroids, and CPAP.)    HPI: 68-year-old man with history of prostate cancer, tobacco abuse (quit 1-1/2 once ago), COPD (discharged with home oxygen via 2 L nasal cannula as of 09/17/2024), centrilobular emphysema, hypertension, anemia, and recent bilateral lower lobe pneumonia who presented to the emergency department for acute onset shortness a breath after a coughing spell.   The patient was admitted through the emergency room had a pigtail catheter placed for pneumothorax on the left side the pneumothorax resolved.  Continues to have continuous air leak.  CT scan showed a large bullous lesion and a 3.2 cm spiculated mass in the right upper lobe.  Central location  Bilateral pneumonias and compressive atelectasis.  Multiple mediastinal lymphadenopathy.  The patient feels better after the pigtail catheter.  Catheter continues to have air leak  Repeat chest x-ray shows expanded lung fields mostly with areas of compressive atelectasis pneumothorax resolved.  The pigtail catheter still has a massive air leak continuous.    10/02/2024 patient has pain over the chest tube insertion site continuous air leak from the chest tube and serous drainage subcutaneous emphysema on 3 L nasal cannula, not in distress.  Otherwise hemodynamically stable      Review of patient's allergies indicates:  No Known Allergies    Past Medical History:   Diagnosis Date    Cancer     Prostate    Tobacco use        Family History   Problem Relation Name Age of Onset    No Known Problems Mother      Heart disease Father      No Known Problems Brother      No Known Problems Brother      No Known Problems Daughter      No Known Problems Daughter         Social History      Socioeconomic History    Marital status:      Spouse name: YUE    Number of children: 2   Tobacco Use    Smoking status: Some Days     Current packs/day: 0.00     Types: Cigarettes     Last attempt to quit: 1/15/2022     Years since quittin.7     Passive exposure: Current    Smokeless tobacco: Never   Substance and Sexual Activity    Alcohol use: Yes     Alcohol/week: 2.0 standard drinks of alcohol     Types: 2 Shots of liquor per week    Drug use: Never    Sexual activity: Not Currently     Partners: Female     Social Drivers of Health     Financial Resource Strain: Low Risk  (2024)    Overall Financial Resource Strain (CARDIA)     Difficulty of Paying Living Expenses: Not hard at all   Food Insecurity: No Food Insecurity (2024)    Hunger Vital Sign     Worried About Running Out of Food in the Last Year: Never true     Ran Out of Food in the Last Year: Never true   Transportation Needs: No Transportation Needs (2024)    TRANSPORTATION NEEDS     Transportation : No   Physical Activity: Inactive (2024)    Exercise Vital Sign     Days of Exercise per Week: 0 days     Minutes of Exercise per Session: 0 min   Stress: No Stress Concern Present (2024)    [employer] [place] [employer] - Occupational Stress Questionnaire     Feeling of Stress : Not at all   Housing Stability: Low Risk  (2024)    Housing Stability Vital Sign     Unable to Pay for Housing in the Last Year: No     Homeless in the Last Year: No       Past Surgical History:   Procedure Laterality Date    COLONOSCOPY      COLONOSCOPY N/A 2022    Procedure: COLONOSCOPY;  Surgeon: Jamia Alfaro MD;  Location: [facility];  Service: Endoscopy;  Laterality: N/A;    VASECTOMY         Review of Systems   Constitutional:  Positive for activity change. Negative for appetite change.   HENT:  Negative for dental problem, nosebleeds and sore throat.    Eyes:  Negative for discharge and visual disturbance.  "  Respiratory:  Positive for chest tightness, shortness of breath and wheezing. Negative for cough and stridor.    Cardiovascular:  Negative for leg swelling.   Gastrointestinal:  Negative for abdominal distention and abdominal pain.   Genitourinary:  Negative for difficulty urinating and dysuria.   Musculoskeletal:  Negative for arthralgias, back pain and joint swelling.   Allergic/Immunologic: Negative for environmental allergies.   Neurological:  Negative for dizziness, syncope and headaches.   Hematological:  Does not bruise/bleed easily.   Psychiatric/Behavioral:  Negative for behavioral problems.           Objective:   /71 (BP Location: Right arm, Patient Position: Lying)   Pulse 83   Temp 97.8 °F (36.6 °C) (Oral)   Resp 18   Ht 5' 9" (1.753 m)   Wt 74.1 kg (163 lb 5.8 oz)   SpO2 (!) 92%   BMI 24.12 kg/m²     X-Ray Chest 1 View  Narrative: EXAMINATION:  XR CHEST 1 VIEW    CLINICAL HISTORY:  followup; Pneumothorax, unspecified    TECHNIQUE:  Single frontal view of the chest was performed.    COMPARISON:  10/01/24    FINDINGS:  Left-sided chest tube remains in position.  No significant pneumothorax.  Extensive chest wall subcutaneous emphysema.  Right lung is clear.  In comparison to the prior study, there is no adverse interval changes.  Impression: In comparison to the prior study, there is no adverse interval changes    Electronically signed by: Navneet Oswald MD  Date:    10/02/2024  Time:    07:22         Physical Exam  Vitals reviewed.   Constitutional:       Appearance: Normal appearance. He is ill-appearing.   HENT:      Head: Normocephalic and atraumatic.      Mouth/Throat:      Mouth: Mucous membranes are moist.   Eyes:      Extraocular Movements: Extraocular movements intact.   Cardiovascular:      Rate and Rhythm: Normal rate and regular rhythm.      Pulses: Normal pulses.      Heart sounds: Normal heart sounds.   Pulmonary:      Effort: Pulmonary effort is normal.      Breath sounds: " Rhonchi and rales present.      Comments: Left-sided chest tube has a massive air leak continuous  Abdominal:      Palpations: Abdomen is soft.   Musculoskeletal:         General: Normal range of motion.      Cervical back: Normal range of motion and neck supple.   Skin:     General: Skin is warm.      Capillary Refill: Capillary refill takes less than 2 seconds.   Neurological:      General: No focal deficit present.      Mental Status: He is alert and oriented to person, place, and time.   Psychiatric:         Mood and Affect: Mood normal.         Assessment:     1. Acute on chronic respiratory failure with hypoxemia    2. Pneumothorax on left    3. Troponin I above reference range    4. Mass of left lung    5. SOB (shortness of breath)    6. Chest pain    7. Elevated troponin I level          Plan   68-year-old male with a right upper lobe mass centrally located with bilateral pan acinar emphysema COPD and pneumonic consolidation.  Chest tube continuous air leak  Aggressive pulmonary toilet incentive spirometry  Bronchopleural fistula may have to convert the chest tube to a Heimlich valve upon discharge   Investigation for left upper lobe mass in progress the patient's it is centrally located and severe panacinar  emphysema not a candidate for open surgical biopsy.  I will continue to follow the chest tube as an outpatient upon discharge.          Romaine Rodriguez MD  [facility]  [place]

## 2024-10-02 NOTE — SUBJECTIVE & OBJECTIVE
"Interval History: No acute events overnight, afebrile, hemodynamically stable.   Repeat chest x-ray this morning noted " No residual pneumothorax". Underwent placement of left-sided 28F chest tube  by Cardiothoracic surgery. Low suspicion for MRSA given MRSA nares negative, deescalating antibiotics to cefazolin.       Objective:     Vital Signs (Most Recent):  Temp: 97.7 °F (36.5 °C) (10/01/24 2042)  Pulse: 87 (10/01/24 2042)  Resp: 18 (10/01/24 2042)  BP: 114/66 (10/01/24 2042)  SpO2: 95 % (10/01/24 2042) Vital Signs (24h Range):  Temp:  [97.7 °F (36.5 °C)-98.3 °F (36.8 °C)] 97.7 °F (36.5 °C)  Pulse:  [70-90] 87  Resp:  [15-22] 18  SpO2:  [93 %-97 %] 95 %  BP: (111-136)/(66-78) 114/66     Weight: 74.1 kg (163 lb 5.8 oz)  Body mass index is 24.12 kg/m².    Intake/Output Summary (Last 24 hours) at 10/1/2024 2117  Last data filed at 10/1/2024 1850  Gross per 24 hour   Intake --   Output 1890 ml   Net -1890 ml         Physical Exam  Vitals and nursing note reviewed.   Constitutional:       General: He is not in acute distress.     Appearance: He is not ill-appearing, toxic-appearing or diaphoretic.   HENT:      Head: Normocephalic and atraumatic.      Mouth/Throat:      Mouth: Mucous membranes are moist.   Eyes:      General: No scleral icterus.        Right eye: No discharge.         Left eye: No discharge.   Cardiovascular:      Rate and Rhythm: Normal rate and regular rhythm.      Heart sounds: Normal heart sounds.   Pulmonary:      Effort: Pulmonary effort is normal. No respiratory distress.      Breath sounds: Rales (Left-sided) present. No wheezing.      Comments: Left-sided chest tube in place with overlying dressing intact  Abdominal:      General: Bowel sounds are normal.      Tenderness: There is no abdominal tenderness.   Musculoskeletal:      Cervical back: No rigidity.      Right lower leg: No edema.      Left lower leg: No edema.   Skin:     General: Skin is warm and dry.      Coloration: Skin is not " jaundiced.   Neurological:      Mental Status: He is alert and oriented to person, place, and time. Mental status is at baseline.   Psychiatric:         Mood and Affect: Mood normal.         Behavior: Behavior normal.             Significant Labs: All pertinent labs within the past 24 hours have been reviewed.  CBC:   Recent Labs   Lab 09/30/24 0442 10/01/24  0508   WBC 8.02 6.70   HGB 11.7* 11.7*   HCT 36.5* 35.8*    328     CMP:   Recent Labs   Lab 09/30/24 0442 10/01/24  0508    139   K 4.2 4.0    109   CO2 24 21*    105   BUN 11 11   CREATININE 0.8 0.8   CALCIUM 9.1 8.9   PROT 5.9* 5.8*   ALBUMIN 2.9* 2.8*   BILITOT 0.9 0.6   ALKPHOS 115 109   AST 26 20   ALT 34 31   ANIONGAP 8 9       Significant Imaging: I have reviewed all pertinent imaging results/findings within the past 24 hours.

## 2024-10-02 NOTE — SUBJECTIVE & OBJECTIVE
Interval History: looks and feels better, no CP, SOB better since large CT placed by Dr. Gavin yesterday, connected to water seal, air leak still present. IR could not perform Lung Bx today, hence tomorrow. Pt agreeable  and otherwise comfortable. Pain controlled with analgesics. NPO again after MN tonight.      Review of Systems   Constitutional:  Positive for activity change and fatigue. Negative for chills, diaphoresis and fever.   HENT: Negative.     Eyes: Negative.    Respiratory:  Positive for cough. Negative for chest tightness, shortness of breath and wheezing.    Cardiovascular:  Positive for chest pain.   Gastrointestinal: Negative.    Endocrine: Negative.    Genitourinary: Negative.    Musculoskeletal:  Positive for gait problem.   Skin: Negative.    Allergic/Immunologic: Negative.    Hematological: Negative.    Psychiatric/Behavioral: Negative.       Objective:     Vital Signs (Most Recent):  Temp: 98 °F (36.7 °C) (10/02/24 1541)  Pulse: 83 (10/02/24 1613)  Resp: 19 (10/02/24 1541)  BP: 131/75 (10/02/24 1541)  SpO2: (!) 92 % (10/02/24 1541) Vital Signs (24h Range):  Temp:  [97.3 °F (36.3 °C)-98 °F (36.7 °C)] 98 °F (36.7 °C)  Pulse:  [70-97] 83  Resp:  [16-20] 19  SpO2:  [91 %-97 %] 92 %  BP: (114-138)/(66-81) 131/75     Weight: 74.1 kg (163 lb 5.8 oz)  Body mass index is 24.12 kg/m².    Intake/Output Summary (Last 24 hours) at 10/2/2024 5847  Last data filed at 10/2/2024 0732  Gross per 24 hour   Intake --   Output 890 ml   Net -890 ml         Physical Exam  Vitals and nursing note reviewed.   Constitutional:       General: He is awake. He is not in acute distress.     Appearance: He is well-developed. He is not ill-appearing, toxic-appearing or diaphoretic.      Comments: On RA   HENT:      Head: Normocephalic and atraumatic.      Right Ear: External ear normal.      Left Ear: External ear normal.      Nose: Nose normal.      Mouth/Throat:      Mouth: Mucous membranes are moist.      Pharynx: Oropharynx  is clear.   Eyes:      General: No scleral icterus.        Right eye: No discharge.         Left eye: No discharge.      Extraocular Movements: Extraocular movements intact.      Conjunctiva/sclera: Conjunctivae normal.      Pupils: Pupils are equal, round, and reactive to light.   Cardiovascular:      Rate and Rhythm: Normal rate and regular rhythm.      Pulses: Normal pulses.      Heart sounds: Normal heart sounds.   Pulmonary:      Effort: Pulmonary effort is normal. No respiratory distress.      Breath sounds: Decreased breath sounds present. No wheezing, rhonchi or rales.      Comments: R CT in place connected to suction, continuous air leak noted  Abdominal:      General: Abdomen is flat. Bowel sounds are normal.      Palpations: Abdomen is soft.      Tenderness: There is no abdominal tenderness.   Genitourinary:     Comments: deferred  Musculoskeletal:         General: Tenderness present. No swelling. Normal range of motion.      Cervical back: Normal range of motion and neck supple. No rigidity.      Right lower leg: No edema.      Left lower leg: No edema.   Skin:     General: Skin is warm and dry.      Capillary Refill: Capillary refill takes less than 2 seconds.      Coloration: Skin is not jaundiced.   Neurological:      General: No focal deficit present.      Mental Status: He is alert and oriented to person, place, and time.   Psychiatric:         Mood and Affect: Mood normal.         Behavior: Behavior normal. Behavior is cooperative.         Thought Content: Thought content normal.         Judgment: Judgment normal.             Significant Labs: All pertinent labs within the past 24 hours have been reviewed.  BMP:   Recent Labs   Lab 10/02/24  0437         K 4.1      CO2 21*   BUN 12   CREATININE 0.8   CALCIUM 8.9   MG 2.1     CBC:   Recent Labs   Lab 10/01/24  0508 10/02/24  0437   WBC 6.70 7.73   HGB 11.7* 12.3*   HCT 35.8* 38.8*    357     CMP:   Recent Labs   Lab  10/01/24  0508 10/02/24  0437    137   K 4.0 4.1    106   CO2 21* 21*    105   BUN 11 12   CREATININE 0.8 0.8   CALCIUM 8.9 8.9   PROT 5.8* 6.2   ALBUMIN 2.8* 3.0*   BILITOT 0.6 0.4   ALKPHOS 109 112   AST 20 18   ALT 31 24   ANIONGAP 9 10       Significant Imaging: I have reviewed all pertinent imaging results/findings within the past 24 hours.

## 2024-10-02 NOTE — ASSESSMENT & PLAN NOTE
Patient's COPD is controlled currently.  Patient is currently off COPD Pathway. Continue p.r.n. DuoNebs, O2 supplementation, and antibiotics for pneumonia.  monitor respiratory status closely.   -patient did receive DuoNeb, prednisone 60 mg p.o., and terbutaline 0.25 mg while in the emergency department.    Cont present care

## 2024-10-03 LAB
ALBUMIN SERPL BCP-MCNC: 3 G/DL (ref 3.5–5.2)
ALP SERPL-CCNC: 109 U/L (ref 55–135)
ALT SERPL W/O P-5'-P-CCNC: 17 U/L (ref 10–44)
ANION GAP SERPL CALC-SCNC: 12 MMOL/L (ref 8–16)
AST SERPL-CCNC: 15 U/L (ref 10–40)
BACTERIA SPEC AEROBE CULT: ABNORMAL
BACTERIA SPEC AEROBE CULT: ABNORMAL
BASOPHILS # BLD AUTO: 0.03 K/UL (ref 0–0.2)
BASOPHILS NFR BLD: 0.5 % (ref 0–1.9)
BILIRUB SERPL-MCNC: 0.5 MG/DL (ref 0.1–1)
BUN SERPL-MCNC: 11 MG/DL (ref 8–23)
CALCIUM SERPL-MCNC: 9.2 MG/DL (ref 8.7–10.5)
CHLORIDE SERPL-SCNC: 105 MMOL/L (ref 95–110)
CO2 SERPL-SCNC: 21 MMOL/L (ref 23–29)
CREAT SERPL-MCNC: 0.7 MG/DL (ref 0.5–1.4)
DIFFERENTIAL METHOD BLD: ABNORMAL
EOSINOPHIL # BLD AUTO: 0.2 K/UL (ref 0–0.5)
EOSINOPHIL NFR BLD: 2.6 % (ref 0–8)
ERYTHROCYTE [DISTWIDTH] IN BLOOD BY AUTOMATED COUNT: 13.4 % (ref 11.5–14.5)
EST. GFR  (NO RACE VARIABLE): >60 ML/MIN/1.73 M^2
GLUCOSE SERPL-MCNC: 107 MG/DL (ref 70–110)
GRAM STN SPEC: ABNORMAL
HCT VFR BLD AUTO: 38.1 % (ref 40–54)
HGB BLD-MCNC: 12.1 G/DL (ref 14–18)
IMM GRANULOCYTES # BLD AUTO: 0.1 K/UL (ref 0–0.04)
IMM GRANULOCYTES NFR BLD AUTO: 1.5 % (ref 0–0.5)
LYMPHOCYTES # BLD AUTO: 1.2 K/UL (ref 1–4.8)
LYMPHOCYTES NFR BLD: 18.2 % (ref 18–48)
MAGNESIUM SERPL-MCNC: 2.2 MG/DL (ref 1.6–2.6)
MCH RBC QN AUTO: 31.6 PG (ref 27–31)
MCHC RBC AUTO-ENTMCNC: 31.8 G/DL (ref 32–36)
MCV RBC AUTO: 100 FL (ref 82–98)
MONOCYTES # BLD AUTO: 0.3 K/UL (ref 0.3–1)
MONOCYTES NFR BLD: 4.7 % (ref 4–15)
NEUTROPHILS # BLD AUTO: 4.8 K/UL (ref 1.8–7.7)
NEUTROPHILS NFR BLD: 72.5 % (ref 38–73)
NRBC BLD-RTO: 0 /100 WBC
PHOSPHATE SERPL-MCNC: 3.1 MG/DL (ref 2.7–4.5)
PLATELET # BLD AUTO: 324 K/UL (ref 150–450)
PMV BLD AUTO: 8.7 FL (ref 9.2–12.9)
POTASSIUM SERPL-SCNC: 4 MMOL/L (ref 3.5–5.1)
PROT SERPL-MCNC: 6.1 G/DL (ref 6–8.4)
RBC # BLD AUTO: 3.83 M/UL (ref 4.6–6.2)
SODIUM SERPL-SCNC: 138 MMOL/L (ref 136–145)
WBC # BLD AUTO: 6.61 K/UL (ref 3.9–12.7)

## 2024-10-03 PROCEDURE — 63600175 PHARM REV CODE 636 W HCPCS: Performed by: STUDENT IN AN ORGANIZED HEALTH CARE EDUCATION/TRAINING PROGRAM

## 2024-10-03 PROCEDURE — 85025 COMPLETE CBC W/AUTO DIFF WBC: CPT | Performed by: STUDENT IN AN ORGANIZED HEALTH CARE EDUCATION/TRAINING PROGRAM

## 2024-10-03 PROCEDURE — 94640 AIRWAY INHALATION TREATMENT: CPT

## 2024-10-03 PROCEDURE — 94761 N-INVAS EAR/PLS OXIMETRY MLT: CPT

## 2024-10-03 PROCEDURE — 84100 ASSAY OF PHOSPHORUS: CPT | Performed by: STUDENT IN AN ORGANIZED HEALTH CARE EDUCATION/TRAINING PROGRAM

## 2024-10-03 PROCEDURE — 27000221 HC OXYGEN, UP TO 24 HOURS

## 2024-10-03 PROCEDURE — 0BBG3ZX EXCISION OF LEFT UPPER LUNG LOBE, PERCUTANEOUS APPROACH, DIAGNOSTIC: ICD-10-PCS | Performed by: RADIOLOGY

## 2024-10-03 PROCEDURE — 25000003 PHARM REV CODE 250: Performed by: STUDENT IN AN ORGANIZED HEALTH CARE EDUCATION/TRAINING PROGRAM

## 2024-10-03 PROCEDURE — 63600175 PHARM REV CODE 636 W HCPCS: Performed by: NURSE ANESTHETIST, CERTIFIED REGISTERED

## 2024-10-03 PROCEDURE — 21400001 HC TELEMETRY ROOM

## 2024-10-03 PROCEDURE — 37000009 HC ANESTHESIA EA ADD 15 MINS: Performed by: RADIOLOGY

## 2024-10-03 PROCEDURE — 36415 COLL VENOUS BLD VENIPUNCTURE: CPT | Performed by: STUDENT IN AN ORGANIZED HEALTH CARE EDUCATION/TRAINING PROGRAM

## 2024-10-03 PROCEDURE — 37000008 HC ANESTHESIA 1ST 15 MINUTES: Performed by: RADIOLOGY

## 2024-10-03 PROCEDURE — 83735 ASSAY OF MAGNESIUM: CPT | Performed by: STUDENT IN AN ORGANIZED HEALTH CARE EDUCATION/TRAINING PROGRAM

## 2024-10-03 PROCEDURE — 80053 COMPREHEN METABOLIC PANEL: CPT | Performed by: STUDENT IN AN ORGANIZED HEALTH CARE EDUCATION/TRAINING PROGRAM

## 2024-10-03 PROCEDURE — 25000003 PHARM REV CODE 250: Performed by: NURSE ANESTHETIST, CERTIFIED REGISTERED

## 2024-10-03 PROCEDURE — 25000242 PHARM REV CODE 250 ALT 637 W/ HCPCS: Performed by: INTERNAL MEDICINE

## 2024-10-03 PROCEDURE — 25000003 PHARM REV CODE 250: Performed by: NURSE PRACTITIONER

## 2024-10-03 PROCEDURE — 25000003 PHARM REV CODE 250: Performed by: EMERGENCY MEDICINE

## 2024-10-03 PROCEDURE — 94799 UNLISTED PULMONARY SVC/PX: CPT

## 2024-10-03 PROCEDURE — 99900035 HC TECH TIME PER 15 MIN (STAT)

## 2024-10-03 RX ORDER — MUPIROCIN 20 MG/G
OINTMENT TOPICAL 2 TIMES DAILY
Status: DISPENSED | OUTPATIENT
Start: 2024-10-03 | End: 2024-10-08

## 2024-10-03 RX ORDER — FENTANYL CITRATE 50 UG/ML
INJECTION, SOLUTION INTRAMUSCULAR; INTRAVENOUS
Status: DISCONTINUED | OUTPATIENT
Start: 2024-10-03 | End: 2024-10-03

## 2024-10-03 RX ORDER — MIDAZOLAM HYDROCHLORIDE 1 MG/ML
INJECTION INTRAMUSCULAR; INTRAVENOUS
Status: DISCONTINUED | OUTPATIENT
Start: 2024-10-03 | End: 2024-10-03

## 2024-10-03 RX ORDER — LINEZOLID 600 MG/1
600 TABLET, FILM COATED ORAL EVERY 12 HOURS
Status: DISCONTINUED | OUTPATIENT
Start: 2024-10-03 | End: 2024-10-09 | Stop reason: HOSPADM

## 2024-10-03 RX ADMIN — MUPIROCIN: 20 OINTMENT TOPICAL at 08:10

## 2024-10-03 RX ADMIN — BUDESONIDE 0.5 MG: 0.5 INHALANT ORAL at 07:10

## 2024-10-03 RX ADMIN — ARFORMOTEROL TARTRATE 15 MCG: 15 SOLUTION RESPIRATORY (INHALATION) at 07:10

## 2024-10-03 RX ADMIN — LINEZOLID 600 MG: 600 TABLET, FILM COATED ORAL at 08:10

## 2024-10-03 RX ADMIN — HYDROCODONE BITARTRATE AND ACETAMINOPHEN 1 TABLET: 7.5; 325 TABLET ORAL at 08:10

## 2024-10-03 RX ADMIN — CEFAZOLIN 2 G: 2 INJECTION, POWDER, FOR SOLUTION INTRAMUSCULAR; INTRAVENOUS at 07:10

## 2024-10-03 RX ADMIN — ARFORMOTEROL TARTRATE 15 MCG: 15 SOLUTION RESPIRATORY (INHALATION) at 08:10

## 2024-10-03 RX ADMIN — LINEZOLID 600 MG: 600 TABLET, FILM COATED ORAL at 03:10

## 2024-10-03 RX ADMIN — SODIUM CHLORIDE: 9 INJECTION, SOLUTION INTRAVENOUS at 01:10

## 2024-10-03 RX ADMIN — MIDAZOLAM HYDROCHLORIDE 2 MG: 1 INJECTION, SOLUTION INTRAMUSCULAR; INTRAVENOUS at 01:10

## 2024-10-03 RX ADMIN — HYDROCODONE BITARTRATE AND ACETAMINOPHEN 1 TABLET: 7.5; 325 TABLET ORAL at 07:10

## 2024-10-03 RX ADMIN — FENTANYL CITRATE 100 MCG: 50 INJECTION, SOLUTION INTRAMUSCULAR; INTRAVENOUS at 01:10

## 2024-10-03 RX ADMIN — BUDESONIDE 0.5 MG: 0.5 INHALANT ORAL at 08:10

## 2024-10-03 NOTE — ANESTHESIA POSTPROCEDURE EVALUATION
Anesthesia Post Evaluation    Patient: Aurelio Daniels    Procedure(s) Performed: Procedure(s) (LRB):  CT (COMPUTED TOMOGRAPHY)/lung biopsy (N/A)    Final Anesthesia Type: MAC      Patient location during evaluation: PACU  Patient participation: Yes- Able to Participate  Level of consciousness: awake and alert  Post-procedure vital signs: reviewed and stable  Pain management: adequate  Airway patency: patent    PONV status at discharge: No PONV  Anesthetic complications: no      Cardiovascular status: blood pressure returned to baseline  Respiratory status: unassisted  Hydration status: euvolemic  Follow-up not needed.              Vitals Value Taken Time   /70 10/03/24 1200   Temp 36.7 °C (98.1 °F) 10/03/24 1200   Pulse 74 10/03/24 1200   Resp 12 10/03/24 1200   SpO2 91 % 10/03/24 1200         No case tracking events are documented in the log.      Pain/Carmen Score: Pain Rating Prior to Med Admin: 6 (10/3/2024  7:45 AM)  Pain Rating Post Med Admin: 0 (10/3/2024  8:46 AM)

## 2024-10-03 NOTE — PLAN OF CARE
10/03/24 1532   Rounds   Attendance Provider;Nurse ;Charge nurse   Discharge Plan A Home with family   Why the patient remains in the hospital Requires continued medical care   Transition of Care Barriers None     For lung biopsy today

## 2024-10-03 NOTE — OP NOTE
Pre Op Diagnosis: left lung mass     Post Op Diagnosis: same     Procedure:  biopsy     Procedure performed by: Russell CRAFT, Brandy WORTHY     Written Informed Consent Obtained: Yes     Specimen Removed:  yes     Estimated Blood Loss:  minimal     Findings: Local anesthesia     Sedation:  anesthesia     The patient tolerated the procedure well and there were no complications.      Disposition:  F/U in clinic or with ordering physician    Discharge instructions:  Light activity for 24 hours.  Remove band aid in 24 hours.  No baths (showers are appropriate).      Sterile technique was performed in the posterior left thorax, lidocaine was used as a local anesthetic.  Multiple samples taken percutaneously from the left lung nodule.  Pt tolerated the procedure well without immediate complications.  Please see radiologist report for details. F/u with PCP and/or ordering physician.     Patient to return to room when anesthesia criteria met.

## 2024-10-03 NOTE — INTERVAL H&P NOTE
The patient has been examined and the H&P has been reviewed:    I concur with the findings and no changes have occurred since H&P was written.    Procedure risks, benefits and alternative options discussed and understood by patient/family.          Active Hospital Problems    Diagnosis  POA    *Spontaneous pneumothorax [J93.83]  Yes    Bilateral pneumonia [J18.9]  Yes    Mass of left lung [R91.8]  Yes    Tobacco abuse [Z72.0]  Yes    Macrocytic anemia [D53.9]  Yes    Acute on chronic respiratory failure with hypoxia [J96.21]  Yes    Elevated troponin [R79.89]  Yes    Centrilobular emphysema [J43.2]  Yes    Primary hypertension [I10]  Yes    Prostate cancer [C61]  Yes      Resolved Hospital Problems    Diagnosis Date Resolved POA    Hypoxia [R09.02] 09/27/2024 Yes    Thrombocytosis [D75.839] 10/01/2024 Yes

## 2024-10-03 NOTE — PLAN OF CARE
Problem: Adult Inpatient Plan of Care  Goal: Patient-Specific Goal (Individualized)  Outcome: Progressing     Problem: Adult Inpatient Plan of Care  Goal: Absence of Hospital-Acquired Illness or Injury  Outcome: Progressing     Problem: Pneumonia  Goal: Fluid Balance  Outcome: Progressing     Problem: Pneumonia  Goal: Effective Oxygenation and Ventilation  Outcome: Progressing     Problem: Adult Inpatient Plan of Care  Goal: Readiness for Transition of Care  Outcome: Progressing

## 2024-10-03 NOTE — PLAN OF CARE
POC reviewed with patient/family, treatment ongoing, verbal understanding received. Reinforcements needed.

## 2024-10-03 NOTE — PROGRESS NOTES
Subjective:      Patient ID: Aurelio Daniels is a 68 y.o. male.    Chief Complaint: Shortness of Breath (Episode of SOB that started after a coughing fit tonight. Recently admitted for pneumonia. 82% SpO2 upon EMS arrival with improvement after breathing treatment, steroids, and CPAP.)    HPI: 68-year-old man with history of prostate cancer, tobacco abuse (quit 1-1/2 once ago), COPD (discharged with home oxygen via 2 L nasal cannula as of 09/17/2024), centrilobular emphysema, hypertension, anemia, and recent bilateral lower lobe pneumonia who presented to the emergency department for acute onset shortness a breath after a coughing spell.   The patient was admitted through the emergency room had a pigtail catheter placed for pneumothorax on the left side the pneumothorax resolved.  Continues to have continuous air leak.  CT scan showed a large bullous lesion and a 3.2 cm spiculated mass in the right upper lobe.  Central location  Bilateral pneumonias and compressive atelectasis.  Multiple mediastinal lymphadenopathy.  The patient feels better after the pigtail catheter.  Catheter continues to have air leak  Repeat chest x-ray shows expanded lung fields mostly with areas of compressive atelectasis pneumothorax resolved.  The pigtail catheter still has a massive air leak continuous.    10/02/2024 patient has pain over the chest tube insertion site continuous air leak from the chest tube and serous drainage subcutaneous emphysema on 3 L nasal cannula, not in distress.  Otherwise hemodynamically stable  10/03/2024 the patient continues to have air leak pain well controlled.    Review of patient's allergies indicates:  No Known Allergies    Past Medical History:   Diagnosis Date    Cancer     Prostate    Tobacco use        Family History   Problem Relation Name Age of Onset    No Known Problems Mother      Heart disease Father      No Known Problems Brother      No Known Problems Brother      No Known Problems  Daughter      No Known Problems Daughter         Social History     Socioeconomic History    Marital status:      Spouse name: YUE    Number of children: 2   Tobacco Use    Smoking status: Some Days     Current packs/day: 0.00     Types: Cigarettes     Last attempt to quit: 1/15/2022     Years since quittin.7     Passive exposure: Current    Smokeless tobacco: Never   Substance and Sexual Activity    Alcohol use: Yes     Alcohol/week: 2.0 standard drinks of alcohol     Types: 2 Shots of liquor per week    Drug use: Never    Sexual activity: Not Currently     Partners: Female     Social Drivers of Health     Financial Resource Strain: Low Risk  (2024)    Overall Financial Resource Strain (CARDIA)     Difficulty of Paying Living Expenses: Not hard at all   Food Insecurity: No Food Insecurity (2024)    Hunger Vital Sign     Worried About Running Out of Food in the Last Year: Never true     Ran Out of Food in the Last Year: Never true   Transportation Needs: No Transportation Needs (2024)    TRANSPORTATION NEEDS     Transportation : No   Physical Activity: Inactive (2024)    Exercise Vital Sign     Days of Exercise per Week: 0 days     Minutes of Exercise per Session: 0 min   Stress: No Stress Concern Present (2024)    [employer] [place] [employer] - Occupational Stress Questionnaire     Feeling of Stress : Not at all   Housing Stability: Low Risk  (2024)    Housing Stability Vital Sign     Unable to Pay for Housing in the Last Year: No     Homeless in the Last Year: No       Past Surgical History:   Procedure Laterality Date    COLONOSCOPY      COLONOSCOPY N/A 2022    Procedure: COLONOSCOPY;  Surgeon: Jamia Alfaro MD;  Location: [facility];  Service: Endoscopy;  Laterality: N/A;    VASECTOMY  1985       Review of Systems   Constitutional:  Positive for activity change. Negative for appetite change.   HENT:  Negative for dental problem, nosebleeds and sore  "throat.    Eyes:  Negative for discharge and visual disturbance.   Respiratory:  Positive for chest tightness, shortness of breath and wheezing. Negative for cough and stridor.    Cardiovascular:  Negative for leg swelling.   Gastrointestinal:  Negative for abdominal distention and abdominal pain.   Genitourinary:  Negative for difficulty urinating and dysuria.   Musculoskeletal:  Negative for arthralgias, back pain and joint swelling.   Allergic/Immunologic: Negative for environmental allergies.   Neurological:  Negative for dizziness, syncope and headaches.   Hematological:  Does not bruise/bleed easily.   Psychiatric/Behavioral:  Negative for behavioral problems.           Objective:   /70   Pulse 74   Temp 98.1 °F (36.7 °C)   Resp 12   Ht 5' 9" (1.753 m)   Wt 72.9 kg (160 lb 11.5 oz)   SpO2 (!) 91%   BMI 23.73 kg/m²     X-Ray Chest 1 View  Narrative: EXAMINATION:  XR CHEST 1 VIEW    CLINICAL HISTORY:  followup; Pneumothorax, unspecified    TECHNIQUE:  Single frontal view of the chest was performed.    COMPARISON:  10/01/24    FINDINGS:  Left-sided chest tube remains in position.  No significant pneumothorax.  Extensive chest wall subcutaneous emphysema.  Right lung is clear.  In comparison to the prior study, there is no adverse interval changes.  Impression: In comparison to the prior study, there is no adverse interval changes    Electronically signed by: Navneet Oswald MD  Date:    10/02/2024  Time:    07:22         Physical Exam  Vitals reviewed.   Constitutional:       Appearance: Normal appearance. He is ill-appearing.   HENT:      Head: Normocephalic and atraumatic.      Mouth/Throat:      Mouth: Mucous membranes are moist.   Eyes:      Extraocular Movements: Extraocular movements intact.   Cardiovascular:      Rate and Rhythm: Normal rate and regular rhythm.      Pulses: Normal pulses.      Heart sounds: Normal heart sounds.   Pulmonary:      Effort: Pulmonary effort is normal.      Breath " sounds: Rhonchi and rales present.      Comments: Left-sided chest tube has a massive air leak continuous  Abdominal:      Palpations: Abdomen is soft.   Musculoskeletal:         General: Normal range of motion.      Cervical back: Normal range of motion and neck supple.   Skin:     General: Skin is warm.      Capillary Refill: Capillary refill takes less than 2 seconds.   Neurological:      General: No focal deficit present.      Mental Status: He is alert and oriented to person, place, and time.   Psychiatric:         Mood and Affect: Mood normal.         Assessment:     1. Acute on chronic respiratory failure with hypoxemia    2. Pneumothorax on left    3. Troponin I above reference range    4. Mass of left lung    5. SOB (shortness of breath)    6. Chest pain    7. Elevated troponin I level          Plan   68-year-old male with a right upper lobe mass centrally located with bilateral pan acinar emphysema COPD and pneumonic consolidation.  Chest tube continuous air leak  Aggressive pulmonary toilet incentive spirometry  Bronchopleural fistula may have to convert the chest tube to a Heimlich valve upon discharge   Investigation for left upper lobe mass in progress the patient's it is centrally located and severe panacinar  emphysema not a candidate for open surgical biopsy.  Percutaneous biopsy plan today.          Romaine Rodriguez MD  [facility]  [place]

## 2024-10-03 NOTE — ANESTHESIA PREPROCEDURE EVALUATION
10/03/2024  Aurelio Daniels is a 68 y.o., male.      Pre-op Assessment    I have reviewed the Patient Summary Reports.     I have reviewed the Nursing Notes. I have reviewed the NPO Status.   I have reviewed the Medications.     Review of Systems  Anesthesia Hx:  No problems with previous Anesthesia                Social:  Smoker, Alcohol Use       Hematology/Oncology:       -- Anemia:  macrocytic            Prostate            Current/Recent Cancer.                EENT/Dental:  EENT/Dental Normal           Cardiovascular:     Hypertension, well controlled           hyperlipidemia OH                            Pulmonary:  Pneumonia COPD, moderate   Shortness of breath                  Renal/:  Renal/ Normal                 Hepatic/GI:  Hepatic/GI Normal                 Musculoskeletal:  Musculoskeletal Normal                Neurological:  Neurology Normal                                      Endocrine:  Endocrine Normal            Dermatological:  Skin Normal    Psych:  Psychiatric Normal                    Physical Exam  General: Well nourished    Airway:  Mallampati: II   Mouth Opening: Normal  TM Distance: Normal  Tongue: Normal  Neck ROM: Normal ROM    Dental:  Intact    Chest/Lungs:  Clear to auscultation    Heart:  Rate: Normal        Anesthesia Plan  Type of Anesthesia, risks & benefits discussed:    Anesthesia Type: MAC  Intra-op Monitoring Plan: Standard ASA Monitors  Induction:  IV  Informed Consent: Informed consent signed with the Patient and all parties understand the risks and agree with anesthesia plan.  All questions answered. Patient consented to blood products? Yes  ASA Score: 3    Ready For Surgery From Anesthesia Perspective.     .

## 2024-10-03 NOTE — TRANSFER OF CARE
"Anesthesia Transfer of Care Note    Patient: Aurelio Daniels    Procedure(s) Performed: Procedure(s) (LRB):  CT (COMPUTED TOMOGRAPHY)/lung biopsy (N/A)    Patient location: PACU    Anesthesia Type: MAC    Transport from OR: Transported from OR on room air with adequate spontaneous ventilation    Post pain: adequate analgesia    Post assessment: no apparent anesthetic complications    Post vital signs: stable    Level of consciousness: awake    Nausea/Vomiting: no nausea/vomiting    Complications: none    Transfer of care protocol was followed      Last vitals: Visit Vitals  /70   Pulse 74   Temp 36.7 °C (98.1 °F)   Resp 12   Ht 5' 9" (1.753 m)   Wt 72.9 kg (160 lb 11.5 oz)   SpO2 (!) 91%   BMI 23.73 kg/m²     "

## 2024-10-04 PROBLEM — R79.89 ELEVATED TROPONIN: Status: RESOLVED | Noted: 2024-09-27 | Resolved: 2024-10-04

## 2024-10-04 LAB
ALBUMIN SERPL BCP-MCNC: 2.9 G/DL (ref 3.5–5.2)
ALP SERPL-CCNC: 97 U/L (ref 55–135)
ALT SERPL W/O P-5'-P-CCNC: 14 U/L (ref 10–44)
ANION GAP SERPL CALC-SCNC: 9 MMOL/L (ref 8–16)
AST SERPL-CCNC: 17 U/L (ref 10–40)
BASOPHILS # BLD AUTO: 0.03 K/UL (ref 0–0.2)
BASOPHILS NFR BLD: 0.4 % (ref 0–1.9)
BILIRUB SERPL-MCNC: 0.5 MG/DL (ref 0.1–1)
BUN SERPL-MCNC: 13 MG/DL (ref 8–23)
CALCIUM SERPL-MCNC: 9.1 MG/DL (ref 8.7–10.5)
CHLORIDE SERPL-SCNC: 105 MMOL/L (ref 95–110)
CO2 SERPL-SCNC: 22 MMOL/L (ref 23–29)
CREAT SERPL-MCNC: 0.8 MG/DL (ref 0.5–1.4)
DIFFERENTIAL METHOD BLD: ABNORMAL
EOSINOPHIL # BLD AUTO: 0.2 K/UL (ref 0–0.5)
EOSINOPHIL NFR BLD: 2.6 % (ref 0–8)
ERYTHROCYTE [DISTWIDTH] IN BLOOD BY AUTOMATED COUNT: 13.1 % (ref 11.5–14.5)
EST. GFR  (NO RACE VARIABLE): >60 ML/MIN/1.73 M^2
GLUCOSE SERPL-MCNC: 98 MG/DL (ref 70–110)
HCT VFR BLD AUTO: 35.2 % (ref 40–54)
HGB BLD-MCNC: 11.3 G/DL (ref 14–18)
IMM GRANULOCYTES # BLD AUTO: 0.12 K/UL (ref 0–0.04)
IMM GRANULOCYTES NFR BLD AUTO: 1.7 % (ref 0–0.5)
LYMPHOCYTES # BLD AUTO: 1 K/UL (ref 1–4.8)
LYMPHOCYTES NFR BLD: 14.3 % (ref 18–48)
MAGNESIUM SERPL-MCNC: 2.1 MG/DL (ref 1.6–2.6)
MCH RBC QN AUTO: 31.8 PG (ref 27–31)
MCHC RBC AUTO-ENTMCNC: 32.1 G/DL (ref 32–36)
MCV RBC AUTO: 99 FL (ref 82–98)
MONOCYTES # BLD AUTO: 0.4 K/UL (ref 0.3–1)
MONOCYTES NFR BLD: 5.7 % (ref 4–15)
NEUTROPHILS # BLD AUTO: 5.4 K/UL (ref 1.8–7.7)
NEUTROPHILS NFR BLD: 75.3 % (ref 38–73)
NRBC BLD-RTO: 0 /100 WBC
PHOSPHATE SERPL-MCNC: 3 MG/DL (ref 2.7–4.5)
PLATELET # BLD AUTO: 289 K/UL (ref 150–450)
PMV BLD AUTO: 8.6 FL (ref 9.2–12.9)
POTASSIUM SERPL-SCNC: 4.3 MMOL/L (ref 3.5–5.1)
PROT SERPL-MCNC: 5.9 G/DL (ref 6–8.4)
RBC # BLD AUTO: 3.55 M/UL (ref 4.6–6.2)
SODIUM SERPL-SCNC: 136 MMOL/L (ref 136–145)
WBC # BLD AUTO: 7.21 K/UL (ref 3.9–12.7)

## 2024-10-04 PROCEDURE — 25000003 PHARM REV CODE 250: Performed by: EMERGENCY MEDICINE

## 2024-10-04 PROCEDURE — 94799 UNLISTED PULMONARY SVC/PX: CPT

## 2024-10-04 PROCEDURE — 94640 AIRWAY INHALATION TREATMENT: CPT

## 2024-10-04 PROCEDURE — 27000207 HC ISOLATION

## 2024-10-04 PROCEDURE — 83735 ASSAY OF MAGNESIUM: CPT | Performed by: STUDENT IN AN ORGANIZED HEALTH CARE EDUCATION/TRAINING PROGRAM

## 2024-10-04 PROCEDURE — 25000242 PHARM REV CODE 250 ALT 637 W/ HCPCS: Performed by: INTERNAL MEDICINE

## 2024-10-04 PROCEDURE — 94761 N-INVAS EAR/PLS OXIMETRY MLT: CPT

## 2024-10-04 PROCEDURE — 85025 COMPLETE CBC W/AUTO DIFF WBC: CPT | Performed by: STUDENT IN AN ORGANIZED HEALTH CARE EDUCATION/TRAINING PROGRAM

## 2024-10-04 PROCEDURE — 84100 ASSAY OF PHOSPHORUS: CPT | Performed by: STUDENT IN AN ORGANIZED HEALTH CARE EDUCATION/TRAINING PROGRAM

## 2024-10-04 PROCEDURE — 36415 COLL VENOUS BLD VENIPUNCTURE: CPT | Performed by: STUDENT IN AN ORGANIZED HEALTH CARE EDUCATION/TRAINING PROGRAM

## 2024-10-04 PROCEDURE — 99233 SBSQ HOSP IP/OBS HIGH 50: CPT | Mod: ,,, | Performed by: INTERNAL MEDICINE

## 2024-10-04 PROCEDURE — 80053 COMPREHEN METABOLIC PANEL: CPT | Performed by: STUDENT IN AN ORGANIZED HEALTH CARE EDUCATION/TRAINING PROGRAM

## 2024-10-04 PROCEDURE — 25000003 PHARM REV CODE 250: Performed by: NURSE PRACTITIONER

## 2024-10-04 PROCEDURE — 99900035 HC TECH TIME PER 15 MIN (STAT)

## 2024-10-04 PROCEDURE — 21400001 HC TELEMETRY ROOM

## 2024-10-04 RX ADMIN — BUDESONIDE 0.5 MG: 0.5 INHALANT ORAL at 07:10

## 2024-10-04 RX ADMIN — MUPIROCIN: 20 OINTMENT TOPICAL at 08:10

## 2024-10-04 RX ADMIN — ARFORMOTEROL TARTRATE 15 MCG: 15 SOLUTION RESPIRATORY (INHALATION) at 07:10

## 2024-10-04 RX ADMIN — HYDROCODONE BITARTRATE AND ACETAMINOPHEN 1 TABLET: 7.5; 325 TABLET ORAL at 06:10

## 2024-10-04 RX ADMIN — HYDROCODONE BITARTRATE AND ACETAMINOPHEN 1 TABLET: 7.5; 325 TABLET ORAL at 05:10

## 2024-10-04 RX ADMIN — LINEZOLID 600 MG: 600 TABLET, FILM COATED ORAL at 08:10

## 2024-10-04 RX ADMIN — HYDROCODONE BITARTRATE AND ACETAMINOPHEN 1 TABLET: 7.5; 325 TABLET ORAL at 11:10

## 2024-10-04 RX ADMIN — LINEZOLID 600 MG: 600 TABLET, FILM COATED ORAL at 10:10

## 2024-10-04 NOTE — ASSESSMENT & PLAN NOTE
Anemia is likely due to  unclear etiology . Most recent hemoglobin and hematocrit are listed below.  Recent Labs     10/02/24  0437 10/03/24  0521 10/04/24  0514   HGB 12.3* 12.1* 11.3*   HCT 38.8* 38.1* 35.2*       Plan  - Monitor serial CBC: Daily  - Transfuse PRBC if patient becomes hemodynamically unstable, symptomatic or H/H drops below 7/21.  - Patient has not received any PRBC transfusions to date  - Patient's anemia is currently stable    Stable

## 2024-10-04 NOTE — ASSESSMENT & PLAN NOTE
-CT scan of chest with consolidations in the left upper lobe and posteromedial right lower lobe concerning for pneumonia  -white blood cell count 12.07, COVID-19 negative, afebrile    Microbiology: Sputum Culture   Lab Results   Component Value Date    GSRESP <10 epithelial cells per low power field. 09/28/2024    GSRESP No WBC's 09/28/2024    GSRESP Few Gram positive cocci 09/28/2024    GSRESP Rare Gram negative rods 09/28/2024    GSRESP Rare yeast 09/28/2024    RESPIRATORYC No Pseudomonas isolated. 09/28/2024    RESPIRATORYC (A) 09/28/2024     METHICILLIN RESISTANT STAPHYLOCOCCUS AUREUS  Rare         -Low suspicion for MRSA given MRSA nares negative, deescalated antibiotics to cefazolin.  Follow up culture  -O2 supplementation, p.r.nSuman Piña  -follow up cultures  -aspiration and fall precautions  -pulmonary consulted, following    Cont present care

## 2024-10-04 NOTE — SUBJECTIVE & OBJECTIVE
Interval History: appreciate IR and CTS- pt resting comfortably, CT in place with air leak present. Underwent L Lung Bx by IR. Tolerated procedure well. CP under control with meds. Cont present care. Per CT surgery- may need Heimlich valve upon discharge, pt has severe pan acinar emphysema- not a candidate for open lung procedure.       Review of Systems   Constitutional:  Positive for activity change and fatigue. Negative for chills, diaphoresis and fever.   HENT: Negative.     Eyes: Negative.    Respiratory:  Positive for cough. Negative for chest tightness, shortness of breath and wheezing.    Cardiovascular:  Positive for chest pain.   Gastrointestinal: Negative.    Endocrine: Negative.    Genitourinary: Negative.    Musculoskeletal:  Positive for gait problem.   Skin: Negative.    Allergic/Immunologic: Negative.    Hematological: Negative.    Psychiatric/Behavioral: Negative.       Objective:     Vital Signs (Most Recent):  Temp: 97.4 °F (36.3 °C) (10/03/24 1950)  Pulse: 103 (10/03/24 1950)  Resp: 18 (10/03/24 2009)  BP: 129/80 (10/03/24 1950)  SpO2: (!) 92 % (10/03/24 1950) Vital Signs (24h Range):  Temp:  [97.4 °F (36.3 °C)-98.4 °F (36.9 °C)] 97.4 °F (36.3 °C)  Pulse:  [] 103  Resp:  [12-18] 18  SpO2:  [91 %-94 %] 92 %  BP: (112-129)/(69-80) 129/80     Weight: 72.9 kg (160 lb 11.5 oz)  Body mass index is 23.73 kg/m².    Intake/Output Summary (Last 24 hours) at 10/3/2024 2012  Last data filed at 10/3/2024 0654  Gross per 24 hour   Intake --   Output 950 ml   Net -950 ml         Physical Exam  Vitals and nursing note reviewed.   Constitutional:       General: He is awake. He is not in acute distress.     Appearance: He is well-developed. He is not ill-appearing, toxic-appearing or diaphoretic.      Comments: On RA   HENT:      Head: Normocephalic and atraumatic.      Right Ear: External ear normal.      Left Ear: External ear normal.      Nose: Nose normal.      Mouth/Throat:      Mouth: Mucous membranes  are moist.      Pharynx: Oropharynx is clear.   Eyes:      General: No scleral icterus.        Right eye: No discharge.         Left eye: No discharge.      Extraocular Movements: Extraocular movements intact.      Conjunctiva/sclera: Conjunctivae normal.      Pupils: Pupils are equal, round, and reactive to light.   Cardiovascular:      Rate and Rhythm: Normal rate and regular rhythm.      Pulses: Normal pulses.      Heart sounds: Normal heart sounds.   Pulmonary:      Effort: Pulmonary effort is normal. No respiratory distress.      Breath sounds: Decreased breath sounds present. No wheezing, rhonchi or rales.      Comments: R CT in place connected to suction, continuous air leak noted  Abdominal:      General: Abdomen is flat. Bowel sounds are normal.      Palpations: Abdomen is soft.      Tenderness: There is no abdominal tenderness.   Genitourinary:     Comments: deferred  Musculoskeletal:         General: Tenderness present. No swelling. Normal range of motion.      Cervical back: Normal range of motion and neck supple. No rigidity.      Right lower leg: No edema.      Left lower leg: No edema.   Skin:     General: Skin is warm and dry.      Capillary Refill: Capillary refill takes less than 2 seconds.      Coloration: Skin is not jaundiced.   Neurological:      General: No focal deficit present.      Mental Status: He is alert and oriented to person, place, and time.   Psychiatric:         Mood and Affect: Mood normal.         Behavior: Behavior normal. Behavior is cooperative.         Thought Content: Thought content normal.         Judgment: Judgment normal.             Significant Labs: All pertinent labs within the past 24 hours have been reviewed.  BMP:   Recent Labs   Lab 10/03/24  0521         K 4.0      CO2 21*   BUN 11   CREATININE 0.7   CALCIUM 9.2   MG 2.2     CBC:   Recent Labs   Lab 10/02/24  0437 10/03/24  0521   WBC 7.73 6.61   HGB 12.3* 12.1*   HCT 38.8* 38.1*    324        Significant Imaging: I have reviewed all pertinent imaging results/findings within the past 24 hours.

## 2024-10-04 NOTE — PLAN OF CARE
10/04/24 1521   Rounds   Attendance Provider;Nurse ;Charge nurse   Discharge Plan A Home with family   Why the patient remains in the hospital Requires continued medical care   Transition of Care Barriers None     Remains with chest tube with air leak

## 2024-10-04 NOTE — ASSESSMENT & PLAN NOTE
-CT scan of chest with consolidations in the left upper lobe and posteromedial right lower lobe concerning for pneumonia  -white blood cell count 12.07, COVID-19 negative, afebrile    Microbiology: Sputum Culture   Lab Results   Component Value Date    GSRESP <10 epithelial cells per low power field. 09/28/2024    GSRESP No WBC's 09/28/2024    GSRESP Few Gram positive cocci 09/28/2024    GSRESP Rare Gram negative rods 09/28/2024    GSRESP Rare yeast 09/28/2024    RESPIRATORYC No Pseudomonas isolated. 09/28/2024    RESPIRATORYC (A) 09/28/2024     METHICILLIN RESISTANT STAPHYLOCOCCUS AUREUS  Rare         -Low suspicion for MRSA given MRSA nares negative, deescalated antibiotics to cefazolin.  Follow up culture  -O2 supplementation, p.r.nSuman Piña  -follow up cultures  -aspiration and fall precautions  -pulmonary consulted, following    Cont present care    Improving,no obvious pneumonia now, Sputum Cx growing MRSA- switched to PO Zyvox

## 2024-10-04 NOTE — ASSESSMENT & PLAN NOTE
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

## 2024-10-04 NOTE — ASSESSMENT & PLAN NOTE
"-pigtail catheter/chest tube in place, continuous suction  -chest x-ray on ER arrival with large left-sided pneumothorax  -CT scan of chest with trace left apical pneumothorax   -While on suction, repeat chest x-ray noted concerns for accumulation  -CXR 09/29 noted, "near complete resolution of the left pneumothorax, with only a trace pneumothorax currently. "  -CXR 9/30 noted "No significant pneumothorax.".   -CXR 10/1 noted "No residual pneumothorax".     Underwent placement of left-sided 28F chest tube on 10/1 by Cardiothoracic surgery.       -Pulmonology and Cardiothoracic surgery consulted, follow-up recs  -O2 via 2 L nasal cannula.  Wean as tolerated to target SaO2 88-92%    Still has air leak- CT to Waterseal- cont    Cont present care- may need Heimlich  "

## 2024-10-04 NOTE — ASSESSMENT & PLAN NOTE
- likely some residual findings from his recent infection and hospitalization  - MRSA swab negative, sputum culture with MRSA - on zosyn   - PT/OT, IS, OOB, ambulate

## 2024-10-04 NOTE — PROGRESS NOTES
[facility] Medicine  Progress Note    Patient Name: Aurelio Daniels  MRN: [id number]  Patient Class: IP- Inpatient   Admission Date: 9/26/2024  Length of Stay: 7 days  Attending Physician: Apurva Singletary MD  Primary Care Provider: Shlomo Villatoro MD        Subjective:     Principal Problem:Spontaneous pneumothorax        HPI:  68-year-old man with history of prostate cancer, tobacco abuse (quit 1-1/2 once ago), COPD (discharged with home oxygen via 2 L nasal cannula as of 09/17/2024), centrilobular emphysema, hypertension, anemia, and recent bilateral lower lobe pneumonia who presented to the emergency department for acute onset shortness a breath after a coughing spell.  Symptom onset occurred after the football game at approximately 10:00 p.m. in the evening, constant, and moderate-to-severe in nature.  Patient denies any associated chest pain, nausea, vomiting, fevers, chills.  Patient was originally 82% on room air on ER arrival.  He improved with nebulizer treatment, steroids, and CPAP.  Chest x-ray revealed left pneumothorax and pigtail catheter/chest tube was placed by ER physician.  Repeat chest x-ray showed improved lung inflation however there were significant abnormalities with underlying pneumonia and possible lung mass.  Stat CT scan of chest with contrast was ordered and showed spiculated left perihilar mass 3.7 x 2.5 x 3.6 cm suspicious for neoplasm; left upper lobe and posteromedial right lower lobe consolidations concerning for pneumonia; nonspecific mildly prominent mediastinal and left hilar lymph nodes with metastases possible; small hypodensity in right liver that is nonspecific but metastases not excluded; osseous metastases not excluded; small left and trace right pleural effusion; trace left apical pneumothorax; emphysematous changes with bulla in the left upper lobe.  Patient's wife is a retired nurse and they have been  for 25 years.  Patient  "still works as a  for [employer].    Recent hospital admit 9/13-09/17/2024 with bilateral pneumonia, hypoxia.  Chest x-ray 09/13/2024 with multifocal interstitial pneumonia.  Patient was discharged with Augmentin.  Patient was discharged with home oxygen via 2 L nasal cannula.  He had previously not required oxygen.  Of note, chest x-ray on 09/09/2024 was read as negative for an acute process.    Patient was seen by primary care physician 09/24/2024 with repeat CXR showing "Findings suspicious for collapse of the left upper lobe with persistent consolidations and atelectasis on the left. Additionally a opaque masses seen at the level of the midthoracic spine. Recommend follow-up evaluation with CT with contrast."  Primary care physician reports speaking with patient's pulmonologist and with plan to order CT scan of chest without contrast as soon as possible.        Overview/Hospital Course:  Hospital medicine for evaluation of pneumothorax after placement of chest tube in ED.   Repeat chest x-ray noted concerns for reaccumulation of pneumothorax despite ongoing suction drainage, suspect likely secondary to ruptured bleb leading to continuous air leak with high concerns for bronchopleural fistula.  CT imaging also noted concerns for consolidation in the left upper lobe and posteromedial right lower lobe concerning for pneumonia, labs with worsening leukocytosis, so started on broad-spectrum antibiotics given risk factors of recent hospitalization.  MRSA cultures pending. Repeat chest x-ray 9/29 noted "near complete resolution of the left pneumothorax, with only a trace pneumothorax currently. " CXR 9/30 noted "No significant pneumothorax.". CXR 10/1 noted "No residual pneumothorax". Underwent placement of left-sided 28F chest tube on 10/1 by Cardiothoracic surgery. Pulmonology and Cardiothoracic surgery following. Low suspicion for MRSA given MRSA nares negative, deescalated antibiotics to " cefazolin.     10/2- looks and feels better, no CP, SOB better since large CT placed by Dr. Gavin yesterday, connected to water seal, air leak still present. IR could not perform Lung Bx today, hence tomorrow. Pt agreeable  and otherwise comfortable. Pain controlled with analgesics. NPO again after MN tonight.      10/3- appreciate IR and CTS- pt resting comfortably, CT in place with air leak present. Underwent L Lung Bx by IR. Tolerated procedure well. CP under control with meds. Cont present care. Per CT surgery- may need Heimlich valve upon discharge, pt has severe pan acinar emphysema- not a candidate for open lung procedure.       10/4- appreciate Dr. Rodriguez- doing better, still connected to Chest tube with -20 cm suction via waterseal. Air leak a little better today. L sided CP also better controlled. He ambulated twice in the halls today. Sputum Cx yesterday grew MRSA- hence Ancef changed to oral Zyvox. Remains HD stable, pleasant, eating drinking well.      Interval History: appreciate Dr. Rodriguez- doing better, still connected to Chest tube with -20 cm suction via waterseal. Air leak a little better today. L sided CP also better controlled. He ambulated twice in the halls today. Sputum Cx yesterday grew MRSA- hence Ancef changed to oral Zyvox. Remains HD stable, pleasant, eating drinking well.      Review of Systems   Constitutional:  Positive for activity change and fatigue. Negative for chills, diaphoresis and fever.   HENT: Negative.     Eyes: Negative.    Respiratory:  Positive for cough. Negative for chest tightness, shortness of breath and wheezing.    Cardiovascular:  Positive for chest pain.   Gastrointestinal: Negative.    Endocrine: Negative.    Genitourinary: Negative.    Musculoskeletal:  Negative for gait problem.   Skin: Negative.    Allergic/Immunologic: Negative.    Hematological: Negative.    Psychiatric/Behavioral: Negative.       Objective:     Vital Signs (Most Recent):  Temp: 97.7 °F  "(36.5 °C) (10/04/24 0800)  Pulse: 66 (10/04/24 0821)  Resp: 18 (10/04/24 1132)  BP: 118/71 (10/04/24 0800)  SpO2: (!) 90 % (10/04/24 0800) Vital Signs (24h Range):  Temp:  [97.4 °F (36.3 °C)-98 °F (36.7 °C)] 97.7 °F (36.5 °C)  Pulse:  [] 66  Resp:  [17-18] 18  SpO2:  [90 %-96 %] 90 %  BP: (112-129)/(67-80) 118/71     Weight: 72.9 kg (160 lb 11.5 oz)  Body mass index is 23.73 kg/m².    Intake/Output Summary (Last 24 hours) at 10/4/2024 1546  Last data filed at 10/4/2024 0656  Gross per 24 hour   Intake --   Output 110 ml   Net -110 ml         Physical Exam  Vitals and nursing note reviewed.   Constitutional:       General: He is awake. He is not in acute distress.     Appearance: Normal appearance. He is well-developed. He is not ill-appearing.   Pulmonary:      Effort: Pulmonary effort is normal.      Comments: On RA with sat of 95%  Chest:      Comments: L CT with air leak - seems less brisk than previous, remains connected to suction   Neurological:      Mental Status: He is alert.   Psychiatric:         Behavior: Behavior is cooperative.             Significant Labs: All pertinent labs within the past 24 hours have been reviewed.  BMP:   Recent Labs   Lab 10/04/24  0514   GLU 98      K 4.3      CO2 22*   BUN 13   CREATININE 0.8   CALCIUM 9.1   MG 2.1     CBC:   Recent Labs   Lab 10/03/24  0521 10/04/24  0514   WBC 6.61 7.21   HGB 12.1* 11.3*   HCT 38.1* 35.2*    289     Respiratory Culture: MRSA- from 9/28-    Significant Imaging: I have reviewed all pertinent imaging results/findings within the past 24 hours.    Assessment/Plan:      * Spontaneous pneumothorax  -pigtail catheter/chest tube in place, continuous suction  -chest x-ray on ER arrival with large left-sided pneumothorax  -CT scan of chest with trace left apical pneumothorax   -While on suction, repeat chest x-ray noted concerns for accumulation  -CXR 09/29 noted, "near complete resolution of the left pneumothorax, with only a " "trace pneumothorax currently. "  -CXR 9/30 noted "No significant pneumothorax.".   -CXR 10/1 noted "No residual pneumothorax".     Underwent placement of left-sided 28F chest tube on 10/1 by Cardiothoracic surgery.       -Pulmonology and Cardiothoracic surgery consulted, follow-up recs  -O2 via 2 L nasal cannula.  Wean as tolerated to target SaO2 88-92%    Still has air leak- CT to Waterseal- cont    Cont present care- may need Heimlich    10/4- looks a little better, less air leak today on same Suction   cont    Centrilobular emphysema  Patient's COPD is controlled currently.  Patient is currently off COPD Pathway. Continue p.r.n. DuoNebs, O2 supplementation, and antibiotics for pneumonia.  monitor respiratory status closely.   -patient did receive DuoNeb, prednisone 60 mg p.o., and terbutaline 0.25 mg while in the emergency department.    Cont present care    Bilateral pneumonia  -CT scan of chest with consolidations in the left upper lobe and posteromedial right lower lobe concerning for pneumonia  -white blood cell count 12.07, COVID-19 negative, afebrile    Microbiology: Sputum Culture   Lab Results   Component Value Date    GSRESP <10 epithelial cells per low power field. 09/28/2024    GSRESP No WBC's 09/28/2024    GSRESP Few Gram positive cocci 09/28/2024    GSRESP Rare Gram negative rods 09/28/2024    GSRESP Rare yeast 09/28/2024    RESPIRATORYC No Pseudomonas isolated. 09/28/2024    RESPIRATORYC (A) 09/28/2024     METHICILLIN RESISTANT STAPHYLOCOCCUS AUREUS  Rare         -Low suspicion for MRSA given MRSA nares negative, deescalated antibiotics to cefazolin.  Follow up culture  -O2 supplementation, p.r.n. DuoNebs  -follow up cultures  -aspiration and fall precautions  -pulmonary consulted, following    Cont present care    Improving,no obvious pneumonia now, Sputum Cx growing MRSA- switched to PO Zyvox    Mass of left lung  Left lung mass with presumed lung cancer with possible metastases  -CT scan of chest " shows left perihilar mass 3.7 x 2.5 x 3.6 cm concerning for neoplasm; nonspecific mildly prominent mediastinal and left hilar lymph nodes with metastases possible; small hypodensity in right liver that is nonspecific but metastases not excluded; osseous metastases not excluded      -pulmonary consulted, plans for future evaluation after respiratory status is more stable    Await Lung Bx tomorrow by IR    S/p Lung Bx today    Tobacco abuse  Patient has not smoked in 1-1/2 months.  Counseled continued cessation.  No nicotine patch needed.      Macrocytic anemia  Anemia is likely due to  unclear etiology . Most recent hemoglobin and hematocrit are listed below.  Recent Labs     10/02/24  0437 10/03/24  0521 10/04/24  0514   HGB 12.3* 12.1* 11.3*   HCT 38.8* 38.1* 35.2*       Plan  - Monitor serial CBC: Daily  - Transfuse PRBC if patient becomes hemodynamically unstable, symptomatic or H/H drops below 7/21.  - Patient has not received any PRBC transfusions to date  - Patient's anemia is currently stable    Stable      Prostate cancer  High-risk prostate cancer  -seen by Urology Dr. Smith 07/10/2024 with plans for follow up visit in 6 months and repeat PSA  -status post radiation therapy and ADT, PSA undetectable  -done with 2 years of Lupron      Primary hypertension  Chronic, controlled. Latest blood pressure and vitals reviewed-     Temp:  [97.3 °F (36.3 °C)-98 °F (36.7 °C)]   Pulse:  [70-97]   Resp:  [16-20]   BP: (114-138)/(66-81)   SpO2:  [91 %-97 %] .   Home meds for hypertension were reviewed and noted below.   Hypertension Medications               valsartan (DIOVAN) 80 MG tablet Take 1 tablet (80 mg total) by mouth once daily.            While in the hospital, will manage blood pressure as follows; Adjust home antihypertensive regimen as follows- normotensive on admission, resume home medications as appropriate; P.r.n. IV hydralazine with parameters.    Will utilize p.r.n. blood pressure medication only if  "patient's blood pressure greater than 180/110 and he develops symptoms such as worsening chest pain or shortness of breath.    Acute on chronic respiratory failure with hypoxia  Patient with Hypoxic Respiratory failure which is Acute on chronic.  he is on home oxygen at 2 LPM. Supplemental oxygen was provided and noted- Oxygen Concentration (%):  [28] 28    .   Signs/symptoms of respiratory failure include- tachypnea. Contributing diagnoses includes - COPD, Pneumonia, and pneumothorax  Labs and images were reviewed. Patient Has recent ABG, which has been reviewed. Will treat underlying causes and adjust management of respiratory failure as follows:      -continuous pulse oximetry monitoring, O2 supplementation, p.r.n. DuoNebs  -left-sided chest tube/pigtail catheter in place  -See "Bilateral pneumonia" A&P. Follow up cultures and deescalate antibiotics as appropriate  -pulmonary and Cardiothoracic surgery consulted, follow-up recs    stable      VTE Risk Mitigation (From admission, onward)           Ordered     Reason for No Pharmacological VTE Prophylaxis  Once        Comments: No Lovenox until patient is seen by Pulmonary as they may decide to pursue lung biopsy   Question:  Reasons:  Answer:  Physician Provided (leave comment)    09/27/24 0401     IP VTE HIGH RISK PATIENT  Once         09/27/24 0401     Place sequential compression device  Until discontinued         09/27/24 0401                    Discharge Planning   YUE:      Code Status: Full Code   Is the patient medically ready for discharge?:     Reason for patient still in hospital (select all that apply): Patient trending condition, Laboratory test, Treatment, Imaging, and Consult recommendations  Discharge Plan A: Home with family        Apurva Singletary MD  Department of Hospital Medicine   [facility] - Telemetry (Hospital)    "

## 2024-10-04 NOTE — PROGRESS NOTES
Subjective:      Patient ID: Aurelio Daniels is a 68 y.o. male.    Chief Complaint: Shortness of Breath (Episode of SOB that started after a coughing fit tonight. Recently admitted for pneumonia. 82% SpO2 upon EMS arrival with improvement after breathing treatment, steroids, and CPAP.)    HPI: 68-year-old man with history of prostate cancer, tobacco abuse (quit 1-1/2 once ago), COPD (discharged with home oxygen via 2 L nasal cannula as of 09/17/2024), centrilobular emphysema, hypertension, anemia, and recent bilateral lower lobe pneumonia who presented to the emergency department for acute onset shortness a breath after a coughing spell.   The patient was admitted through the emergency room had a pigtail catheter placed for pneumothorax on the left side the pneumothorax resolved.  Continues to have continuous air leak.  CT scan showed a large bullous lesion and a 3.2 cm spiculated mass in the right upper lobe.  Central location  Bilateral pneumonias and compressive atelectasis.  Multiple mediastinal lymphadenopathy.  The patient feels better after the pigtail catheter.  Catheter continues to have air leak  Repeat chest x-ray shows expanded lung fields mostly with areas of compressive atelectasis pneumothorax resolved.  The pigtail catheter still has a massive air leak continuous.    10/02/2024 patient has pain over the chest tube insertion site continuous air leak from the chest tube and serous drainage subcutaneous emphysema on 3 L nasal cannula, not in distress.  Otherwise hemodynamically stable  10/03/2024 the patient continues to have air leak pain well controlled.  10/04/2024 patient is having lesser air leak from the chest tube and subcutaneous emphysema./he is stable otherwise hemodynamically and satting more than 95 on 2 L nasal cannula.  Chest tube output is minimal pain well controlled    Review of patient's allergies indicates:  No Known Allergies    Past Medical History:   Diagnosis Date    Cancer      Prostate    Tobacco use        Family History   Problem Relation Name Age of Onset    No Known Problems Mother      Heart disease Father      No Known Problems Brother      No Known Problems Brother      No Known Problems Daughter      No Known Problems Daughter         Social History     Socioeconomic History    Marital status:      Spouse name: YUE    Number of children: 2   Tobacco Use    Smoking status: Some Days     Current packs/day: 0.00     Types: Cigarettes     Last attempt to quit: 1/15/2022     Years since quittin.7     Passive exposure: Current    Smokeless tobacco: Never   Substance and Sexual Activity    Alcohol use: Yes     Alcohol/week: 2.0 standard drinks of alcohol     Types: 2 Shots of liquor per week    Drug use: Never    Sexual activity: Not Currently     Partners: Female     Social Drivers of Health     Financial Resource Strain: Low Risk  (2024)    Overall Financial Resource Strain (CARDIA)     Difficulty of Paying Living Expenses: Not hard at all   Food Insecurity: No Food Insecurity (2024)    Hunger Vital Sign     Worried About Running Out of Food in the Last Year: Never true     Ran Out of Food in the Last Year: Never true   Transportation Needs: No Transportation Needs (2024)    TRANSPORTATION NEEDS     Transportation : No   Physical Activity: Inactive (2024)    Exercise Vital Sign     Days of Exercise per Week: 0 days     Minutes of Exercise per Session: 0 min   Stress: No Stress Concern Present (2024)    [employer] - Occupational Stress Questionnaire     Feeling of Stress : Not at all   Housing Stability: Low Risk  (2024)    Housing Stability Vital Sign     Unable to Pay for Housing in the Last Year: No     Homeless in the Last Year: No       Past Surgical History:   Procedure Laterality Date    COLONOSCOPY      COLONOSCOPY N/A 2022    Procedure: COLONOSCOPY;  Surgeon: Jamia Alfaro MD;  Location: [facility]  "ENDO;  Service: Endoscopy;  Laterality: N/A;    VASECTOMY  1985       Review of Systems   Constitutional:  Positive for activity change. Negative for appetite change.   HENT:  Negative for dental problem, nosebleeds and sore throat.    Eyes:  Negative for discharge and visual disturbance.   Respiratory:  Positive for chest tightness, shortness of breath and wheezing. Negative for cough and stridor.    Cardiovascular:  Negative for leg swelling.   Gastrointestinal:  Negative for abdominal distention and abdominal pain.   Genitourinary:  Negative for difficulty urinating and dysuria.   Musculoskeletal:  Negative for arthralgias, back pain and joint swelling.   Allergic/Immunologic: Negative for environmental allergies.   Neurological:  Negative for dizziness, syncope and headaches.   Hematological:  Does not bruise/bleed easily.   Psychiatric/Behavioral:  Negative for behavioral problems.           Objective:   /67 (BP Location: Right arm, Patient Position: Lying)   Pulse 66   Temp 98 °F (36.7 °C) (Oral)   Resp 18   Ht 5' 9" (1.753 m)   Wt 72.9 kg (160 lb 11.5 oz)   SpO2 (!) 92%   BMI 23.73 kg/m²     X-Ray Chest 1 View  Narrative: EXAMINATION:  XR CHEST 1 VIEW    CLINICAL HISTORY:  2 hour s/p left lung biopsy. inspiration. show to radiologist.;    TECHNIQUE:  Single frontal portable view of the chest was performed.    COMPARISON:  Earlier imaging same date    FINDINGS:  Left pneumothorax similar size and chest tube similar position.  Extensive chest wall emphysema redemonstrated.  Partial collapse left lung similar.  Right lung well aerated and no shift of the mediastinum  Impression: No adverse finding persistent left pneumothorax with chest tube    Electronically signed by: Dianne Hollins  Date:    10/03/2024  Time:    17:03  CT Guided Needle Placement  Narrative: EXAMINATION:  CT GUIDED NEEDLE PLACEMENT    CLINICAL HISTORY:  left lung mass biopsy with MAC;    TECHNIQUE:  The CT exam was performed " using one or more of the following dose reduction techniques- Automated exposure control, adjustment of the mA and/or kV according to patient size, and/or use of iterative reconstructed technique.    All CT scans at this facility use dose modulation, iterative reconstruction, and/or weight based dosing when appropriate to reduce radiation dose to as low as reasonable achievable.    Medications:    Anesthesia was provided by the anesthesia department.    1% lidocaine locally    : SANTIAGO Wells    Complications: None immediate    COMPARISON:  None    FINDINGS:  Procedure: The risks and benefits of this procedure were discussed, all questions were answered and informed consent was obtained.  The patient was placed supine on the CT gantry.  Preprocedural imaging was performed.  A suitable skin site for biopsy was selected.  IV fentanyl and Versed was administered for conscious sedation by a trained observer.  The skin site was prepped and draped in sterile fashion.  The skin was anesthetized with 1% lidocaine.    Using CT guidance a 19 gauge introducer needle was guided into the mass.  Prior to biopsy appropriate needle positioning was confirmed with CT. Four 20 gauge samples were acquired.  The stylet was replaced and the needle was removed.    Postprocedural imaging was acquired. The site was bandaged sterilely. The patient left the room in stable condition.    Findings:    The needle is in the left lung mass.  Impression: Technically successful CT guided lung mass biopsy.    Electronically signed by: Stew Wells  Date:    10/03/2024  Time:    16:20  X-Ray Chest 1 View  Narrative: EXAMINATION:  XR CHEST 1 VIEW    CLINICAL HISTORY:  s/p left lung bx. inspiration. show to radiologist.;    TECHNIQUE:  Single frontal view of the chest was performed.    COMPARISON:  X-ray dated 10/02/2024    FINDINGS:  Left-sided chest tube is unchanged in position.  Residual left pneumothorax was not previously conspicuous.   Trachea remains midline.  Cardiomediastinal silhouette is unchanged in size and contour.  Focal opacity in the left mid to lower lung is unchanged.  Right lung remains clear.  Bones are unchanged.  Extensive soft tissue emphysema throughout the left chest wall again noted.  Impression: Residual left pneumothorax is more conspicuous compared to prior study with chest tube appearing unchanged in position.  Unchanged left mid to lower lung opacity and extensive left chest wall soft tissue emphysema.    Electronically signed by: Primo Cameron  Date:    10/03/2024  Time:    16:04         Physical Exam  Vitals reviewed.   Constitutional:       Appearance: Normal appearance. He is ill-appearing.   HENT:      Head: Normocephalic and atraumatic.      Mouth/Throat:      Mouth: Mucous membranes are moist.   Eyes:      Extraocular Movements: Extraocular movements intact.   Cardiovascular:      Rate and Rhythm: Normal rate and regular rhythm.      Pulses: Normal pulses.      Heart sounds: Normal heart sounds.   Pulmonary:      Effort: Pulmonary effort is normal.      Breath sounds: Rhonchi and rales present.      Comments: Left-sided chest tube has a massive air leak continuous  Abdominal:      Palpations: Abdomen is soft.   Musculoskeletal:         General: Normal range of motion.      Cervical back: Normal range of motion and neck supple.   Skin:     General: Skin is warm.      Capillary Refill: Capillary refill takes less than 2 seconds.   Neurological:      General: No focal deficit present.      Mental Status: He is alert and oriented to person, place, and time.   Psychiatric:         Mood and Affect: Mood normal.         Assessment:     1. Acute on chronic respiratory failure with hypoxemia    2. Pneumothorax on left    3. Troponin I above reference range    4. Mass of left lung    5. SOB (shortness of breath)    6. Chest pain    7. Elevated troponin I level          Plan   68-year-old male with a right upper lobe mass  centrally located with bilateral pan acinar emphysema COPD and pneumonic consolidation.  Chest tube continuous air leak getting better  Aggressive pulmonary toilet incentive spirometry  Bronchopleural fistula may have to convert the chest tube to a Heimlich valve upon discharge  Percutaneous biopsy plan today.  Out of bed ambulate the patient chest tube can be discontinued while ambulating and connected back to -20 cm of suction upon returned to the bed.  DVT prophylaxis.  Bilateral SCDs        Romaine Rodriguez MD  [facility]  [place]

## 2024-10-04 NOTE — PROGRESS NOTES
[facility] - Tele[facility] ([facility])  Pulmonology  Progress Note    Patient Name: Aurelio Daniels  MRN: [id number]  Admission Date: 9/26/2024  Hospital Length of Stay: 7 days  Code Status: Full Code  Attending Provider: Apurva Singletary MD  Primary Care Provider: Shlomo Villatoro MD   Principal Problem: Spontaneous pneumothorax    Subjective:     Mr. Daniels is a 67 y/o gentleman with a long smoking history, COPD (recently established with Dr Jimenez), prior prostate cancer, and HTN who is currently admitted to the Hospitalist service after coming in overnight with shortness of breath and being found to have a large left sided PTX.  Pt was recently admitted to the hospital for PNA (9/13 - 9/17) and was recovering well from that when he suddenly had a large coughing fit last night.  Became markedly short of breath after this, which prompted his presentation to the ER.  Chest tube was placed by the ER physician with good re-expansion, though he continues to have a brisk air leak.    CT after the chest tube placement shows trace remaining pneumothorax, large bleb on the left, approx 3.5 cm spiculated mass on the left, and some remaining bilateral consolidations.  Mr Daniels says that he is feeling much better after the chest tube placement, though still with some cough.    Interval History:  9/28 - continuous air leak with some reaccumulation on CXR late this morning.   Adjusted tube to try and optimize drainage.  It was a bit kinked at the skin, so resecured in a better position. Remains on suction.  9/29 - re-expansion improved following tube adjustments yesterday.  Continuous brisk air leak remains on suction.  No  new complaints.  9/30: CXR with lung up, continues with large volume air leak with and without suction, CTS consulted for eval and recs, no new issues or c/o noted on exam, POC and questions answered with pt and his wife  10/1: no acute events overnight, pt continues with air leak to chest tube, detailed  conversation with pt and wife this AM about treatment and possible plans moving forward - all questions answered wife bedside  10/2: on RA today, pain better controlled with PO meds, plans for IR CT guided lung bx today   10/4: remains on RA, feels better, s/p CT guided lung bx 10/3, CXR with extensive SQ air, CT remains with air leak but seems a little less agressive than before     ROS complete and negative unless stated in the interval HPI     Objective:     Vital Signs (Most Recent):  Temp: 97.7 °F (36.5 °C) (10/04/24 0800)  Pulse: 66 (10/04/24 0821)  Resp: 17 (10/04/24 0800)  BP: 118/71 (10/04/24 0800)  SpO2: (!) 90 % (10/04/24 0800) Vital Signs (24h Range):  Temp:  [97.4 °F (36.3 °C)-98.3 °F (36.8 °C)] 97.7 °F (36.5 °C)  Pulse:  [] 66  Resp:  [12-18] 17  SpO2:  [90 %-96 %] 90 %  BP: (112-129)/(67-80) 118/71     Weight: 72.9 kg (160 lb 11.5 oz)  Body mass index is 23.73 kg/m².      Intake/Output Summary (Last 24 hours) at 10/4/2024 1044  Last data filed at 10/4/2024 0656  Gross per 24 hour   Intake --   Output 110 ml   Net -110 ml        Physical Exam  Vitals reviewed.   Constitutional:       General: He is awake. He is not in acute distress.     Appearance: Normal appearance. He is well-developed. He is not ill-appearing.   Pulmonary:      Effort: Pulmonary effort is normal.      Comments: On RA with sat of 95%  Chest:      Comments: L CT with air leak - seems less brisk than previous, remains connected to suction   Neurological:      Mental Status: He is alert.   Psychiatric:         Behavior: Behavior is cooperative.           Vents:  Oxygen Concentration (%): 28 (10/1955)    Lines/Drains/Airways       Drain  Duration                  Chest Tube 09/27/24 0130 Left Midaxillary 7 days    Male External Urinary Catheter 09/29/24 1000 Medium 5 days              Peripheral Intravenous Line  Duration                  Peripheral IV - Single Lumen 10/01/24 0920 20 G Anterior;Left Forearm 3 days          "           Significant Labs:    CBC/Anemia Profile:  Recent Labs   Lab 10/03/24  0521 10/04/24  0514   WBC 6.61 7.21   HGB 12.1* 11.3*   HCT 38.1* 35.2*    289   * 99*   RDW 13.4 13.1        Chemistries:  Recent Labs   Lab 10/03/24  0521 10/04/24  0514    136   K 4.0 4.3    105   CO2 21* 22*   BUN 11 13   CREATININE 0.7 0.8   CALCIUM 9.2 9.1   ALBUMIN 3.0* 2.9*   PROT 6.1 5.9*   BILITOT 0.5 0.5   ALKPHOS 109 97   ALT 17 14   AST 15 17   MG 2.2 2.1   PHOS 3.1 3.0       All pertinent labs within the past 24 hours have been reviewed.    Significant Imaging:  I have reviewed all pertinent imaging results/findings within the past 24 hours.    ABG  No results for input(s): "PH", "PO2", "PCO2", "HCO3", "BE" in the last 168 hours.  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  he is not on home oxygen. Supplemental oxygen was provided and noted- Oxygen Concentration (%):  [28] 28. Signs/symptoms of respiratory failure include- respiratory distress. Contributing diagnoses includes - COPD and pneumothorax  Labs and images were reviewed. Patient Has recent ABG, which has been reviewed. Will treat underlying causes and adjust management of respiratory failure as follows  - chest tube in place with resolution of ptx, continued with air leak but seem less brisk than previous, CTS managing  - CXR reviewed, extensive SQ air   - wean O2 for goal SpO2 > 88%, on RA  - avoid high flow oxygen or NIPPV given PTX and ongoing air leak    Mass of left lung  - s/p CT guided bx 10/3 per IR    Bilateral pneumonia  - likely some residual findings " from his recent infection and hospitalization  - MRSA swab negative, sputum culture with MRSA - on zosyn   - PT/OT, IS, OOB, ambulate       Centrilobular emphysema  Patient's COPD is controlled currently.  Patient is currently off COPD Pathway. Continue scheduled inhalers  Pulmicort/Brovana nebs and Supplemental oxygen and monitor respiratory status closely.   - follow with Dr. Jimenez in clinic, last saw 9/5/24  - not in acute exac, monitor     Other  Tobacco abuse  - counseled on cessation and he is motivated to quit  - declined nicotine patch, order if he changes his mind        Tha Cali MD  Pulmonology  [facility] - Tele[facility] ([facility])

## 2024-10-04 NOTE — SUBJECTIVE & OBJECTIVE
Mr. Daniels is a 67 y/o gentleman with a long smoking history, COPD (recently established with Dr Jimenez), prior prostate cancer, and HTN who is currently admitted to the Hospitalist service after coming in overnight with shortness of breath and being found to have a large left sided PTX.  Pt was recently admitted to the hospital for PNA (9/13 - 9/17) and was recovering well from that when he suddenly had a large coughing fit last night.  Became markedly short of breath after this, which prompted his presentation to the ER.  Chest tube was placed by the ER physician with good re-expansion, though he continues to have a brisk air leak.    CT after the chest tube placement shows trace remaining pneumothorax, large bleb on the left, approx 3.5 cm spiculated mass on the left, and some remaining bilateral consolidations.  Mr Daniels says that he is feeling much better after the chest tube placement, though still with some cough.    Interval History:  9/28 - continuous air leak with some reaccumulation on CXR late this morning.   Adjusted tube to try and optimize drainage.  It was a bit kinked at the skin, so resecured in a better position. Remains on suction.  9/29 - re-expansion improved following tube adjustments yesterday.  Continuous brisk air leak remains on suction.  No  new complaints.  9/30: CXR with lung up, continues with large volume air leak with and without suction, CTS consulted for eval and recs, no new issues or c/o noted on exam, POC and questions answered with pt and his wife  10/1: no acute events overnight, pt continues with air leak to chest tube, detailed conversation with pt and wife this AM about treatment and possible plans moving forward - all questions answered wife bedside  10/2: on RA today, pain better controlled with PO meds, plans for IR CT guided lung bx today   10/4: remains on RA, feels better, s/p CT guided lung bx 10/3, CXR with extensive SQ air, CT remains with air leak but seems a  little less agressive than before     ROS complete and negative unless stated in the interval HPI     Objective:     Vital Signs (Most Recent):  Temp: 97.7 °F (36.5 °C) (10/04/24 0800)  Pulse: 66 (10/04/24 0821)  Resp: 17 (10/04/24 0800)  BP: 118/71 (10/04/24 0800)  SpO2: (!) 90 % (10/04/24 0800) Vital Signs (24h Range):  Temp:  [97.4 °F (36.3 °C)-98.3 °F (36.8 °C)] 97.7 °F (36.5 °C)  Pulse:  [] 66  Resp:  [12-18] 17  SpO2:  [90 %-96 %] 90 %  BP: (112-129)/(67-80) 118/71     Weight: 72.9 kg (160 lb 11.5 oz)  Body mass index is 23.73 kg/m².      Intake/Output Summary (Last 24 hours) at 10/4/2024 1044  Last data filed at 10/4/2024 0656  Gross per 24 hour   Intake --   Output 110 ml   Net -110 ml        Physical Exam  Vitals reviewed.   Constitutional:       General: He is awake. He is not in acute distress.     Appearance: Normal appearance. He is well-developed. He is not ill-appearing.   Pulmonary:      Effort: Pulmonary effort is normal.      Comments: On RA with sat of 95%  Chest:      Comments: L CT with air leak - seems less brisk than previous, remains connected to suction   Neurological:      Mental Status: He is alert.   Psychiatric:         Behavior: Behavior is cooperative.           Vents:  Oxygen Concentration (%): 28 (10/1955)    Lines/Drains/Airways       Drain  Duration                  Chest Tube 09/27/24 0130 Left Midaxillary 7 days    Male External Urinary Catheter 09/29/24 1000 Medium 5 days              Peripheral Intravenous Line  Duration                  Peripheral IV - Single Lumen 10/01/24 0920 20 G Anterior;Left Forearm 3 days                    Significant Labs:    CBC/Anemia Profile:  Recent Labs   Lab 10/03/24  0521 10/04/24  0514   WBC 6.61 7.21   HGB 12.1* 11.3*   HCT 38.1* 35.2*    289   * 99*   RDW 13.4 13.1        Chemistries:  Recent Labs   Lab 10/03/24  0521 10/04/24  0514    136   K 4.0 4.3    105   CO2 21* 22*   BUN 11 13   CREATININE 0.7 0.8    CALCIUM 9.2 9.1   ALBUMIN 3.0* 2.9*   PROT 6.1 5.9*   BILITOT 0.5 0.5   ALKPHOS 109 97   ALT 17 14   AST 15 17   MG 2.2 2.1   PHOS 3.1 3.0       All pertinent labs within the past 24 hours have been reviewed.    Significant Imaging:  I have reviewed all pertinent imaging results/findings within the past 24 hours.

## 2024-10-04 NOTE — ASSESSMENT & PLAN NOTE
Left lung mass with presumed lung cancer with possible metastases  -CT scan of chest shows left perihilar mass 3.7 x 2.5 x 3.6 cm concerning for neoplasm; nonspecific mildly prominent mediastinal and left hilar lymph nodes with metastases possible; small hypodensity in right liver that is nonspecific but metastases not excluded; osseous metastases not excluded      -pulmonary consulted, plans for future evaluation after respiratory status is more stable    Await Lung Bx tomorrow by IR    S/p Lung Bx today

## 2024-10-04 NOTE — PROGRESS NOTES
[facility] Medicine  Progress Note    Patient Name: Aurelio Daniels  MRN: [id number]  Patient Class: IP- Inpatient   Admission Date: 9/26/2024  Length of Stay: 6 days  Attending Physician: Apurva Singletary MD  Primary Care Provider: Shlomo Villatoro MD        Subjective:     Principal Problem:Spontaneous pneumothorax        HPI:  68-year-old man with history of prostate cancer, tobacco abuse (quit 1-1/2 once ago), COPD (discharged with home oxygen via 2 L nasal cannula as of 09/17/2024), centrilobular emphysema, hypertension, anemia, and recent bilateral lower lobe pneumonia who presented to the emergency department for acute onset shortness a breath after a coughing spell.  Symptom onset occurred after the football game at approximately 10:00 p.m. in the evening, constant, and moderate-to-severe in nature.  Patient denies any associated chest pain, nausea, vomiting, fevers, chills.  Patient was originally 82% on room air on ER arrival.  He improved with nebulizer treatment, steroids, and CPAP.  Chest x-ray revealed left pneumothorax and pigtail catheter/chest tube was placed by ER physician.  Repeat chest x-ray showed improved lung inflation however there were significant abnormalities with underlying pneumonia and possible lung mass.  Stat CT scan of chest with contrast was ordered and showed spiculated left perihilar mass 3.7 x 2.5 x 3.6 cm suspicious for neoplasm; left upper lobe and posteromedial right lower lobe consolidations concerning for pneumonia; nonspecific mildly prominent mediastinal and left hilar lymph nodes with metastases possible; small hypodensity in right liver that is nonspecific but metastases not excluded; osseous metastases not excluded; small left and trace right pleural effusion; trace left apical pneumothorax; emphysematous changes with bulla in the left upper lobe.  Patient's wife is a retired nurse and they have been  for 25 years.  Patient  "still works as a  for [employer].    Recent hospital admit 9/13-09/17/2024 with bilateral pneumonia, hypoxia.  Chest x-ray 09/13/2024 with multifocal interstitial pneumonia.  Patient was discharged with Augmentin.  Patient was discharged with home oxygen via 2 L nasal cannula.  He had previously not required oxygen.  Of note, chest x-ray on 09/09/2024 was read as negative for an acute process.    Patient was seen by primary care physician 09/24/2024 with repeat CXR showing "Findings suspicious for collapse of the left upper lobe with persistent consolidations and atelectasis on the left. Additionally a opaque masses seen at the level of the midthoracic spine. Recommend follow-up evaluation with CT with contrast."  Primary care physician reports speaking with patient's pulmonologist and with plan to order CT scan of chest without contrast as soon as possible.        Overview/Hospital Course:  Hospital medicine for evaluation of pneumothorax after placement of chest tube in ED.   Repeat chest x-ray noted concerns for reaccumulation of pneumothorax despite ongoing suction drainage, suspect likely secondary to ruptured bleb leading to continuous air leak with high concerns for bronchopleural fistula.  CT imaging also noted concerns for consolidation in the left upper lobe and posteromedial right lower lobe concerning for pneumonia, labs with worsening leukocytosis, so started on broad-spectrum antibiotics given risk factors of recent hospitalization.  MRSA cultures pending. Repeat chest x-ray 9/29 noted "near complete resolution of the left pneumothorax, with only a trace pneumothorax currently. " CXR 9/30 noted "No significant pneumothorax.". CXR 10/1 noted "No residual pneumothorax". Underwent placement of left-sided 28F chest tube on 10/1 by Cardiothoracic surgery. Pulmonology and Cardiothoracic surgery following. Low suspicion for MRSA given MRSA nares negative, deescalated antibiotics to " cefazolin.     10/2- looks and feels better, no CP, SOB better since large CT placed by Dr. Gavin yesterday, connected to water seal, air leak still present. IR could not perform Lung Bx today, hence tomorrow. Pt agreeable  and otherwise comfortable. Pain controlled with analgesics. NPO again after MN tonight.      10/3- appreciate IR and CTS- pt resting comfortably, CT in place with air leak present. Underwent L Lung Bx by IR. Tolerated procedure well. CP under control with meds. Cont present care. Per CT surgery- may need Heimlich valve upon discharge, pt has severe pan acinar emphysema- not a candidate for open lung procedure.       Interval History: appreciate IR and CTS- pt resting comfortably, CT in place with air leak present. Underwent L Lung Bx by IR. Tolerated procedure well. CP under control with meds. Cont present care. Per CT surgery- may need Heimlich valve upon discharge, pt has severe pan acinar emphysema- not a candidate for open lung procedure.       Review of Systems   Constitutional:  Positive for activity change and fatigue. Negative for chills, diaphoresis and fever.   HENT: Negative.     Eyes: Negative.    Respiratory:  Positive for cough. Negative for chest tightness, shortness of breath and wheezing.    Cardiovascular:  Positive for chest pain.   Gastrointestinal: Negative.    Endocrine: Negative.    Genitourinary: Negative.    Musculoskeletal:  Positive for gait problem.   Skin: Negative.    Allergic/Immunologic: Negative.    Hematological: Negative.    Psychiatric/Behavioral: Negative.       Objective:     Vital Signs (Most Recent):  Temp: 97.4 °F (36.3 °C) (10/03/24 1950)  Pulse: 103 (10/03/24 1950)  Resp: 18 (10/03/24 2009)  BP: 129/80 (10/03/24 1950)  SpO2: (!) 92 % (10/03/24 1950) Vital Signs (24h Range):  Temp:  [97.4 °F (36.3 °C)-98.4 °F (36.9 °C)] 97.4 °F (36.3 °C)  Pulse:  [] 103  Resp:  [12-18] 18  SpO2:  [91 %-94 %] 92 %  BP: (112-129)/(69-80) 129/80     Weight: 72.9 kg  (160 lb 11.5 oz)  Body mass index is 23.73 kg/m².    Intake/Output Summary (Last 24 hours) at 10/3/2024 2012  Last data filed at 10/3/2024 0654  Gross per 24 hour   Intake --   Output 950 ml   Net -950 ml         Physical Exam  Vitals and nursing note reviewed.   Constitutional:       General: He is awake. He is not in acute distress.     Appearance: He is well-developed. He is not ill-appearing, toxic-appearing or diaphoretic.      Comments: On RA   HENT:      Head: Normocephalic and atraumatic.      Right Ear: External ear normal.      Left Ear: External ear normal.      Nose: Nose normal.      Mouth/Throat:      Mouth: Mucous membranes are moist.      Pharynx: Oropharynx is clear.   Eyes:      General: No scleral icterus.        Right eye: No discharge.         Left eye: No discharge.      Extraocular Movements: Extraocular movements intact.      Conjunctiva/sclera: Conjunctivae normal.      Pupils: Pupils are equal, round, and reactive to light.   Cardiovascular:      Rate and Rhythm: Normal rate and regular rhythm.      Pulses: Normal pulses.      Heart sounds: Normal heart sounds.   Pulmonary:      Effort: Pulmonary effort is normal. No respiratory distress.      Breath sounds: Decreased breath sounds present. No wheezing, rhonchi or rales.      Comments: R CT in place connected to suction, continuous air leak noted  Abdominal:      General: Abdomen is flat. Bowel sounds are normal.      Palpations: Abdomen is soft.      Tenderness: There is no abdominal tenderness.   Genitourinary:     Comments: deferred  Musculoskeletal:         General: Tenderness present. No swelling. Normal range of motion.      Cervical back: Normal range of motion and neck supple. No rigidity.      Right lower leg: No edema.      Left lower leg: No edema.   Skin:     General: Skin is warm and dry.      Capillary Refill: Capillary refill takes less than 2 seconds.      Coloration: Skin is not jaundiced.   Neurological:      General: No  "focal deficit present.      Mental Status: He is alert and oriented to person, place, and time.   Psychiatric:         Mood and Affect: Mood normal.         Behavior: Behavior normal. Behavior is cooperative.         Thought Content: Thought content normal.         Judgment: Judgment normal.             Significant Labs: All pertinent labs within the past 24 hours have been reviewed.  BMP:   Recent Labs   Lab 10/03/24  0521         K 4.0      CO2 21*   BUN 11   CREATININE 0.7   CALCIUM 9.2   MG 2.2     CBC:   Recent Labs   Lab 10/02/24  0437 10/03/24  0521   WBC 7.73 6.61   HGB 12.3* 12.1*   HCT 38.8* 38.1*    324       Significant Imaging: I have reviewed all pertinent imaging results/findings within the past 24 hours.    Assessment/Plan:      * Spontaneous pneumothorax  -pigtail catheter/chest tube in place, continuous suction  -chest x-ray on ER arrival with large left-sided pneumothorax  -CT scan of chest with trace left apical pneumothorax   -While on suction, repeat chest x-ray noted concerns for accumulation  -CXR 09/29 noted, "near complete resolution of the left pneumothorax, with only a trace pneumothorax currently. "  -CXR 9/30 noted "No significant pneumothorax.".   -CXR 10/1 noted "No residual pneumothorax".     Underwent placement of left-sided 28F chest tube on 10/1 by Cardiothoracic surgery.       -Pulmonology and Cardiothoracic surgery consulted, follow-up recs  -O2 via 2 L nasal cannula.  Wean as tolerated to target SaO2 88-92%    Still has air leak- CT to Waterseal- cont    Cont present care- may need Heimlich    Centrilobular emphysema  Patient's COPD is controlled currently.  Patient is currently off COPD Pathway. Continue p.r.n. DuoNebs, O2 supplementation, and antibiotics for pneumonia.  monitor respiratory status closely.   -patient did receive DuoNeb, prednisone 60 mg p.o., and terbutaline 0.25 mg while in the emergency department.    Cont present care    Bilateral " pneumonia  -CT scan of chest with consolidations in the left upper lobe and posteromedial right lower lobe concerning for pneumonia  -white blood cell count 12.07, COVID-19 negative, afebrile    Microbiology: Sputum Culture   Lab Results   Component Value Date    GSRESP <10 epithelial cells per low power field. 09/28/2024    GSRESP No WBC's 09/28/2024    GSRESP Few Gram positive cocci 09/28/2024    GSRESP Rare Gram negative rods 09/28/2024    GSRESP Rare yeast 09/28/2024    RESPIRATORYC No Pseudomonas isolated. 09/28/2024    RESPIRATORYC (A) 09/28/2024     METHICILLIN RESISTANT STAPHYLOCOCCUS AUREUS  Rare         -Low suspicion for MRSA given MRSA nares negative, deescalated antibiotics to cefazolin.  Follow up culture  -O2 supplementation, p.r.n. DuoNebs  -follow up cultures  -aspiration and fall precautions  -pulmonary consulted, following    Cont present care    Mass of left lung  Left lung mass with presumed lung cancer with possible metastases  -CT scan of chest shows left perihilar mass 3.7 x 2.5 x 3.6 cm concerning for neoplasm; nonspecific mildly prominent mediastinal and left hilar lymph nodes with metastases possible; small hypodensity in right liver that is nonspecific but metastases not excluded; osseous metastases not excluded      -pulmonary consulted, plans for future evaluation after respiratory status is more stable    Await Lung Bx tomorrow by IR    S/p Lung Bx today    Tobacco abuse  Patient has not smoked in 1-1/2 months.  Counseled continued cessation.  No nicotine patch needed.      Macrocytic anemia  Anemia is likely due to  unclear etiology . Most recent hemoglobin and hematocrit are listed below.  Recent Labs     09/30/24  0442 10/01/24  0508 10/02/24  0437   HGB 11.7* 11.7* 12.3*   HCT 36.5* 35.8* 38.8*       Plan  - Monitor serial CBC: Daily  - Transfuse PRBC if patient becomes hemodynamically unstable, symptomatic or H/H drops below 7/21.  - Patient has not received any PRBC transfusions  "to date  - Patient's anemia is currently stable    Prostate cancer  High-risk prostate cancer  -seen by Urology Dr. Smith 07/10/2024 with plans for follow up visit in 6 months and repeat PSA  -status post radiation therapy and ADT, PSA undetectable  -done with 2 years of Lupron      Primary hypertension  Chronic, controlled. Latest blood pressure and vitals reviewed-     Temp:  [97.3 °F (36.3 °C)-98 °F (36.7 °C)]   Pulse:  [70-97]   Resp:  [16-20]   BP: (114-138)/(66-81)   SpO2:  [91 %-97 %] .   Home meds for hypertension were reviewed and noted below.   Hypertension Medications               valsartan (DIOVAN) 80 MG tablet Take 1 tablet (80 mg total) by mouth once daily.            While in the hospital, will manage blood pressure as follows; Adjust home antihypertensive regimen as follows- normotensive on admission, resume home medications as appropriate; P.r.n. IV hydralazine with parameters.    Will utilize p.r.n. blood pressure medication only if patient's blood pressure greater than 180/110 and he develops symptoms such as worsening chest pain or shortness of breath.    Elevated troponin  -patient denies chest pain  -troponin elevation most likely related to pneumothorax and underlying pneumonia  -TTE nonacute    -telemetry monitoring      Acute on chronic respiratory failure with hypoxia  Patient with Hypoxic Respiratory failure which is Acute on chronic.  he is on home oxygen at 2 LPM. Supplemental oxygen was provided and noted- Oxygen Concentration (%):  [28] 28    .   Signs/symptoms of respiratory failure include- tachypnea. Contributing diagnoses includes - COPD, Pneumonia, and pneumothorax  Labs and images were reviewed. Patient Has recent ABG, which has been reviewed. Will treat underlying causes and adjust management of respiratory failure as follows:      -continuous pulse oximetry monitoring, O2 supplementation, p.r.n. DuoNebs  -left-sided chest tube/pigtail catheter in place  -See "Bilateral " "pneumonia" A&P. Follow up cultures and deescalate antibiotics as appropriate  -pulmonary and Cardiothoracic surgery consulted, follow-up recs      VTE Risk Mitigation (From admission, onward)           Ordered     Reason for No Pharmacological VTE Prophylaxis  Once        Comments: No Lovenox until patient is seen by Pulmonary as they may decide to pursue lung biopsy   Question:  Reasons:  Answer:  Physician Provided (leave comment)    09/27/24 0401     IP VTE HIGH RISK PATIENT  Once         09/27/24 0401     Place sequential compression device  Until discontinued         09/27/24 0401                    Discharge Planning   YUE:      Code Status: Full Code   Is the patient medically ready for discharge?:     Reason for patient still in hospital (select all that apply): Patient trending condition, Laboratory test, Treatment, Imaging, and Consult recommendations  Discharge Plan A: Home with family          Apurva Singletary MD  Department of Hospital Medicine   [facility]    "

## 2024-10-04 NOTE — ASSESSMENT & PLAN NOTE
Patient with Hypoxic Respiratory failure which is Acute.  he is not on home oxygen. Supplemental oxygen was provided and noted- Oxygen Concentration (%):  [28] 28. Signs/symptoms of respiratory failure include- respiratory distress. Contributing diagnoses includes - COPD and pneumothorax  Labs and images were reviewed. Patient Has recent ABG, which has been reviewed. Will treat underlying causes and adjust management of respiratory failure as follows  - chest tube in place with resolution of ptx, continued with air leak but seem less brisk than previous, CTS managing  - CXR reviewed, extensive SQ air   - wean O2 for goal SpO2 > 88%, on RA  - avoid high flow oxygen or NIPPV given PTX and ongoing air leak

## 2024-10-04 NOTE — ASSESSMENT & PLAN NOTE
"Patient with Hypoxic Respiratory failure which is Acute on chronic.  he is on home oxygen at 2 LPM. Supplemental oxygen was provided and noted- Oxygen Concentration (%):  [28] 28    .   Signs/symptoms of respiratory failure include- tachypnea. Contributing diagnoses includes - COPD, Pneumonia, and pneumothorax  Labs and images were reviewed. Patient Has recent ABG, which has been reviewed. Will treat underlying causes and adjust management of respiratory failure as follows:      -continuous pulse oximetry monitoring, O2 supplementation, p.r.n. Ayana  -left-sided chest tube/pigtail catheter in place  -See "Bilateral pneumonia" A&P. Follow up cultures and deescalate antibiotics as appropriate  -pulmonary and Cardiothoracic surgery consulted, follow-up recs    stable  "

## 2024-10-04 NOTE — SUBJECTIVE & OBJECTIVE
Interval History: appreciate Dr. Rodriguez- doing better, still connected to Chest tube with -20 cm suction via waterseal. Air leak a little better today. L sided CP also better controlled. He ambulated twice in the halls today. Sputum Cx yesterday grew MRSA- hence Ancef changed to oral Zyvox. Remains HD stable, pleasant, eating drinking well.      Review of Systems   Constitutional:  Positive for activity change and fatigue. Negative for chills, diaphoresis and fever.   HENT: Negative.     Eyes: Negative.    Respiratory:  Positive for cough. Negative for chest tightness, shortness of breath and wheezing.    Cardiovascular:  Positive for chest pain.   Gastrointestinal: Negative.    Endocrine: Negative.    Genitourinary: Negative.    Musculoskeletal:  Negative for gait problem.   Skin: Negative.    Allergic/Immunologic: Negative.    Hematological: Negative.    Psychiatric/Behavioral: Negative.       Objective:     Vital Signs (Most Recent):  Temp: 97.7 °F (36.5 °C) (10/04/24 0800)  Pulse: 66 (10/04/24 0821)  Resp: 18 (10/04/24 1132)  BP: 118/71 (10/04/24 0800)  SpO2: (!) 90 % (10/04/24 0800) Vital Signs (24h Range):  Temp:  [97.4 °F (36.3 °C)-98 °F (36.7 °C)] 97.7 °F (36.5 °C)  Pulse:  [] 66  Resp:  [17-18] 18  SpO2:  [90 %-96 %] 90 %  BP: (112-129)/(67-80) 118/71     Weight: 72.9 kg (160 lb 11.5 oz)  Body mass index is 23.73 kg/m².    Intake/Output Summary (Last 24 hours) at 10/4/2024 1546  Last data filed at 10/4/2024 0656  Gross per 24 hour   Intake --   Output 110 ml   Net -110 ml         Physical Exam  Vitals and nursing note reviewed.   Constitutional:       General: He is awake. He is not in acute distress.     Appearance: Normal appearance. He is well-developed. He is not ill-appearing.   Pulmonary:      Effort: Pulmonary effort is normal.      Comments: On RA with sat of 95%  Chest:      Comments: L CT with air leak - seems less brisk than previous, remains connected to suction   Neurological:       Mental Status: He is alert.   Psychiatric:         Behavior: Behavior is cooperative.             Significant Labs: All pertinent labs within the past 24 hours have been reviewed.  BMP:   Recent Labs   Lab 10/04/24  0514   GLU 98      K 4.3      CO2 22*   BUN 13   CREATININE 0.8   CALCIUM 9.1   MG 2.1     CBC:   Recent Labs   Lab 10/03/24  0521 10/04/24  0514   WBC 6.61 7.21   HGB 12.1* 11.3*   HCT 38.1* 35.2*    289     Respiratory Culture: MRSA- from 9/28-    Significant Imaging: I have reviewed all pertinent imaging results/findings within the past 24 hours.

## 2024-10-05 LAB
ALBUMIN SERPL BCP-MCNC: 2.9 G/DL (ref 3.5–5.2)
ALP SERPL-CCNC: 85 U/L (ref 55–135)
ALT SERPL W/O P-5'-P-CCNC: 8 U/L (ref 10–44)
ANION GAP SERPL CALC-SCNC: 8 MMOL/L (ref 8–16)
AST SERPL-CCNC: 13 U/L (ref 10–40)
BASOPHILS # BLD AUTO: 0.02 K/UL (ref 0–0.2)
BASOPHILS NFR BLD: 0.3 % (ref 0–1.9)
BILIRUB SERPL-MCNC: 0.4 MG/DL (ref 0.1–1)
BUN SERPL-MCNC: 10 MG/DL (ref 8–23)
CALCIUM SERPL-MCNC: 9 MG/DL (ref 8.7–10.5)
CHLORIDE SERPL-SCNC: 105 MMOL/L (ref 95–110)
CO2 SERPL-SCNC: 23 MMOL/L (ref 23–29)
CREAT SERPL-MCNC: 0.7 MG/DL (ref 0.5–1.4)
DIFFERENTIAL METHOD BLD: ABNORMAL
EOSINOPHIL # BLD AUTO: 0.2 K/UL (ref 0–0.5)
EOSINOPHIL NFR BLD: 2.5 % (ref 0–8)
ERYTHROCYTE [DISTWIDTH] IN BLOOD BY AUTOMATED COUNT: 12.8 % (ref 11.5–14.5)
EST. GFR  (NO RACE VARIABLE): >60 ML/MIN/1.73 M^2
GLUCOSE SERPL-MCNC: 103 MG/DL (ref 70–110)
HCT VFR BLD AUTO: 34 % (ref 40–54)
HGB BLD-MCNC: 11.4 G/DL (ref 14–18)
IMM GRANULOCYTES # BLD AUTO: 0.1 K/UL (ref 0–0.04)
IMM GRANULOCYTES NFR BLD AUTO: 1.4 % (ref 0–0.5)
LYMPHOCYTES # BLD AUTO: 1 K/UL (ref 1–4.8)
LYMPHOCYTES NFR BLD: 13.2 % (ref 18–48)
MAGNESIUM SERPL-MCNC: 2 MG/DL (ref 1.6–2.6)
MCH RBC QN AUTO: 32 PG (ref 27–31)
MCHC RBC AUTO-ENTMCNC: 33.5 G/DL (ref 32–36)
MCV RBC AUTO: 96 FL (ref 82–98)
MONOCYTES # BLD AUTO: 0.4 K/UL (ref 0.3–1)
MONOCYTES NFR BLD: 5.4 % (ref 4–15)
NEUTROPHILS # BLD AUTO: 5.6 K/UL (ref 1.8–7.7)
NEUTROPHILS NFR BLD: 77.2 % (ref 38–73)
NRBC BLD-RTO: 0 /100 WBC
PHOSPHATE SERPL-MCNC: 2.5 MG/DL (ref 2.7–4.5)
PLATELET # BLD AUTO: 281 K/UL (ref 150–450)
PMV BLD AUTO: 8.6 FL (ref 9.2–12.9)
POTASSIUM SERPL-SCNC: 4 MMOL/L (ref 3.5–5.1)
PROT SERPL-MCNC: 5.7 G/DL (ref 6–8.4)
RBC # BLD AUTO: 3.56 M/UL (ref 4.6–6.2)
SODIUM SERPL-SCNC: 136 MMOL/L (ref 136–145)
WBC # BLD AUTO: 7.25 K/UL (ref 3.9–12.7)

## 2024-10-05 PROCEDURE — 85025 COMPLETE CBC W/AUTO DIFF WBC: CPT | Performed by: STUDENT IN AN ORGANIZED HEALTH CARE EDUCATION/TRAINING PROGRAM

## 2024-10-05 PROCEDURE — 25000003 PHARM REV CODE 250: Performed by: NURSE PRACTITIONER

## 2024-10-05 PROCEDURE — 83735 ASSAY OF MAGNESIUM: CPT | Performed by: STUDENT IN AN ORGANIZED HEALTH CARE EDUCATION/TRAINING PROGRAM

## 2024-10-05 PROCEDURE — 36415 COLL VENOUS BLD VENIPUNCTURE: CPT | Performed by: STUDENT IN AN ORGANIZED HEALTH CARE EDUCATION/TRAINING PROGRAM

## 2024-10-05 PROCEDURE — 94761 N-INVAS EAR/PLS OXIMETRY MLT: CPT

## 2024-10-05 PROCEDURE — 25000242 PHARM REV CODE 250 ALT 637 W/ HCPCS: Performed by: INTERNAL MEDICINE

## 2024-10-05 PROCEDURE — 84100 ASSAY OF PHOSPHORUS: CPT | Performed by: STUDENT IN AN ORGANIZED HEALTH CARE EDUCATION/TRAINING PROGRAM

## 2024-10-05 PROCEDURE — 94640 AIRWAY INHALATION TREATMENT: CPT

## 2024-10-05 PROCEDURE — 80053 COMPREHEN METABOLIC PANEL: CPT | Performed by: STUDENT IN AN ORGANIZED HEALTH CARE EDUCATION/TRAINING PROGRAM

## 2024-10-05 PROCEDURE — 21400001 HC TELEMETRY ROOM

## 2024-10-05 PROCEDURE — 99233 SBSQ HOSP IP/OBS HIGH 50: CPT | Mod: ,,, | Performed by: INTERNAL MEDICINE

## 2024-10-05 PROCEDURE — 27000207 HC ISOLATION

## 2024-10-05 PROCEDURE — 25000003 PHARM REV CODE 250: Performed by: EMERGENCY MEDICINE

## 2024-10-05 RX ADMIN — HYDROCODONE BITARTRATE AND ACETAMINOPHEN 1 TABLET: 7.5; 325 TABLET ORAL at 04:10

## 2024-10-05 RX ADMIN — LINEZOLID 600 MG: 600 TABLET, FILM COATED ORAL at 08:10

## 2024-10-05 RX ADMIN — MUPIROCIN: 20 OINTMENT TOPICAL at 08:10

## 2024-10-05 RX ADMIN — BUDESONIDE 0.5 MG: 0.5 INHALANT ORAL at 07:10

## 2024-10-05 RX ADMIN — ARFORMOTEROL TARTRATE 15 MCG: 15 SOLUTION RESPIRATORY (INHALATION) at 07:10

## 2024-10-05 RX ADMIN — HYDROCODONE BITARTRATE AND ACETAMINOPHEN 1 TABLET: 7.5; 325 TABLET ORAL at 07:10

## 2024-10-05 NOTE — PROGRESS NOTES
[facility] ER Medicine  Progress Note    Patient Name: Aurelio Daniels  MRN: [id number]  Patient Class: IP- Inpatient   Admission Date: 9/26/2024  Length of Stay: 8 days  Attending Physician: Apurva Singletary MD  Primary Care Provider: Shlomo Villatoro MD        Subjective:     Principal Problem:Spontaneous pneumothorax        HPI:  68-year-old man with history of prostate cancer, tobacco abuse (quit 1-1/2 once ago), COPD (discharged with home oxygen via 2 L nasal cannula as of 09/17/2024), centrilobular emphysema, hypertension, anemia, and recent bilateral lower lobe pneumonia who presented to the emergency department for acute onset shortness a breath after a coughing spell.  Symptom onset occurred after the football game at approximately 10:00 p.m. in the evening, constant, and moderate-to-severe in nature.  Patient denies any associated chest pain, nausea, vomiting, fevers, chills.  Patient was originally 82% on room air on ER arrival.  He improved with nebulizer treatment, steroids, and CPAP.  Chest x-ray revealed left pneumothorax and pigtail catheter/chest tube was placed by ER physician.  Repeat chest x-ray showed improved lung inflation however there were significant abnormalities with underlying pneumonia and possible lung mass.  Stat CT scan of chest with contrast was ordered and showed spiculated left perihilar mass 3.7 x 2.5 x 3.6 cm suspicious for neoplasm; left upper lobe and posteromedial right lower lobe consolidations concerning for pneumonia; nonspecific mildly prominent mediastinal and left hilar lymph nodes with metastases possible; small hypodensity in right liver that is nonspecific but metastases not excluded; osseous metastases not excluded; small left and trace right pleural effusion; trace left apical pneumothorax; emphysematous changes with bulla in the left upper lobe.  Patient's wife is a retired nurse and they have been  for 25 years.  Patient  "still works as a  for [employer].    Recent hospital admit 9/13-09/17/2024 with bilateral pneumonia, hypoxia.  Chest x-ray 09/13/2024 with multifocal interstitial pneumonia.  Patient was discharged with Augmentin.  Patient was discharged with home oxygen via 2 L nasal cannula.  He had previously not required oxygen.  Of note, chest x-ray on 09/09/2024 was read as negative for an acute process.    Patient was seen by primary care physician 09/24/2024 with repeat CXR showing "Findings suspicious for collapse of the left upper lobe with persistent consolidations and atelectasis on the left. Additionally a opaque masses seen at the level of the midthoracic spine. Recommend follow-up evaluation with CT with contrast."  Primary care physician reports speaking with patient's pulmonologist and with plan to order CT scan of chest without contrast as soon as possible.        Overview/Hospital Course:  Hospital medicine for evaluation of pneumothorax after placement of chest tube in ED.   Repeat chest x-ray noted concerns for reaccumulation of pneumothorax despite ongoing suction drainage, suspect likely secondary to ruptured bleb leading to continuous air leak with high concerns for bronchopleural fistula.  CT imaging also noted concerns for consolidation in the left upper lobe and posteromedial right lower lobe concerning for pneumonia, labs with worsening leukocytosis, so started on broad-spectrum antibiotics given risk factors of recent hospitalization.  MRSA cultures pending. Repeat chest x-ray 9/29 noted "near complete resolution of the left pneumothorax, with only a trace pneumothorax currently. " CXR 9/30 noted "No significant pneumothorax.". CXR 10/1 noted "No residual pneumothorax". Underwent placement of left-sided 28F chest tube on 10/1 by Cardiothoracic surgery. Pulmonology and Cardiothoracic surgery following. Low suspicion for MRSA given MRSA nares negative, deescalated antibiotics to " cefazolin.     10/2- looks and feels better, no CP, SOB better since large CT placed by Dr. Gavin yesterday, connected to water seal, air leak still present. IR could not perform Lung Bx today, hence tomorrow. Pt agreeable  and otherwise comfortable. Pain controlled with analgesics. NPO again after MN tonight.      10/3- appreciate IR and CTS- pt resting comfortably, CT in place with air leak present. Underwent L Lung Bx by IR. Tolerated procedure well. CP under control with meds. Cont present care. Per CT surgery- may need Heimlich valve upon discharge, pt has severe pan acinar emphysema- not a candidate for open lung procedure.       10/4- appreciate Dr. Rodriguez- doing better, still connected to Chest tube with -20 cm suction via waterseal. Air leak a little better today. L sided CP also better controlled. He ambulated twice in the halls today. Sputum Cx yesterday grew MRSA- hence Ancef changed to oral Zyvox. Remains HD stable, pleasant, eating drinking well.      10/5- Appreciate Dr. Cali- doing well, resting in bed on RA. CP much better controlled. Still has SQ emphysema L chest- CT in placed- to suction, air leak appears better, CXR L apical PTX. Labs stable. Pt ambulates well. Cont same tx, await Lung Bx results next week.     Interval History: Appreciate Dr. Cali- doing well, resting in bed on RA. CP much better controlled. Still has SQ emphysema L chest- CT in placed- to suction, air leak appears better, CXR L apical PTX. Labs stable. Pt ambulates well. Cont same tx, await Lung Bx results next week.     Review of Systems   Constitutional:  Positive for activity change. Negative for chills, diaphoresis and fever.   HENT: Negative.     Eyes: Negative.    Respiratory:  Negative for cough, chest tightness, shortness of breath and wheezing.    Cardiovascular:  Positive for chest pain.   Gastrointestinal: Negative.    Endocrine: Negative.    Genitourinary: Negative.    Musculoskeletal:  Negative for gait  problem.   Skin: Negative.    Allergic/Immunologic: Negative.    Hematological: Negative.    Psychiatric/Behavioral: Negative.       Objective:     Vital Signs (Most Recent):  Temp: 98.7 °F (37.1 °C) (10/05/24 1631)  Pulse: 87 (10/05/24 1631)  Resp: 18 (10/05/24 1631)  BP: 120/67 (10/05/24 1631)  SpO2: (!) 92 % (10/05/24 1631) Vital Signs (24h Range):  Temp:  [97.8 °F (36.6 °C)-98.7 °F (37.1 °C)] 98.7 °F (37.1 °C)  Pulse:  [70-87] 87  Resp:  [] 18  SpO2:  [92 %-94 %] 92 %  BP: (120-130)/(67-74) 120/67     Weight: 72.9 kg (160 lb 11.5 oz)  Body mass index is 23.73 kg/m².    Intake/Output Summary (Last 24 hours) at 10/5/2024 1631  Last data filed at 10/5/2024 1522  Gross per 24 hour   Intake 505.91 ml   Output 601 ml   Net -95.09 ml         Physical Exam  Vitals reviewed.   Constitutional:       General: He is awake. He is not in acute distress.     Appearance: He is well-developed.      Comments: On RA with L CT in place, continues with air leak but improved overall    Pulmonary:      Effort: Pulmonary effort is normal. No tachypnea, bradypnea, accessory muscle usage or respiratory distress.      Breath sounds: Decreased breath sounds present. No wheezing.      Comments: L chest SQ air, on RA  Neurological:      Mental Status: He is alert.   Psychiatric:         Behavior: Behavior is cooperative.             Significant Labs: All pertinent labs within the past 24 hours have been reviewed.  BMP:   Recent Labs   Lab 10/05/24  0448         K 4.0      CO2 23   BUN 10   CREATININE 0.7   CALCIUM 9.0   MG 2.0     CBC:   Recent Labs   Lab 10/04/24  0514 10/05/24  0448   WBC 7.21 7.25   HGB 11.3* 11.4*   HCT 35.2* 34.0*    281       Significant Imaging: I have reviewed all pertinent imaging results/findings within the past 24 hours.    Assessment/Plan:      * Spontaneous pneumothorax  -pigtail catheter/chest tube in place, continuous suction  -chest x-ray on ER arrival with large left-sided  "pneumothorax  -CT scan of chest with trace left apical pneumothorax   -While on suction, repeat chest x-ray noted concerns for accumulation  -CXR 09/29 noted, "near complete resolution of the left pneumothorax, with only a trace pneumothorax currently. "  -CXR 9/30 noted "No significant pneumothorax.".   -CXR 10/1 noted "No residual pneumothorax".     Underwent placement of left-sided 28F chest tube on 10/1 by Cardiothoracic surgery.       -Pulmonology and Cardiothoracic surgery consulted, follow-up recs  -O2 via 2 L nasal cannula.  Wean as tolerated to target SaO2 88-92%    Still has air leak- CT to Waterseal- cont    Cont present care- may need Heimlich    10/4- looks a little better, less air leak today on same Suction   Cont    10/5- same as above    Centrilobular emphysema  Patient's COPD is controlled currently.  Patient is currently off COPD Pathway. Continue p.r.n. DuoNebs, O2 supplementation, and antibiotics for pneumonia.  monitor respiratory status closely.   -patient did receive DuoNeb, prednisone 60 mg p.o., and terbutaline 0.25 mg while in the emergency department.    Cont present care    Same as above    Bilateral pneumonia  -CT scan of chest with consolidations in the left upper lobe and posteromedial right lower lobe concerning for pneumonia  -white blood cell count 12.07, COVID-19 negative, afebrile    Microbiology: Sputum Culture   Lab Results   Component Value Date    GSRESP <10 epithelial cells per low power field. 09/28/2024    GSRESP No WBC's 09/28/2024    GSRESP Few Gram positive cocci 09/28/2024    GSRESP Rare Gram negative rods 09/28/2024    GSRESP Rare yeast 09/28/2024    RESPIRATORYC No Pseudomonas isolated. 09/28/2024    RESPIRATORYC (A) 09/28/2024     METHICILLIN RESISTANT STAPHYLOCOCCUS AUREUS  Rare         -Low suspicion for MRSA given MRSA nares negative, deescalated antibiotics to cefazolin.  Follow up culture  -O2 supplementation, p.r.n. DuoNebs  -follow up cultures  -aspiration " and fall precautions  -pulmonary consulted, following    Cont present care    Improving,no obvious pneumonia now, Sputum Cx growing MRSA- switched to PO Zyvox    Cont Zyvox    Mass of left lung  Left lung mass with presumed lung cancer with possible metastases  -CT scan of chest shows left perihilar mass 3.7 x 2.5 x 3.6 cm concerning for neoplasm; nonspecific mildly prominent mediastinal and left hilar lymph nodes with metastases possible; small hypodensity in right liver that is nonspecific but metastases not excluded; osseous metastases not excluded      -pulmonary consulted, plans for future evaluation after respiratory status is more stable    Await Lung Bx tomorrow by IR    S/p Lung Bx today    Tobacco abuse  Patient has not smoked in 1-1/2 months.  Counseled continued cessation.  No nicotine patch needed.      Macrocytic anemia  Anemia is likely due to  unclear etiology . Most recent hemoglobin and hematocrit are listed below.  Recent Labs     10/02/24  0437 10/03/24  0521 10/04/24  0514   HGB 12.3* 12.1* 11.3*   HCT 38.8* 38.1* 35.2*       Plan  - Monitor serial CBC: Daily  - Transfuse PRBC if patient becomes hemodynamically unstable, symptomatic or H/H drops below 7/21.  - Patient has not received any PRBC transfusions to date  - Patient's anemia is currently stable    Stable      Prostate cancer  High-risk prostate cancer  -seen by Urology Dr. Smith 07/10/2024 with plans for follow up visit in 6 months and repeat PSA  -status post radiation therapy and ADT, PSA undetectable  -done with 2 years of Lupron      Primary hypertension  Chronic, controlled. Latest blood pressure and vitals reviewed-     Temp:  [97.3 °F (36.3 °C)-98 °F (36.7 °C)]   Pulse:  [70-97]   Resp:  [16-20]   BP: (114-138)/(66-81)   SpO2:  [91 %-97 %] .   Home meds for hypertension were reviewed and noted below.   Hypertension Medications               valsartan (DIOVAN) 80 MG tablet Take 1 tablet (80 mg total) by mouth once daily.       "      While in the hospital, will manage blood pressure as follows; Adjust home antihypertensive regimen as follows- normotensive on admission, resume home medications as appropriate; P.r.n. IV hydralazine with parameters.    Will utilize p.r.n. blood pressure medication only if patient's blood pressure greater than 180/110 and he develops symptoms such as worsening chest pain or shortness of breath.    Acute on chronic respiratory failure with hypoxia  Patient with Hypoxic Respiratory failure which is Acute on chronic.  he is on home oxygen at 2 LPM. Supplemental oxygen was provided and noted- Oxygen Concentration (%):  [28] 28    .   Signs/symptoms of respiratory failure include- tachypnea. Contributing diagnoses includes - COPD, Pneumonia, and pneumothorax  Labs and images were reviewed. Patient Has recent ABG, which has been reviewed. Will treat underlying causes and adjust management of respiratory failure as follows:      -continuous pulse oximetry monitoring, O2 supplementation, p.r.n. DuoNebs  -left-sided chest tube/pigtail catheter in place  -See "Bilateral pneumonia" A&P. Follow up cultures and deescalate antibiotics as appropriate  -pulmonary and Cardiothoracic surgery consulted, follow-up recs    stable      VTE Risk Mitigation (From admission, onward)           Ordered     Reason for No Pharmacological VTE Prophylaxis  Once        Comments: No Lovenox until patient is seen by Pulmonary as they may decide to pursue lung biopsy   Question:  Reasons:  Answer:  Physician Provided (leave comment)    09/27/24 0401     IP VTE HIGH RISK PATIENT  Once         09/27/24 0401     Place sequential compression device  Until discontinued         09/27/24 0401                    Discharge Planning   YUE:      Code Status: Full Code   Is the patient medically ready for discharge?:     Reason for patient still in hospital (select all that apply): Patient trending condition, Laboratory test, Treatment, Imaging, and " Consult recommendations  Discharge Plan A: Home with family        Apurva Singletary MD  Department of Hospital Medicine   [facility] - Telemetry ([facility])

## 2024-10-05 NOTE — ASSESSMENT & PLAN NOTE
Patient's COPD is controlled currently.  Patient is currently off COPD Pathway. Continue scheduled inhalers  Pulmicort/Brovana nebs and Supplemental oxygen and monitor respiratory status closely.   - follow with Dr. Jimenez in clinic, last saw 9/5/24  - not in acute exac, monitor    Ravi Fu is a 60 y.o. female right handed . Worker's Compensation and legal considerations: none filed. Vitals:    11/22/21 0836   Pulse: 84   Temp: 97.1 °F (36.2 °C)   TempSrc: Temporal   SpO2: 98%   PainSc:   6           Chief Complaint   Patient presents with    Hand Pain     bilat       HPI: Patient presents today requesting injections for her bilateral thumbs and carpal tunnels. September 3, 2021 HPI: Patient presents today for follow-up of bilateral thumb CMC arthritis. She is requesting injections today. She was recently admitted to the hospital and discharged due to Covid. She was discharged in July. She is currently on oxygen due to residual pulmonary issues. 6/11/2021 HPI: Patient returns today requesting injections for bilateral thumb bases and bilateral carpal tunnels. She was also like to set up surgery for her right carpal tunnel. 4/9/2021 HPI: Patient presents today for follow-up of bilateral thumb CMC joint arthritis and bilateral carpal tunnel syndrome. She has received injections in the past and is requesting injections today. She is not quite ready for surgery for carpal tunnel yet. 1/8/2021 HPI: Patient returns today requesting bilateral thumb CMC joint injections and bilateral carpal tunnel injections. Her previous injections worked well for her. She would like to consider surgery on the right side in the future. 10/9/2020 HPI: Patient comes in today for bilateral upper extremity EMG follow-up as well as follow-up on her joint injections. She reports her left thumb base is starting to hurt her significantly. Initial HPI: Patient comes in today with complaints of right thumb base pain. She also has a history of bilateral hand numbness and tingling especially at night.   She says she was previously diagnosed with carpal tunnel in the past.    Date of onset: Chronic    Injury: No    Prior Treatment:  Yes: Comment: Bilateral carpal tunnel injections and right thumb CMC and STT joint injections    Numbness/ Tingling: Yes: Comment: Bilateral hands right worse than left      ROS: Review of Systems - General ROS: negative  Psychological ROS: negative  ENT ROS: negative  Allergy and Immunology ROS: negative  Hematological and Lymphatic ROS: negative  Respiratory ROS: no cough, shortness of breath, or wheezing  Cardiovascular ROS: no chest pain or dyspnea on exertion  Gastrointestinal ROS: no abdominal pain, change in bowel habits, or black or bloody stools  Musculoskeletal ROS: positive for - pain in thumb - right and swelling in thumb - right  Neurological ROS: positive for - numbness/tingling  Dermatological ROS: negative    Past Medical History:   Diagnosis Date    Asthma     Bilateral hand pain        Past Surgical History:   Procedure Laterality Date    HX HYSTERECTOMY      HX PELVIC LAPAROSCOPY      HX TONSILLECTOMY         Current Outpatient Medications   Medication Sig Dispense Refill    albuterol (PROVENTIL VENTOLIN) 2.5 mg /3 mL (0.083 %) nebu Take 2.5 mg by inhalation.  albuterol (PROVENTIL VENTOLIN) 2.5 mg /3 mL (0.083 %) nebu INHALE 3 ML AS NEEDED EVERY 6 HOURS      apixaban (ELIQUIS) 5 mg tablet Take 5 mg by mouth two (2) times a day.  fexofenadine (ALLEGRA) 180 mg tablet Take 180 mg by mouth daily.  guaiFENesin ER (MUCINEX) 600 mg ER tablet Take 1,200 mg by mouth.  omeprazole (PRILOSEC) 40 mg capsule TAKE 1 CAPSULE BY MOUTH EVERY DAY 30 MINUTES BEFORE BREAKFAST      meloxicam (MOBIC) 7.5 mg tablet Take 1 Tab by mouth daily as needed for Pain. 90 Tab 2    EPINEPHrine (EpiPen) 0.3 mg/0.3 mL injection 0.3 mg by IntraMUSCular route once as needed for Allergic Response.  DIETARY SUPPLEMENT PO Take  by mouth.  Herbal concetrate      SYMBICORT 160-4.5 mcg/actuation HFAA INHALE 2 PUFFS PO BID      OTHER Jacob Rhino Salt Packets  99    QNASL 80 mcg/actuation HFAA INHALE 1 PUFF IEN BID  5    ergocalciferol (ERGOCALCIFEROL) 50,000 unit capsule Twice a week  0    budesonide (PULMICORT) 0.5 mg/2 mL nbsp 500 mcg by Nebulization route.  esomeprazole (NEXIUM) 20 mg capsule Take 40 mg by mouth two (2) times daily as needed.  escitalopram oxalate (LEXAPRO) 20 mg tablet Take 20 mg by mouth daily.  montelukast (SINGULAIR) 10 mg tablet Take 10 mg by mouth daily.  cyclobenzaprine (FLEXERIL) 10 mg tablet Take 1 Tab by mouth three (3) times daily as needed for Muscle Spasm(s). (Patient not taking: Reported on 11/22/2021) 30 Tab 0    fexofenadine-pseudoephedrine (ALLEGRA-D 12 HOUR)  mg Tb12 Take 1 Tab by mouth every twelve (12) hours. (Patient not taking: Reported on 11/22/2021)         Allergies   Allergen Reactions    Fluticasone Furoate-Vilanterol Anaphylaxis    Nutritional Supplement-Fiber Anaphylaxis    Sulfa (Sulfonamide Antibiotics) Other (comments) and Shortness of Breath     Other reaction(s): Other (See Comments)  Sinus swells up, wheezing states Pt.  Calcium Other (comments)    Egg Swelling    Gluten Shortness of Breath    Milk Containing Products Other (comments)    Red Dye Swelling           PE:     Physical Exam  Vitals and nursing note reviewed. Constitutional:       General: She is not in acute distress. Appearance: Normal appearance. She is not ill-appearing. Cardiovascular:      Pulses: Normal pulses. Pulmonary:      Effort: Pulmonary effort is normal. No respiratory distress. Musculoskeletal:         General: Swelling and tenderness present. No deformity or signs of injury. Normal range of motion. Cervical back: Normal range of motion and neck supple. Right lower leg: No edema. Left lower leg: No edema. Skin:     General: Skin is warm and dry. Capillary Refill: Capillary refill takes less than 2 seconds. Findings: No bruising or erythema. Neurological:      General: No focal deficit present.       Mental Status: She is alert and oriented to person, place, and time. Cranial Nerves: No cranial nerve deficit. Sensory: No sensory deficit. Psychiatric:         Mood and Affect: Mood normal.         Behavior: Behavior normal.            Hand:    Examination L Digit(s) R Digit(s)   1st CMC Tenderness +  +    1st CMC Grind +  +    Elder Nodes -  -    Heberden Nodes -  -    A1 Pulley Tenderness -  -    Triggering -  -    UCL Instability -  -    RCL Instability -  -    Lateral Stress Pain -  -    Palmar Cords -  -    Tabletop test -  -    Garrod's Pads -  -     Strength       Pinch Strength         ROM: Full      NEUROVASCULAR    Examination L R Examination L R   Carpal Comp. - - Pronator Comp. - -   Carpal Tinel + + Pronator Tinel - -   Phalen's - - Pronator Stress - -   Cubital Comp. - - Guyon Comp. - -   Cubital Tinel - - Guyon Tinel - -   Elbow Hyperflexion - - Adson's - -   Spurling's - - SC Comp. - -   PCB Median abn - - SC Tinel - -   Radial Tinel - - IC Comp. - -   Digital Tinel - - IC Tinel - -   Radial 2-Pt WNL WNL Ulnar 2-Pt WNL WNL     Radial Pulse: 2+  Capillary Refill: < 2 sec  Philip: Not Performed  [employer]: Not Performed      NCV & EMG Findings:  Evaluation of the left median motor nerve showed prolonged distal onset latency (5.1 ms). The right median motor nerve showed prolonged distal onset latency (5.5 ms) and reduced amplitude (4.5 mV). The left median sensory and the right median sensory nerves showed prolonged distal peak latency (L4.7, R4.7 ms) and decreased conduction velocity (Wrist-2nd Digit, L30, R30 m/s). All remaining nerves (as indicated in the following tables) were within normal limits. Left vs. Right side comparison data for the median motor nerve indicates abnormal L-R velocity difference (Elbow-Wrist, 51 m/s). The ulnar motor nerve indicates abnormal L-R velocity difference (A Elbow-B Elbow, 18 m/s).   All remaining left vs. right side differences were within normal limits.       All examined muscles (as indicated in the following table) showed no evidence of electrical instability.       INTERPRETATION  This is an abnormal electrodiagnostic examination. These findings may be consistent with Moderate median mononeuropathy at the wrist (carpal tunnel syndrome), bilaterally.      There is no electrodiagnostic evidence of cervical radiculopathy, brachial plexopathy, polyneuropathy or other mononeuropathy.         CLINICAL INTERPRETATION  Her electrodiagnostic finding of bilateral carpal tunnel syndrome is consistent with her bilateral hand symptoms.        Imaging:     10/9/2020 plain films of left wrist shows Eaton stage III-IV degenerative changes of the [facility] joint with early degenerative changes of the STT joint. 8/7/2020 plain films of right wrist are positive for degenerative changes about the STT and CMC joints of the thumb. These are consistent with Criselda Rodríguez stage 3. ICD-10-CM ICD-9-CM    1. Carpal tunnel syndrome, bilateral  G56.03 354.0 INJECT CARPAL TUNNEL      triamcinolone acetonide (KENALOG) 10 mg/mL injection 20 mg   2. Primary osteoarthritis of first carpometacarpal joint of right hand  M18.11 715.14 DRAIN/INJECT SMALL JOINT/BURSA      triamcinolone acetonide (KENALOG) 10 mg/mL injection 20 mg         Plan:     Bilateral thumb CMC joint injections and bilateral carpal tunnel injections. Continue daytime CMC bracing and nighttime carpal tunnel bracing. I discussed the possibility of surgery to be set up at her next visit. We will determine if she should move forward with only carpal tunnel releases versus also including her [facility] joint on whichever side she chooses. Follow-up and Dispositions    · Return in about 2 months (around 1/22/2022) for Reevaluation and possible surgical discussion.           Plan was reviewed with patient, who verbalized agreement and understanding of the plan    [address] NOTE        Chart reviewed for the following:   Renny BAKER DO, have reviewed the History, Physical and updated the Allergic reactions for [address] performed immediately prior to start of procedure:   Renny BAKER DO, have performed the following reviews on [address] prior to the start of the procedure:            * Patient was identified by name and date of birth   * Agreement on procedure being performed was verified  * Risks and Benefits explained to the patient  * Procedure site verified and marked as necessary  * Patient was positioned for comfort  * Consent was signed and verified     Time: 09:00 AM      Date of procedure: 11/22/2021    Procedure performed by: Luz Albert DO    Provider assisted by: Ronaldo Evans LPN    Patient assisted by: self    How tolerated by patient: tolerated the procedure well with no complications    Post Procedural Pain Scale: 0 - No Hurt    Comments: none    Procedure:  After consent was obtained, using sterile technique the bilateral thumb CMC joint and bilateral carpal tunnel was prepped. Local anesthetic used: 1% lidocaine. Kenalog 5 mg X4 and was then injected and the needle withdrawn. The procedure was well tolerated. The patient is asked to continue to rest the area for a few more days before resuming regular activities. It may be more painful for the first 1-2 days. Watch for fever, or increased swelling or persistent pain in the joint. Call or return to clinic prn if such symptoms occur or there is failure to improve as anticipated.

## 2024-10-05 NOTE — PROGRESS NOTES
NS RN

 ON BED, NO DISTRESS NOTED, ALL NEEDS ATTENDED. [facility] - Telemetry ([facility])  Pulmonology  Progress Note    Patient Name: Aurelio Daniels  MRN: [id number]  Admission Date: 9/26/2024  Hospital Length of Stay: 8 days  Code Status: Full Code  Attending Provider: Apurva Singletary MD  Primary Care Provider: Shlomo Villatoro MD   Principal Problem: Spontaneous pneumothorax    Subjective:     Mr Daniels is a 67 y/o gentleman with a long smoking history, COPD (recently established with Dr Jimenez), prior prostate cancer, and HTN who is currently admitted to the Hospitalist service after coming in overnight with shortness of breath and being found to have a large left sided PTX.  Pt was recently admitted to the hospital for PNA (9/13 - 9/17) and was recovering well from that when he suddenly had a large coughing fit last night.  Became markedly short of breath after this, which prompted his presentation to the ER.  Chest tube was placed by the ER physician with good re-expansion, though he continues to have a brisk air leak.    CT after the chest tube placement shows trace remaining pneumothorax, large bleb on the left, approx 3.5 cm spiculated mass on the left, and some remaining bilateral consolidations.  Mr Daniels says that he is feeling much better after the chest tube placement, though still with some cough.    Interval History:  9/28 - continuous air leak with some reaccumulation on CXR late this morning.   Adjusted tube to try and optimize drainage.  It was a bit kinked at the skin, so resecured in a better position. Remains on suction.  9/29 - re-expansion improved following tube adjustments yesterday.  Continuous brisk air leak remains on suction.  No  new complaints.  9/30: CXR with lung up, continues with large volume air leak with and without suction, CTS consulted for eval and recs, no new issues or c/o noted on exam, POC and questions answered with pt and his wife  10/1: no acute events overnight, pt continues with air leak to chest tube, detailed  conversation with pt and wife this AM about treatment and possible plans moving forward - all questions answered wife bedside  10/2: on RA today, pain better controlled with PO meds, plans for IR CT guided lung bx today   10/4: remains on RA, feels better, s/p CT guided lung bx 10/3, CXR with extensive SQ air, CT remains with air leak but seems a little less agressive than before   10/5: essentially uncharged, feels well and is eating well, pain controlled with less meds, no new issues or c/o, remains on RA, walking the halls during the day     ROS complete and negative unless stated in the interval HPI     Objective:     Vital Signs (Most Recent):  Temp: 97.9 °F (36.6 °C) (10/05/24 0741)  Pulse: 87 (10/05/24 0922)  Resp: 18 (10/05/24 0741)  BP: 128/74 (10/05/24 0741)  SpO2: (!) 93 % (10/05/24 0741) Vital Signs (24h Range):  Temp:  [97.2 °F (36.2 °C)-98.5 °F (36.9 °C)] 97.9 °F (36.6 °C)  Pulse:  [70-87] 87  Resp:  [17-19] 18  SpO2:  [91 %-93 %] 93 %  BP: (115-130)/(72-79) 128/74     Weight: 72.9 kg (160 lb 11.5 oz)  Body mass index is 23.73 kg/m².      Intake/Output Summary (Last 24 hours) at 10/5/2024 0982  Last data filed at 10/5/2024 0726  Gross per 24 hour   Intake 25.91 ml   Output 201 ml   Net -175.09 ml        Physical Exam  Vitals reviewed.   Constitutional:       General: He is awake. He is not in acute distress.     Appearance: He is well-developed.      Comments: On RA with L CT in place, continues with air leak but improved overall    Pulmonary:      Effort: Pulmonary effort is normal. No tachypnea, bradypnea, accessory muscle usage or respiratory distress.      Breath sounds: Decreased breath sounds present. No wheezing.      Comments: L chest SQ air, on RA  Neurological:      Mental Status: He is alert.   Psychiatric:         Behavior: Behavior is cooperative.               Vents:  Oxygen Concentration (%): 28 (10/1955)    Lines/Drains/Airways       Drain  Duration                  Chest Tube  "09/27/24 0130 Left Midaxillary 8 days              Peripheral Intravenous Line  Duration                  Peripheral IV - Single Lumen 10/01/24 0920 20 G Anterior;Left Forearm 4 days                    Significant Labs:    CBC/Anemia Profile:  Recent Labs   Lab 10/04/24  0514 10/05/24  0448   WBC 7.21 7.25   HGB 11.3* 11.4*   HCT 35.2* 34.0*    281   MCV 99* 96   RDW 13.1 12.8        Chemistries:  Recent Labs   Lab 10/04/24  0514 10/05/24  0448    136   K 4.3 4.0    105   CO2 22* 23   BUN 13 10   CREATININE 0.8 0.7   CALCIUM 9.1 9.0   ALBUMIN 2.9* 2.9*   PROT 5.9* 5.7*   BILITOT 0.5 0.4   ALKPHOS 97 85   ALT 14 8*   AST 17 13   MG 2.1 2.0   PHOS 3.0 2.5*       All pertinent labs within the past 24 hours have been reviewed.    Significant Imaging:  I have reviewed all pertinent imaging results/findings within the past 24 hours.    ABG  No results for input(s): "PH", "PO2", "PCO2", "HCO3", "BE" in the last 168 hours.  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  he is not on home oxygen. Supplemental oxygen was provided and noted-  . Signs/symptoms of respiratory failure include- respiratory distress. Contributing diagnoses includes - COPD and pneumothorax  Labs and images were reviewed. Patient Has recent ABG, which has been reviewed. Will treat underlying causes and adjust management of respiratory failure as follows  - chest tube remain in place, continued with air leak but seem less brisk than previous, CTS managing  - CXR reviewed, extensive SQ air   - wean O2 for goal SpO2 > 88%, on RA  - avoid high flow oxygen or NIPPV " given PTX and ongoing air leak    Mass of left lung  - s/p CT guided bx 10/3 per IR, follow path     Bilateral pneumonia  - likely some residual findings from his recent infection and hospitalization  - MRSA swab negative, sputum culture with MRSA - on zyvox  - PT/OT, IS, OOB, ambulate       Centrilobular emphysema  Patient's COPD is controlled currently.  Patient is currently off COPD Pathway. Continue scheduled inhalers  Pulmicort/Brovana nebs and Supplemental oxygen and monitor respiratory status closely.   - follow with Dr. Jimenez in clinic, last saw 9/5/24  - not in acute exac, monitor     Other  Tobacco abuse  - counseled on cessation and he is motivated to quit  - declined nicotine patch, order if he changes his mind       Likely transition to waterseal 24-48 hrs      Tha Cali MD  Pulmonology  [facility] - Telemetry ([facility])

## 2024-10-05 NOTE — SUBJECTIVE & OBJECTIVE
Interval History: Appreciate Dr. Cali- doing well, resting in bed on RA. CP much better controlled. Still has SQ emphysema L chest- CT in placed- to suction, air leak appears better, CXR L apical PTX. Labs stable. Pt ambulates well. Cont same tx, await Lung Bx results next week.     Review of Systems   Constitutional:  Positive for activity change. Negative for chills, diaphoresis and fever.   HENT: Negative.     Eyes: Negative.    Respiratory:  Negative for cough, chest tightness, shortness of breath and wheezing.    Cardiovascular:  Positive for chest pain.   Gastrointestinal: Negative.    Endocrine: Negative.    Genitourinary: Negative.    Musculoskeletal:  Negative for gait problem.   Skin: Negative.    Allergic/Immunologic: Negative.    Hematological: Negative.    Psychiatric/Behavioral: Negative.       Objective:     Vital Signs (Most Recent):  Temp: 98.7 °F (37.1 °C) (10/05/24 1631)  Pulse: 87 (10/05/24 1631)  Resp: 18 (10/05/24 1631)  BP: 120/67 (10/05/24 1631)  SpO2: (!) 92 % (10/05/24 1631) Vital Signs (24h Range):  Temp:  [97.8 °F (36.6 °C)-98.7 °F (37.1 °C)] 98.7 °F (37.1 °C)  Pulse:  [70-87] 87  Resp:  [] 18  SpO2:  [92 %-94 %] 92 %  BP: (120-130)/(67-74) 120/67     Weight: 72.9 kg (160 lb 11.5 oz)  Body mass index is 23.73 kg/m².    Intake/Output Summary (Last 24 hours) at 10/5/2024 1631  Last data filed at 10/5/2024 1522  Gross per 24 hour   Intake 505.91 ml   Output 601 ml   Net -95.09 ml         Physical Exam  Vitals reviewed.   Constitutional:       General: He is awake. He is not in acute distress.     Appearance: He is well-developed.      Comments: On RA with L CT in place, continues with air leak but improved overall    Pulmonary:      Effort: Pulmonary effort is normal. No tachypnea, bradypnea, accessory muscle usage or respiratory distress.      Breath sounds: Decreased breath sounds present. No wheezing.      Comments: L chest SQ air, on RA  Neurological:      Mental Status: He is  alert.   Psychiatric:         Behavior: Behavior is cooperative.             Significant Labs: All pertinent labs within the past 24 hours have been reviewed.  BMP:   Recent Labs   Lab 10/05/24  0448         K 4.0      CO2 23   BUN 10   CREATININE 0.7   CALCIUM 9.0   MG 2.0     CBC:   Recent Labs   Lab 10/04/24  0514 10/05/24  0448   WBC 7.21 7.25   HGB 11.3* 11.4*   HCT 35.2* 34.0*    281       Significant Imaging: I have reviewed all pertinent imaging results/findings within the past 24 hours.

## 2024-10-05 NOTE — ASSESSMENT & PLAN NOTE
Patient's COPD is controlled currently.  Patient is currently off COPD Pathway. Continue p.r.n. DuoNebs, O2 supplementation, and antibiotics for pneumonia.  monitor respiratory status closely.   -patient did receive DuoNeb, prednisone 60 mg p.o., and terbutaline 0.25 mg while in the emergency department.    Cont present care    Same as above

## 2024-10-05 NOTE — ASSESSMENT & PLAN NOTE
"-pigtail catheter/chest tube in place, continuous suction  -chest x-ray on ER arrival with large left-sided pneumothorax  -CT scan of chest with trace left apical pneumothorax   -While on suction, repeat chest x-ray noted concerns for accumulation  -CXR 09/29 noted, "near complete resolution of the left pneumothorax, with only a trace pneumothorax currently. "  -CXR 9/30 noted "No significant pneumothorax.".   -CXR 10/1 noted "No residual pneumothorax".     Underwent placement of left-sided 28F chest tube on 10/1 by Cardiothoracic surgery.       -Pulmonology and Cardiothoracic surgery consulted, follow-up recs  -O2 via 2 L nasal cannula.  Wean as tolerated to target SaO2 88-92%    Still has air leak- CT to Waterseal- cont    Cont present care- may need Heimlich    10/4- looks a little better, less air leak today on same Suction   Cont    10/5- same as above  "

## 2024-10-05 NOTE — ASSESSMENT & PLAN NOTE
Patient with Hypoxic Respiratory failure which is Acute.  he is not on home oxygen. Supplemental oxygen was provided and noted-  . Signs/symptoms of respiratory failure include- respiratory distress. Contributing diagnoses includes - COPD and pneumothorax  Labs and images were reviewed. Patient Has recent ABG, which has been reviewed. Will treat underlying causes and adjust management of respiratory failure as follows  - chest tube remain in place, continued with air leak but seem less brisk than previous, CTS managing  - CXR reviewed, extensive SQ air   - wean O2 for goal SpO2 > 88%, on RA  - avoid high flow oxygen or NIPPV given PTX and ongoing air leak

## 2024-10-05 NOTE — PLAN OF CARE
A213/A213 RADHA Daniels is a 68 y.o.male admitted on 9/26/2024 for Spontaneous pneumothorax   Code Status: Full Code MRN: [id number]   Review of patient's allergies indicates:  No Known Allergies  Past Medical History:   Diagnosis Date    Cancer     Prostate    Tobacco use       PRN meds    0.9% NaCl, , PRN  acetaminophen, 650 mg, Q6H PRN  albuterol-ipratropium, 3 mL, Q6H PRN  dextrose 10%, 12.5 g, PRN  dextrose 10%, 25 g, PRN  glucagon (human recombinant), 1 mg, PRN  glucose, 16 g, PRN  glucose, 24 g, PRN  guaiFENesin 100 mg/5 ml, 200 mg, Q4H PRN  hydrALAZINE, 10 mg, Q6H PRN  HYDROcodone-acetaminophen, 1 tablet, Q6H PRN  morphine, 2 mg, Q6H PRN  naloxone, 0.02 mg, PRN  ondansetron, 4 mg, Q6H PRN  sodium chloride 0.9%, 10 mL, Q12H PRN      Chart check completed. Will continue plan of care.     CT to -20 cm suction bubbles noted MD aware   Pt ambulated up in [place] this shift   Daily cxr   Remains free from falls         Deric Coma Scale Score: 15     Lead Monitored: Lead II Rhythm: normal sinus rhythm Frequency/Ectopy: PVCs  Cardiac/Telemetry Box Number: [id number]  VTE Required Core Measure: Pharmacological prophylaxis initiated/maintained Last Bowel Movement: 10/04/24  Diet Adult Regular Standard Tray  Voiding Characteristics: voids spontaneously without difficulty  Syed Score: 19  Fall Risk Score: 11  Accucheck []   Freq?      Lines/Drains/Airways       Drain  Duration                  Chest Tube 09/27/24 0130 Left Midaxillary 8 days              Peripheral Intravenous Line  Duration                  Peripheral IV - Single Lumen 10/01/24 0920 20 G Anterior;Left Forearm 4 days

## 2024-10-05 NOTE — ASSESSMENT & PLAN NOTE
- likely some residual findings from his recent infection and hospitalization  - MRSA swab negative, sputum culture with MRSA - on zyvox  - PT/OT, IS, OOB, ambulate

## 2024-10-05 NOTE — ASSESSMENT & PLAN NOTE
-CT scan of chest with consolidations in the left upper lobe and posteromedial right lower lobe concerning for pneumonia  -white blood cell count 12.07, COVID-19 negative, afebrile    Microbiology: Sputum Culture   Lab Results   Component Value Date    GSRESP <10 epithelial cells per low power field. 09/28/2024    GSRESP No WBC's 09/28/2024    GSRESP Few Gram positive cocci 09/28/2024    GSRESP Rare Gram negative rods 09/28/2024    GSRESP Rare yeast 09/28/2024    RESPIRATORYC No Pseudomonas isolated. 09/28/2024    RESPIRATORYC (A) 09/28/2024     METHICILLIN RESISTANT STAPHYLOCOCCUS AUREUS  Rare         -Low suspicion for MRSA given MRSA nares negative, deescalated antibiotics to cefazolin.  Follow up culture  -O2 supplementation, p.r.jose Piña  -follow up cultures  -aspiration and fall precautions  -pulmonary consulted, following    Cont present care    Improving,no obvious pneumonia now, Sputum Cx growing MRSA- switched to PO Zyvox    Cont Zyvox

## 2024-10-05 NOTE — PLAN OF CARE
POC reviewed with pt. Pt verbalizes understanding of POC. No questions at this time.  AAOx4. NADN.  NSR on cardiac monitor.  Pt remains free of falls.  No complaints at this time.  Safety measures in place.   Informed pt to call for assistance before getting up. Pt verbalizes understanding.  Hourly rounding completed.   Will continue to monitor.    A213/A213 RADHA Daniels is a 68 y.o.male admitted on 9/26/2024 for Spontaneous pneumothorax   Code Status: Full Code MRN: [id number]   Review of patient's allergies indicates:  No Known Allergies  Past Medical History:   Diagnosis Date    Cancer     Prostate    Tobacco use       PRN meds    0.9% NaCl, , PRN  acetaminophen, 650 mg, Q6H PRN  albuterol-ipratropium, 3 mL, Q6H PRN  dextrose 10%, 12.5 g, PRN  dextrose 10%, 25 g, PRN  glucagon (human recombinant), 1 mg, PRN  glucose, 16 g, PRN  glucose, 24 g, PRN  guaiFENesin 100 mg/5 ml, 200 mg, Q4H PRN  hydrALAZINE, 10 mg, Q6H PRN  HYDROcodone-acetaminophen, 1 tablet, Q6H PRN  morphine, 2 mg, Q6H PRN  naloxone, 0.02 mg, PRN  ondansetron, 4 mg, Q6H PRN  sodium chloride 0.9%, 10 mL, Q12H PRN      Chart check completed. Will continue plan of care.         Deric Coma Scale Score: 15     Lead Monitored: Lead II Rhythm: normal sinus rhythm Frequency/Ectopy: frequent, PVCs  Cardiac/Telemetry Box Number: [id number]  VTE Required Core Measure: Pharmacological prophylaxis initiated/maintained Last Bowel Movement: 10/04/24  Diet Adult Regular Standard Tray  Voiding Characteristics: voids spontaneously without difficulty  Syed Score: 19  Fall Risk Score: 11  Accucheck []   Freq?      Lines/Drains/Airways       Drain  Duration                  Chest Tube 09/27/24 0130 Left Midaxillary 8 days              Peripheral Intravenous Line  Duration                  Peripheral IV - Single Lumen 10/01/24 0920 20 G Anterior;Left Forearm 3 days

## 2024-10-05 NOTE — SUBJECTIVE & OBJECTIVE
Mr Daniels is a 67 y/o gentleman with a long smoking history, COPD (recently established with Dr Jimenez), prior prostate cancer, and HTN who is currently admitted to the Hospitalist service after coming in overnight with shortness of breath and being found to have a large left sided PTX.  Pt was recently admitted to the hospital for PNA (9/13 - 9/17) and was recovering well from that when he suddenly had a large coughing fit last night.  Became markedly short of breath after this, which prompted his presentation to the ER.  Chest tube was placed by the ER physician with good re-expansion, though he continues to have a brisk air leak.    CT after the chest tube placement shows trace remaining pneumothorax, large bleb on the left, approx 3.5 cm spiculated mass on the left, and some remaining bilateral consolidations.  Mr Daniels says that he is feeling much better after the chest tube placement, though still with some cough.    Interval History:  9/28 - continuous air leak with some reaccumulation on CXR late this morning.   Adjusted tube to try and optimize drainage.  It was a bit kinked at the skin, so resecured in a better position. Remains on suction.  9/29 - re-expansion improved following tube adjustments yesterday.  Continuous brisk air leak remains on suction.  No  new complaints.  9/30: CXR with lung up, continues with large volume air leak with and without suction, CTS consulted for eval and recs, no new issues or c/o noted on exam, POC and questions answered with pt and his wife  10/1: no acute events overnight, pt continues with air leak to chest tube, detailed conversation with pt and wife this AM about treatment and possible plans moving forward - all questions answered wife bedside  10/2: on RA today, pain better controlled with PO meds, plans for IR CT guided lung bx today   10/4: remains on RA, feels better, s/p CT guided lung bx 10/3, CXR with extensive SQ air, CT remains with air leak but seems a  little less agressive than before   10/5: essentially uncharged, feels well and is eating well, pain controlled with less meds, no new issues or c/o, remains on RA, walking the halls during the day     ROS complete and negative unless stated in the interval HPI     Objective:     Vital Signs (Most Recent):  Temp: 97.9 °F (36.6 °C) (10/05/24 0741)  Pulse: 87 (10/05/24 0922)  Resp: 18 (10/05/24 0741)  BP: 128/74 (10/05/24 0741)  SpO2: (!) 93 % (10/05/24 0741) Vital Signs (24h Range):  Temp:  [97.2 °F (36.2 °C)-98.5 °F (36.9 °C)] 97.9 °F (36.6 °C)  Pulse:  [70-87] 87  Resp:  [17-19] 18  SpO2:  [91 %-93 %] 93 %  BP: (115-130)/(72-79) 128/74     Weight: 72.9 kg (160 lb 11.5 oz)  Body mass index is 23.73 kg/m².      Intake/Output Summary (Last 24 hours) at 10/5/2024 0943  Last data filed at 10/5/2024 0726  Gross per 24 hour   Intake 25.91 ml   Output 201 ml   Net -175.09 ml        Physical Exam  Vitals reviewed.   Constitutional:       General: He is awake. He is not in acute distress.     Appearance: He is well-developed.      Comments: On RA with L CT in place, continues with air leak but improved overall    Pulmonary:      Effort: Pulmonary effort is normal. No tachypnea, bradypnea, accessory muscle usage or respiratory distress.      Breath sounds: Decreased breath sounds present. No wheezing.      Comments: L chest SQ air, on RA  Neurological:      Mental Status: He is alert.   Psychiatric:         Behavior: Behavior is cooperative.               Vents:  Oxygen Concentration (%): 28 (10/1955)    Lines/Drains/Airways       Drain  Duration                  Chest Tube 09/27/24 0130 Left Midaxillary 8 days              Peripheral Intravenous Line  Duration                  Peripheral IV - Single Lumen 10/01/24 0920 20 G Anterior;Left Forearm 4 days                    Significant Labs:    CBC/Anemia Profile:  Recent Labs   Lab 10/04/24  0514 10/05/24  0448   WBC 7.21 7.25   HGB 11.3* 11.4*   HCT 35.2* 34.0*   PLT  289 281   MCV 99* 96   RDW 13.1 12.8        Chemistries:  Recent Labs   Lab 10/04/24  0514 10/05/24  0448    136   K 4.3 4.0    105   CO2 22* 23   BUN 13 10   CREATININE 0.8 0.7   CALCIUM 9.1 9.0   ALBUMIN 2.9* 2.9*   PROT 5.9* 5.7*   BILITOT 0.5 0.4   ALKPHOS 97 85   ALT 14 8*   AST 17 13   MG 2.1 2.0   PHOS 3.0 2.5*       All pertinent labs within the past 24 hours have been reviewed.    Significant Imaging:  I have reviewed all pertinent imaging results/findings within the past 24 hours.

## 2024-10-06 PROCEDURE — 27000207 HC ISOLATION

## 2024-10-06 PROCEDURE — 94640 AIRWAY INHALATION TREATMENT: CPT

## 2024-10-06 PROCEDURE — 21400001 HC TELEMETRY ROOM

## 2024-10-06 PROCEDURE — 25000003 PHARM REV CODE 250: Performed by: NURSE PRACTITIONER

## 2024-10-06 PROCEDURE — 25000003 PHARM REV CODE 250: Performed by: EMERGENCY MEDICINE

## 2024-10-06 PROCEDURE — 25000242 PHARM REV CODE 250 ALT 637 W/ HCPCS: Performed by: INTERNAL MEDICINE

## 2024-10-06 RX ADMIN — BUDESONIDE 0.5 MG: 0.5 INHALANT ORAL at 07:10

## 2024-10-06 RX ADMIN — BUDESONIDE 0.5 MG: 0.5 INHALANT ORAL at 08:10

## 2024-10-06 RX ADMIN — MUPIROCIN: 20 OINTMENT TOPICAL at 09:10

## 2024-10-06 RX ADMIN — LINEZOLID 600 MG: 600 TABLET, FILM COATED ORAL at 09:10

## 2024-10-06 RX ADMIN — ARFORMOTEROL TARTRATE 15 MCG: 15 SOLUTION RESPIRATORY (INHALATION) at 07:10

## 2024-10-06 RX ADMIN — MUPIROCIN: 20 OINTMENT TOPICAL at 08:10

## 2024-10-06 RX ADMIN — LINEZOLID 600 MG: 600 TABLET, FILM COATED ORAL at 08:10

## 2024-10-06 RX ADMIN — HYDROCODONE BITARTRATE AND ACETAMINOPHEN 1 TABLET: 7.5; 325 TABLET ORAL at 04:10

## 2024-10-06 RX ADMIN — HYDROCODONE BITARTRATE AND ACETAMINOPHEN 1 TABLET: 7.5; 325 TABLET ORAL at 02:10

## 2024-10-06 NOTE — ASSESSMENT & PLAN NOTE
"-pigtail catheter/chest tube in place, continuous suction  -chest x-ray on ER arrival with large left-sided pneumothorax  -CT scan of chest with trace left apical pneumothorax   -While on suction, repeat chest x-ray noted concerns for accumulation  -CXR 09/29 noted, "near complete resolution of the left pneumothorax, with only a trace pneumothorax currently. "  -CXR 9/30 noted "No significant pneumothorax.".   -CXR 10/1 noted "No residual pneumothorax".     Underwent placement of left-sided 28F chest tube on 10/1 by Cardiothoracic surgery.       -Pulmonology and Cardiothoracic surgery consulted, follow-up recs  -O2 via 2 L nasal cannula.  Wean as tolerated to target SaO2 88-92%    Still has air leak- CT to Waterseal- cont    Cont present care- may need Heimlich    10/4- looks a little better, less air leak today on same Suction   Cont    10/5- same as above    10/6- improving, waterseal trial, CT clamp soon  "

## 2024-10-06 NOTE — SUBJECTIVE & OBJECTIVE
Interval History: looks better, comfortable in bed on RA. CT remains on -20 cm suction, air leak appears better. Pulm plans water seal trial soon. CXR does not show any PTX. Cont present care. Await Lung bx results.     Review of Systems   Constitutional:  Positive for activity change. Negative for chills, diaphoresis and fever.   HENT: Negative.     Eyes: Negative.    Respiratory:  Negative for cough, chest tightness, shortness of breath and wheezing.    Cardiovascular:  Positive for chest pain.   Gastrointestinal: Negative.    Endocrine: Negative.    Genitourinary: Negative.    Musculoskeletal:  Negative for gait problem.   Skin: Negative.    Allergic/Immunologic: Negative.    Hematological: Negative.    Psychiatric/Behavioral: Negative.       Objective:     Vital Signs (Most Recent):  Temp: 98.3 °F (36.8 °C) (10/06/24 1146)  Pulse: 79 (10/06/24 1146)  Resp: 18 (10/06/24 1146)  BP: 118/83 (10/06/24 1146)  SpO2: (!) 94 % (10/06/24 1146) Vital Signs (24h Range):  Temp:  [97.8 °F (36.6 °C)-98.7 °F (37.1 °C)] 98.3 °F (36.8 °C)  Pulse:  [68-99] 79  Resp:  [] 18  SpO2:  [92 %-96 %] 94 %  BP: (112-120)/(60-83) 118/83     Weight: 72.9 kg (160 lb 11.5 oz)  Body mass index is 23.73 kg/m².    Intake/Output Summary (Last 24 hours) at 10/6/2024 1317  Last data filed at 10/6/2024 0830  Gross per 24 hour   Intake 540 ml   Output 1150 ml   Net -610 ml         Physical Exam  Vitals reviewed.   Constitutional:       General: He is awake. He is not in acute distress.     Appearance: He is well-developed. He is not ill-appearing.   Pulmonary:      Comments: L CT in place with air leak, air leak much improved and less brisk than in previous days, continues with SQ air, on RA  Neurological:      Mental Status: He is alert.   Psychiatric:         Behavior: Behavior is cooperative.           Significant Labs: All pertinent labs within the past 24 hours have been reviewed.  ession  EXAMINATION:  XR CHEST 1 VIEW     CLINICAL  HISTORY:  Pneumothorax, ptx wtih CT in place;     COMPARISON:  Chest, 10/05/2024.     FINDINGS:  One view.  No definite pneumothorax.  Left pleural catheter.  Stable opacity involving the center portion of the left lung.  Subcutaneous emphysema left chest wall similar to previous exam.  Right lung remains relatively clear.     Impression:     No definite left pneumothorax.  Otherwise stable chest      Electronically signed by:Faith Rios MD  Date:                                            10/06/2024  Time:                                           07:59    Significant Imaging: I have reviewed all pertinent imaging results/findings within the past 24 hours.

## 2024-10-06 NOTE — PROGRESS NOTES
[facility] - Telemetry ([facility])  Pulmonology  Progress Note    Patient Name: Aurelio Daniels  MRN: [id number]  Admission Date: 9/26/2024  Hospital Length of Stay: 9 days  Code Status: Full Code  Attending Provider: Apurva Singletary MD  Primary Care Provider: Shlomo Villatoro MD   Principal Problem: Spontaneous pneumothorax    Subjective:     Mr Daniels is a 69 y/o gentleman with a long smoking history, COPD (recently established with Dr Jimenez), prior prostate cancer, and HTN who is currently admitted to the Hospitalist service after coming in overnight with shortness of breath and being found to have a large left sided PTX.  Pt was recently admitted to the hospital for PNA (9/13 - 9/17) and was recovering well from that when he suddenly had a large coughing fit last night.  Became markedly short of breath after this, which prompted his presentation to the ER.  Chest tube was placed by the ER physician with good re-expansion, though he continues to have a brisk air leak.    CT after the chest tube placement shows trace remaining pneumothorax, large bleb on the left, approx 3.5 cm spiculated mass on the left, and some remaining bilateral consolidations.  Mr Daniels says that he is feeling much better after the chest tube placement, though still with some cough.    Interval History:  9/28 - continuous air leak with some reaccumulation on CXR late this morning.   Adjusted tube to try and optimize drainage.  It was a bit kinked at the skin, so resecured in a better position. Remains on suction.  9/29 - re-expansion improved following tube adjustments yesterday.  Continuous brisk air leak remains on suction.  No  new complaints.  9/30: CXR with lung up, continues with large volume air leak with and without suction, CTS consulted for eval and recs, no new issues or c/o noted on exam, POC and questions answered with pt and his wife  10/1: no acute events overnight, pt continues with air leak to chest tube, detailed  conversation with pt and wife this AM about treatment and possible plans moving forward - all questions answered wife bedside  10/2: on RA today, pain better controlled with PO meds, plans for IR CT guided lung bx today   10/4: remains on RA, feels better, s/p CT guided lung bx 10/3, CXR with extensive SQ air, CT remains with air leak but seems a little less agressive than before   10/5: essentially uncharged, feels well and is eating well, pain controlled with less meds, no new issues or c/o, remains on RA, walking the halls during the day   10/6: doing well, continue with CT to left side with air leak that continues to slow, may be able to trial water seal in the coming 24 hours or so    ROS complete and negative unless stated in the interval HPI     Objective:     Vital Signs (Most Recent):  Temp: 98.7 °F (37.1 °C) (10/06/24 0811)  Pulse: 87 (10/06/24 0930)  Resp: 18 (10/06/24 0811)  BP: 116/60 (10/06/24 0811)  SpO2: (!) 93 % (10/06/24 0811) Vital Signs (24h Range):  Temp:  [97.8 °F (36.6 °C)-98.7 °F (37.1 °C)] 98.7 °F (37.1 °C)  Pulse:  [68-99] 87  Resp:  [] 18  SpO2:  [92 %-96 %] 93 %  BP: (112-124)/(60-74) 116/60     Weight: 72.9 kg (160 lb 11.5 oz)  Body mass index is 23.73 kg/m².      Intake/Output Summary (Last 24 hours) at 10/6/2024 1046  Last data filed at 10/6/2024 0715  Gross per 24 hour   Intake 540 ml   Output 1150 ml   Net -610 ml        Physical Exam  Vitals reviewed.   Constitutional:       General: He is awake. He is not in acute distress.     Appearance: He is well-developed. He is not ill-appearing.   Pulmonary:      Comments: L CT in place with air leak, air leak much improved and less brisk than in previous days, continues with SQ air, on RA  Neurological:      Mental Status: He is alert.   Psychiatric:         Behavior: Behavior is cooperative.             Vents:  Oxygen Concentration (%): 28 (10/1955)    Lines/Drains/Airways       Drain  Duration                  Chest Tube  "09/27/24 0130 Left Midaxillary 9 days              Peripheral Intravenous Line  Duration                  Peripheral IV - Single Lumen 10/06/24 0544 22 G Posterior;Right Forearm <1 day                    Significant Labs:    CBC/Anemia Profile:  Recent Labs   Lab 10/05/24  0448   WBC 7.25   HGB 11.4*   HCT 34.0*      MCV 96   RDW 12.8        Chemistries:  Recent Labs   Lab 10/05/24  0448      K 4.0      CO2 23   BUN 10   CREATININE 0.7   CALCIUM 9.0   ALBUMIN 2.9*   PROT 5.7*   BILITOT 0.4   ALKPHOS 85   ALT 8*   AST 13   MG 2.0   PHOS 2.5*       All pertinent labs within the past 24 hours have been reviewed.    Significant Imaging:  I have reviewed all pertinent imaging results/findings within the past 24 hours.    ABG  No results for input(s): "PH", "PO2", "PCO2", "HCO3", "BE" in the last 168 hours.  Assessment/Plan:     Pulmonary  * Spontaneous pneumothorax  - likely 2/2 to ruptured bleb  - pt with continuous air leak but less brisk than previous days  - CTS surgery following, appreciate assistance, pigtail swapped for large bore CT 10/1  - daily CXR while CT in place       Acute on chronic respiratory failure with hypoxia  Patient with Hypoxic Respiratory failure which is Acute.  he is not on home oxygen. Supplemental oxygen was provided and noted-  . Signs/symptoms of respiratory failure include- respiratory distress. Contributing diagnoses includes - COPD and pneumothorax  Labs and images were reviewed. Patient Has recent ABG, which has been reviewed. Will treat underlying causes and adjust management of respiratory failure as follows  - chest tube remain in place, continued with air leak but less brisk than previous days, CTS managing, ? trial of water seal in the coming day or so  - CXR reviewed, extensive SQ air, no ptx  - wean O2 for goal SpO2 > 88%, on RA  - avoid high flow oxygen or NIPPV given PTX and ongoing air leak    Mass of left lung  - s/p CT guided bx 10/3 per IR, follow path " as it remains pending     Bilateral pneumonia  - sputum culture with MRSA - on zyvox  - PT/OT, IS, OOB, ambulate       Centrilobular emphysema  Patient's COPD is controlled currently.  Patient is currently off COPD Pathway. Continue scheduled inhalers  Pulmicort/Brovana nebs and Supplemental oxygen and monitor respiratory status closely.   - follow with Dr. Jimenez in clinic, last saw 9/5/24  - not in acute exac, monitor    Other  Tobacco abuse  - counseled on cessation and he is motivated to quit  - declined nicotine patch, order if he changes his mind        Lucian Lowe, NP  Pulmonology  [facility] - Telemetry ([facility])

## 2024-10-06 NOTE — ASSESSMENT & PLAN NOTE
- likely 2/2 to ruptured bleb  - pt with continuous air leak but less brisk than previous days  - CTS surgery following, appreciate assistance, pigtail swapped for large bore CT 10/1  - daily CXR while CT in place

## 2024-10-06 NOTE — PROGRESS NOTES
[facility] Medicine  Progress Note    Patient Name: Aurelio Daniels  MRN: [id number]  Patient Class: IP- Inpatient   Admission Date: 9/26/2024  Length of Stay: 9 days  Attending Physician: Apurva Singletary MD  Primary Care Provider: Shlomo Villatoro MD        Subjective:     Principal Problem:Spontaneous pneumothorax        HPI:  68-year-old man with history of prostate cancer, tobacco abuse (quit 1-1/2 once ago), COPD (discharged with home oxygen via 2 L nasal cannula as of 09/17/2024), centrilobular emphysema, hypertension, anemia, and recent bilateral lower lobe pneumonia who presented to the emergency department for acute onset shortness a breath after a coughing spell.  Symptom onset occurred after the football game at approximately 10:00 p.m. in the evening, constant, and moderate-to-severe in nature.  Patient denies any associated chest pain, nausea, vomiting, fevers, chills.  Patient was originally 82% on room air on ER arrival.  He improved with nebulizer treatment, steroids, and CPAP.  Chest x-ray revealed left pneumothorax and pigtail catheter/chest tube was placed by ER physician.  Repeat chest x-ray showed improved lung inflation however there were significant abnormalities with underlying pneumonia and possible lung mass.  Stat CT scan of chest with contrast was ordered and showed spiculated left perihilar mass 3.7 x 2.5 x 3.6 cm suspicious for neoplasm; left upper lobe and posteromedial right lower lobe consolidations concerning for pneumonia; nonspecific mildly prominent mediastinal and left hilar lymph nodes with metastases possible; small hypodensity in right liver that is nonspecific but metastases not excluded; osseous metastases not excluded; small left and trace right pleural effusion; trace left apical pneumothorax; emphysematous changes with bulla in the left upper lobe.  Patient's wife is a retired nurse and they have been  for 25 years.  Patient  "still works as a  for [employer].    Recent hospital admit 9/13-09/17/2024 with bilateral pneumonia, hypoxia.  Chest x-ray 09/13/2024 with multifocal interstitial pneumonia.  Patient was discharged with Augmentin.  Patient was discharged with home oxygen via 2 L nasal cannula.  He had previously not required oxygen.  Of note, chest x-ray on 09/09/2024 was read as negative for an acute process.    Patient was seen by primary care physician 09/24/2024 with repeat CXR showing "Findings suspicious for collapse of the left upper lobe with persistent consolidations and atelectasis on the left. Additionally a opaque masses seen at the level of the midthoracic spine. Recommend follow-up evaluation with CT with contrast."  Primary care physician reports speaking with patient's pulmonologist and with plan to order CT scan of chest without contrast as soon as possible.        Overview/Hospital Course:  Hospital medicine for evaluation of pneumothorax after placement of chest tube in ED.   Repeat chest x-ray noted concerns for reaccumulation of pneumothorax despite ongoing suction drainage, suspect likely secondary to ruptured bleb leading to continuous air leak with high concerns for bronchopleural fistula.  CT imaging also noted concerns for consolidation in the left upper lobe and posteromedial right lower lobe concerning for pneumonia, labs with worsening leukocytosis, so started on broad-spectrum antibiotics given risk factors of recent hospitalization.  MRSA cultures pending. Repeat chest x-ray 9/29 noted "near complete resolution of the left pneumothorax, with only a trace pneumothorax currently. " CXR 9/30 noted "No significant pneumothorax.". CXR 10/1 noted "No residual pneumothorax". Underwent placement of left-sided 28F chest tube on 10/1 by Cardiothoracic surgery. Pulmonology and Cardiothoracic surgery following. Low suspicion for MRSA given MRSA nares negative, deescalated antibiotics to " cefazolin.     10/2- looks and feels better, no CP, SOB better since large CT placed by Dr. Gavin yesterday, connected to water seal, air leak still present. IR could not perform Lung Bx today, hence tomorrow. Pt agreeable  and otherwise comfortable. Pain controlled with analgesics. NPO again after MN tonight.      10/3- appreciate IR and CTS- pt resting comfortably, CT in place with air leak present. Underwent L Lung Bx by IR. Tolerated procedure well. CP under control with meds. Cont present care. Per CT surgery- may need Heimlich valve upon discharge, pt has severe pan acinar emphysema- not a candidate for open lung procedure.       10/4- appreciate Dr. Rodriguez- doing better, still connected to Chest tube with -20 cm suction via waterseal. Air leak a little better today. L sided CP also better controlled. He ambulated twice in the halls today. Sputum Cx yesterday grew MRSA- hence Ancef changed to oral Zyvox. Remains HD stable, pleasant, eating drinking well.      10/5- Appreciate Dr. Cali- doing well, resting in bed on RA. CP much better controlled. Still has SQ emphysema L chest- CT in placed- to suction, air leak appears better, CXR L apical PTX. Labs stable. Pt ambulates well. Cont same tx, await Lung Bx results next week.     10/6- looks better, comfortable in bed on RA. CT remains on -20 cm suction, air leak appears better. Pulm plans water seal trial soon. CXR does not show any PTX. Cont present care. Await Lung bx results.     Interval History: looks better, comfortable in bed on RA. CT remains on -20 cm suction, air leak appears better. Pulm plans water seal trial soon. CXR does not show any PTX. Cont present care. Await Lung bx results.     Review of Systems   Constitutional:  Positive for activity change. Negative for chills, diaphoresis and fever.   HENT: Negative.     Eyes: Negative.    Respiratory:  Negative for cough, chest tightness, shortness of breath and wheezing.    Cardiovascular:   Positive for chest pain.   Gastrointestinal: Negative.    Endocrine: Negative.    Genitourinary: Negative.    Musculoskeletal:  Negative for gait problem.   Skin: Negative.    Allergic/Immunologic: Negative.    Hematological: Negative.    Psychiatric/Behavioral: Negative.       Objective:     Vital Signs (Most Recent):  Temp: 98.3 °F (36.8 °C) (10/06/24 1146)  Pulse: 79 (10/06/24 1146)  Resp: 18 (10/06/24 1146)  BP: 118/83 (10/06/24 1146)  SpO2: (!) 94 % (10/06/24 1146) Vital Signs (24h Range):  Temp:  [97.8 °F (36.6 °C)-98.7 °F (37.1 °C)] 98.3 °F (36.8 °C)  Pulse:  [68-99] 79  Resp:  [] 18  SpO2:  [92 %-96 %] 94 %  BP: (112-120)/(60-83) 118/83     Weight: 72.9 kg (160 lb 11.5 oz)  Body mass index is 23.73 kg/m².    Intake/Output Summary (Last 24 hours) at 10/6/2024 1317  Last data filed at 10/6/2024 0830  Gross per 24 hour   Intake 540 ml   Output 1150 ml   Net -610 ml         Physical Exam  Vitals reviewed.   Constitutional:       General: He is awake. He is not in acute distress.     Appearance: He is well-developed. He is not ill-appearing.   Pulmonary:      Comments: L CT in place with air leak, air leak much improved and less brisk than in previous days, continues with SQ air, on RA  Neurological:      Mental Status: He is alert.   Psychiatric:         Behavior: Behavior is cooperative.           Significant Labs: All pertinent labs within the past 24 hours have been reviewed.  ession  EXAMINATION:  XR CHEST 1 VIEW     CLINICAL HISTORY:  Pneumothorax, ptx wtih CT in place;     COMPARISON:  Chest, 10/05/2024.     FINDINGS:  One view.  No definite pneumothorax.  Left pleural catheter.  Stable opacity involving the center portion of the left lung.  Subcutaneous emphysema left chest wall similar to previous exam.  Right lung remains relatively clear.     Impression:     No definite left pneumothorax.  Otherwise stable chest      Electronically signed by:Faith Rios MD  Date:                                "             10/06/2024  Time:                                           07:59    Significant Imaging: I have reviewed all pertinent imaging results/findings within the past 24 hours.    Assessment/Plan:      * Spontaneous pneumothorax  -pigtail catheter/chest tube in place, continuous suction  -chest x-ray on ER arrival with large left-sided pneumothorax  -CT scan of chest with trace left apical pneumothorax   -While on suction, repeat chest x-ray noted concerns for accumulation  -CXR 09/29 noted, "near complete resolution of the left pneumothorax, with only a trace pneumothorax currently. "  -CXR 9/30 noted "No significant pneumothorax.".   -CXR 10/1 noted "No residual pneumothorax".     Underwent placement of left-sided 28F chest tube on 10/1 by Cardiothoracic surgery.       -Pulmonology and Cardiothoracic surgery consulted, follow-up recs  -O2 via 2 L nasal cannula.  Wean as tolerated to target SaO2 88-92%    Still has air leak- CT to Waterseal- cont    Cont present care- may need Heimlich    10/4- looks a little better, less air leak today on same Suction   Cont    10/5- same as above    10/6- improving, waterseal trial, CT clamp soon    Centrilobular emphysema  Patient's COPD is controlled currently.  Patient is currently off COPD Pathway. Continue p.r.n. DuoNebs, O2 supplementation, and antibiotics for pneumonia.  monitor respiratory status closely.   -patient did receive DuoNeb, prednisone 60 mg p.o., and terbutaline 0.25 mg while in the emergency department.    Cont present care    Same as above    Bilateral pneumonia  -CT scan of chest with consolidations in the left upper lobe and posteromedial right lower lobe concerning for pneumonia  -white blood cell count 12.07, COVID-19 negative, afebrile    Microbiology: Sputum Culture   Lab Results   Component Value Date    GSRESP <10 epithelial cells per low power field. 09/28/2024    GSRESP No WBC's 09/28/2024    GSRESP Few Gram positive cocci 09/28/2024    " GSRESP Rare Gram negative rods 09/28/2024    GSRESP Rare yeast 09/28/2024    RESPIRATORYC No Pseudomonas isolated. 09/28/2024    RESPIRATORYC (A) 09/28/2024     METHICILLIN RESISTANT STAPHYLOCOCCUS AUREUS  Rare         -Low suspicion for MRSA given MRSA nares negative, deescalated antibiotics to cefazolin.  Follow up culture  -O2 supplementation, p.r.n. DuoNebs  -follow up cultures  -aspiration and fall precautions  -pulmonary consulted, following    Cont present care    Improving,no obvious pneumonia now, Sputum Cx growing MRSA- switched to PO Zyvox    Cont Zyvox    Mass of left lung  Left lung mass with presumed lung cancer with possible metastases  -CT scan of chest shows left perihilar mass 3.7 x 2.5 x 3.6 cm concerning for neoplasm; nonspecific mildly prominent mediastinal and left hilar lymph nodes with metastases possible; small hypodensity in right liver that is nonspecific but metastases not excluded; osseous metastases not excluded      -pulmonary consulted, plans for future evaluation after respiratory status is more stable    Await Lung Bx tomorrow by IR    S/p Lung Bx today    Tobacco abuse  Patient has not smoked in 1-1/2 months.  Counseled continued cessation.  No nicotine patch needed.      Macrocytic anemia  Anemia is likely due to  unclear etiology . Most recent hemoglobin and hematocrit are listed below.  Recent Labs     10/02/24  0437 10/03/24  0521 10/04/24  0514   HGB 12.3* 12.1* 11.3*   HCT 38.8* 38.1* 35.2*       Plan  - Monitor serial CBC: Daily  - Transfuse PRBC if patient becomes hemodynamically unstable, symptomatic or H/H drops below 7/21.  - Patient has not received any PRBC transfusions to date  - Patient's anemia is currently stable    Stable      Prostate cancer  High-risk prostate cancer  -seen by Urology Dr. Smith 07/10/2024 with plans for follow up visit in 6 months and repeat PSA  -status post radiation therapy and ADT, PSA undetectable  -done with 2 years of Lupron      Primary  "hypertension  Chronic, controlled. Latest blood pressure and vitals reviewed-     Temp:  [97.3 °F (36.3 °C)-98 °F (36.7 °C)]   Pulse:  [70-97]   Resp:  [16-20]   BP: (114-138)/(66-81)   SpO2:  [91 %-97 %] .   Home meds for hypertension were reviewed and noted below.   Hypertension Medications               valsartan (DIOVAN) 80 MG tablet Take 1 tablet (80 mg total) by mouth once daily.            While in the hospital, will manage blood pressure as follows; Adjust home antihypertensive regimen as follows- normotensive on admission, resume home medications as appropriate; P.r.n. IV hydralazine with parameters.    Will utilize p.r.n. blood pressure medication only if patient's blood pressure greater than 180/110 and he develops symptoms such as worsening chest pain or shortness of breath.    Acute on chronic respiratory failure with hypoxia  Patient with Hypoxic Respiratory failure which is Acute on chronic.  he is on home oxygen at 2 LPM. Supplemental oxygen was provided and noted- Oxygen Concentration (%):  [28] 28    .   Signs/symptoms of respiratory failure include- tachypnea. Contributing diagnoses includes - COPD, Pneumonia, and pneumothorax  Labs and images were reviewed. Patient Has recent ABG, which has been reviewed. Will treat underlying causes and adjust management of respiratory failure as follows:      -continuous pulse oximetry monitoring, O2 supplementation, p.r.n. DuoNebs  -left-sided chest tube/pigtail catheter in place  -See "Bilateral pneumonia" A&P. Follow up cultures and deescalate antibiotics as appropriate  -pulmonary and Cardiothoracic surgery consulted, follow-up recs    stable      VTE Risk Mitigation (From admission, onward)           Ordered     Reason for No Pharmacological VTE Prophylaxis  Once        Comments: No Lovenox until patient is seen by Pulmonary as they may decide to pursue lung biopsy   Question:  Reasons:  Answer:  Physician Provided (leave comment)    09/27/24 0401     IP " VTE HIGH RISK PATIENT  Once         09/27/24 0401     Place sequential compression device  Until discontinued         09/27/24 0401                    Discharge Planning   YUE:      Code Status: Full Code   Is the patient medically ready for discharge?:     Reason for patient still in hospital (select all that apply): Patient trending condition, Laboratory test, Treatment, Imaging, and Consult recommendations  Discharge Plan A: Home with family        Apurva Singletary MD  Department of Hospital Medicine   [facility] ([facility])

## 2024-10-06 NOTE — SUBJECTIVE & OBJECTIVE
Mr Daniels is a 67 y/o gentleman with a long smoking history, COPD (recently established with Dr Jimenez), prior prostate cancer, and HTN who is currently admitted to the Hospitalist service after coming in overnight with shortness of breath and being found to have a large left sided PTX.  Pt was recently admitted to the hospital for PNA (9/13 - 9/17) and was recovering well from that when he suddenly had a large coughing fit last night.  Became markedly short of breath after this, which prompted his presentation to the ER.  Chest tube was placed by the ER physician with good re-expansion, though he continues to have a brisk air leak.    CT after the chest tube placement shows trace remaining pneumothorax, large bleb on the left, approx 3.5 cm spiculated mass on the left, and some remaining bilateral consolidations.  Mr Daniels says that he is feeling much better after the chest tube placement, though still with some cough.    Interval History:  9/28 - continuous air leak with some reaccumulation on CXR late this morning.   Adjusted tube to try and optimize drainage.  It was a bit kinked at the skin, so resecured in a better position. Remains on suction.  9/29 - re-expansion improved following tube adjustments yesterday.  Continuous brisk air leak remains on suction.  No  new complaints.  9/30: CXR with lung up, continues with large volume air leak with and without suction, CTS consulted for eval and recs, no new issues or c/o noted on exam, POC and questions answered with pt and his wife  10/1: no acute events overnight, pt continues with air leak to chest tube, detailed conversation with pt and wife this AM about treatment and possible plans moving forward - all questions answered wife bedside  10/2: on RA today, pain better controlled with PO meds, plans for IR CT guided lung bx today   10/4: remains on RA, feels better, s/p CT guided lung bx 10/3, CXR with extensive SQ air, CT remains with air leak but seems a  little less agressive than before   10/5: essentially uncharged, feels well and is eating well, pain controlled with less meds, no new issues or c/o, remains on RA, walking the halls during the day   10/6: doing well, continue with CT to left side with air leak that continues to slow, may be able to trial water seal in the coming 24 hours or so    ROS complete and negative unless stated in the interval HPI     Objective:     Vital Signs (Most Recent):  Temp: 98.7 °F (37.1 °C) (10/06/24 0811)  Pulse: 87 (10/06/24 0930)  Resp: 18 (10/06/24 0811)  BP: 116/60 (10/06/24 0811)  SpO2: (!) 93 % (10/06/24 0811) Vital Signs (24h Range):  Temp:  [97.8 °F (36.6 °C)-98.7 °F (37.1 °C)] 98.7 °F (37.1 °C)  Pulse:  [68-99] 87  Resp:  [] 18  SpO2:  [92 %-96 %] 93 %  BP: (112-124)/(60-74) 116/60     Weight: 72.9 kg (160 lb 11.5 oz)  Body mass index is 23.73 kg/m².      Intake/Output Summary (Last 24 hours) at 10/6/2024 1046  Last data filed at 10/6/2024 0715  Gross per 24 hour   Intake 540 ml   Output 1150 ml   Net -610 ml        Physical Exam  Vitals reviewed.   Constitutional:       General: He is awake. He is not in acute distress.     Appearance: He is well-developed. He is not ill-appearing.   Pulmonary:      Comments: L CT in place with air leak, air leak much improved and less brisk than in previous days, continues with SQ air, on RA  Neurological:      Mental Status: He is alert.   Psychiatric:         Behavior: Behavior is cooperative.             Vents:  Oxygen Concentration (%): 28 (10/1955)    Lines/Drains/Airways       Drain  Duration                  Chest Tube 09/27/24 0130 Left Midaxillary 9 days              Peripheral Intravenous Line  Duration                  Peripheral IV - Single Lumen 10/06/24 0544 22 G Posterior;Right Forearm <1 day                    Significant Labs:    CBC/Anemia Profile:  Recent Labs   Lab 10/05/24  0448   WBC 7.25   HGB 11.4*   HCT 34.0*      MCV 96   RDW 12.8         Chemistries:  Recent Labs   Lab 10/05/24  0448      K 4.0      CO2 23   BUN 10   CREATININE 0.7   CALCIUM 9.0   ALBUMIN 2.9*   PROT 5.7*   BILITOT 0.4   ALKPHOS 85   ALT 8*   AST 13   MG 2.0   PHOS 2.5*       All pertinent labs within the past 24 hours have been reviewed.    Significant Imaging:  I have reviewed all pertinent imaging results/findings within the past 24 hours.

## 2024-10-06 NOTE — PLAN OF CARE
A213/A213 RADHA Daniels is a 68 y.o.male admitted on 9/26/2024 for Spontaneous pneumothorax   Code Status: Full Code MRN: [id number]   Review of patient's allergies indicates:  No Known Allergies  Past Medical History:   Diagnosis Date    Cancer     Prostate    Tobacco use       PRN meds    0.9% NaCl, , PRN  acetaminophen, 650 mg, Q6H PRN  albuterol-ipratropium, 3 mL, Q6H PRN  dextrose 10%, 12.5 g, PRN  dextrose 10%, 25 g, PRN  glucagon (human recombinant), 1 mg, PRN  glucose, 16 g, PRN  glucose, 24 g, PRN  guaiFENesin 100 mg/5 ml, 200 mg, Q4H PRN  hydrALAZINE, 10 mg, Q6H PRN  HYDROcodone-acetaminophen, 1 tablet, Q6H PRN  morphine, 2 mg, Q6H PRN  naloxone, 0.02 mg, PRN  ondansetron, 4 mg, Q6H PRN  sodium chloride 0.9%, 10 mL, Q12H PRN         Pt oriented x4.  VSS.  Pt remained afebrile throughout this shift.   All meds administered per order.   Pt remained free of falls this shift.   Plan of care reviewed. Patient verbalizes understanding.   Pt moving/turning independently.   Bed low, side rails up x 2, wheels locked, call light in reach.   Patient instructed to call for assistance.  Patient education provided             Orientation: oriented x 4  [place] Coma Scale Score: 15     Lead Monitored: Lead II Rhythm: normal sinus rhythm Frequency/Ectopy: PVCs  Cardiac/Telemetry Box Number: [id number]  VTE Required Core Measure: Pharmacological prophylaxis initiated/maintained Last Bowel Movement: 10/04/24  Diet Adult Regular Standard Tray  Voiding Characteristics: voids spontaneously without difficulty  Syed Score: 18  Fall Risk Score: 11  Accucheck []   Freq?      Lines/Drains/Airways       Drain  Duration                  Chest Tube 09/27/24 0130 Left Midaxillary 9 days              Peripheral Intravenous Line  Duration                  Peripheral IV - Single Lumen 10/06/24 0544 22 G Posterior;Right Forearm <1 day

## 2024-10-06 NOTE — ASSESSMENT & PLAN NOTE
Patient with Hypoxic Respiratory failure which is Acute.  he is not on home oxygen. Supplemental oxygen was provided and noted-  . Signs/symptoms of respiratory failure include- respiratory distress. Contributing diagnoses includes - COPD and pneumothorax  Labs and images were reviewed. Patient Has recent ABG, which has been reviewed. Will treat underlying causes and adjust management of respiratory failure as follows  - chest tube remain in place, continued with air leak but less brisk than previous days, CTS managing, ? trial of water seal in the coming day or so  - CXR reviewed, extensive SQ air, no ptx  - wean O2 for goal SpO2 > 88%, on RA  - avoid high flow oxygen or NIPPV given PTX and ongoing air leak

## 2024-10-06 NOTE — PLAN OF CARE
Problem: Adult Inpatient Plan of Care  Goal: Plan of Care Review  Outcome: Progressing  Goal: Patient-Specific Goal (Individualized)  Outcome: Progressing  Goal: Absence of Hospital-Acquired Illness or Injury  Outcome: Progressing  Goal: Optimal Comfort and Wellbeing  Outcome: Progressing  Goal: Readiness for Transition of Care  Outcome: Progressing     Problem: Pneumonia  Goal: Fluid Balance  Outcome: Progressing  Goal: Resolution of Infection Signs and Symptoms  Outcome: Progressing  Goal: Effective Oxygenation and Ventilation  Outcome: Progressing     Problem: Skin Injury Risk Increased  Goal: Skin Health and Integrity  Outcome: Progressing     Problem: Fall Injury Risk  Goal: Absence of Fall and Fall-Related Injury  Outcome: Progressing     Problem: Infection  Goal: Absence of Infection Signs and Symptoms  Outcome: Progressing

## 2024-10-07 PROBLEM — J96.21 ACUTE ON CHRONIC RESPIRATORY FAILURE WITH HYPOXIA: Status: RESOLVED | Noted: 2024-09-27 | Resolved: 2024-10-07

## 2024-10-07 PROCEDURE — 94799 UNLISTED PULMONARY SVC/PX: CPT

## 2024-10-07 PROCEDURE — 94640 AIRWAY INHALATION TREATMENT: CPT

## 2024-10-07 PROCEDURE — 25000003 PHARM REV CODE 250: Performed by: NURSE PRACTITIONER

## 2024-10-07 PROCEDURE — 25000003 PHARM REV CODE 250: Performed by: EMERGENCY MEDICINE

## 2024-10-07 PROCEDURE — 25000242 PHARM REV CODE 250 ALT 637 W/ HCPCS: Performed by: INTERNAL MEDICINE

## 2024-10-07 PROCEDURE — 21400001 HC TELEMETRY ROOM

## 2024-10-07 PROCEDURE — 94761 N-INVAS EAR/PLS OXIMETRY MLT: CPT

## 2024-10-07 PROCEDURE — 27000207 HC ISOLATION

## 2024-10-07 RX ORDER — SODIUM,POTASSIUM PHOSPHATES 280-250MG
2 POWDER IN PACKET (EA) ORAL
Status: DISCONTINUED | OUTPATIENT
Start: 2024-10-07 | End: 2024-10-09 | Stop reason: HOSPADM

## 2024-10-07 RX ADMIN — HYDROCODONE BITARTRATE AND ACETAMINOPHEN 1 TABLET: 7.5; 325 TABLET ORAL at 08:10

## 2024-10-07 RX ADMIN — BUDESONIDE 0.5 MG: 0.5 INHALANT ORAL at 07:10

## 2024-10-07 RX ADMIN — LINEZOLID 600 MG: 600 TABLET, FILM COATED ORAL at 08:10

## 2024-10-07 RX ADMIN — MUPIROCIN: 20 OINTMENT TOPICAL at 08:10

## 2024-10-07 RX ADMIN — Medication 2 PACKET: at 08:10

## 2024-10-07 RX ADMIN — HYDROCODONE BITARTRATE AND ACETAMINOPHEN 1 TABLET: 7.5; 325 TABLET ORAL at 04:10

## 2024-10-07 RX ADMIN — Medication 2 PACKET: at 09:10

## 2024-10-07 RX ADMIN — ARFORMOTEROL TARTRATE 15 MCG: 15 SOLUTION RESPIRATORY (INHALATION) at 07:10

## 2024-10-07 RX ADMIN — Medication 2 PACKET: at 04:10

## 2024-10-07 NOTE — PROGRESS NOTES
[facility] Medicine  Progress Note    Patient Name: Aurelio Daniels  MRN: [id number]  Patient Class: IP- Inpatient   Admission Date: 9/26/2024  Length of Stay: 10 days  Attending Physician: Apurva Singletary MD  Primary Care Provider: Shlomo Villatoro MD        Subjective:     Principal Problem:Spontaneous pneumothorax        HPI:  68-year-old man with history of prostate cancer, tobacco abuse (quit 1-1/2 once ago), COPD (discharged with home oxygen via 2 L nasal cannula as of 09/17/2024), centrilobular emphysema, hypertension, anemia, and recent bilateral lower lobe pneumonia who presented to the emergency department for acute onset shortness a breath after a coughing spell.  Symptom onset occurred after the football game at approximately 10:00 p.m. in the evening, constant, and moderate-to-severe in nature.  Patient denies any associated chest pain, nausea, vomiting, fevers, chills.  Patient was originally 82% on room air on ER arrival.  He improved with nebulizer treatment, steroids, and CPAP.  Chest x-ray revealed left pneumothorax and pigtail catheter/chest tube was placed by ER physician.  Repeat chest x-ray showed improved lung inflation however there were significant abnormalities with underlying pneumonia and possible lung mass.  Stat CT scan of chest with contrast was ordered and showed spiculated left perihilar mass 3.7 x 2.5 x 3.6 cm suspicious for neoplasm; left upper lobe and posteromedial right lower lobe consolidations concerning for pneumonia; nonspecific mildly prominent mediastinal and left hilar lymph nodes with metastases possible; small hypodensity in right liver that is nonspecific but metastases not excluded; osseous metastases not excluded; small left and trace right pleural effusion; trace left apical pneumothorax; emphysematous changes with bulla in the left upper lobe.  Patient's wife is a retired nurse and they have been  for 25 years.  Patient  "still works as a  for [employer] apartments.    Recent hospital admit 9/13-09/17/2024 with bilateral pneumonia, hypoxia.  Chest x-ray 09/13/2024 with multifocal interstitial pneumonia.  Patient was discharged with Augmentin.  Patient was discharged with home oxygen via 2 L nasal cannula.  He had previously not required oxygen.  Of note, chest x-ray on 09/09/2024 was read as negative for an acute process.    Patient was seen by primary care physician 09/24/2024 with repeat CXR showing "Findings suspicious for collapse of the left upper lobe with persistent consolidations and atelectasis on the left. Additionally a opaque masses seen at the level of the midthoracic spine. Recommend follow-up evaluation with CT with contrast."  Primary care physician reports speaking with patient's pulmonologist and with plan to order CT scan of chest without contrast as soon as possible.        Overview/Hospital Course:  Hospital medicine for evaluation of pneumothorax after placement of chest tube in ED.   Repeat chest x-ray noted concerns for reaccumulation of pneumothorax despite ongoing suction drainage, suspect likely secondary to ruptured bleb leading to continuous air leak with high concerns for bronchopleural fistula.  CT imaging also noted concerns for consolidation in the left upper lobe and posteromedial right lower lobe concerning for pneumonia, labs with worsening leukocytosis, so started on broad-spectrum antibiotics given risk factors of recent hospitalization.  MRSA cultures pending. Repeat chest x-ray 9/29 noted "near complete resolution of the left pneumothorax, with only a trace pneumothorax currently. " CXR 9/30 noted "No significant pneumothorax.". CXR 10/1 noted "No residual pneumothorax". Underwent placement of left-sided 28F chest tube on 10/1 by Cardiothoracic surgery. Pulmonology and Cardiothoracic surgery following. Low suspicion for MRSA given MRSA nares negative, deescalated antibiotics to " cefazolin.     10/2- looks and feels better, no CP, SOB better since large CT placed by Dr. Gavin yesterday, connected to water seal, air leak still present. IR could not perform Lung Bx today, hence tomorrow. Pt agreeable  and otherwise comfortable. Pain controlled with analgesics. NPO again after MN tonight.      10/3- appreciate IR and CTS- pt resting comfortably, CT in place with air leak present. Underwent L Lung Bx by IR. Tolerated procedure well. CP under control with meds. Cont present care. Per CT surgery- may need Heimlich valve upon discharge, pt has severe pan acinar emphysema- not a candidate for open lung procedure.       10/4- appreciate Dr. Rodriguez- doing better, still connected to Chest tube with -20 cm suction via waterseal. Air leak a little better today. L sided CP also better controlled. He ambulated twice in the halls today. Sputum Cx yesterday grew MRSA- hence Ancef changed to oral Zyvox. Remains HD stable, pleasant, eating drinking well.      10/5- Appreciate Dr. Cali- doing well, resting in bed on RA. CP much better controlled. Still has SQ emphysema L chest- CT in placed- to suction, air leak appears better, CXR L apical PTX. Labs stable. Pt ambulates well. Cont same tx, await Lung Bx results next week.     10/6- looks better, comfortable in bed on RA. CT remains on -20 cm suction, air leak appears better. Pulm plans water seal trial soon. CXR does not show any PTX. Cont present care. Await Lung bx results.     10/7- looks much better, sitting up in chair, off suction, on water seal only with minimal air leak by CTS. Ambulating well. No cough or sputum, VS afeb. Hopefully, CT clamp in am and removal soon. Await Lung Bx results.     Interval History: looks much better, sitting up in chair, off suction, on water seal only with minimal air leak by CTS. Ambulating well. No cough or sputum, VS afeb. Hopefully, CT clamp in am and removal soon. Await Lung Bx results.     Review of Systems    Constitutional:  Positive for activity change. Negative for chills, diaphoresis and fever.   HENT: Negative.     Eyes: Negative.    Respiratory:  Negative for cough, chest tightness, shortness of breath and wheezing.    Cardiovascular:  Positive for chest pain.   Gastrointestinal: Negative.    Endocrine: Negative.    Genitourinary: Negative.    Musculoskeletal:  Negative for gait problem.   Skin: Negative.    Allergic/Immunologic: Negative.    Hematological: Negative.    Psychiatric/Behavioral: Negative.       Objective:     Vital Signs (Most Recent):  Temp: 97.6 °F (36.4 °C) (10/07/24 1541)  Pulse: 88 (10/07/24 1600)  Resp: 18 (10/07/24 1541)  BP: 127/75 (10/07/24 1541)  SpO2: (!) 92 % (10/07/24 1541) Vital Signs (24h Range):  Temp:  [97 °F (36.1 °C)-98.3 °F (36.8 °C)] 97.6 °F (36.4 °C)  Pulse:  [] 88  Resp:  [14-18] 18  SpO2:  [92 %-95 %] 92 %  BP: (110-132)/(71-82) 127/75     Weight: 72.9 kg (160 lb 11.5 oz)  Body mass index is 23.73 kg/m².    Intake/Output Summary (Last 24 hours) at 10/7/2024 1643  Last data filed at 10/7/2024 1243  Gross per 24 hour   Intake 790 ml   Output 1875 ml   Net -1085 ml         Physical Exam  Vitals and nursing note reviewed.   Constitutional:       General: He is not in acute distress.  HENT:      Head: Normocephalic.      Mouth/Throat:      Mouth: Mucous membranes are moist.   Eyes:      Conjunctiva/sclera: Conjunctivae normal.      Pupils: Pupils are equal, round, and reactive to light.   Cardiovascular:      Pulses: Normal pulses.   Pulmonary:      Breath sounds: Rhonchi present.      Comments: Chest tube insertion site dressing clean. On RA, stable. Air leak improving  Abdominal:      General: There is no distension.      Palpations: Abdomen is soft.      Tenderness: There is no abdominal tenderness.   Musculoskeletal:         General: No swelling.   Skin:     General: Skin is warm.      Coloration: Skin is not jaundiced.      Findings: No bruising.   Neurological:       "Mental Status: He is alert and oriented to person, place, and time.   Psychiatric:         Mood and Affect: Mood normal.         Behavior: Behavior normal.             Significant Labs: All pertinent labs within the past 24 hours have been reviewed.    Significant Imaging: I have reviewed all pertinent imaging results/findings within the past 24 hours.    Assessment/Plan:      * Spontaneous pneumothorax  -pigtail catheter/chest tube in place, continuous suction  -chest x-ray on ER arrival with large left-sided pneumothorax  -CT scan of chest with trace left apical pneumothorax   -While on suction, repeat chest x-ray noted concerns for accumulation  -CXR 09/29 noted, "near complete resolution of the left pneumothorax, with only a trace pneumothorax currently. "  -CXR 9/30 noted "No significant pneumothorax.".   -CXR 10/1 noted "No residual pneumothorax".     Underwent placement of left-sided 28F chest tube on 10/1 by Cardiothoracic surgery.       -Pulmonology and Cardiothoracic surgery consulted, follow-up recs  -O2 via 2 L nasal cannula.  Wean as tolerated to target SaO2 88-92%    Still has air leak- CT to Waterseal- cont    Cont present care- may need Heimlich    10/4- looks a little better, less air leak today on same Suction   Cont    10/5- same as above    10/6- improving, waterseal trial, CT clamp soon.    10/7- CT to water seal now.     Centrilobular emphysema  Patient's COPD is controlled currently.  Patient is currently off COPD Pathway. Continue p.r.n. DuoNebs, O2 supplementation, and antibiotics for pneumonia.  monitor respiratory status closely.   -patient did receive DuoNeb, prednisone 60 mg p.o., and terbutaline 0.25 mg while in the emergency department.    Cont present care    Same as above    Bilateral pneumonia  -CT scan of chest with consolidations in the left upper lobe and posteromedial right lower lobe concerning for pneumonia  -white blood cell count 12.07, COVID-19 negative, " afebrile    Microbiology: Sputum Culture   Lab Results   Component Value Date    GSRESP <10 epithelial cells per low power field. 09/28/2024    GSRESP No WBC's 09/28/2024    GSRESP Few Gram positive cocci 09/28/2024    GSRESP Rare Gram negative rods 09/28/2024    GSRESP Rare yeast 09/28/2024    RESPIRATORYC No Pseudomonas isolated. 09/28/2024    RESPIRATORYC (A) 09/28/2024     METHICILLIN RESISTANT STAPHYLOCOCCUS AUREUS  Rare         -Low suspicion for MRSA given MRSA nares negative, deescalated antibiotics to cefazolin.  Follow up culture  -O2 supplementation, p.r.n. DuoNebs  -follow up cultures  -aspiration and fall precautions  -pulmonary consulted, following    Cont present care    Improving,no obvious pneumonia now, Sputum Cx growing MRSA- switched to PO Zyvox    Cont Zyvox    Mass of left lung  Left lung mass with presumed lung cancer with possible metastases  -CT scan of chest shows left perihilar mass 3.7 x 2.5 x 3.6 cm concerning for neoplasm; nonspecific mildly prominent mediastinal and left hilar lymph nodes with metastases possible; small hypodensity in right liver that is nonspecific but metastases not excluded; osseous metastases not excluded      -pulmonary consulted, plans for future evaluation after respiratory status is more stable    Await Lung Bx tomorrow by IR    S/p Lung Bx today    Await Path results    Tobacco abuse  Patient has not smoked in 1-1/2 months.  Counseled continued cessation.  No nicotine patch needed.      Macrocytic anemia  Anemia is likely due to  unclear etiology . Most recent hemoglobin and hematocrit are listed below.  Recent Labs     10/02/24  0437 10/03/24  0521 10/04/24  0514   HGB 12.3* 12.1* 11.3*   HCT 38.8* 38.1* 35.2*       Plan  - Monitor serial CBC: Daily  - Transfuse PRBC if patient becomes hemodynamically unstable, symptomatic or H/H drops below 7/21.  - Patient has not received any PRBC transfusions to date  - Patient's anemia is currently  stable    Stable      Prostate cancer  High-risk prostate cancer  -seen by Urology Dr. Smith 07/10/2024 with plans for follow up visit in 6 months and repeat PSA  -status post radiation therapy and ADT, PSA undetectable  -done with 2 years of Lupron      Primary hypertension  Chronic, controlled. Latest blood pressure and vitals reviewed-     Temp:  [97.3 °F (36.3 °C)-98 °F (36.7 °C)]   Pulse:  [70-97]   Resp:  [16-20]   BP: (114-138)/(66-81)   SpO2:  [91 %-97 %] .   Home meds for hypertension were reviewed and noted below.   Hypertension Medications               valsartan (DIOVAN) 80 MG tablet Take 1 tablet (80 mg total) by mouth once daily.            While in the hospital, will manage blood pressure as follows; Adjust home antihypertensive regimen as follows- normotensive on admission, resume home medications as appropriate; P.r.n. IV hydralazine with parameters.    Will utilize p.r.n. blood pressure medication only if patient's blood pressure greater than 180/110 and he develops symptoms such as worsening chest pain or shortness of breath.      VTE Risk Mitigation (From admission, onward)           Ordered     Reason for No Pharmacological VTE Prophylaxis  Once        Comments: No Lovenox until patient is seen by Pulmonary as they may decide to pursue lung biopsy   Question:  Reasons:  Answer:  Physician Provided (leave comment)    09/27/24 0401     IP VTE HIGH RISK PATIENT  Once         09/27/24 0401     Place sequential compression device  Until discontinued         09/27/24 0401                    Discharge Planning   YUE:      Code Status: Full Code   Is the patient medically ready for discharge?:     Reason for patient still in hospital (select all that apply): Patient trending condition, Laboratory test, Treatment, Imaging, and Consult recommendations  Discharge Plan A: Home with family          Apurva Singletary MD  Department of Hospital Medicine   [facility] - Tele[facility] ([facility])

## 2024-10-07 NOTE — PROGRESS NOTES
[facility] - Tele[facility] ([facility])  Pulmonology  Progress Note    Patient Name: Aurelio Daniels  MRN: [id number]  Admission Date: 9/26/2024  Hospital Length of Stay: 10 days  Code Status: Full Code  Attending Provider: Apurva Singletary MD  Primary Care Provider: Shlomo Villatoro MD   Principal Problem: Spontaneous pneumothorax    Subjective:     Mr Daniels is a 67 y/o gentleman with a long smoking history, COPD (recently established with Dr Jimenez), prior prostate cancer, and HTN who is currently admitted to the Hospitalist service after coming in overnight with shortness of breath and being found to have a large left sided PTX.  Pt was recently admitted to the hospital for PNA (9/13 - 9/17) and was recovering well from that when he suddenly had a large coughing fit last night.  Became markedly short of breath after this, which prompted his presentation to the ER.  Chest tube was placed by the ER physician with good re-expansion, though he continues to have a brisk air leak.    CT after the chest tube placement shows trace remaining pneumothorax, large bleb on the left, approx 3.5 cm spiculated mass on the left, and some remaining bilateral consolidations.  Mr Daniels says that he is feeling much better after the chest tube placement, though still with some cough.    Interval History:  9/28 - continuous air leak with some reaccumulation on CXR late this morning.   Adjusted tube to try and optimize drainage.  It was a bit kinked at the skin, so resecured in a better position. Remains on suction.  9/29 - re-expansion improved following tube adjustments yesterday.  Continuous brisk air leak remains on suction.  No  new complaints.  9/30: CXR with lung up, continues with large volume air leak with and without suction, CTS consulted for eval and recs, no new issues or c/o noted on exam, POC and questions answered with pt and his wife  10/1 - no acute events overnight, pt continues with air leak to chest tube, detailed  conversation with pt and wife this AM about treatment and possible plans moving forward - all questions answered wife bedside  10/2 - on RA today, pain better controlled with PO meds, plans for IR CT guided lung bx today   10/4 - remains on RA, feels better, s/p CT guided lung bx 10/3, CXR with extensive SQ air, CT remains with air leak but seems a little less agressive than before   10/5 - essentially uncharged, feels well and is eating well, pain controlled with less meds, no new issues or c/o, remains on RA, walking the halls during the day   10/6 - doing well, continue with CT to left side with air leak that continues to slow, may be able to trial water seal in the coming 24 hours or so  10/7 - Chest tube now to water seal per CTS this am. Air leak minimal. Feels comfortable in bed just some irritation at chest tube insertion site. Dressing c/d/i.     Objective:     Vital Signs (Most Recent):  Temp: 97 °F (36.1 °C) (10/07/24 1115)  Pulse: 75 (10/07/24 1310)  Resp: 18 (10/07/24 1115)  BP: 117/72 (10/07/24 1115)  SpO2: (!) 93 % (10/07/24 1115) Vital Signs (24h Range):  Temp:  [97 °F (36.1 °C)-98.3 °F (36.8 °C)] 97 °F (36.1 °C)  Pulse:  [] 75  Resp:  [14-18] 18  SpO2:  [92 %-95 %] 93 %  BP: (110-132)/(71-82) 117/72     Weight: 72.9 kg (160 lb 11.5 oz)  Body mass index is 23.73 kg/m².  Intake/Output Summary (Last 24 hours) at 10/7/2024 1538  Last data filed at 10/7/2024 1243  Gross per 24 hour   Intake 790 ml   Output 1875 ml   Net -1085 ml     Physical Exam  Constitutional:       General: He is not in acute distress.  HENT:      Head: Normocephalic.      Mouth/Throat:      Mouth: Mucous membranes are moist.   Eyes:      Conjunctiva/sclera: Conjunctivae normal.      Pupils: Pupils are equal, round, and reactive to light.   Cardiovascular:      Pulses: Normal pulses.   Pulmonary:      Breath sounds: Rhonchi present.      Comments: Chest tube insertion site dressing clean. On RA, stable. Air leak  improving  Abdominal:      General: There is no distension.      Palpations: Abdomen is soft.      Tenderness: There is no abdominal tenderness.   Musculoskeletal:         General: No swelling.   Skin:     General: Skin is warm.      Coloration: Skin is not jaundiced.      Findings: No bruising.   Neurological:      Mental Status: He is alert and oriented to person, place, and time.   Psychiatric:         Mood and Affect: Mood normal.         Behavior: Behavior normal.     Review of Systems   Constitutional:  Negative for fatigue and fever.   Respiratory:  Positive for cough. Negative for shortness of breath.    Cardiovascular:  Positive for chest pain (irritation at chest tube insertion).   Gastrointestinal:  Negative for abdominal pain, diarrhea, nausea and vomiting.   Genitourinary:  Negative for dysuria.   Neurological:  Negative for headaches.   Psychiatric/Behavioral:  Negative for agitation and confusion.      Vents:  Oxygen Concentration (%): 28 (10/1955)    Lines/Drains/Airways       Drain  Duration                  Chest Tube 09/27/24 0130 Left Midaxillary 10 days              Peripheral Intravenous Line  Duration                  Peripheral IV - Single Lumen 10/06/24 0544 22 G Posterior;Right Forearm 1 day                  Significant Labs:    Recent Lab Results       None          Significant Imaging:  I have reviewed all pertinent imaging results/findings within the past 24 hours.  I have reviewed and interpreted all pertinent imaging results/findings within the past 24 hours.    Assessment/Plan:     Pulmonary  * Spontaneous pneumothorax  - Likely 2/2 to ruptured bleb  - Originally had pigtail placed in ED but swapped for large bore by CTS on 10/1  - Persistent, brisk air leak has improved over the past couple days  - CT now to water seal by CTS, minimal air leak  - On RA, stable; O2 supplementation if needed  - CXR ordered for tomorrow morning    Acute on chronic respiratory failure with  hypoxia  - Patient with Hypoxic Respiratory failure which is Acute.  he is not on home oxygen. Supplemental oxygen was provided and noted-  . Signs/symptoms of respiratory failure include- respiratory distress. Contributing diagnoses includes - COPD and pneumothorax  Labs and images were reviewed. Patient Has recent ABG, which has been reviewed. Will treat underlying causes and adjust management of respiratory failure as follows  - CT managed by CTS, now on water seal  - Repeat CXR daily  - Wean O2 for goal SpO2 > 88%, on RA  - Avoid high flow oxygen or NIPPV given PTX and ongoing air leak  - PT/OT/OOB    Mass of left lung  - Spiculated left perihilar mass measuring approximately 3.7 x 2.5 x 3.6 cm, concerning for neoplasm   - S/p CT guided bx 10/3 per IR, follow path as it remains pending     Bilateral pneumonia  - Sputum culture with MRSA - on zyvox    Centrilobular emphysema  - Patient's COPD is controlled currently.  Patient is currently off COPD Pathway. Continue scheduled inhalers  Pulmicort/Brovana nebs and Supplemental oxygen and monitor respiratory status closely.   - Follow with Dr. Jimenez in clinic, last saw 9/5/24  - Respiratory status stable on RA    Other  Tobacco abuse  - Counseled on cessation and he is motivated to quit  - Declined nicotine patch, order if he changes his mind     Thank you for your consult. I will follow-up with the patient. Please call with any additional questions.     Almas King PA-C  Pulmonology  [facility] - Tele[facility] ([facility])

## 2024-10-07 NOTE — PLAN OF CARE
A213/A213 RADHA Daniels is a 68 y.o.male admitted on 9/26/2024 for Spontaneous pneumothorax   Code Status: Full Code MRN: [id number]   Review of patient's allergies indicates:  No Known Allergies  Past Medical History:   Diagnosis Date    Cancer     Prostate    Tobacco use       PRN meds    0.9% NaCl, , PRN  acetaminophen, 650 mg, Q6H PRN  albuterol-ipratropium, 3 mL, Q6H PRN  dextrose 10%, 12.5 g, PRN  dextrose 10%, 25 g, PRN  glucagon (human recombinant), 1 mg, PRN  glucose, 16 g, PRN  glucose, 24 g, PRN  guaiFENesin 100 mg/5 ml, 200 mg, Q4H PRN  hydrALAZINE, 10 mg, Q6H PRN  HYDROcodone-acetaminophen, 1 tablet, Q6H PRN  morphine, 2 mg, Q6H PRN  naloxone, 0.02 mg, PRN  ondansetron, 4 mg, Q6H PRN  sodium chloride 0.9%, 10 mL, Q12H PRN         Pt oriented x4.  VSS.  Pt remained afebrile throughout this shift.   All meds administered per order.   Pt remained free of falls this shift.   Plan of care reviewed. Patient verbalizes understanding.   Pt moving/turning independently.   Bed low, side rails up x 2, wheels locked, call light in reach.   Patient instructed to call for assistance.  Patient education provided  Will continue to monitor.              Orientation: oriented x 4  Deric Coma Scale Score: 15     Lead Monitored: Lead II Rhythm: normal sinus rhythm Frequency/Ectopy: PVCs  Cardiac/Telemetry Box Number: [id number]  VTE Required Core Measure: Pharmacological prophylaxis initiated/maintained Last Bowel Movement: 10/06/24  Diet Adult Regular Standard Tray  Voiding Characteristics: voids spontaneously without difficulty  Syed Score: 18  Fall Risk Score: 11  Accucheck []   Freq?      Lines/Drains/Airways       Drain  Duration                  Chest Tube 09/27/24 0130 Left Midaxillary 10 days              Peripheral Intravenous Line  Duration                  Peripheral IV - Single Lumen 10/06/24 0544 22 G Posterior;Right Forearm 1 day

## 2024-10-07 NOTE — ASSESSMENT & PLAN NOTE
"Patient with Hypoxic Respiratory failure which is Acute on chronic.  he is on home oxygen at 2 LPM. Supplemental oxygen was provided and noted-      .   Signs/symptoms of respiratory failure include- tachypnea. Contributing diagnoses includes - COPD, Pneumonia, and pneumothorax  Labs and images were reviewed. Patient Has recent ABG, which has been reviewed. Will treat underlying causes and adjust management of respiratory failure as follows:      -continuous pulse oximetry monitoring, O2 supplementation, p.r.n. DuoNebs  -left-sided chest tube/pigtail catheter in place  -See "Bilateral pneumonia" A&P. Follow up cultures and deescalate antibiotics as appropriate  -pulmonary and Cardiothoracic surgery consulted, follow-up recs    stable  "

## 2024-10-07 NOTE — SUBJECTIVE & OBJECTIVE
Interval History: looks much better, sitting up in chair, off suction, on water seal only with minimal air leak by CTS. Ambulating well. No cough or sputum, VS afeb. Hopefully, CT clamp in am and removal soon. Await Lung Bx results.     Review of Systems   Constitutional:  Positive for activity change. Negative for chills, diaphoresis and fever.   HENT: Negative.     Eyes: Negative.    Respiratory:  Negative for cough, chest tightness, shortness of breath and wheezing.    Cardiovascular:  Positive for chest pain.   Gastrointestinal: Negative.    Endocrine: Negative.    Genitourinary: Negative.    Musculoskeletal:  Negative for gait problem.   Skin: Negative.    Allergic/Immunologic: Negative.    Hematological: Negative.    Psychiatric/Behavioral: Negative.       Objective:     Vital Signs (Most Recent):  Temp: 97.6 °F (36.4 °C) (10/07/24 1541)  Pulse: 88 (10/07/24 1600)  Resp: 18 (10/07/24 1541)  BP: 127/75 (10/07/24 1541)  SpO2: (!) 92 % (10/07/24 1541) Vital Signs (24h Range):  Temp:  [97 °F (36.1 °C)-98.3 °F (36.8 °C)] 97.6 °F (36.4 °C)  Pulse:  [] 88  Resp:  [14-18] 18  SpO2:  [92 %-95 %] 92 %  BP: (110-132)/(71-82) 127/75     Weight: 72.9 kg (160 lb 11.5 oz)  Body mass index is 23.73 kg/m².    Intake/Output Summary (Last 24 hours) at 10/7/2024 1643  Last data filed at 10/7/2024 1243  Gross per 24 hour   Intake 790 ml   Output 1875 ml   Net -1085 ml         Physical Exam  Vitals and nursing note reviewed.   Constitutional:       General: He is not in acute distress.  HENT:      Head: Normocephalic.      Mouth/Throat:      Mouth: Mucous membranes are moist.   Eyes:      Conjunctiva/sclera: Conjunctivae normal.      Pupils: Pupils are equal, round, and reactive to light.   Cardiovascular:      Pulses: Normal pulses.   Pulmonary:      Breath sounds: Rhonchi present.      Comments: Chest tube insertion site dressing clean. On RA, stable. Air leak improving  Abdominal:      General: There is no distension.       Palpations: Abdomen is soft.      Tenderness: There is no abdominal tenderness.   Musculoskeletal:         General: No swelling.   Skin:     General: Skin is warm.      Coloration: Skin is not jaundiced.      Findings: No bruising.   Neurological:      Mental Status: He is alert and oriented to person, place, and time.   Psychiatric:         Mood and Affect: Mood normal.         Behavior: Behavior normal.             Significant Labs: All pertinent labs within the past 24 hours have been reviewed.    Significant Imaging: I have reviewed all pertinent imaging results/findings within the past 24 hours.

## 2024-10-07 NOTE — PROGRESS NOTES
Subjective:      Patient ID: uArelio Daniels is a 68 y.o. male.    Chief Complaint: Shortness of Breath (Episode of SOB that started after a coughing fit tonight. Recently admitted for pneumonia. 82% SpO2 upon EMS arrival with improvement after breathing treatment, steroids, and CPAP.)    HPI: 68-year-old man with history of prostate cancer, tobacco abuse (quit 1-1/2 once ago), COPD (discharged with home oxygen via 2 L nasal cannula as of 09/17/2024), centrilobular emphysema, hypertension, anemia, and recent bilateral lower lobe pneumonia who presented to the emergency department for acute onset shortness a breath after a coughing spell.   The patient was admitted through the emergency room had a pigtail catheter placed for pneumothorax on the left side the pneumothorax resolved.  Continues to have continuous air leak.  CT scan showed a large bullous lesion and a 3.2 cm spiculated mass in the right upper lobe.  Central location  Bilateral pneumonias and compressive atelectasis.  Multiple mediastinal lymphadenopathy.  The patient feels better after the pigtail catheter.  Catheter continues to have air leak  Repeat chest x-ray shows expanded lung fields mostly with areas of compressive atelectasis pneumothorax resolved.  The pigtail catheter still has a massive air leak continuous.    10/02/2024 patient has pain over the chest tube insertion site continuous air leak from the chest tube and serous drainage subcutaneous emphysema on 3 L nasal cannula, not in distress.  Otherwise hemodynamically stable  10/03/2024 the patient continues to have air leak pain well controlled.  10/04/2024 patient is having lesser air leak from the chest tube and subcutaneous emphysema./he is stable otherwise hemodynamically and satting more than 95 on 2 L nasal cannula.  Chest tube output is minimal pain well controlled    10/7/2024 the patient air leak is intermittent now minimal drainage from the chest tube.  Subcutaneous emphysema  is getting better.    Review of patient's allergies indicates:  No Known Allergies    Past Medical History:   Diagnosis Date    Cancer     Prostate    Tobacco use        Family History   Problem Relation Name Age of Onset    No Known Problems Mother      Heart disease Father      No Known Problems Brother      No Known Problems Brother      No Known Problems Daughter      No Known Problems Daughter         Social History     Socioeconomic History    Marital status:      Spouse name: YUE    Number of children: 2   Tobacco Use    Smoking status: Some Days     Current packs/day: 0.00     Types: Cigarettes     Last attempt to quit: 1/15/2022     Years since quittin.7     Passive exposure: Current    Smokeless tobacco: Never   Substance and Sexual Activity    Alcohol use: Yes     Alcohol/week: 2.0 standard drinks of alcohol     Types: 2 Shots of liquor per week    Drug use: Never    Sexual activity: Not Currently     Partners: Female     Social Drivers of Health     Financial Resource Strain: Low Risk  (2024)    Overall Financial Resource Strain (CARDIA)     Difficulty of Paying Living Expenses: Not hard at all   Food Insecurity: No Food Insecurity (2024)    Hunger Vital Sign     Worried About Running Out of Food in the Last Year: Never true     Ran Out of Food in the Last Year: Never true   Transportation Needs: No Transportation Needs (2024)    TRANSPORTATION NEEDS     Transportation : No   Physical Activity: Inactive (2024)    Exercise Vital Sign     Days of Exercise per Week: 0 days     Minutes of Exercise per Session: 0 min   Stress: No Stress Concern Present (2024)    [employer] - Occupational Stress Questionnaire     Feeling of Stress : Not at all   Housing Stability: Low Risk  (2024)    Housing Stability Vital Sign     Unable to Pay for Housing in the Last Year: No     Homeless in the Last Year: No       Past Surgical History:   Procedure  "Laterality Date    COLONOSCOPY      COLONOSCOPY N/A 5/20/2022    Procedure: COLONOSCOPY;  Surgeon: Jamia Alfaro MD;  Location: [facility];  Service: Endoscopy;  Laterality: N/A;    COMPUTED TOMOGRAPHY N/A 10/3/2024    Procedure: CT (COMPUTED TOMOGRAPHY)/lung biopsy;  Surgeon: Navneet Oswald MD;  Location: [facility];  Service: General;  Laterality: N/A;    VASECTOMY  1985       Review of Systems   Constitutional:  Positive for activity change. Negative for appetite change.   HENT:  Negative for dental problem, nosebleeds and sore throat.    Eyes:  Negative for discharge and visual disturbance.   Respiratory:  Positive for chest tightness, shortness of breath and wheezing. Negative for cough and stridor.    Cardiovascular:  Negative for leg swelling.   Gastrointestinal:  Negative for abdominal distention and abdominal pain.   Genitourinary:  Negative for difficulty urinating and dysuria.   Musculoskeletal:  Negative for arthralgias, back pain and joint swelling.   Allergic/Immunologic: Negative for environmental allergies.   Neurological:  Negative for dizziness, syncope and headaches.   Hematological:  Does not bruise/bleed easily.   Psychiatric/Behavioral:  Negative for behavioral problems.           Objective:   /71   Pulse 91   Temp 97 °F (36.1 °C)   Resp 16   Ht 5' 9" (1.753 m)   Wt 72.9 kg (160 lb 11.5 oz)   SpO2 95%   BMI 23.73 kg/m²     X-Ray Chest 1 View  Narrative: EXAMINATION:  XR CHEST 1 VIEW    CLINICAL HISTORY:  ptx with CT in place;    COMPARISON:  October 6, 2024    FINDINGS:  Stable left-sided chest tube with extensive subcutaneous air.  Interval increase in size of small left apical pneumothorax.  Stable angular opacity in the left perihilar region.  The lungs are are free of new pulmonary opacities.  The hilar and mediastinal contours and osseous structures are unchanged.  Impression: 1.  Overall, there is been interval development of a small left apical pneumothorax in this " patient who has a left-sided chest tube in place.  Extensive subcutaneous air again seen.  Is the chest tube clamped or to waterseal?    2.  Follow-up radiographs recommended.    Electronically signed by: Ottoniel Breaux MD  Date:    10/07/2024  Time:    06:28         Physical Exam  Vitals reviewed.   Constitutional:       Appearance: Normal appearance. He is ill-appearing.   HENT:      Head: Normocephalic and atraumatic.      Mouth/Throat:      Mouth: Mucous membranes are moist.   Eyes:      Extraocular Movements: Extraocular movements intact.   Cardiovascular:      Rate and Rhythm: Normal rate and regular rhythm.      Pulses: Normal pulses.      Heart sounds: Normal heart sounds.   Pulmonary:      Effort: Pulmonary effort is normal.      Breath sounds: Rhonchi and rales present.      Comments: Left-sided chest tube has a massive air leak continuous  Abdominal:      Palpations: Abdomen is soft.   Musculoskeletal:         General: Normal range of motion.      Cervical back: Normal range of motion and neck supple.   Skin:     General: Skin is warm.      Capillary Refill: Capillary refill takes less than 2 seconds.   Neurological:      General: No focal deficit present.      Mental Status: He is alert and oriented to person, place, and time.   Psychiatric:         Mood and Affect: Mood normal.         Assessment:     1. Acute on chronic respiratory failure with hypoxemia    2. Pneumothorax on left    3. Troponin I above reference range    4. Mass of left lung    5. SOB (shortness of breath)    6. Chest pain    7. Elevated troponin I level          Plan   68-year-old male with a right upper lobe mass centrally located with bilateral pan acinar emphysema COPD and pneumonic consolidation.  Chest tube continuous air leak getting better  Aggressive pulmonary toilet incentive spirometry  Out of bed ambulate    chest tube tube to water seal  Chest x-ray in the morning.  DVT prophylaxis.  Bilateral SCDs        Romaine Rodriguez  MD [facility]  [place]

## 2024-10-07 NOTE — ASSESSMENT & PLAN NOTE
Left lung mass with presumed lung cancer with possible metastases  -CT scan of chest shows left perihilar mass 3.7 x 2.5 x 3.6 cm concerning for neoplasm; nonspecific mildly prominent mediastinal and left hilar lymph nodes with metastases possible; small hypodensity in right liver that is nonspecific but metastases not excluded; osseous metastases not excluded      -pulmonary consulted, plans for future evaluation after respiratory status is more stable    Await Lung Bx tomorrow by IR    S/p Lung Bx today    Await Path results

## 2024-10-07 NOTE — ASSESSMENT & PLAN NOTE
- Likely 2/2 to ruptured bleb  - Originally had pigtail placed in ED but swapped for large bore by CTS on 10/1  - Persistent, brisk air leak has improved over the past couple days  - CT now to water seal by CTS, minimal air leak  - On RA, stable; O2 supplementation if needed  - CXR ordered for tomorrow morning

## 2024-10-07 NOTE — ASSESSMENT & PLAN NOTE
"-pigtail catheter/chest tube in place, continuous suction  -chest x-ray on ER arrival with large left-sided pneumothorax  -CT scan of chest with trace left apical pneumothorax   -While on suction, repeat chest x-ray noted concerns for accumulation  -CXR 09/29 noted, "near complete resolution of the left pneumothorax, with only a trace pneumothorax currently. "  -CXR 9/30 noted "No significant pneumothorax.".   -CXR 10/1 noted "No residual pneumothorax".     Underwent placement of left-sided 28F chest tube on 10/1 by Cardiothoracic surgery.       -Pulmonology and Cardiothoracic surgery consulted, follow-up recs  -O2 via 2 L nasal cannula.  Wean as tolerated to target SaO2 88-92%    Still has air leak- CT to Waterseal- cont    Cont present care- may need Heimlich    10/4- looks a little better, less air leak today on same Suction   Cont    10/5- same as above    10/6- improving, waterseal trial, CT clamp soon.    10/7- CT to water seal now.   "

## 2024-10-07 NOTE — PLAN OF CARE
Nutrition Plan of Care:    Recommendations     1. Continue to encourage po intake    2. Monitor labs and weights   3. Recommend Boost if po intake <50%  4. Recommend marion BID to promote wound healing      Goals: Patient to consume >75% of EEN/EPN prior to RD follow up  Nutrition Goal Status: goal met  Communication of RD Recs: other (comment) (POC)     Assessment and Plan     Nutrition Problem  Increased protein needs  Increased energy expenditure      Related to (etiology):   Increased demand for nutrition   Physiological causes increasing nutrient needs      Signs and Symptoms (as evidenced by):   Surgery   Condition associated with Centrilobular emphysema, pneumonia and lung mass     Interventions(treatment strategy):  General healthful diet   Commercial beverage medical food supplement therapy   Amino acid medical food supplement therapy   Collaboration by nutrition professional with other providers     Nutrition Diagnosis Status:   Improving       Mayr Anne Vann, MS, RDN, LDN

## 2024-10-07 NOTE — PROGRESS NOTES
[facility] - Telemetry ([facility])  Adult Nutrition  Progress Note    SUMMARY       Recommendations    1. Continue to encourage po intake    2. Monitor labs and weights   3. Recommend Boost if po intake <50%  4. Recommend marion BID to promote wound healing     Goals: Patient to consume >75% of EEN/EPN prior to RD follow up  Nutrition Goal Status: goal met  Communication of RD Recs: other (comment) (POC)    Assessment and Plan    Nutrition Problem  Increased protein needs  Increased energy expenditure     Related to (etiology):   Increased demand for nutrition   Physiological causes increasing nutrient needs     Signs and Symptoms (as evidenced by):   Surgery   Condition associated with Centrilobular emphysema, pneumonia and lung mass    Interventions(treatment strategy):  General healthful diet   Commercial beverage medical food supplement therapy   Amino acid medical food supplement therapy   Collaboration by nutrition professional with other providers    Nutrition Diagnosis Status:   Improving         Malnutrition Assessment     Patient does not meet positive criteria for NFPE at this time.  RD to continue to monitor for nutrition risks at follow up visits.                                     Reason for Assessment    Reason For Assessment: length of stay    Diagnosis:  (pneumothorax)  Patient Active Problem List   Diagnosis    Prostate cancer    Cigarette smoker    Simple chronic bronchitis    Primary hypertension    Other specified anemias    Pneumonia of both lower lobes due to infectious organism    Hypokalemia    Tobacco dependency    Centrilobular emphysema    Spontaneous pneumothorax    Bilateral pneumonia    Mass of left lung    Tobacco abuse    Macrocytic anemia     Past Medical History:   Diagnosis Date    Cancer     Prostate    Tobacco use        General Information Comments:   10/7/2024: Patient is on a regular oral diet with fair to good po intake at this time.  Meal consumption documented between %.   "CT remains in place at this time.  Labs reviewed.  NKFA.  LBM: 10/6/2024.  No significant weight loss found within the past 1 year.  RD to continue to encourage po intake and monitor tolerance.    Nutrition Discharge Planning: General Healthful Diet    Nutrition Risk Screen    Nutrition Risk Screen: no indicators present  Nutrition Related Social Determinants of Health: SDOH: Adequate food in home environment and None Identified    Nutrition/Diet History    Spiritual, Cultural Beliefs, Jewish Practices, Values that Affect Care: no  Food Allergies: [facility]    Anthropometrics    Temp: 97.6 °F (36.4 °C)  Height Method: Stated  Height: 5' 9" (175.3 cm)  Height (inches): 69 in  Weight Method: Bed Scale  Weight: 72.9 kg (160 lb 11.5 oz)  Weight (lb): 160.72 lb  Ideal Body Weight (IBW), Male: 160 lb  % Ideal Body Weight, Male (lb): 100.45 %  BMI (Calculated): 23.7  BMI Grade: 18.5-24.9 - normal       Lab/Procedures/Meds    Pertinent Labs Reviewed: reviewed  BMP  Lab Results   Component Value Date     10/05/2024    K 4.0 10/05/2024     10/05/2024    CO2 23 10/05/2024    BUN 10 10/05/2024    CREATININE 0.7 10/05/2024    CALCIUM 9.0 10/05/2024    ANIONGAP 8 10/05/2024    EGFRNORACEVR >60 10/05/2024     No results found for: "LABA1C", "HGBA1C"  Lab Results   Component Value Date    CALCIUM 9.0 10/05/2024    PHOS 2.5 (L) 10/05/2024       Pertinent Medications Reviewed: reviewed  Scheduled Meds:   arformoteroL  15 mcg Nebulization BID    budesonide  0.5 mg Nebulization Q12H    linezolid  600 mg Oral Q12H    mupirocin   Nasal BID    potassium, sodium phosphates  2 packet Oral QID (WM & HS)     Continuous Infusions:  PRN Meds:.  Current Facility-Administered Medications:     0.9% NaCl, , Intravenous, PRN    acetaminophen, 650 mg, Oral, Q6H PRN    albuterol-ipratropium, 3 mL, Nebulization, Q6H PRN    dextrose 10%, 12.5 g, Intravenous, PRN    dextrose 10%, 25 g, Intravenous, PRN    glucagon (human recombinant), 1 mg, " Intramuscular, PRN    glucose, 16 g, Oral, PRN    glucose, 24 g, Oral, PRN    guaiFENesin 100 mg/5 ml, 200 mg, Oral, Q4H PRN    hydrALAZINE, 10 mg, Intravenous, Q6H PRN    HYDROcodone-acetaminophen, 1 tablet, Oral, Q6H PRN    morphine, 2 mg, Intravenous, Q6H PRN    naloxone, 0.02 mg, Intravenous, PRN    ondansetron, 4 mg, Intravenous, Q6H PRN    sodium chloride 0.9%, 10 mL, Intravenous, Q12H PRN    Physical Findings/Assessment         Estimated/Assessed Needs    Weight Used For Calorie Calculations: 72.9 kg (160 lb 11.5 oz)  Energy Calorie Requirements (kcal): 25-30kcals/kg (1822-2187kcals/day)  Energy Need Method: Kcal/kg  Protein Requirements: 1.2g/kg (88g/day)  Weight Used For Protein Calculations: 72.9 kg (160 lb 11.5 oz)  Fluid Requirements (mL): 1ml/kcal  Estimated Fluid Requirement Method: RDA Method  RDA Method (mL): 25  CHO Requirement: 250      Nutrition Prescription Ordered    Current Diet Order: Regular  Oral Nutrition Supplement: Boost (if needed)    Evaluation of Received Nutrient/Fluid Intake    % Kcal Needs: %  % Protein Needs: %  I/O: 10/7/2024: -12.4L since admit  Energy Calories Required: meeting needs  Protein Required: meeting needs  Fluid Required: meeting needs  Comments: LBM: 10/6/2024  Tolerance: tolerating  % Intake of Estimated Energy Needs: 50 - 75 %  % Meal Intake: 50 - 75 %    Nutrition Risk    Level of Risk/Frequency of Follow-up: low (Follow up: 1x per week)     Monitor and Evaluation    Food and Nutrient Intake: energy intake, food and beverage intake  Food and Nutrient Adminstration: diet order  Knowledge/Beliefs/Attitudes: beliefs and attitudes  Physical Activity and Function: nutrition-related ADLs and IADLs, factors affecting access to physical activity  Anthropometric Measurements: height/length, weight, weight change  Biochemical Data, Medical Tests and Procedures: electrolyte and renal panel, gastrointestinal profile, glucose/endocrine profile, inflammatory  profile, lipid profile     Nutrition Follow-Up    RD Follow-up?: Yes  Mary Anne Vann, MS, RDN, LDN

## 2024-10-07 NOTE — ASSESSMENT & PLAN NOTE
- Patient with Hypoxic Respiratory failure which is Acute.  he is not on home oxygen. Supplemental oxygen was provided and noted-  . Signs/symptoms of respiratory failure include- respiratory distress. Contributing diagnoses includes - COPD and pneumothorax  Labs and images were reviewed. Patient Has recent ABG, which has been reviewed. Will treat underlying causes and adjust management of respiratory failure as follows  - CT managed by CTS, now on water seal  - Repeat CXR daily  - Wean O2 for goal SpO2 > 88%, on RA  - Avoid high flow oxygen or NIPPV given PTX and ongoing air leak  - PT/OT/OOB

## 2024-10-07 NOTE — ASSESSMENT & PLAN NOTE
- Spiculated left perihilar mass measuring approximately 3.7 x 2.5 x 3.6 cm, concerning for neoplasm   - S/p CT guided bx 10/3 per IR, follow path as it remains pending

## 2024-10-07 NOTE — SUBJECTIVE & OBJECTIVE
Mr Daniels is a 69 y/o gentleman with a long smoking history, COPD (recently established with Dr Jimenez), prior prostate cancer, and HTN who is currently admitted to the Hospitalist service after coming in overnight with shortness of breath and being found to have a large left sided PTX.  Pt was recently admitted to the hospital for PNA (9/13 - 9/17) and was recovering well from that when he suddenly had a large coughing fit last night.  Became markedly short of breath after this, which prompted his presentation to the ER.  Chest tube was placed by the ER physician with good re-expansion, though he continues to have a brisk air leak.    CT after the chest tube placement shows trace remaining pneumothorax, large bleb on the left, approx 3.5 cm spiculated mass on the left, and some remaining bilateral consolidations.  Mr Daniels says that he is feeling much better after the chest tube placement, though still with some cough.    Interval History:  9/28 - continuous air leak with some reaccumulation on CXR late this morning.   Adjusted tube to try and optimize drainage.  It was a bit kinked at the skin, so resecured in a better position. Remains on suction.  9/29 - re-expansion improved following tube adjustments yesterday.  Continuous brisk air leak remains on suction.  No  new complaints.  9/30: CXR with lung up, continues with large volume air leak with and without suction, CTS consulted for eval and recs, no new issues or c/o noted on exam, POC and questions answered with pt and his wife  10/1 - no acute events overnight, pt continues with air leak to chest tube, detailed conversation with pt and wife this AM about treatment and possible plans moving forward - all questions answered wife bedside  10/2 - on RA today, pain better controlled with PO meds, plans for IR CT guided lung bx today   10/4 - remains on RA, feels better, s/p CT guided lung bx 10/3, CXR with extensive SQ air, CT remains with air leak but seems a  little less agressive than before   10/5 - essentially uncharged, feels well and is eating well, pain controlled with less meds, no new issues or c/o, remains on RA, walking the halls during the day   10/6 - doing well, continue with CT to left side with air leak that continues to slow, may be able to trial water seal in the coming 24 hours or so  10/7 - Chest tube now to water seal per CTS this am. Air leak minimal. Feels comfortable in bed just some irritation at chest tube insertion site. Dressing c/d/i.     Objective:     Vital Signs (Most Recent):  Temp: 97 °F (36.1 °C) (10/07/24 1115)  Pulse: 75 (10/07/24 1310)  Resp: 18 (10/07/24 1115)  BP: 117/72 (10/07/24 1115)  SpO2: (!) 93 % (10/07/24 1115) Vital Signs (24h Range):  Temp:  [97 °F (36.1 °C)-98.3 °F (36.8 °C)] 97 °F (36.1 °C)  Pulse:  [] 75  Resp:  [14-18] 18  SpO2:  [92 %-95 %] 93 %  BP: (110-132)/(71-82) 117/72     Weight: 72.9 kg (160 lb 11.5 oz)  Body mass index is 23.73 kg/m².  Intake/Output Summary (Last 24 hours) at 10/7/2024 1538  Last data filed at 10/7/2024 1243  Gross per 24 hour   Intake 790 ml   Output 1875 ml   Net -1085 ml     Physical Exam  Constitutional:       General: He is not in acute distress.  HENT:      Head: Normocephalic.      Mouth/Throat:      Mouth: Mucous membranes are moist.   Eyes:      Conjunctiva/sclera: Conjunctivae normal.      Pupils: Pupils are equal, round, and reactive to light.   Cardiovascular:      Pulses: Normal pulses.   Pulmonary:      Breath sounds: Rhonchi present.      Comments: Chest tube insertion site dressing clean. On RA, stable. Air leak improving  Abdominal:      General: There is no distension.      Palpations: Abdomen is soft.      Tenderness: There is no abdominal tenderness.   Musculoskeletal:         General: No swelling.   Skin:     General: Skin is warm.      Coloration: Skin is not jaundiced.      Findings: No bruising.   Neurological:      Mental Status: He is alert and oriented to  person, place, and time.   Psychiatric:         Mood and Affect: Mood normal.         Behavior: Behavior normal.     Review of Systems   Constitutional:  Negative for fatigue and fever.   Respiratory:  Positive for cough. Negative for shortness of breath.    Cardiovascular:  Positive for chest pain (irritation at chest tube insertion).   Gastrointestinal:  Negative for abdominal pain, diarrhea, nausea and vomiting.   Genitourinary:  Negative for dysuria.   Neurological:  Negative for headaches.   Psychiatric/Behavioral:  Negative for agitation and confusion.      Vents:  Oxygen Concentration (%): 28 (10/1955)    Lines/Drains/Airways       Drain  Duration                  Chest Tube 09/27/24 0130 Left Midaxillary 10 days              Peripheral Intravenous Line  Duration                  Peripheral IV - Single Lumen 10/06/24 0544 22 G Posterior;Right Forearm 1 day                  Significant Labs:    Recent Lab Results       None          Significant Imaging:  I have reviewed all pertinent imaging results/findings within the past 24 hours.  I have reviewed and interpreted all pertinent imaging results/findings within the past 24 hours.

## 2024-10-07 NOTE — PLAN OF CARE
10/07/24 1428   Rounds   Attendance Provider;;Charge nurse;Physical therapist   Discharge Plan A Home with family   Why the patient remains in the hospital Requires continued medical care   Transition of Care Barriers None       Chest tube to water seal today.  Biopsy results pending.

## 2024-10-07 NOTE — PLAN OF CARE
Problem: Adult Inpatient Plan of Care  Goal: Plan of Care Review  Outcome: Progressing  Goal: Patient-Specific Goal (Individualized)  Outcome: Progressing  Goal: Absence of Hospital-Acquired Illness or Injury  Outcome: Progressing  Goal: Optimal Comfort and Wellbeing  Outcome: Progressing  Goal: Readiness for Transition of Care  Outcome: Progressing     Problem: Pneumonia  Goal: Fluid Balance  Outcome: Progressing  Goal: Resolution of Infection Signs and Symptoms  Outcome: Progressing  Goal: Effective Oxygenation and Ventilation  Outcome: Progressing     Problem: Skin Injury Risk Increased  Goal: Skin Health and Integrity  Outcome: Progressing     Problem: Fall Injury Risk  Goal: Absence of Fall and Fall-Related Injury  Outcome: Progressing     Problem: Infection  Goal: Absence of Infection Signs and Symptoms  Outcome: Progressing     Problem: Fatigue  Goal: Improved Activity Tolerance  Outcome: Progressing

## 2024-10-07 NOTE — ASSESSMENT & PLAN NOTE
- Patient's COPD is controlled currently.  Patient is currently off COPD Pathway. Continue scheduled inhalers  Pulmicort/Brovana nebs and Supplemental oxygen and monitor respiratory status closely.   - Follow with Dr. Jimenez in clinic, last saw 9/5/24  - Respiratory status stable on RA

## 2024-10-08 PROCEDURE — 25000003 PHARM REV CODE 250: Performed by: EMERGENCY MEDICINE

## 2024-10-08 PROCEDURE — 94761 N-INVAS EAR/PLS OXIMETRY MLT: CPT

## 2024-10-08 PROCEDURE — 21400001 HC TELEMETRY ROOM

## 2024-10-08 PROCEDURE — 99900035 HC TECH TIME PER 15 MIN (STAT)

## 2024-10-08 PROCEDURE — 25000242 PHARM REV CODE 250 ALT 637 W/ HCPCS: Performed by: INTERNAL MEDICINE

## 2024-10-08 PROCEDURE — 27000207 HC ISOLATION

## 2024-10-08 PROCEDURE — 94640 AIRWAY INHALATION TREATMENT: CPT

## 2024-10-08 PROCEDURE — 25000003 PHARM REV CODE 250: Performed by: NURSE PRACTITIONER

## 2024-10-08 RX ADMIN — Medication 2 PACKET: at 04:10

## 2024-10-08 RX ADMIN — Medication 2 PACKET: at 08:10

## 2024-10-08 RX ADMIN — LINEZOLID 600 MG: 600 TABLET, FILM COATED ORAL at 08:10

## 2024-10-08 RX ADMIN — ARFORMOTEROL TARTRATE 15 MCG: 15 SOLUTION RESPIRATORY (INHALATION) at 07:10

## 2024-10-08 RX ADMIN — BUDESONIDE 0.5 MG: 0.5 INHALANT ORAL at 07:10

## 2024-10-08 RX ADMIN — MUPIROCIN: 20 OINTMENT TOPICAL at 08:10

## 2024-10-08 RX ADMIN — HYDROCODONE BITARTRATE AND ACETAMINOPHEN 1 TABLET: 7.5; 325 TABLET ORAL at 12:10

## 2024-10-08 RX ADMIN — Medication 2 PACKET: at 11:10

## 2024-10-08 NOTE — SUBJECTIVE & OBJECTIVE
Interval History: Appreciate Dr. Rodriguez- pt looks and feels much better, no air leak on water seal, no PTX on CXR. CT was clamped for 3 hrs, no PTX. CT pulled, dressing applied. SQ emphysema also better, CP under control. Dr. Rodriguez wants to monitor pt in [facility] another day. Await Lung Bx results.     Review of Systems   Constitutional:  Positive for activity change. Negative for chills, diaphoresis and fever.   HENT: Negative.     Eyes: Negative.    Respiratory:  Negative for cough, chest tightness, shortness of breath and wheezing.    Cardiovascular:  Positive for chest pain.   Gastrointestinal: Negative.    Endocrine: Negative.    Genitourinary: Negative.    Musculoskeletal:  Negative for gait problem.   Skin: Negative.    Allergic/Immunologic: Negative.    Hematological: Negative.    Psychiatric/Behavioral: Negative.       Objective:     Vital Signs (Most Recent):  Temp: 98 °F (36.7 °C) (10/08/24 1657)  Pulse: 90 (10/08/24 1707)  Resp: 18 (10/08/24 1657)  BP: 107/75 (10/08/24 1657)  SpO2: (!) 94 % (10/08/24 1657) Vital Signs (24h Range):  Temp:  [97.7 °F (36.5 °C)-98.6 °F (37 °C)] 98 °F (36.7 °C)  Pulse:  [72-98] 90  Resp:  [16-18] 18  SpO2:  [93 %-95 %] 94 %  BP: (103-121)/(60-75) 107/75     Weight: 72.9 kg (160 lb 11.5 oz)  Body mass index is 23.73 kg/m².    Intake/Output Summary (Last 24 hours) at 10/8/2024 1726  Last data filed at 10/8/2024 1230  Gross per 24 hour   Intake 600 ml   Output 995 ml   Net -395 ml         Physical Exam  Vitals and nursing note reviewed.   Constitutional:       General: He is not in acute distress.     Appearance: He is well-developed.   HENT:      Head: Normocephalic and atraumatic.      Right Ear: External ear normal.      Left Ear: External ear normal.      Nose: Nose normal. No congestion.      Mouth/Throat:      Mouth: Mucous membranes are moist.      Pharynx: Oropharynx is clear.      Comments: Very narrow oropharynx  Eyes:      General: No scleral icterus.      Conjunctiva/sclera: Conjunctivae normal.      Pupils: Pupils are equal, round, and reactive to light.   Cardiovascular:      Rate and Rhythm: Normal rate and regular rhythm.      Pulses: Normal pulses.      Heart sounds: Normal heart sounds.   Pulmonary:      Effort: Pulmonary effort is normal. No respiratory distress.      Breath sounds: No wheezing, rhonchi or rales.      Comments: Chest tube insertion site dressing clean. On RA, stable. Air leak improving    CT removed, Dressing clean and dry. SQ emphysema much better.  Abdominal:      General: Abdomen is flat. Bowel sounds are normal. There is no distension.      Palpations: Abdomen is soft.      Tenderness: There is no abdominal tenderness. There is no guarding.   Genitourinary:     Rectum: Normal.      Comments: deferred  Musculoskeletal:         General: No swelling. Normal range of motion.      Cervical back: Normal range of motion and neck supple.   Skin:     General: Skin is warm and dry.      Capillary Refill: Capillary refill takes less than 2 seconds.      Coloration: Skin is not jaundiced.      Findings: No bruising.   Neurological:      General: No focal deficit present.      Mental Status: He is alert and oriented to person, place, and time.      Deep Tendon Reflexes: Reflexes are normal and symmetric.   Psychiatric:         Mood and Affect: Mood normal.         Behavior: Behavior normal.         Thought Content: Thought content normal.         Judgment: Judgment normal.           Significant Labs: All pertinent labs within the past 24 hours have been reviewed.    Significant Imaging: I have reviewed all pertinent imaging results/findings within the past 24 hours.

## 2024-10-08 NOTE — ASSESSMENT & PLAN NOTE
- Patient's COPD is controlled currently.  Patient is currently off COPD Pathway. Continue scheduled inhalers  Pulmicort/Brovana nebs and Supplemental oxygen and monitor respiratory status closely.   - Follow with Dr. Jimenez in clinic, last saw 9/5/24; Discussed possibility of EBV   - Respiratory status stable on RA

## 2024-10-08 NOTE — ASSESSMENT & PLAN NOTE
"-pigtail catheter/chest tube in place, continuous suction  -chest x-ray on ER arrival with large left-sided pneumothorax  -CT scan of chest with trace left apical pneumothorax   -While on suction, repeat chest x-ray noted concerns for accumulation  -CXR 09/29 noted, "near complete resolution of the left pneumothorax, with only a trace pneumothorax currently. "  -CXR 9/30 noted "No significant pneumothorax.".   -CXR 10/1 noted "No residual pneumothorax".     Underwent placement of left-sided 28F chest tube on 10/1 by Cardiothoracic surgery.       -Pulmonology and Cardiothoracic surgery consulted, follow-up recs  -O2 via 2 L nasal cannula.  Wean as tolerated to target SaO2 88-92%    Still has air leak- CT to Waterseal- cont    Cont present care- may need Heimlich    10/4- looks a little better, less air leak today on same Suction   Cont    10/5- same as above    10/6- improving, waterseal trial, CT clamp soon.    10/7- CT to water seal now.     10/8- better. CT removed  "

## 2024-10-08 NOTE — PROGRESS NOTES
[facility] Medicine  Progress Note    Patient Name: Aurelio Daniels  MRN: [id number]  Patient Class: IP- Inpatient   Admission Date: 9/26/2024  Length of Stay: 11 days  Attending Physician: Apurva Singletary MD  Primary Care Provider: Shlomo Villatoro MD        Subjective:     Principal Problem:Spontaneous pneumothorax        HPI:  68-year-old man with history of prostate cancer, tobacco abuse (quit 1-1/2 once ago), COPD (discharged with home oxygen via 2 L nasal cannula as of 09/17/2024), centrilobular emphysema, hypertension, anemia, and recent bilateral lower lobe pneumonia who presented to the emergency department for acute onset shortness a breath after a coughing spell.  Symptom onset occurred after the football game at approximately 10:00 p.m. in the evening, constant, and moderate-to-severe in nature.  Patient denies any associated chest pain, nausea, vomiting, fevers, chills.  Patient was originally 82% on room air on ER arrival.  He improved with nebulizer treatment, steroids, and CPAP.  Chest x-ray revealed left pneumothorax and pigtail catheter/chest tube was placed by ER physician.  Repeat chest x-ray showed improved lung inflation however there were significant abnormalities with underlying pneumonia and possible lung mass.  Stat CT scan of chest with contrast was ordered and showed spiculated left perihilar mass 3.7 x 2.5 x 3.6 cm suspicious for neoplasm; left upper lobe and posteromedial right lower lobe consolidations concerning for pneumonia; nonspecific mildly prominent mediastinal and left hilar lymph nodes with metastases possible; small hypodensity in right liver that is nonspecific but metastases not excluded; osseous metastases not excluded; small left and trace right pleural effusion; trace left apical pneumothorax; emphysematous changes with bulla in the left upper lobe.  Patient's wife is a retired nurse and they have been  for 25 years.  Patient  "still works as a  for [employer] apartments.    Recent hospital admit 9/13-09/17/2024 with bilateral pneumonia, hypoxia.  Chest x-ray 09/13/2024 with multifocal interstitial pneumonia.  Patient was discharged with Augmentin.  Patient was discharged with home oxygen via 2 L nasal cannula.  He had previously not required oxygen.  Of note, chest x-ray on 09/09/2024 was read as negative for an acute process.    Patient was seen by primary care physician 09/24/2024 with repeat CXR showing "Findings suspicious for collapse of the left upper lobe with persistent consolidations and atelectasis on the left. Additionally a opaque masses seen at the level of the midthoracic spine. Recommend follow-up evaluation with CT with contrast."  Primary care physician reports speaking with patient's pulmonologist and with plan to order CT scan of chest without contrast as soon as possible.        Overview/Hospital Course:  Hospital medicine for evaluation of pneumothorax after placement of chest tube in ED.   Repeat chest x-ray noted concerns for reaccumulation of pneumothorax despite ongoing suction drainage, suspect likely secondary to ruptured bleb leading to continuous air leak with high concerns for bronchopleural fistula.  CT imaging also noted concerns for consolidation in the left upper lobe and posteromedial right lower lobe concerning for pneumonia, labs with worsening leukocytosis, so started on broad-spectrum antibiotics given risk factors of recent hospitalization.  MRSA cultures pending. Repeat chest x-ray 9/29 noted "near complete resolution of the left pneumothorax, with only a trace pneumothorax currently. " CXR 9/30 noted "No significant pneumothorax.". CXR 10/1 noted "No residual pneumothorax". Underwent placement of left-sided 28F chest tube on 10/1 by Cardiothoracic surgery. Pulmonology and Cardiothoracic surgery following. Low suspicion for MRSA given MRSA nares negative, deescalated antibiotics to " cefazolin.     10/2- looks and feels better, no CP, SOB better since large CT placed by Dr. Gavin yesterday, connected to water seal, air leak still present. IR could not perform Lung Bx today, hence tomorrow. Pt agreeable  and otherwise comfortable. Pain controlled with analgesics. NPO again after MN tonight.      10/3- appreciate IR and CTS- pt resting comfortably, CT in place with air leak present. Underwent L Lung Bx by IR. Tolerated procedure well. CP under control with meds. Cont present care. Per CT surgery- may need Heimlich valve upon discharge, pt has severe pan acinar emphysema- not a candidate for open lung procedure.       10/4- appreciate Dr. Rodriguez- doing better, still connected to Chest tube with -20 cm suction via waterseal. Air leak a little better today. L sided CP also better controlled. He ambulated twice in the halls today. Sputum Cx yesterday grew MRSA- hence Ancef changed to oral Zyvox. Remains HD stable, pleasant, eating drinking well.      10/5- Appreciate Dr. Cali- doing well, resting in bed on RA. CP much better controlled. Still has SQ emphysema L chest- CT in placed- to suction, air leak appears better, CXR L apical PTX. Labs stable. Pt ambulates well. Cont same tx, await Lung Bx results next week.     10/6- looks better, comfortable in bed on RA. CT remains on -20 cm suction, air leak appears better. Pulm plans water seal trial soon. CXR does not show any PTX. Cont present care. Await Lung bx results.     10/7- looks much better, sitting up in chair, off suction, on water seal only with minimal air leak by CTS. Ambulating well. No cough or sputum, VS afeb. Hopefully, CT clamp in am and removal soon. Await Lung Bx results.     10/8- Appreciate Dr. Rodriguez- pt looks and feels much better, no air leak on water seal, no PTX on CXR. CT was clamped for 3 hrs, no PTX. CT pulled, dressing applied. SQ emphysema also better, CP under control. Dr. Rodriguez wants to monitor pt in [facility]  another day. Await Lung Bx results.     Interval History: Appreciate Dr. Rodriguez- pt looks and feels much better, no air leak on water seal, no PTX on CXR. CT was clamped for 3 hrs, no PTX. CT pulled, dressing applied. SQ emphysema also better, CP under control. Dr. Rodriguez wants to monitor pt in [facility] another day. Await Lung Bx results.     Review of Systems   Constitutional:  Positive for activity change. Negative for chills, diaphoresis and fever.   HENT: Negative.     Eyes: Negative.    Respiratory:  Negative for cough, chest tightness, shortness of breath and wheezing.    Cardiovascular:  Positive for chest pain.   Gastrointestinal: Negative.    Endocrine: Negative.    Genitourinary: Negative.    Musculoskeletal:  Negative for gait problem.   Skin: Negative.    Allergic/Immunologic: Negative.    Hematological: Negative.    Psychiatric/Behavioral: Negative.       Objective:     Vital Signs (Most Recent):  Temp: 98 °F (36.7 °C) (10/08/24 1657)  Pulse: 90 (10/08/24 1707)  Resp: 18 (10/08/24 1657)  BP: 107/75 (10/08/24 1657)  SpO2: (!) 94 % (10/08/24 1657) Vital Signs (24h Range):  Temp:  [97.7 °F (36.5 °C)-98.6 °F (37 °C)] 98 °F (36.7 °C)  Pulse:  [72-98] 90  Resp:  [16-18] 18  SpO2:  [93 %-95 %] 94 %  BP: (103-121)/(60-75) 107/75     Weight: 72.9 kg (160 lb 11.5 oz)  Body mass index is 23.73 kg/m².    Intake/Output Summary (Last 24 hours) at 10/8/2024 1726  Last data filed at 10/8/2024 1230  Gross per 24 hour   Intake 600 ml   Output 995 ml   Net -395 ml         Physical Exam  Vitals and nursing note reviewed.   Constitutional:       General: He is not in acute distress.     Appearance: He is well-developed.   HENT:      Head: Normocephalic and atraumatic.      Right Ear: External ear normal.      Left Ear: External ear normal.      Nose: Nose normal. No congestion.      Mouth/Throat:      Mouth: Mucous membranes are moist.      Pharynx: Oropharynx is clear.      Comments: Very narrow oropharynx  Eyes:       General: No scleral icterus.     Conjunctiva/sclera: Conjunctivae normal.      Pupils: Pupils are equal, round, and reactive to light.   Cardiovascular:      Rate and Rhythm: Normal rate and regular rhythm.      Pulses: Normal pulses.      Heart sounds: Normal heart sounds.   Pulmonary:      Effort: Pulmonary effort is normal. No respiratory distress.      Breath sounds: No wheezing, rhonchi or rales.      Comments: Chest tube insertion site dressing clean. On RA, stable. Air leak improving    CT removed, Dressing clean and dry. SQ emphysema much better.  Abdominal:      General: Abdomen is flat. Bowel sounds are normal. There is no distension.      Palpations: Abdomen is soft.      Tenderness: There is no abdominal tenderness. There is no guarding.   Genitourinary:     Rectum: Normal.      Comments: deferred  Musculoskeletal:         General: No swelling. Normal range of motion.      Cervical back: Normal range of motion and neck supple.   Skin:     General: Skin is warm and dry.      Capillary Refill: Capillary refill takes less than 2 seconds.      Coloration: Skin is not jaundiced.      Findings: No bruising.   Neurological:      General: No focal deficit present.      Mental Status: He is alert and oriented to person, place, and time.      Deep Tendon Reflexes: Reflexes are normal and symmetric.   Psychiatric:         Mood and Affect: Mood normal.         Behavior: Behavior normal.         Thought Content: Thought content normal.         Judgment: Judgment normal.           Significant Labs: All pertinent labs within the past 24 hours have been reviewed.    Significant Imaging: I have reviewed all pertinent imaging results/findings within the past 24 hours.    Assessment/Plan:      * Spontaneous pneumothorax  -pigtail catheter/chest tube in place, continuous suction  -chest x-ray on ER arrival with large left-sided pneumothorax  -CT scan of chest with trace left apical pneumothorax   -While on suction, repeat  "chest x-ray noted concerns for accumulation  -CXR 09/29 noted, "near complete resolution of the left pneumothorax, with only a trace pneumothorax currently. "  -CXR 9/30 noted "No significant pneumothorax.".   -CXR 10/1 noted "No residual pneumothorax".     Underwent placement of left-sided 28F chest tube on 10/1 by Cardiothoracic surgery.       -Pulmonology and Cardiothoracic surgery consulted, follow-up recs  -O2 via 2 L nasal cannula.  Wean as tolerated to target SaO2 88-92%    Still has air leak- CT to Waterseal- cont    Cont present care- may need Heimlich    10/4- looks a little better, less air leak today on same Suction   Cont    10/5- same as above    10/6- improving, waterseal trial, CT clamp soon.    10/7- CT to water seal now.     10/8- better. CT removed    Centrilobular emphysema  Patient's COPD is controlled currently.  Patient is currently off COPD Pathway. Continue p.r.n. DuoNebs, O2 supplementation, and antibiotics for pneumonia.  monitor respiratory status closely.   -patient did receive DuoNeb, prednisone 60 mg p.o., and terbutaline 0.25 mg while in the emergency department.    Cont present care    Same as above    Bilateral pneumonia  -CT scan of chest with consolidations in the left upper lobe and posteromedial right lower lobe concerning for pneumonia  -white blood cell count 12.07, COVID-19 negative, afebrile    Microbiology: Sputum Culture   Lab Results   Component Value Date    GSRESP <10 epithelial cells per low power field. 09/28/2024    GSRESP No WBC's 09/28/2024    GSRESP Few Gram positive cocci 09/28/2024    GSRESP Rare Gram negative rods 09/28/2024    GSRESP Rare yeast 09/28/2024    RESPIRATORYC No Pseudomonas isolated. 09/28/2024    RESPIRATORYC (A) 09/28/2024     METHICILLIN RESISTANT STAPHYLOCOCCUS AUREUS  Rare         -Low suspicion for MRSA given MRSA nares negative, deescalated antibiotics to cefazolin.  Follow up culture  -O2 supplementation, p.r.n. DuoNebs  -follow up " cultures  -aspiration and fall precautions  -pulmonary consulted, following    Cont present care    Improving,no obvious pneumonia now, Sputum Cx growing MRSA- switched to PO Zyvox    Cont Zyvox for another 3 days    Mass of left lung  Left lung mass with presumed lung cancer with possible metastases  -CT scan of chest shows left perihilar mass 3.7 x 2.5 x 3.6 cm concerning for neoplasm; nonspecific mildly prominent mediastinal and left hilar lymph nodes with metastases possible; small hypodensity in right liver that is nonspecific but metastases not excluded; osseous metastases not excluded      -pulmonary consulted, plans for future evaluation after respiratory status is more stable    Await Lung Bx tomorrow by IR    S/p Lung Bx today    Await Path results    Tobacco abuse  Patient has not smoked in 1-1/2 months.  Counseled continued cessation.  No nicotine patch needed.      Macrocytic anemia  Anemia is likely due to  unclear etiology . Most recent hemoglobin and hematocrit are listed below.  Recent Labs     10/02/24  0437 10/03/24  0521 10/04/24  0514   HGB 12.3* 12.1* 11.3*   HCT 38.8* 38.1* 35.2*       Plan  - Monitor serial CBC: Daily  - Transfuse PRBC if patient becomes hemodynamically unstable, symptomatic or H/H drops below 7/21.  - Patient has not received any PRBC transfusions to date  - Patient's anemia is currently stable    Stable      Prostate cancer  High-risk prostate cancer  -seen by Urology Dr. Smith 07/10/2024 with plans for follow up visit in 6 months and repeat PSA  -status post radiation therapy and ADT, PSA undetectable  -done with 2 years of Lupron      Primary hypertension  Chronic, controlled. Latest blood pressure and vitals reviewed-     Temp:  [97.3 °F (36.3 °C)-98 °F (36.7 °C)]   Pulse:  [70-97]   Resp:  [16-20]   BP: (114-138)/(66-81)   SpO2:  [91 %-97 %] .   Home meds for hypertension were reviewed and noted below.   Hypertension Medications               valsartan (DIOVAN) 80 MG  tablet Take 1 tablet (80 mg total) by mouth once daily.            While in the hospital, will manage blood pressure as follows; Adjust home antihypertensive regimen as follows- normotensive on admission, resume home medications as appropriate; P.r.n. IV hydralazine with parameters.    Will utilize p.r.n. blood pressure medication only if patient's blood pressure greater than 180/110 and he develops symptoms such as worsening chest pain or shortness of breath.      VTE Risk Mitigation (From admission, onward)           Ordered     Reason for No Pharmacological VTE Prophylaxis  Once        Comments: No Lovenox until patient is seen by Pulmonary as they may decide to pursue lung biopsy   Question:  Reasons:  Answer:  Physician Provided (leave comment)    09/27/24 0401     IP VTE HIGH RISK PATIENT  Once         09/27/24 0401     Place sequential compression device  Until discontinued         09/27/24 0401                    Discharge Planning   YUE:      Code Status: Full Code   Is the patient medically ready for discharge?:     Reason for patient still in hospital (select all that apply): Patient trending condition, Laboratory test, Treatment, Imaging, and Consult recommendations  Discharge Plan A: Home with family        Apurva Singeltary MD  Department of Hospital Medicine   [facility] - Telemetry ([facility])

## 2024-10-08 NOTE — PROGRESS NOTES
Subjective:      Patient ID: Aurelio Daniels is a 68 y.o. male.    Chief Complaint: Shortness of Breath (Episode of SOB that started after a coughing fit tonight. Recently admitted for pneumonia. 82% SpO2 upon EMS arrival with improvement after breathing treatment, steroids, and CPAP.)    HPI: 68-year-old man with history of prostate cancer, tobacco abuse (quit 1-1/2 once ago), COPD (discharged with home oxygen via 2 L nasal cannula as of 09/17/2024), centrilobular emphysema, hypertension, anemia, and recent bilateral lower lobe pneumonia who presented to the emergency department for acute onset shortness a breath after a coughing spell.   The patient was admitted through the emergency room had a pigtail catheter placed for pneumothorax on the left side the pneumothorax resolved.  Continues to have continuous air leak.  CT scan showed a large bullous lesion and a 3.2 cm spiculated mass in the right upper lobe.  Central location  Bilateral pneumonias and compressive atelectasis.  Multiple mediastinal lymphadenopathy.  The patient feels better after the pigtail catheter.  Catheter continues to have air leak  Repeat chest x-ray shows expanded lung fields mostly with areas of compressive atelectasis pneumothorax resolved.  The pigtail catheter still has a massive air leak continuous.    10/02/2024 patient has pain over the chest tube insertion site continuous air leak from the chest tube and serous drainage subcutaneous emphysema on 3 L nasal cannula, not in distress.  Otherwise hemodynamically stable  10/03/2024 the patient continues to have air leak pain well controlled.  10/04/2024 patient is having lesser air leak from the chest tube and subcutaneous emphysema./he is stable otherwise hemodynamically and satting more than 95 on 2 L nasal cannula.  Chest tube output is minimal pain well controlled    10/7/2024 the patient air leak is intermittent now minimal drainage from the chest tube.  Subcutaneous emphysema  is getting better.    10/08/2024 the patient chest tube showing no air leak subcutaneous emphysema is stable the patient is comfortable on water seal ambulating still complaining of pain over the incision site.    Review of patient's allergies indicates:  No Known Allergies    Past Medical History:   Diagnosis Date    Cancer     Prostate    Tobacco use        Family History   Problem Relation Name Age of Onset    No Known Problems Mother      Heart disease Father      No Known Problems Brother      No Known Problems Brother      No Known Problems Daughter      No Known Problems Daughter         Social History     Socioeconomic History    Marital status:      Spouse name: YUE    Number of children: 2   Tobacco Use    Smoking status: Some Days     Current packs/day: 0.00     Types: Cigarettes     Last attempt to quit: 1/15/2022     Years since quittin.7     Passive exposure: Current    Smokeless tobacco: Never   Substance and Sexual Activity    Alcohol use: Yes     Alcohol/week: 2.0 standard drinks of alcohol     Types: 2 Shots of liquor per week    Drug use: Never    Sexual activity: Not Currently     Partners: Female     Social Drivers of Health     Financial Resource Strain: Low Risk  (2024)    Overall Financial Resource Strain (CARDIA)     Difficulty of Paying Living Expenses: Not hard at all   Food Insecurity: No Food Insecurity (2024)    Hunger Vital Sign     Worried About Running Out of Food in the Last Year: Never true     Ran Out of Food in the Last Year: Never true   Transportation Needs: No Transportation Needs (2024)    TRANSPORTATION NEEDS     Transportation : No   Physical Activity: Inactive (2024)    Exercise Vital Sign     Days of Exercise per Week: 0 days     Minutes of Exercise per Session: 0 min   Stress: No Stress Concern Present (2024)    [employer] [place] [employer] - Occupational Stress Questionnaire     Feeling of Stress : Not at all   Housing  "Stability: Low Risk  (9/27/2024)    Housing Stability Vital Sign     Unable to Pay for Housing in the Last Year: No     Homeless in the Last Year: No       Past Surgical History:   Procedure Laterality Date    COLONOSCOPY      COLONOSCOPY N/A 5/20/2022    Procedure: COLONOSCOPY;  Surgeon: Jamia Alfaro MD;  Location: [facility];  Service: Endoscopy;  Laterality: N/A;    COMPUTED TOMOGRAPHY N/A 10/3/2024    Procedure: CT (COMPUTED TOMOGRAPHY)/lung biopsy;  Surgeon: Navenet Oswald MD;  Location: [facility];  Service: General;  Laterality: N/A;    VASECTOMY  1985       Review of Systems   Constitutional:  Positive for activity change. Negative for appetite change.   HENT:  Negative for dental problem, nosebleeds and sore throat.    Eyes:  Negative for discharge and visual disturbance.   Respiratory:  Positive for chest tightness, shortness of breath and wheezing. Negative for cough and stridor.    Cardiovascular:  Negative for leg swelling.   Gastrointestinal:  Negative for abdominal distention and abdominal pain.   Genitourinary:  Negative for difficulty urinating and dysuria.   Musculoskeletal:  Negative for arthralgias, back pain and joint swelling.   Allergic/Immunologic: Negative for environmental allergies.   Neurological:  Negative for dizziness, syncope and headaches.   Hematological:  Does not bruise/bleed easily.   Psychiatric/Behavioral:  Negative for behavioral problems.           Objective:   /70 (BP Location: Right arm, Patient Position: Lying)   Pulse 80   Temp 97.8 °F (36.6 °C) (Oral)   Resp 16   Ht 5' 9" (1.753 m)   Wt 72.9 kg (160 lb 11.5 oz)   SpO2 (!) 94%   BMI 23.73 kg/m²     X-Ray Chest 1 View  Narrative: EXAMINATION:  XR CHEST 1 VIEW    CLINICAL HISTORY:  Pneumothorax;    COMPARISON:  October 7, 2024    FINDINGS:  Interval improvement without complete resolution of left pneumothorax.  Stable position of the left-sided chest tube.  Extensive air in the left chest wall again seen.  " The lungs are free of new pulmonary opacities.  The hilar and mediastinal contours and osseous structures are unchanged.  Impression: 1.  Overall, there is been interval improvement without complete resolution of the left apical pneumothorax.    Electronically signed by: Ottoniel Breaux MD  Date:    10/08/2024  Time:    06:45         Physical Exam  Vitals reviewed.   Constitutional:       Appearance: Normal appearance. He is ill-appearing.   HENT:      Head: Normocephalic and atraumatic.      Mouth/Throat:      Mouth: Mucous membranes are moist.   Eyes:      Extraocular Movements: Extraocular movements intact.   Cardiovascular:      Rate and Rhythm: Normal rate and regular rhythm.      Pulses: Normal pulses.      Heart sounds: Normal heart sounds.   Pulmonary:      Effort: Pulmonary effort is normal.      Breath sounds: Rhonchi and rales present.      Comments: Left-sided chest tube has a massive air leak continuous  Abdominal:      Palpations: Abdomen is soft.   Musculoskeletal:         General: Normal range of motion.      Cervical back: Normal range of motion and neck supple.   Skin:     General: Skin is warm.      Capillary Refill: Capillary refill takes less than 2 seconds.   Neurological:      General: No focal deficit present.      Mental Status: He is alert and oriented to person, place, and time.   Psychiatric:         Mood and Affect: Mood normal.         Assessment:     1. Acute on chronic respiratory failure with hypoxemia    2. Pneumothorax on left    3. Troponin I above reference range    4. Mass of left lung    5. SOB (shortness of breath)    6. Chest pain    7. Elevated troponin I level          Plan   68-year-old male with a right upper lobe mass centrally located with bilateral pan acinar emphysema COPD and pneumonic consolidation.  A chest tube were clamped this morning clamping trial is ongoing we will get an x-ray in 4 hours time with the x-ray looks good we will discontinue the chest tube  pathology still pending  Aggressive pulmonary toilet incentive spirometry  Out of bed ambulate    Chest x-ray in the afternoon  DVT prophylaxis.  Bilateral SCDs        Romaine Rodriguez MD  [facility]  [place]

## 2024-10-08 NOTE — SUBJECTIVE & OBJECTIVE
Objective:     Vital Signs (Most Recent):  Temp: 98.6 °F (37 °C) (10/08/24 1159)  Pulse: 91 (10/08/24 1159)  Resp: 18 (10/08/24 1159)  BP: 111/69 (10/08/24 1159)  SpO2: (!) 94 % (10/08/24 1159) Vital Signs (24h Range):  Temp:  [97.6 °F (36.4 °C)-98.6 °F (37 °C)] 98.6 °F (37 °C)  Pulse:  [72-93] 91  Resp:  [16-18] 18  SpO2:  [93 %-95 %] 94 %  BP: (103-121)/(60-74) 111/69     Weight: 72.9 kg (160 lb 11.5 oz)  Body mass index is 23.73 kg/m².  Intake/Output Summary (Last 24 hours) at 10/8/2024 1551  Last data filed at 10/8/2024 1230  Gross per 24 hour   Intake 900 ml   Output 995 ml   Net -95 ml     Physical Exam  Constitutional:       General: He is not in acute distress.  HENT:      Head: Normocephalic.      Mouth/Throat:      Mouth: Mucous membranes are moist.   Eyes:      Conjunctiva/sclera: Conjunctivae normal.      Pupils: Pupils are equal, round, and reactive to light.   Cardiovascular:      Pulses: Normal pulses.   Pulmonary:      Effort: Pulmonary effort is normal.      Breath sounds: Rhonchi present. No wheezing.      Comments: On RA  Musculoskeletal:         General: No swelling.   Neurological:      Mental Status: He is alert and oriented to person, place, and time.   Psychiatric:         Mood and Affect: Mood normal.         Behavior: Behavior normal.     Review of Systems   Constitutional:  Negative for fatigue and fever.   Respiratory:  Negative for cough and shortness of breath.    Cardiovascular:  Negative for chest pain.   Gastrointestinal:  Negative for abdominal pain, nausea and vomiting.   Genitourinary:  Negative for dysuria.   Neurological:  Negative for dizziness and headaches.   Psychiatric/Behavioral:  Negative for confusion.      Vents:  Oxygen Concentration (%): 28 (10/1955)    Lines/Drains/Airways       Drain  Duration                  Chest Tube 09/27/24 0130 Left Midaxillary 11 days              Peripheral Intravenous Line  Duration                  Peripheral IV - Single Lumen  "10/06/24 0544 22 G Posterior;Right Forearm 2 days                  Significant Labs:    CBC/Anemia Profile:  No results for input(s): "WBC", "HGB", "HCT", "PLT", "MCV", "RDW", "IRON", "FERRITIN", "RETIC", "FOLATE", "[id number]", "OCCULTBLOOD" in the last 48 hours.     Chemistries:  No results for input(s): "NA", "K", "CL", "CO2", "BUN", "CREATININE", "CALCIUM", "ALBUMIN", "PROT", "BILITOT", "ALKPHOS", "ALT", "AST", "GLUCOSE", "MG", "PHOS" in the last 48 hours.    Significant Imaging:  I have reviewed all pertinent imaging results/findings within the past 24 hours.  I have reviewed and interpreted all pertinent imaging results/findings within the past 24 hours.  "

## 2024-10-08 NOTE — ASSESSMENT & PLAN NOTE
- Likely 2/2 to ruptured bleb  - Originally had pigtail placed in ED but swapped for large bore by CTS on 10/1  - Persistent, brisk air leak has improved over the past couple days  - Water seal 10/7, clamped early this morning by CTS - CT removed this afternoon   - Management per CTS  - Plan to repeat CXR tomorrow morning to ensure no re-accumulation

## 2024-10-08 NOTE — NURSING
Pt remains free of falls/injury. Safety precautions maintained. Pt denies pain/discomfort. PO phosphate given.  Regular diet tolerated. VSS. No S/S of distress noted at this time. Bed alarm refused.  Pt ambulated down and and sat in chair throughout shift.  12 hour chart check complete. Will continue to monitor.

## 2024-10-08 NOTE — ASSESSMENT & PLAN NOTE
-CT scan of chest with consolidations in the left upper lobe and posteromedial right lower lobe concerning for pneumonia  -white blood cell count 12.07, COVID-19 negative, afebrile    Microbiology: Sputum Culture   Lab Results   Component Value Date    GSRESP <10 epithelial cells per low power field. 09/28/2024    GSRESP No WBC's 09/28/2024    GSRESP Few Gram positive cocci 09/28/2024    GSRESP Rare Gram negative rods 09/28/2024    GSRESP Rare yeast 09/28/2024    RESPIRATORYC No Pseudomonas isolated. 09/28/2024    RESPIRATORYC (A) 09/28/2024     METHICILLIN RESISTANT STAPHYLOCOCCUS AUREUS  Rare         -Low suspicion for MRSA given MRSA nares negative, deescalated antibiotics to cefazolin.  Follow up culture  -O2 supplementation, p.r.nSuman Piña  -follow up cultures  -aspiration and fall precautions  -pulmonary consulted, following    Cont present care    Improving,no obvious pneumonia now, Sputum Cx growing MRSA- switched to PO Zyvox    Cont Zyvox for another 3 days

## 2024-10-08 NOTE — PROGRESS NOTES
[facility] - Tele[facility] ([facility])  Pulmonology  Progress Note    Patient Name: Aurelio Daniels  MRN: [id number]  Admission Date: 9/26/2024  Hospital Length of Stay: 11 days  Code Status: Full Code  Attending Provider: Apurva Singletary MD  Primary Care Provider: Shlomo Villatoro MD   Principal Problem: Spontaneous pneumothorax    Subjective:   Mr Daniels is a 67 y/o gentleman with a long smoking history, COPD (recently established with Dr Jimenez), prior prostate cancer, and HTN who is currently admitted to the Hospitalist service after coming in overnight with shortness of breath and being found to have a large left sided PTX.  Pt was recently admitted to the hospital for PNA (9/13 - 9/17) and was recovering well from that when he suddenly had a large coughing fit last night.  Became markedly short of breath after this, which prompted his presentation to the ER.  Chest tube was placed by the ER physician with good re-expansion, though he continues to have a brisk air leak.    CT after the chest tube placement shows trace remaining pneumothorax, large bleb on the left, approx 3.5 cm spiculated mass on the left, and some remaining bilateral consolidations.  Mr Daniels says that he is feeling much better after the chest tube placement, though still with some cough.    Interval History:  9/28 - continuous air leak with some reaccumulation on CXR late this morning.   Adjusted tube to try and optimize drainage.  It was a bit kinked at the skin, so resecured in a better position. Remains on suction.  9/29 - re-expansion improved following tube adjustments yesterday.  Continuous brisk air leak remains on suction.  No  new complaints.  9/30: CXR with lung up, continues with large volume air leak with and without suction, CTS consulted for eval and recs, no new issues or c/o noted on exam, POC and questions answered with pt and his wife  10/1 - no acute events overnight, pt continues with air leak to chest tube, detailed  conversation with pt and wife this AM about treatment and possible plans moving forward - all questions answered wife bedside  10/2 - on RA today, pain better controlled with PO meds, plans for IR CT guided lung bx today   10/4 - remains on RA, feels better, s/p CT guided lung bx 10/3, CXR with extensive SQ air, CT remains with air leak but seems a little less agressive than before   10/5 - essentially uncharged, feels well and is eating well, pain controlled with less meds, no new issues or c/o, remains on RA, walking the halls during the day   10/6 - doing well, continue with CT to left side with air leak that continues to slow, may be able to trial water seal in the coming 24 hours or so  10/7 - Chest tube now to water seal per CTS this am. Air leak minimal. Feels comfortable in bed just some irritation at chest tube insertion site. Dressing c/d/i.   10/8 - No complaints today. CT has been removed. Chest wall irritation improved. Awaiting pathology    Objective:     Vital Signs (Most Recent):  Temp: 98.6 °F (37 °C) (10/08/24 1159)  Pulse: 91 (10/08/24 1159)  Resp: 18 (10/08/24 1159)  BP: 111/69 (10/08/24 1159)  SpO2: (!) 94 % (10/08/24 1159) Vital Signs (24h Range):  Temp:  [97.6 °F (36.4 °C)-98.6 °F (37 °C)] 98.6 °F (37 °C)  Pulse:  [72-93] 91  Resp:  [16-18] 18  SpO2:  [93 %-95 %] 94 %  BP: (103-121)/(60-74) 111/69     Weight: 72.9 kg (160 lb 11.5 oz)  Body mass index is 23.73 kg/m².  Intake/Output Summary (Last 24 hours) at 10/8/2024 1551  Last data filed at 10/8/2024 1230  Gross per 24 hour   Intake 900 ml   Output 995 ml   Net -95 ml     Physical Exam  Constitutional:       General: He is not in acute distress.  HENT:      Head: Normocephalic.      Mouth/Throat:      Mouth: Mucous membranes are moist.   Eyes:      Conjunctiva/sclera: Conjunctivae normal.      Pupils: Pupils are equal, round, and reactive to light.   Cardiovascular:      Pulses: Normal pulses.   Pulmonary:      Effort: Pulmonary effort is  "normal.      Breath sounds: Rhonchi present. No wheezing.      Comments: On RA  Musculoskeletal:         General: No swelling.   Neurological:      Mental Status: He is alert and oriented to person, place, and time.   Psychiatric:         Mood and Affect: Mood normal.         Behavior: Behavior normal.     Review of Systems   Constitutional:  Negative for fatigue and fever.   Respiratory:  Negative for cough and shortness of breath.    Cardiovascular:  Negative for chest pain.   Gastrointestinal:  Negative for abdominal pain, nausea and vomiting.   Genitourinary:  Negative for dysuria.   Neurological:  Negative for dizziness and headaches.   Psychiatric/Behavioral:  Negative for confusion.      Vents:  Oxygen Concentration (%): 28 (10/1955)    Lines/Drains/Airways       Drain  Duration                  Chest Tube 09/27/24 0130 Left Midaxillary 11 days              Peripheral Intravenous Line  Duration                  Peripheral IV - Single Lumen 10/06/24 0544 22 G Posterior;Right Forearm 2 days                  Significant Labs:    CBC/Anemia Profile:  No results for input(s): "WBC", "HGB", "HCT", "PLT", "MCV", "RDW", "IRON", "FERRITIN", "RETIC", "FOLATE", "[id number]", "OCCULTBLOOD" in the last 48 hours.     Chemistries:  No results for input(s): "NA", "K", "CL", "CO2", "BUN", "CREATININE", "CALCIUM", "ALBUMIN", "PROT", "BILITOT", "ALKPHOS", "ALT", "AST", "GLUCOSE", "MG", "PHOS" in the last 48 hours.    Significant Imaging:  I have reviewed all pertinent imaging results/findings within the past 24 hours.  I have reviewed and interpreted all pertinent imaging results/findings within the past 24 hours.    Assessment/Plan:     Pulmonary  * Spontaneous pneumothorax  - Likely 2/2 to ruptured bleb  - Originally had pigtail placed in ED but swapped for large bore by CTS on 10/1  - Persistent, brisk air leak has improved over the past couple days  - Water seal 10/7, clamped early this morning by CTS - CT removed " this afternoon   - Management per CTS  - Plan to repeat CXR tomorrow morning to ensure no re-accumulation    Mass of left lung  - Spiculated left perihilar mass measuring approximately 3.7 x 2.5 x 3.6 cm, concerning for neoplasm   - S/p CT guided bx 10/3 per IR, follow path as it remains pending     Bilateral pneumonia  - Sputum culture with MRSA - on zyvox    Centrilobular emphysema  - Patient's COPD is controlled currently.  Patient is currently off COPD Pathway. Continue scheduled inhalers  Pulmicort/Brovana nebs and Supplemental oxygen and monitor respiratory status closely.   - Follow with Dr. Jimenez in clinic, last saw 9/5/24; Discussed possibility of EBV   - Respiratory status stable on RA    Other  Tobacco abuse  - Counseled on cessation and he is motivated to quit  - Declined nicotine patch, order if he changes his mind     Thank you for your consult. I will follow-up with the patient. Please call with any additional questions.     Almas King PA-C  Pulmonology  [facility] - Telemetry ([facility])

## 2024-10-08 NOTE — PLAN OF CARE
Problem: Adult Inpatient Plan of Care  Goal: Plan of Care Review  Outcome: Progressing  Goal: Patient-Specific Goal (Individualized)  Outcome: Progressing  Goal: Absence of Hospital-Acquired Illness or Injury  Outcome: Progressing  Goal: Optimal Comfort and Wellbeing  Outcome: Progressing  Goal: Readiness for Transition of Care  Outcome: Progressing     Problem: Pneumonia  Goal: Fluid Balance  Outcome: Progressing  Goal: Resolution of Infection Signs and Symptoms  Outcome: Progressing  Goal: Effective Oxygenation and Ventilation  Outcome: Progressing     Problem: Skin Injury Risk Increased  Goal: Skin Health and Integrity  Outcome: Progressing     Problem: Fall Injury Risk  Goal: Absence of Fall and Fall-Related Injury  Outcome: Progressing     Problem: Infection  Goal: Absence of Infection Signs and Symptoms  Outcome: Progressing     Problem: Fatigue  Goal: Improved Activity Tolerance  Outcome: Progressing     Problem: Pain Acute  Goal: Optimal Pain Control and Function  Outcome: Progressing

## 2024-10-09 VITALS
RESPIRATION RATE: 18 BRPM | TEMPERATURE: 98 F | BODY MASS INDEX: 24.36 KG/M2 | OXYGEN SATURATION: 95 % | WEIGHT: 164.44 LBS | SYSTOLIC BLOOD PRESSURE: 115 MMHG | DIASTOLIC BLOOD PRESSURE: 63 MMHG | HEART RATE: 93 BPM | HEIGHT: 69 IN

## 2024-10-09 DIAGNOSIS — J93.83 SPONTANEOUS PNEUMOTHORAX: Primary | ICD-10-CM

## 2024-10-09 PROCEDURE — 94761 N-INVAS EAR/PLS OXIMETRY MLT: CPT

## 2024-10-09 PROCEDURE — 99900035 HC TECH TIME PER 15 MIN (STAT)

## 2024-10-09 PROCEDURE — 94640 AIRWAY INHALATION TREATMENT: CPT

## 2024-10-09 PROCEDURE — 25000242 PHARM REV CODE 250 ALT 637 W/ HCPCS: Performed by: INTERNAL MEDICINE

## 2024-10-09 PROCEDURE — 25000003 PHARM REV CODE 250: Performed by: EMERGENCY MEDICINE

## 2024-10-09 RX ORDER — HYDROCODONE BITARTRATE AND ACETAMINOPHEN 7.5; 325 MG/1; MG/1
1 TABLET ORAL EVERY 8 HOURS PRN
Qty: 15 TABLET | Refills: 0 | Status: SHIPPED | OUTPATIENT
Start: 2024-10-09

## 2024-10-09 RX ORDER — LINEZOLID 600 MG/1
600 TABLET, FILM COATED ORAL EVERY 12 HOURS
Qty: 6 TABLET | Refills: 0 | Status: SHIPPED | OUTPATIENT
Start: 2024-10-09 | End: 2024-10-12

## 2024-10-09 RX ADMIN — BUDESONIDE 0.5 MG: 0.5 INHALANT ORAL at 08:10

## 2024-10-09 RX ADMIN — ARFORMOTEROL TARTRATE 15 MCG: 15 SOLUTION RESPIRATORY (INHALATION) at 08:10

## 2024-10-09 RX ADMIN — Medication 2 PACKET: at 08:10

## 2024-10-09 RX ADMIN — LINEZOLID 600 MG: 600 TABLET, FILM COATED ORAL at 08:10

## 2024-10-09 NOTE — DISCHARGE SUMMARY
'[facility] ER Medicine  Discharge Summary      Patient Name: Aurelio Daniels  MRN: [id number]  [facility]: [id number]  Patient Class: IP- Inpatient  Admission Date: 9/26/2024  Hospital Length of Stay: 12 days  Discharge Date and Time:  10/09/2024 12:29 PM  Attending Physician: Arie Bacon MD   Discharging Provider: Arie Bacon MD  Primary Care Provider: Shlomo Villatoro MD    Primary Care Team: [facility] MEDICINE D    HPI:   68-year-old man with history of prostate cancer, tobacco abuse (quit 1-1/2 once ago), COPD (discharged with home oxygen via 2 L nasal cannula as of 09/17/2024), centrilobular emphysema, hypertension, anemia, and recent bilateral lower lobe pneumonia who presented to the emergency department for acute onset shortness a breath after a coughing spell.  Symptom onset occurred after the football game at approximately 10:00 p.m. in the evening, constant, and moderate-to-severe in nature.  Patient denies any associated chest pain, nausea, vomiting, fevers, chills.  Patient was originally 82% on room air on ER arrival.  He improved with nebulizer treatment, steroids, and CPAP.  Chest x-ray revealed left pneumothorax and pigtail catheter/chest tube was placed by ER physician.  Repeat chest x-ray showed improved lung inflation however there were significant abnormalities with underlying pneumonia and possible lung mass.  Stat CT scan of chest with contrast was ordered and showed spiculated left perihilar mass 3.7 x 2.5 x 3.6 cm suspicious for neoplasm; left upper lobe and posteromedial right lower lobe consolidations concerning for pneumonia; nonspecific mildly prominent mediastinal and left hilar lymph nodes with metastases possible; small hypodensity in right liver that is nonspecific but metastases not excluded; osseous metastases not excluded; small left and trace right pleural effusion; trace left apical pneumothorax; emphysematous changes with bulla in the left  "upper lobe.  Patient's wife is a retired nurse and they have been  for 25 years.  Patient still works as a  for [employer].    Recent hospital admit 9/13-09/17/2024 with bilateral pneumonia, hypoxia.  Chest x-ray 09/13/2024 with multifocal interstitial pneumonia.  Patient was discharged with Augmentin.  Patient was discharged with home oxygen via 2 L nasal cannula.  He had previously not required oxygen.  Of note, chest x-ray on 09/09/2024 was read as negative for an acute process.    Patient was seen by primary care physician 09/24/2024 with repeat CXR showing "Findings suspicious for collapse of the left upper lobe with persistent consolidations and atelectasis on the left. Additionally a opaque masses seen at the level of the midthoracic spine. Recommend follow-up evaluation with CT with contrast."  Primary care physician reports speaking with patient's pulmonologist and with plan to order CT scan of chest without contrast as soon as possible.        Procedure(s) (LRB):  CT (COMPUTED TOMOGRAPHY)/lung biopsy (N/A)      Hospital Course:   Hospital medicine for evaluation of pneumothorax after placement of chest tube in ED.   Repeat chest x-ray noted concerns for reaccumulation of pneumothorax despite ongoing suction drainage, suspect likely secondary to ruptured bleb leading to continuous air leak with high concerns for bronchopleural fistula.  CT imaging also noted concerns for consolidation in the left upper lobe and posteromedial right lower lobe concerning for pneumonia, labs with worsening leukocytosis, so started on broad-spectrum antibiotics given risk factors of recent hospitalization.  MRSA cultures pending. Repeat chest x-ray 9/29 noted "near complete resolution of the left pneumothorax, with only a trace pneumothorax currently. " CXR 9/30 noted "No significant pneumothorax.". CXR 10/1 noted "No residual pneumothorax". Underwent placement of left-sided 28F chest tube on " 10/1 by Cardiothoracic surgery. Pulmonology and Cardiothoracic surgery following. Low suspicion for MRSA given MRSA nares negative, deescalated antibiotics to cefazolin.     10/2- looks and feels better, no CP, SOB better since large CT placed by Dr. Gavin yesterday, connected to water seal, air leak still present. IR could not perform Lung Bx today, hence tomorrow. Pt agreeable  and otherwise comfortable. Pain controlled with analgesics. NPO again after MN tonight.      10/3- appreciate IR and CTS- pt resting comfortably, CT in place with air leak present. Underwent L Lung Bx by IR. Tolerated procedure well. CP under control with meds. Cont present care. Per CT surgery- may need Heimlich valve upon discharge, pt has severe pan acinar emphysema- not a candidate for open lung procedure.       10/4- appreciate Dr. Rodriguez- doing better, still connected to Chest tube with -20 cm suction via waterseal. Air leak a little better today. L sided CP also better controlled. He ambulated twice in the halls today. Sputum Cx yesterday grew MRSA- hence Ancef changed to oral Zyvox. Remains HD stable, pleasant, eating drinking well.      10/5- Appreciate Dr. Cali- doing well, resting in bed on RA. CP much better controlled. Still has SQ emphysema L chest- CT in placed- to suction, air leak appears better, CXR L apical PTX. Labs stable. Pt ambulates well. Cont same tx, await Lung Bx results next week.     10/6- looks better, comfortable in bed on RA. CT remains on -20 cm suction, air leak appears better. Pulm plans water seal trial soon. CXR does not show any PTX. Cont present care. Await Lung bx results.     10/7- looks much better, sitting up in chair, off suction, on water seal only with minimal air leak by CTS. Ambulating well. No cough or sputum, VS afeb. Hopefully, CT clamp in am and removal soon. Await Lung Bx results.     10/8- Appreciate Dr. Rodriguez- pt looks and feels much better, no air leak on water seal, no PTX on  CXR. CT was clamped for 3 hrs, no PTX. CT pulled, dressing applied. SQ emphysema also better, CP under control. Dr. Rodriguez wants to monitor pt in  hospital another day. Await Lung Bx results.     10/9- NAEON, remains stable on room air. CXR this AM no PTX. Stable for discharge home. F/U with CTS and Pulmonary in 1-2 weeks for further management.     Goals of Care Treatment Preferences:  Code Status: Full Code      SDOH Screening:  The patient was screened for utility difficulties, food insecurity, transport difficulties, housing insecurity, and interpersonal safety and there were no concerns identified this admission.     Consults:   Consults (From admission, onward)          Status Ordering Provider     Inpatient consult to Interventional Radiology  Once        Provider:  Stew Wells MD    Completed DAVIN GONG.     Inpatient consult to Cardiothoracic Surgery  Once        Provider:  Romaine Rodriguez MD    Completed DAVIN GONG.     Inpatient consult to Pulmonology  Once        Provider:  Navneet Lester MD    Completed NINA LEO            No new Assessment & Plan notes have been filed under this hospital service since the last note was generated.  Service: Hospital Medicine    Final Active Diagnoses:    Diagnosis Date Noted POA    PRINCIPAL PROBLEM:  Spontaneous pneumothorax [J93.83] 09/27/2024 Yes    Bilateral pneumonia [J18.9] 09/27/2024 Yes    Mass of left lung [R91.8] 09/27/2024 Yes    Tobacco abuse [Z72.0] 09/27/2024 Yes    Macrocytic anemia [D53.9] 09/27/2024 Yes    Centrilobular emphysema [J43.2] 09/14/2024 Yes    Primary hypertension [I10] 10/24/2023 Yes    Prostate cancer [C61] 05/30/2022 Yes      Problems Resolved During this Admission:    Diagnosis Date Noted Date Resolved POA    Hypoxia [R09.02] 09/27/2024 09/27/2024 Yes    Thrombocytosis [D75.839] 09/27/2024 10/01/2024 Yes    Acute on chronic respiratory failure with hypoxia [J96.21] 09/27/2024 10/07/2024 Yes     Elevated troponin [R79.89] 09/27/2024 10/04/2024 Yes       Discharged Condition: stable    Disposition:     Follow Up:   Follow-up Information       Romaine Rodriguez MD Follow up on 10/24/2024.    Specialty: Cardiothoracic Surgery  Contact information:  [address] [place] [address] [address] [place] [address]  [phone]                           Patient Instructions:      Ambulatory referral/consult to Pulmonology   Standing Status: Future   Referral Priority: Routine Referral Type: Consultation   Referral Reason: Specialty Services Required   Requested Specialty: Pulmonary Disease   Number of Visits Requested: 1       Significant Diagnostic Studies: Labs: All labs within the past 24 hours have been reviewed    Pending Diagnostic Studies:       Procedure Component Value Units Date/Time    Specimen to Pathology, Radiology Lung, biopsy not tranplant [[id number]] Collected: 10/03/24 1427    Order Status: Sent Lab Status: In process Updated: 10/03/24 1443           Medications:  Reconciled Home Medications:      Medication List        START taking these medications      HYDROcodone-acetaminophen 7.5-325 mg per tablet  Commonly known as: NORCO  Take 1 tablet by mouth every 8 (eight) hours as needed for Pain.     linezolid 600 mg Tab  Commonly known as: ZYVOX  Take 1 tablet (600 mg total) by mouth every 12 (twelve) hours. for 3 days            CONTINUE taking these medications      azelastine 137 mcg (0.1 %) nasal spray  Commonly known as: ASTELIN  1 spray (137 mcg total) by Nasal route 2 (two) times daily.            ASK your doctor about these medications      valsartan 80 MG tablet  Commonly known as: DIOVAN  Take 1 tablet (80 mg total) by mouth once daily.              Indwelling Lines/Drains at time of discharge:   Lines/Drains/Airways       None                   Time spent on the discharge of patient: 45 minutes         Arie Bacon MD  Department of Hospital Medicine  [facility] - Telemetry ([facility])

## 2024-10-09 NOTE — PROGRESS NOTES
Subjective:      Patient ID: Aurelio Daniels is a 68 y.o. male.    Chief Complaint: Shortness of Breath (Episode of SOB that started after a coughing fit tonight. Recently admitted for pneumonia. 82% SpO2 upon EMS arrival with improvement after breathing treatment, steroids, and CPAP.)    HPI: 68-year-old man with history of prostate cancer, tobacco abuse (quit 1-1/2 once ago), COPD (discharged with home oxygen via 2 L nasal cannula as of 09/17/2024), centrilobular emphysema, hypertension, anemia, and recent bilateral lower lobe pneumonia who presented to the emergency department for acute onset shortness a breath after a coughing spell.   The patient was admitted through the emergency room had a pigtail catheter placed for pneumothorax on the left side the pneumothorax resolved.  Continues to have continuous air leak.  CT scan showed a large bullous lesion and a 3.2 cm spiculated mass in the right upper lobe.  Central location  Bilateral pneumonias and compressive atelectasis.  Multiple mediastinal lymphadenopathy.  The patient feels better after the pigtail catheter.  Catheter continues to have air leak  Repeat chest x-ray shows expanded lung fields mostly with areas of compressive atelectasis pneumothorax resolved.  The pigtail catheter still has a massive air leak continuous.    10/02/2024 patient has pain over the chest tube insertion site continuous air leak from the chest tube and serous drainage subcutaneous emphysema on 3 L nasal cannula, not in distress.  Otherwise hemodynamically stable  10/03/2024 the patient continues to have air leak pain well controlled.  10/04/2024 patient is having lesser air leak from the chest tube and subcutaneous emphysema./he is stable otherwise hemodynamically and satting more than 95 on 2 L nasal cannula.  Chest tube output is minimal pain well controlled    10/7/2024 the patient air leak is intermittent now minimal drainage from the chest tube.  Subcutaneous emphysema  is getting better.    10/08/2024 the patient chest tube showing no air leak subcutaneous emphysema is stable the patient is comfortable on water seal ambulating still complaining of pain over the incision site.    10/09/2024 patient is more comfortable subcutaneous emphysema is down chest tube sites not draining.  The patient is on room air occasional cough chest x-ray pending.    Review of patient's allergies indicates:  No Known Allergies    Past Medical History:   Diagnosis Date    Cancer     Prostate    Tobacco use        Family History   Problem Relation Name Age of Onset    No Known Problems Mother      Heart disease Father      No Known Problems Brother      No Known Problems Brother      No Known Problems Daughter      No Known Problems Daughter         Social History     Socioeconomic History    Marital status:      Spouse name: YUE    Number of children: 2   Tobacco Use    Smoking status: Some Days     Current packs/day: 0.00     Types: Cigarettes     Last attempt to quit: 1/15/2022     Years since quittin.7     Passive exposure: Current    Smokeless tobacco: Never   Substance and Sexual Activity    Alcohol use: Yes     Alcohol/week: 2.0 standard drinks of alcohol     Types: 2 Shots of liquor per week    Drug use: Never    Sexual activity: Not Currently     Partners: Female     Social Drivers of Health     Financial Resource Strain: Low Risk  (2024)    Overall Financial Resource Strain (CARDIA)     Difficulty of Paying Living Expenses: Not hard at all   Food Insecurity: No Food Insecurity (2024)    Hunger Vital Sign     Worried About Running Out of Food in the Last Year: Never true     Ran Out of Food in the Last Year: Never true   Transportation Needs: No Transportation Needs (2024)    TRANSPORTATION NEEDS     Transportation : No   Physical Activity: Inactive (2024)    Exercise Vital Sign     Days of Exercise per Week: 0 days     Minutes of Exercise per Session: 0 min  "  Stress: No Stress Concern Present (9/27/2024)    [employer] - Occupational Stress Questionnaire     Feeling of Stress : Not at all   Housing Stability: Low Risk  (9/27/2024)    Housing Stability Vital Sign     Unable to Pay for Housing in the Last Year: No     Homeless in the Last Year: No       Past Surgical History:   Procedure Laterality Date    COLONOSCOPY      COLONOSCOPY N/A 5/20/2022    Procedure: COLONOSCOPY;  Surgeon: Jamia Alfaro MD;  Location: [facility] ENDO;  Service: Endoscopy;  Laterality: N/A;    COMPUTED TOMOGRAPHY N/A 10/3/2024    Procedure: CT (COMPUTED TOMOGRAPHY)/lung biopsy;  Surgeon: Navneet Oswald MD;  Location: [facility];  Service: General;  Laterality: N/A;    VASECTOMY  1985       Review of Systems   Constitutional:  Positive for activity change. Negative for appetite change.   HENT:  Negative for dental problem, nosebleeds and sore throat.    Eyes:  Negative for discharge and visual disturbance.   Respiratory:  Positive for chest tightness, shortness of breath and wheezing. Negative for cough and stridor.    Cardiovascular:  Negative for leg swelling.   Gastrointestinal:  Negative for abdominal distention and abdominal pain.   Genitourinary:  Negative for difficulty urinating and dysuria.   Musculoskeletal:  Negative for arthralgias, back pain and joint swelling.   Allergic/Immunologic: Negative for environmental allergies.   Neurological:  Negative for dizziness, syncope and headaches.   Hematological:  Does not bruise/bleed easily.   Psychiatric/Behavioral:  Negative for behavioral problems.           Objective:   /72 (BP Location: Right arm, Patient Position: Lying)   Pulse 82   Temp 98.2 °F (36.8 °C) (Oral)   Resp 18   Ht 5' 9" (1.753 m)   Wt 74.6 kg (164 lb 7.4 oz)   SpO2 (!) 94%   BMI 24.29 kg/m²     X-Ray Chest 1 View  Narrative: EXAMINATION:  XR CHEST 1 VIEW    CLINICAL HISTORY:  left apical pneumothorax; Acute and chronic respiratory " failure with hypoxia    TECHNIQUE:  Single frontal view of the chest was performed.    COMPARISON:  X-ray dated 10/08/2024    FINDINGS:  Left-sided chest tube is unchanged in position.  Cardiomediastinal silhouette is unchanged in size and contour.  Left apical pneumothorax is less conspicuous on current study.  Left perihilar and upper lung opacification is unchanged.  Relative elevation of the left hemidiaphragm also unchanged.  Right lung remains clear.  Bones are unchanged.  Extensive soft tissue emphysema noted throughout the left chest wall.  Impression: Left apical pneumothorax is no longer clearly visualized.  Remaining findings appear unchanged.    Electronically signed by: Primo Cameron  Date:    10/08/2024  Time:    13:04  X-Ray Chest 1 View  Narrative: EXAMINATION:  XR CHEST 1 VIEW    CLINICAL HISTORY:  Pneumothorax;    COMPARISON:  October 7, 2024    FINDINGS:  Interval improvement without complete resolution of left pneumothorax.  Stable position of the left-sided chest tube.  Extensive air in the left chest wall again seen.  The lungs are free of new pulmonary opacities.  The hilar and mediastinal contours and osseous structures are unchanged.  Impression: 1.  Overall, there is been interval improvement without complete resolution of the left apical pneumothorax.    Electronically signed by: Ottoniel Breaux MD  Date:    10/08/2024  Time:    06:45         Physical Exam  Vitals reviewed.   Constitutional:       Appearance: Normal appearance. He is ill-appearing.   HENT:      Head: Normocephalic and atraumatic.      Mouth/Throat:      Mouth: Mucous membranes are moist.   Eyes:      Extraocular Movements: Extraocular movements intact.   Cardiovascular:      Rate and Rhythm: Normal rate and regular rhythm.      Pulses: Normal pulses.      Heart sounds: Normal heart sounds.   Pulmonary:      Effort: Pulmonary effort is normal.      Breath sounds: Rhonchi and rales present.      Comments: Left-sided chest  tube has a massive air leak continuous  Abdominal:      Palpations: Abdomen is soft.   Musculoskeletal:         General: Normal range of motion.      Cervical back: Normal range of motion and neck supple.   Skin:     General: Skin is warm.      Capillary Refill: Capillary refill takes less than 2 seconds.   Neurological:      General: No focal deficit present.      Mental Status: He is alert and oriented to person, place, and time.   Psychiatric:         Mood and Affect: Mood normal.         Assessment:     1. Acute on chronic respiratory failure with hypoxemia    2. Pneumothorax on left    3. Troponin I above reference range    4. Mass of left lung    5. SOB (shortness of breath)    6. Chest pain    7. Elevated troponin I level          Plan   68-year-old male with a right upper lobe mass centrally located with bilateral pan acinar emphysema COPD and pneumonic consolidation.  A chest tube were clamped this morning clamping trial is ongoing we will get an x-ray in 4 hours time with the x-ray looks good we will discontinue the chest tube pathology still pending  Aggressive pulmonary toilet incentive spirometry  Out of bed ambulate    DVT prophylaxis.  Bilateral SCDs  Follow up with pulmonology for COPD with oxygen requirements.    Chest x-ray pending if it is cleared by Radiology the patient can be discharged and follow up in my office in 2 weeks with a repeat chest x-ray        Romaine Rodriguez MD  [facility]  [place]

## 2024-10-09 NOTE — PLAN OF CARE
POC reviewed with pt. Pt verbalizes understanding of POC. No questions at this time.  AAOx4. NADN.  NSR on cardiac monitor.  Pt remains free of falls.  No complaints at this time.  Safety measures in place. Will continue to monitor.  Informed pt to call for assistance before getting up. Pt verbalizes understanding.  Hourly rounding and chart check complete.

## 2024-10-09 NOTE — PLAN OF CARE
[facility] - Telemetry (Hospital)  Discharge Final Note    Primary Care Provider: Shlomo Villatoro MD    Expected Discharge Date: 10/9/2024    Final Discharge Note (most recent)       Final Note - 10/09/24 1417          Final Note    Assessment Type Final Discharge Note     Anticipated Discharge Disposition Home or Self Care     Hospital Resources/Appts/Education Provided Appointments scheduled and added to AVS        Post-Acute Status    Discharge Delays None known at this time                     Important Message from Medicare             Contact Info       Romaine Rodriguez MD   Specialty: Cardiothoracic Surgery    [address] [place] [address] [address] [place] [address]   Phone: [phone]       Next Steps: Follow up on 10/24/2024           DC Disposition: home with family  Family Notified: Patient at bedside  Transportation: personal transportation    Patient had no equipment or placement needs from .     Patient has PCP hospital follow up with KATHRIN Villatoro MD, on 10/14/2024 at 10 am.

## 2024-10-09 NOTE — PLAN OF CARE
AAOx4, VSS, No complaints of pain. Left Side Dressing Intact. Patient Left Floor Via transport to D/C home with wife.,  Problem: Adult Inpatient Plan of Care  Goal: Plan of Care Review  Outcome: Met  Goal: Patient-Specific Goal (Individualized)  Outcome: Met  Goal: Absence of Hospital-Acquired Illness or Injury  Outcome: Met  Goal: Optimal Comfort and Wellbeing  Outcome: Met  Goal: Readiness for Transition of Care  Outcome: Met     Problem: Pneumonia  Goal: Fluid Balance  Outcome: Met  Goal: Resolution of Infection Signs and Symptoms  Outcome: Met  Goal: Effective Oxygenation and Ventilation  Outcome: Met     Problem: Skin Injury Risk Increased  Goal: Skin Health and Integrity  Outcome: Met     Problem: Fall Injury Risk  Goal: Absence of Fall and Fall-Related Injury  Outcome: Met     Problem: Infection  Goal: Absence of Infection Signs and Symptoms  Outcome: Met     Problem: Fatigue  Goal: Improved Activity Tolerance  Outcome: Met     Problem: Pain Acute  Goal: Optimal Pain Control and Function  Outcome: Met

## 2024-10-11 ENCOUNTER — TELEPHONE (OUTPATIENT)
Dept: CARDIOLOGY | Facility: CLINIC | Age: 69
End: 2024-10-11
Payer: MEDICARE

## 2024-10-11 LAB
COMMENT: NORMAL
FINAL PATHOLOGIC DIAGNOSIS: NORMAL
GROSS: NORMAL
Lab: NORMAL
MICROSCOPIC EXAM: NORMAL

## 2024-10-11 NOTE — TELEPHONE ENCOUNTER
----- Message from Edgardo sent at 10/11/2024 12:53 PM CDT -----  Contact: Pt  Pt saw the Dr in the hospital and a procedure was done while in the hospital/ pt is calling to be worked in and can be reached at [phone]//thanks/dbw

## 2024-10-11 NOTE — TELEPHONE ENCOUNTER
Scheduled patient with Dr. Rodriguez for a follow up visit at [facility] on 10/16/24 at 1pm

## 2024-10-14 ENCOUNTER — TELEPHONE (OUTPATIENT)
Dept: INTERNAL MEDICINE | Facility: CLINIC | Age: 69
End: 2024-10-14
Payer: MEDICARE

## 2024-10-14 ENCOUNTER — DOCUMENTATION ONLY (OUTPATIENT)
Dept: HEMATOLOGY/ONCOLOGY | Facility: CLINIC | Age: 69
End: 2024-10-14
Payer: MEDICARE

## 2024-10-14 ENCOUNTER — TELEPHONE (OUTPATIENT)
Dept: HEMATOLOGY/ONCOLOGY | Facility: CLINIC | Age: 69
End: 2024-10-14
Payer: MEDICARE

## 2024-10-14 ENCOUNTER — HOSPITAL ENCOUNTER (OUTPATIENT)
Dept: RADIOLOGY | Facility: HOSPITAL | Age: 69
Discharge: HOME | End: 2024-10-14
Attending: PEDIATRICS
Payer: MEDICARE

## 2024-10-14 ENCOUNTER — OFFICE VISIT (OUTPATIENT)
Dept: INTERNAL MEDICINE | Facility: CLINIC | Age: 69
End: 2024-10-14
Payer: MEDICARE

## 2024-10-14 VITALS
WEIGHT: 156.31 LBS | OXYGEN SATURATION: 99 % | HEART RATE: 93 BPM | BODY MASS INDEX: 23.15 KG/M2 | DIASTOLIC BLOOD PRESSURE: 70 MMHG | TEMPERATURE: 97 F | RESPIRATION RATE: 17 BRPM | SYSTOLIC BLOOD PRESSURE: 118 MMHG | HEIGHT: 69 IN

## 2024-10-14 DIAGNOSIS — J43.2 CENTRILOBULAR EMPHYSEMA: ICD-10-CM

## 2024-10-14 DIAGNOSIS — R91.8 MASS OF LEFT LUNG: ICD-10-CM

## 2024-10-14 DIAGNOSIS — C34.92 SQUAMOUS CELL CARCINOMA OF LEFT LUNG: Primary | ICD-10-CM

## 2024-10-14 DIAGNOSIS — J18.9 PNEUMONIA OF BOTH LOWER LOBES DUE TO INFECTIOUS ORGANISM: ICD-10-CM

## 2024-10-14 DIAGNOSIS — J41.0 SIMPLE CHRONIC BRONCHITIS: ICD-10-CM

## 2024-10-14 DIAGNOSIS — J93.83 SPONTANEOUS PNEUMOTHORAX: ICD-10-CM

## 2024-10-14 DIAGNOSIS — J93.83 SPONTANEOUS PNEUMOTHORAX: Primary | ICD-10-CM

## 2024-10-14 DIAGNOSIS — C34.92 SQUAMOUS CELL CARCINOMA OF LEFT LUNG: ICD-10-CM

## 2024-10-14 PROCEDURE — 99999 PR PBB SHADOW E&M-EST. PATIENT-LVL IV: CPT | Mod: PBBFAC,,, | Performed by: PEDIATRICS

## 2024-10-14 PROCEDURE — 71046 X-RAY EXAM CHEST 2 VIEWS: CPT | Mod: TC

## 2024-10-14 PROCEDURE — 71046 X-RAY EXAM CHEST 2 VIEWS: CPT | Mod: 26,,, | Performed by: RADIOLOGY

## 2024-10-14 NOTE — PROGRESS NOTES
"Patient ID: Aurelio Daniels is a 68 y.o. male.    Chief Complaint: Hospital Follow Up    History of Present Illness    CHIEF COMPLAINT:  Patient presents today for hospital follow-up.    RECENT HOSPITALIZATIONS:  He reports two recent hospital admissions. The most recent stay was complicated by a pneumothorax, requiring chest tube placement for an extended period. He was treated for pneumonia during this hospitalization.    LUNG CANCER:  A recent CT scan revealed an abnormality not solely attributable to pneumonia, including probable liver and bony mets. A biopsy was performed and results have just returned, with preliminary reports indicating squamous cell carcinoma. There are concerns about possible liver involvement based on the CT scan findings. Further testing is pending, including [facility] next generation sequencing for more detailed cancer type information. He is aware that this diagnosis may not be curable and that treatment options, rather than curative measures, may be the focus. He denies being in a rush for treatment, stating he is still healing from recent medical interventions.    CURRENT SYMPTOMS:  He reports lung tightness and soreness from the chest tube, describing feeling as though he "got hit by a truck" due to the discomfort from the tube placement. He also mentions having a cough and shortness of breath, though these symptoms are reported as "okay" and not significantly bothersome. The soreness and tightness are expected to persist for several weeks as part of the healing process.    MEDICATIONS AND MEDICAL HISTORY:  He has been taking Trelegy inhaler for the past three days since hospital discharge, reporting it seems to be helping. He has a history of COPD/Emphysema and continues to use his Trelegy BiPAP machine for management.    FAMILY HISTORY:  He reports a family history of lung cancer in his cousin, who was a heavy smoker and underwent treatment for an extended period before passing " "away.    SOCIAL HISTORY:  He is a current smoker but indicates that his smoking habits are less intense compared to others, specifically mentioning a relative who "smoked like a train." He denies ever smoking heavily himself.    FOLLOW-UP CARE:  He has upcoming follow-up visits scheduled with both the cardiothoracic surgeon and pulmonologist.    PMH, PSH, SH, FH reviewed with patient.      ROS:  General: -fever, -chills, -fatigue, -weight gain, -weight loss  Eyes: -vision changes, -redness, -discharge  ENT: -ear pain, -nasal congestion, -sore throat  Cardiovascular: -chest pain, -palpitations, -lower extremity edema, +chest tightness  Respiratory: +cough, +shortness of breath  Gastrointestinal: -abdominal pain, -nausea, -vomiting, -diarrhea, -constipation, -blood in stool  Genitourinary: -dysuria, -hematuria, -frequency  Musculoskeletal: -joint pain, -muscle pain  Skin: -rash, -lesion  Neurological: -headache, -dizziness, -numbness, -tingling  Psychiatric: -anxiety, -depression, -sleep difficulty         Exam:  Physical Exam    General: No acute distress. Well-developed. Well-nourished.  Eyes: EOMI. Sclerae anicteric.  HENT: Normocephalic. Atraumatic. Nares patent. Moist oral mucosa.  Cardiovascular: Regular rate. Regular rhythm. No murmurs. No rubs. No gallops. Normal S1, S2. Tenderness in chest area.  Respiratory: Normal respiratory effort. Clear to auscultation bilaterally. No rales. No rhonchi. No wheezing. Lungs sound equal bilaterally. Minimal crepitus skin over left breast, minimal bruising remains, tender all across.  Abdomen: Soft. Non-tender. Non-distended. Normoactive bowel sounds.  Musculoskeletal: No  obvious deformity.  Extremities: No lower extremity edema.  Neurological: Alert & oriented x3. No slurred speech. Normal gait.  Psychiatric: Normal mood. Normal affect. Good insight. Good judgment.  Skin: Warm. Dry. No rash.         Assessment/Plan:  Spontaneous pneumothorax  -     X-Ray Chest PA And " Lateral; Future; Expected date: 10/14/2024    Pneumonia of both lower lobes due to infectious organism    Centrilobular emphysema    Mass of left lung    Simple chronic bronchitis    Squamous cell carcinoma of left lung  -     Ambulatory referral/consult to Oncology; Future; Expected date: 10/21/2024         Assessment & Plan    Reviewed patient's recent hospital stays, complicated by pneumothorax and pneumonia  Noted preliminary biopsy results indicate squamous cell carcinoma of the lung  Assessed likelihood of metastatic disease based on imaging findings in liver and possibly bones  Determined cancer is likely not curable due to extent of spread  Anticipate palliative treatment approach to prolong quality and quantity of life  Expect prognosis of less than 1 year without treatment, based on typical progression of this cancer type  Awaiting further testing results ([facility] next generation sequencing) for more detailed cancer characterization  Age not a contraindication for potential treatments in this case    LUNG CANCER:  - CT scan revealed concern for possible liver involvement with lung cancer.  - Assessed that if liver involvement is confirmed, it indicates cancer progression, limiting treatment options to palliative care.  - Further testing may be required to confirm liver involvement.  - Explained squamous cell carcinoma as one of the most common types of lung cancer.  - Discussed general prognosis and treatment approach for metastatic lung cancer.  - Clarified the difference between curative and palliative treatment options.  - Referred the patient to oncology for further evaluation and treatment planning.  - Await contact from oncology department for appointment scheduling.  - The patient had a prolonged hospital stay complicated by pneumothorax.  - CT scan showed an abnormality suggestive of lung cancer.  - Biopsy results confirmed squamous cell carcinoma.  - Further testing ([facility] pending next generation  sequencing) is being conducted for additional information.  - Assessed that the cancer is likely fast-growing and untreatable, with a poor prognosis of less than a year without treatment.  - Noted the possibility of cancer metastasis to the bones.  - Assessed that if bone involvement is confirmed, it indicates cancer progression, limiting treatment options to palliative care.    COPD/EMPHYSEMA:  - Continued Trelegy as previously prescribed for underlying emphysema.  - Auscultated the patient's lungs and found them equal on both sides.  - The patient has underlying COPD and emphysema.  - Advised the patient to continue taking Trelegy for COPD/emphysema.  - Continued Trelegy as previously prescribed for underlying COPD.  - Advised the patient to continue taking Trelegy for COPD management.    PNEUMOTHORAX:  - The patient had a pneumothorax during recent hospital stay, which was treated with tube placement.  - The patient reports soreness and tightness in lungs from tube placement.  - Acknowledged the pain from tube placement and explained the healing process.  - Advised allowing time for healing.  - Explained the healing process for the chest tube site, estimating 6-8 weeks for inflammation to subside.  - Ordered a chest XR to assess recovery.  - The patient reports tight lungs but attributes it to recent tube placement.  - Ordered a repeat chest XR to monitor the condition.    COLON CANCER:  - Assessed the patient's history of colon cancer screening.  - The patient has been following up with Dr. Prakash for colon cancer.    PROSTATE CANCER:  - The patient has been following up with Dr. Smith for prostate cancer.    FOLLOW UP:  - Instructed the patient to follow up with Dr. Lange (cardiothoracic surgeon) as scheduled on Wednesday.     Transitional Care Note    Family and/or Caretaker present at visit?  Yes. Wife  Diagnostic tests reviewed/disposition: I have reviewed all completed as well as pending diagnostic tests  at the time of discharge.  Disease/illness education: given  Home health/community services discussion/referrals: Patient does not have home health established from hospital visit.  They do not need home health.  If needed, we will set up home health for the patient.   Establishment or re-establishment of referral orders for community resources: No other necessary community resources.   Discussion with other health care providers:  has subspecialty care in [facility] .               Visit today included increased complexity associated with the care of the episodic problem  addressed and managing the longitudinal care of the patient due to the serious and/or complex managed problem(s) .      No follow-ups on file.    This note was generated with the assistance of ambient listening technology. Verbal consent was obtained by the patient and accompanying visitor(s) for the recording of patient appointment to facilitate this note. I attest to having reviewed and edited the generated note for accuracy, though some syntax or spelling errors may persist. Please contact the author of this note for any clarification.

## 2024-10-14 NOTE — TELEPHONE ENCOUNTER
Called patient and his wife to schedule an appointment to see the Oncologist. Appointment made for 10/22/24

## 2024-10-16 ENCOUNTER — TELEPHONE (OUTPATIENT)
Dept: RADIATION ONCOLOGY | Facility: CLINIC | Age: 69
End: 2024-10-16
Payer: MEDICARE

## 2024-10-16 ENCOUNTER — OFFICE VISIT (OUTPATIENT)
Dept: CARDIOTHORACIC SURGERY | Facility: CLINIC | Age: 69
End: 2024-10-16
Payer: MEDICARE

## 2024-10-16 VITALS
OXYGEN SATURATION: 96 % | HEIGHT: 69 IN | WEIGHT: 159.19 LBS | DIASTOLIC BLOOD PRESSURE: 72 MMHG | BODY MASS INDEX: 23.58 KG/M2 | SYSTOLIC BLOOD PRESSURE: 132 MMHG | HEART RATE: 80 BPM

## 2024-10-16 DIAGNOSIS — J18.9 PNEUMONIA OF BOTH LOWER LOBES DUE TO INFECTIOUS ORGANISM: ICD-10-CM

## 2024-10-16 DIAGNOSIS — R91.8 MASS OF LEFT LUNG: ICD-10-CM

## 2024-10-16 DIAGNOSIS — D53.9 MACROCYTIC ANEMIA: ICD-10-CM

## 2024-10-16 DIAGNOSIS — C61 PROSTATE CANCER: ICD-10-CM

## 2024-10-16 DIAGNOSIS — I10 PRIMARY HYPERTENSION: ICD-10-CM

## 2024-10-16 DIAGNOSIS — C34.90 MALIGNANT NEOPLASM OF UNSPECIFIED PART OF UNSPECIFIED BRONCHUS OR LUNG: ICD-10-CM

## 2024-10-16 DIAGNOSIS — C34.12 MALIGNANT NEOPLASM OF UPPER LOBE OF LEFT LUNG: Primary | ICD-10-CM

## 2024-10-16 DIAGNOSIS — F17.210 CIGARETTE SMOKER: ICD-10-CM

## 2024-10-16 DIAGNOSIS — J93.83 SPONTANEOUS PNEUMOTHORAX: ICD-10-CM

## 2024-10-16 PROCEDURE — 99999 PR PBB SHADOW E&M-EST. PATIENT-LVL III: CPT | Mod: PBBFAC,,, | Performed by: THORACIC SURGERY (CARDIOTHORACIC VASCULAR SURGERY)

## 2024-10-16 NOTE — PROGRESS NOTES
Subjective:      Patient ID: Aurelio Daniels is a 68 y.o. male.    Chief Complaint: No chief complaint on file.    HPI:  68-year-old male with a history of longstanding smoking was seen for a left upper lobe nodule and the patient was admitted in the hospital for a spontaneous pneumothorax which was managed with a wide bore chest tube.  The patient also had a percutaneous biopsy which came back as non-small-cell carcinoma.  The patient was discharged home after the chest tube was discontinued and he is here for the follow-up visit..  Patient does have history of coughing with occasional thick sputum no evidence of hemoptysis.  The patient has been slowly getting back to his baseline and does have some discomfort over the chest tube insertion site.    Review of patient's allergies indicates:  No Known Allergies    Past Medical History:   Diagnosis Date    Cancer     Prostate    Tobacco use        Family History   Problem Relation Name Age of Onset    No Known Problems Mother      Heart disease Father      No Known Problems Brother      No Known Problems Brother      No Known Problems Daughter      No Known Problems Daughter         Social History     Socioeconomic History    Marital status:      Spouse name: YUE    Number of children: 2   Tobacco Use    Smoking status: Some Days     Current packs/day: 0.00     Types: Cigarettes     Last attempt to quit: 1/15/2022     Years since quittin.7     Passive exposure: Current    Smokeless tobacco: Never   Substance and Sexual Activity    Alcohol use: Yes     Alcohol/week: 2.0 standard drinks of alcohol     Types: 2 Shots of liquor per week    Drug use: Never    Sexual activity: Not Currently     Partners: Female     Social Drivers of Health     Financial Resource Strain: Low Risk  (2024)    Overall Financial Resource Strain (CARDIA)     Difficulty of Paying Living Expenses: Not hard at all   Food Insecurity: No Food Insecurity (2024)    Hunger  Vital Sign     Worried About Running Out of Food in the Last Year: Never true     Ran Out of Food in the Last Year: Never true   Transportation Needs: No Transportation Needs (9/27/2024)    TRANSPORTATION NEEDS     Transportation : No   Physical Activity: Inactive (9/27/2024)    Exercise Vital Sign     Days of Exercise per Week: 0 days     Minutes of Exercise per Session: 0 min   Stress: No Stress Concern Present (9/27/2024)    [employer] [place] [employer] - Occupational Stress Questionnaire     Feeling of Stress : Not at all   Housing Stability: Low Risk  (9/27/2024)    Housing Stability Vital Sign     Unable to Pay for Housing in the Last Year: No     Homeless in the Last Year: No       Past Surgical History:   Procedure Laterality Date    COLONOSCOPY      COLONOSCOPY N/A 5/20/2022    Procedure: COLONOSCOPY;  Surgeon: Jamia Alfaro MD;  Location: [facility];  Service: Endoscopy;  Laterality: N/A;    COMPUTED TOMOGRAPHY N/A 10/3/2024    Procedure: CT (COMPUTED TOMOGRAPHY)/lung biopsy;  Surgeon: Navneet Oswald MD;  Location: [facility];  Service: General;  Laterality: N/A;    VASECTOMY  1985       Review of Systems   Constitutional:  Negative for activity change and appetite change.   HENT:  Negative for dental problem, nosebleeds and sore throat.    Eyes:  Negative for discharge and visual disturbance.   Respiratory:  Positive for cough, chest tightness and shortness of breath. Negative for stridor.    Cardiovascular:  Negative for leg swelling.   Gastrointestinal:  Negative for abdominal distention and abdominal pain.   Genitourinary:  Negative for difficulty urinating and dysuria.   Musculoskeletal:  Negative for arthralgias, back pain and joint swelling.   Allergic/Immunologic: Negative for environmental allergies.   Neurological:  Negative for dizziness, syncope and headaches.   Hematological:  Does not bruise/bleed easily.   Psychiatric/Behavioral:  Negative for behavioral problems.          "  Objective:   /72 (BP Location: Right arm, Patient Position: Sitting)   Pulse 80   Ht 5' 9" (1.753 m)   Wt 72.2 kg (159 lb 2.8 oz)   SpO2 96%   BMI 23.51 kg/m²     X-Ray Chest PA And Lateral  Narrative: EXAM: XR CHEST PA AND LATERAL    CLINICAL HISTORY:  Follow-up pneumothorax    COMPARISON:  10/09/2024    FINDINGS:  2 view chest.  Heart size is normal.  There is a stable appearance of lucency located laterally in the left mid and lower lung zone which displaces lung tissue medially consistent with a loculated pneumothorax.  There is new associated fluid also visible blunting the lateral costophrenic angle.  The right lung is clear.  Pulmonary vasculature is normal.  There is decreasing air in soft tissue spaces of the left lateral chest wall compared to 10/09/2024.  Impression: There is a stable appearance of a loculated pneumothorax on the left except for new fluid also visible blunting the left lateral costophrenic angle.    Finalized on: 10/14/2024 2:43 PM By:  Harjit Yoder  [facility]# [id number]      2024-10-14 14:46:[id number]    [facility]         Physical Exam  Vitals reviewed.   Constitutional:       Appearance: Normal appearance.   HENT:      Head: Normocephalic and atraumatic.      Mouth/Throat:      Mouth: Mucous membranes are moist.   Eyes:      Extraocular Movements: Extraocular movements intact.   Cardiovascular:      Rate and Rhythm: Normal rate and regular rhythm.      Pulses: Normal pulses.      Heart sounds: Normal heart sounds.   Pulmonary:      Effort: Pulmonary effort is normal.      Breath sounds: Rhonchi and rales present.   Abdominal:      Palpations: Abdomen is soft.   Musculoskeletal:         General: Normal range of motion.      Cervical back: Normal range of motion and neck supple.   Skin:     General: Skin is warm.      Capillary Refill: Capillary refill takes less than 2 seconds.   Neurological:      General: No focal deficit present.      Mental Status: He is alert and oriented to " person, place, and time.   Psychiatric:         Mood and Affect: Mood normal.         Assessment:     1. Malignant neoplasm of upper lobe of left lung    2. Spontaneous pneumothorax    3. Pneumonia of both lower lobes due to infectious organism    4. Mass of left lung    5. Primary hypertension    6. Prostate cancer    7. Macrocytic anemia    8. Cigarette smoker    9. 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  We will order an MRI of the brain PET scan for staging.  Counseled about smoking cessation  Continue aggressive pulmonary toilet.  He will not be a surgical candidate based on his smoking, history current status of lung and possible local spread from spontaneous pneumothorax.  follow up with Pulmonary   referred to Oncology   referred to Oncology Radiation Oncology.  I will discharge the patient from my care today follow up as needed with me.          Romaine Rodriguez MD,   [facility]  [place]

## 2024-10-16 NOTE — TELEPHONE ENCOUNTER
Attempted to call patient to schedule Rad Onc consult but there was no answer. Left a detailed message including our direct number to call back to schedule appt.    .

## 2024-10-17 NOTE — PHYSICIAN QUERY
Please clarify the pathology findings:  Pathology findings noted above are ruled in/confirmed as diagnoses

## 2024-10-17 NOTE — PHYSICIAN QUERY
Provider, Please clarify the pneumonia diagnosis   MRSA (Methicillin-resistant Staphylococcus aureus) pneumonia

## 2024-10-18 ENCOUNTER — TUMOR BOARD CONFERENCE (OUTPATIENT)
Dept: HEMATOLOGY/ONCOLOGY | Facility: CLINIC | Age: 69
End: 2024-10-18
Payer: MEDICARE

## 2024-10-18 ENCOUNTER — TELEPHONE (OUTPATIENT)
Dept: HEMATOLOGY/ONCOLOGY | Facility: CLINIC | Age: 69
End: 2024-10-18
Payer: MEDICARE

## 2024-10-18 NOTE — TELEPHONE ENCOUNTER
Call placed to pt, spoke to pt and his wife. Introduced myself and my roll in his care and notified him of all adjustments made to his upcoming appointments post TB discussion to get pt in sooner and have scans prior to MD visit. Pt and wife agree to all scheduled appointments. They deny any further needs at this time. Encouraged them to reach out with any needs/concerns.     Oncology Navigation   Intake  Cancer Type: LDCT/Incidental Lung Finding  Type of Referral: Internal  Date of Referral: 10/14/24  Initial Nurse Navigator Contact: 10/14/24  Referral to Initial Contact Timeline (days): 0  First Appointment Available: 10/22/24  Appointment Date: 10/22/24  First Available Date vs. Scheduled Date (days): 0  Reason if booked > 7 days after scheduling: Specific provider / access     Treatment  Date Presented to Tumor Board: 10/18/24                             Acuity      Follow Up  No follow-ups on file.

## 2024-10-18 NOTE — PROGRESS NOTES
Tumor Board Documentation      Aurelio Daniels was presented by Romaine Rodriguez MD at our Tumor Board on 10/18/2024, which included representatives from Medical Oncology, Hematology, Radiation Oncology, Surgical Oncology, Pathology, Navigation, Genetics, Integrative Oncology, Radiology, Urology (gen surg, urology, CTS).    Aurelio currently presents as a new patient with Lung cancer, with history of the following treatments: None.    Additionally, we reviewed previous medical and familial history, history of present illness, and recent lab results along with all available histopathologic and imaging studies. The tumor board considered available treatment options and made the following recommendations:  Imaging, Radiation, Additional screening Obtain ordered PET and MRI of brain, NP appointments with heme/onc and rad/onc.        The following procedures/referrals were also placed: No orders of the defined types were placed in this encounter.      Clinical Trial Status: Not discussed     National site-specific guidelines were discussed with respect to the case.    Tumor board is a meeting of clinicians from various specialty areas who evaluate and discuss patients for whom a multidisciplinary approach is being considered. Final determinations in the plan of care are those of the provider(s). The responsibility for follow up of recommendations given during tumor board is that of the provider.     Romaine Rodriguez MD

## 2024-10-22 ENCOUNTER — HOSPITAL ENCOUNTER (OUTPATIENT)
Dept: RADIOLOGY | Facility: HOSPITAL | Age: 69
Discharge: HOME | End: 2024-10-22
Attending: THORACIC SURGERY (CARDIOTHORACIC VASCULAR SURGERY)
Payer: MEDICARE

## 2024-10-22 DIAGNOSIS — C34.12 MALIGNANT NEOPLASM OF UPPER LOBE OF LEFT LUNG: ICD-10-CM

## 2024-10-22 PROCEDURE — 70553 MRI BRAIN STEM W/O & W/DYE: CPT | Mod: 26,,, | Performed by: RADIOLOGY

## 2024-10-22 PROCEDURE — A9585 GADOBUTROL INJECTION: HCPCS | Mod: PN | Performed by: THORACIC SURGERY (CARDIOTHORACIC VASCULAR SURGERY)

## 2024-10-22 PROCEDURE — 25500020 PHARM REV CODE 255: Mod: PN | Performed by: THORACIC SURGERY (CARDIOTHORACIC VASCULAR SURGERY)

## 2024-10-22 PROCEDURE — 70553 MRI BRAIN STEM W/O & W/DYE: CPT | Mod: TC,PN

## 2024-10-22 RX ORDER — GADOBUTROL 604.72 MG/ML
7 INJECTION INTRAVENOUS
Status: COMPLETED | OUTPATIENT
Start: 2024-10-22 | End: 2024-10-22

## 2024-10-22 RX ADMIN — GADOBUTROL 7 ML: 604.72 INJECTION INTRAVENOUS at 08:10

## 2024-10-23 ENCOUNTER — HOSPITAL ENCOUNTER (OUTPATIENT)
Dept: RADIOLOGY | Facility: HOSPITAL | Age: 69
Discharge: HOME | End: 2024-10-23
Attending: THORACIC SURGERY (CARDIOTHORACIC VASCULAR SURGERY)
Payer: MEDICARE

## 2024-10-23 ENCOUNTER — OFFICE VISIT (OUTPATIENT)
Dept: RADIATION ONCOLOGY | Facility: CLINIC | Age: 69
End: 2024-10-23
Payer: MEDICARE

## 2024-10-23 VITALS
HEIGHT: 69 IN | TEMPERATURE: 99 F | SYSTOLIC BLOOD PRESSURE: 150 MMHG | BODY MASS INDEX: 22.96 KG/M2 | DIASTOLIC BLOOD PRESSURE: 95 MMHG | RESPIRATION RATE: 18 BRPM | WEIGHT: 155 LBS | OXYGEN SATURATION: 94 %

## 2024-10-23 DIAGNOSIS — C34.90 MALIGNANT NEOPLASM OF UNSPECIFIED PART OF UNSPECIFIED BRONCHUS OR LUNG: ICD-10-CM

## 2024-10-23 DIAGNOSIS — C34.12 MALIGNANT NEOPLASM OF UPPER LOBE OF LEFT LUNG: ICD-10-CM

## 2024-10-23 PROCEDURE — 99999 PR PBB SHADOW E&M-EST. PATIENT-LVL III: CPT | Mod: PBBFAC,,, | Performed by: SPECIALIST

## 2024-10-23 PROCEDURE — A9552 F18 FDG: HCPCS | Performed by: THORACIC SURGERY (CARDIOTHORACIC VASCULAR SURGERY)

## 2024-10-23 PROCEDURE — 1111F DSCHRG MED/CURRENT MED MERGE: CPT | Mod: CPTII,S$GLB,, | Performed by: SPECIALIST

## 2024-10-23 PROCEDURE — 78815 PET IMAGE W/CT SKULL-THIGH: CPT | Mod: TC

## 2024-10-23 PROCEDURE — 3288F FALL RISK ASSESSMENT DOCD: CPT | Mod: CPTII,S$GLB,, | Performed by: SPECIALIST

## 2024-10-23 PROCEDURE — 4010F ACE/ARB THERAPY RXD/TAKEN: CPT | Mod: CPTII,S$GLB,, | Performed by: SPECIALIST

## 2024-10-23 PROCEDURE — 1126F AMNT PAIN NOTED NONE PRSNT: CPT | Mod: CPTII,S$GLB,, | Performed by: SPECIALIST

## 2024-10-23 PROCEDURE — 99215 OFFICE O/P EST HI 40 MIN: CPT | Mod: S$GLB,,, | Performed by: SPECIALIST

## 2024-10-23 PROCEDURE — 3077F SYST BP >= 140 MM HG: CPT | Mod: CPTII,S$GLB,, | Performed by: SPECIALIST

## 2024-10-23 PROCEDURE — 78815 PET IMAGE W/CT SKULL-THIGH: CPT | Mod: 26,PI,, | Performed by: RADIOLOGY

## 2024-10-23 PROCEDURE — 3008F BODY MASS INDEX DOCD: CPT | Mod: CPTII,S$GLB,, | Performed by: SPECIALIST

## 2024-10-23 PROCEDURE — 3080F DIAST BP >= 90 MM HG: CPT | Mod: CPTII,S$GLB,, | Performed by: SPECIALIST

## 2024-10-23 PROCEDURE — 1101F PT FALLS ASSESS-DOCD LE1/YR: CPT | Mod: CPTII,S$GLB,, | Performed by: SPECIALIST

## 2024-10-23 PROCEDURE — 1159F MED LIST DOCD IN RCRD: CPT | Mod: CPTII,S$GLB,, | Performed by: SPECIALIST

## 2024-10-23 RX ORDER — FLUDEOXYGLUCOSE F18 500 MCI/ML
10.72 INJECTION INTRAVENOUS
Status: COMPLETED | OUTPATIENT
Start: 2024-10-23 | End: 2024-10-23

## 2024-10-23 RX ADMIN — FLUDEOXYGLUCOSE F-18 10.72 MILLICURIE: 500 INJECTION INTRAVENOUS at 12:10

## 2024-10-23 NOTE — PROGRESS NOTES
[facility] - Radiation Oncology Consult Note    HISTORY OF PRESENT ILLNESS:  68-year-old man previously treated with radiation and hormone for prostate cancer and not seen in this clinic in over 2 years, now off endocrine therapy with testosterone recovery and persistent unmeasurable PSA through 07/08/2024, at less than 0.01.       He  presented to his primary care physician complaining of cough and fever with chest x-ray on 09/09/2024 benign.  Chest x-ray on 09/13/2024 showed changes consistent with multifocal bilateral interstitial pneumonia.  Chest x-ray on 09/24/2024 suspicious for left upper lobe collapse with persistent consolidations and atelectasis on the left.  Additionally, an opaque mass at the level of the midthoracic spine.  CT was recommended     Chest x-ray on 09/27/2024 shows near-complete collapse of the left lung with a large left pneumothorax.  The right lung is clear    CT of the chest with contrast on 09/27/2024, which I have reviewed today, shows a spiculated left perihilar mass measuring 3.7 x 2.5 x 3.6 cm concerning for neoplasm.  Nonspecific mildly prominent mediastinal and left hilar lymph nodes.  Consolidations of the left upper lobe and posteromedial right lower lobe are concerning for pneumonia.  Emphysematous changes with bulla are noted in the left upper lobe nonspecific sclerotic foci at T12 and L1 vertebral bodies are nonspecific.  Small left and trace right pleural effusion.  Trace left apical pneumothorax with chest tube in place        CT-guided biopsy on 10/03/2024 recovered non-small-cell carcinoma favoring squamous cell, PD-L1 IHC and NextGen sequencing through [facility] are pending      Brain MRI yesterday was benign    PET scan today, which I have reviewed, shows the left perihilar biopsy-proven mass to measure 5 cm with mildly FDG avid AP window nodes, SUV up to 3.5        REVIEW OF SYSTEMS:  He reports overwhelming and ongoing improvement  in his cough and shortness of breath.  He denies direct symptoms related to his biopsy-proven tumor including pain, hemoptysis, or weight loss.  He has no palpable adenopathy.  He has no headache, nausea, vomiting, vision changes, mental status changes, other pain, abdominal bloating distention or pain, or GI or  complaints.    PAST MEDICAL HISTORY:  Past Medical History:   Diagnosis Date    Cancer     Prostate    Tobacco use        PAST SURGICAL HISTORY:  Past Surgical History:   Procedure Laterality Date    COLONOSCOPY      COLONOSCOPY N/A 2022    Procedure: COLONOSCOPY;  Surgeon: Jamia Alfaro MD;  Location: [facility];  Service: Endoscopy;  Laterality: N/A;    COMPUTED TOMOGRAPHY N/A 10/3/2024    Procedure: CT (COMPUTED TOMOGRAPHY)/lung biopsy;  Surgeon: Navneet Oswald MD;  Location: [facility];  Service: General;  Laterality: N/A;    VASECTOMY  1985       ALLERGIES:   Review of patient's allergies indicates:  No Known Allergies    MEDICATIONS:  Current Outpatient Medications   Medication Sig    azelastine (ASTELIN) 137 mcg (0.1 %) nasal spray 1 spray (137 mcg total) by Nasal route 2 (two) times daily.    HYDROcodone-acetaminophen (NORCO) 7.5-325 mg per tablet Take 1 tablet by mouth every 8 (eight) hours as needed for Pain. (Patient not taking: Reported on 10/16/2024)    valsartan (DIOVAN) 80 MG tablet Take 1 tablet (80 mg total) by mouth once daily. (Patient not taking: Reported on 10/16/2024)     No current facility-administered medications for this visit.       SOCIAL HISTORY:  Social History     Socioeconomic History    Marital status:      Spouse name: YUE    Number of children: 2   Tobacco Use    Smoking status: Former     Current packs/day: 0.00     Types: Cigarettes     Quit date: 1/15/2022     Years since quittin.7     Passive exposure: Current    Smokeless tobacco: Never   Substance and Sexual Activity    Alcohol use: Yes     Alcohol/week: 2.0 standard drinks of alcohol      "Types: 2 Shots of liquor per week    Drug use: Never    Sexual activity: Not Currently     Partners: Female     Social Drivers of Health     Financial Resource Strain: Low Risk  (9/27/2024)    Overall Financial Resource Strain (CARDIA)     Difficulty of Paying Living Expenses: Not hard at all   Food Insecurity: No Food Insecurity (9/27/2024)    Hunger Vital Sign     Worried About Running Out of Food in the Last Year: Never true     Ran Out of Food in the Last Year: Never true   Transportation Needs: No Transportation Needs (9/27/2024)    TRANSPORTATION NEEDS     Transportation : No   Physical Activity: Inactive (9/27/2024)    Exercise Vital Sign     Days of Exercise per Week: 0 days     Minutes of Exercise per Session: 0 min   Stress: No Stress Concern Present (9/27/2024)    [employer] [place] [employer] - Occupational Stress Questionnaire     Feeling of Stress : Not at all   Housing Stability: Low Risk  (9/27/2024)    Housing Stability Vital Sign     Unable to Pay for Housing in the Last Year: No     Homeless in the Last Year: No     He is  and accompanied by his attentive wife in my clinic    FAMILY HISTORY:  Family History   Problem Relation Name Age of Onset    No Known Problems Mother      Heart disease Father      No Known Problems Brother      No Known Problems Brother      No Known Problems Daughter      No Known Problems Daughter           PHYSICAL EXAMINATION:       Vitals:    10/23/24 1338   BP: (!) 150/95   Resp: 18   Temp: 98.6 °F (37 °C)   SpO2: (!) 94%   Weight: 70.3 kg (154 lb 15.7 oz)   Height: 5' 9" (1.753 m)        General:  A&O x4, NAD   Head:  PERRLA, EOM intact, CN II-XII intact  Lymphatics:  No cervical or supraclavicular adenopathy   Lungs: Clear to auscultation bilaterally   Heart regular:  rate and rhythm  Abdomen:  nontender and nondistended with positive bowel sounds no hepatomegaly  Pelvis:  No inguinal adenopathy, normal phallus scrotal contents, good rectal tone " with    KPS:  90    ASSESSMENT:  68-year-old man with a history of prostate cancer, biochemically MARIANA, now with biopsy-proven at least T2b NX M0 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  He otherwise remains in good health with good disease insight and good social    PLAN:  He sees Dr. Albright in Medical Oncology and kate Jimenez in pulmonology tomorrow.  He sits on the margin between a node-negative tumor small enough to consider definitive radiotherapy, possibly with SBRT, versus node-negative disease requiring conventional fractionation with chemotherapy, versus node positive disease best treated with conventional fractionation and chemotherapy.    I ask that Dr. Jimenez consider appropriateness of EBUS biopsy of the suspicious lymph node seen on PET and other regional mediastinal/hilar nodes as indicated at the time of the procedure    I will also discuss the case with Dr. Albright and consider adding Mr. Menon to tumor board on Friday morning.    We reviewed together today the evidence demonstrating that he has a potentially curable lung cancer, though the best mechanism of treatment remains unknown and his final stage is also uncertain.    He was familiar with logistics of treatment, having previously undergone curative management for his prostate cancer.  Today we reviewed the potential differences associated with thoracic treatment, with or without stereotactic techniques.  He and his wife asked many intelligent questions, and voiced an understanding, desiring to proceed with what ever manner of thoracic radiotherapy as ultimately deemed appropriate.  Informed written consent was obtained and he was given the original after scanning into the EMR     He will return at his earliest convenience for immobilization and CT simulation using prospective 4D gating.          I spent approximately 60  minutes reviewing the available records and evaluating the patient, out of which over 50% of the time was spent face to face with the patient in counseling and coordinating this patient's care.

## 2024-10-24 ENCOUNTER — PATIENT OUTREACH (OUTPATIENT)
Dept: ADMINISTRATIVE | Facility: CLINIC | Age: 69
End: 2024-10-24
Payer: MEDICARE

## 2024-10-24 ENCOUNTER — HOSPITAL ENCOUNTER (OUTPATIENT)
Dept: RADIOLOGY | Facility: HOSPITAL | Age: 69
Discharge: HOME | End: 2024-10-24
Attending: THORACIC SURGERY (CARDIOTHORACIC VASCULAR SURGERY)
Payer: MEDICARE

## 2024-10-24 ENCOUNTER — OFFICE VISIT (OUTPATIENT)
Dept: PULMONOLOGY | Facility: CLINIC | Age: 69
End: 2024-10-24
Payer: MEDICARE

## 2024-10-24 ENCOUNTER — OFFICE VISIT (OUTPATIENT)
Dept: HEMATOLOGY/ONCOLOGY | Facility: CLINIC | Age: 69
End: 2024-10-24
Payer: MEDICARE

## 2024-10-24 VITALS
HEIGHT: 69 IN | SYSTOLIC BLOOD PRESSURE: 110 MMHG | BODY MASS INDEX: 23.25 KG/M2 | RESPIRATION RATE: 20 BRPM | HEART RATE: 99 BPM | WEIGHT: 156.94 LBS | DIASTOLIC BLOOD PRESSURE: 68 MMHG

## 2024-10-24 VITALS
HEIGHT: 69 IN | OXYGEN SATURATION: 95 % | DIASTOLIC BLOOD PRESSURE: 69 MMHG | SYSTOLIC BLOOD PRESSURE: 112 MMHG | WEIGHT: 157.44 LBS | RESPIRATION RATE: 20 BRPM | TEMPERATURE: 98 F | BODY MASS INDEX: 23.32 KG/M2 | HEART RATE: 96 BPM

## 2024-10-24 DIAGNOSIS — R94.6 ABNORMAL RESULTS OF THYROID FUNCTION STUDIES: ICD-10-CM

## 2024-10-24 DIAGNOSIS — C34.92: Primary | ICD-10-CM

## 2024-10-24 DIAGNOSIS — C34.92 PRIMARY MALIGNANT NEOPLASM OF LUNG, LEFT: ICD-10-CM

## 2024-10-24 DIAGNOSIS — Z72.0 TOBACCO ABUSE: ICD-10-CM

## 2024-10-24 DIAGNOSIS — Z85.46 HISTORY OF PROSTATE CANCER: ICD-10-CM

## 2024-10-24 DIAGNOSIS — C34.92 SQUAMOUS CELL CARCINOMA OF LEFT LUNG: Primary | ICD-10-CM

## 2024-10-24 DIAGNOSIS — J93.83 SPONTANEOUS PNEUMOTHORAX: ICD-10-CM

## 2024-10-24 DIAGNOSIS — J96.21 ACUTE ON CHRONIC RESPIRATORY FAILURE WITH HYPOXEMIA: ICD-10-CM

## 2024-10-24 PROBLEM — C61 PROSTATE CANCER: Status: RESOLVED | Noted: 2022-05-30 | Resolved: 2024-10-24

## 2024-10-24 PROCEDURE — 3074F SYST BP LT 130 MM HG: CPT | Mod: CPTII,S$GLB,, | Performed by: INTERNAL MEDICINE

## 2024-10-24 PROCEDURE — 3078F DIAST BP <80 MM HG: CPT | Mod: CPTII,S$GLB,, | Performed by: INTERNAL MEDICINE

## 2024-10-24 PROCEDURE — 3008F BODY MASS INDEX DOCD: CPT | Mod: CPTII,S$GLB,, | Performed by: INTERNAL MEDICINE

## 2024-10-24 PROCEDURE — 1160F RVW MEDS BY RX/DR IN RCRD: CPT | Mod: CPTII,S$GLB,, | Performed by: INTERNAL MEDICINE

## 2024-10-24 PROCEDURE — 1126F AMNT PAIN NOTED NONE PRSNT: CPT | Mod: CPTII,S$GLB,, | Performed by: INTERNAL MEDICINE

## 2024-10-24 PROCEDURE — 1159F MED LIST DOCD IN RCRD: CPT | Mod: CPTII,S$GLB,, | Performed by: INTERNAL MEDICINE

## 2024-10-24 PROCEDURE — 99999 PR PBB SHADOW E&M-EST. PATIENT-LVL III: CPT | Mod: PBBFAC,,, | Performed by: INTERNAL MEDICINE

## 2024-10-24 PROCEDURE — 1111F DSCHRG MED/CURRENT MED MERGE: CPT | Mod: CPTII,S$GLB,, | Performed by: INTERNAL MEDICINE

## 2024-10-24 PROCEDURE — 99205 OFFICE O/P NEW HI 60 MIN: CPT | Mod: S$GLB,,, | Performed by: INTERNAL MEDICINE

## 2024-10-24 PROCEDURE — 1101F PT FALLS ASSESS-DOCD LE1/YR: CPT | Mod: CPTII,S$GLB,, | Performed by: INTERNAL MEDICINE

## 2024-10-24 PROCEDURE — 99214 OFFICE O/P EST MOD 30 MIN: CPT | Mod: S$GLB,,, | Performed by: INTERNAL MEDICINE

## 2024-10-24 PROCEDURE — 99999 PR PBB SHADOW E&M-EST. PATIENT-LVL IV: CPT | Mod: PBBFAC,,, | Performed by: INTERNAL MEDICINE

## 2024-10-24 PROCEDURE — 71046 X-RAY EXAM CHEST 2 VIEWS: CPT | Mod: 26,,, | Performed by: STUDENT IN AN ORGANIZED HEALTH CARE EDUCATION/TRAINING PROGRAM

## 2024-10-24 PROCEDURE — 3288F FALL RISK ASSESSMENT DOCD: CPT | Mod: CPTII,S$GLB,, | Performed by: INTERNAL MEDICINE

## 2024-10-24 PROCEDURE — 71046 X-RAY EXAM CHEST 2 VIEWS: CPT | Mod: TC

## 2024-10-24 PROCEDURE — 4010F ACE/ARB THERAPY RXD/TAKEN: CPT | Mod: CPTII,S$GLB,, | Performed by: INTERNAL MEDICINE

## 2024-10-24 RX ORDER — FLUTICASONE FUROATE, UMECLIDINIUM BROMIDE AND VILANTEROL TRIFENATATE 200; 62.5; 25 UG/1; UG/1; UG/1
POWDER RESPIRATORY (INHALATION)
COMMUNITY
Start: 2024-10-22

## 2024-10-24 NOTE — PLAN OF CARE
START ON PATHWAY REGIMEN - Non-Small Cell Lung    [id number]        Paclitaxel       Carboplatin           Additional Orders: Chemotherapy is given concurrently with radiation.    **Always confirm dose/schedule in your pharmacy ordering system**    Patient Characteristics:  Preoperative or Nonsurgical Candidate (Clinical Staging), Stage II, Nonsurgical   Candidate  Therapeutic Status: Preoperative or Nonsurgical Candidate (Clinical Staging)  AJCC T Category: cT2b  AJCC N Category: cN1  AJCC M Category: cM0  AJCC 8 Stage Grouping: IIB  Intent of Therapy:  Curative Intent, Not Discussed with Patient

## 2024-10-24 NOTE — PROGRESS NOTES
C3 nurse spoke with Aurelio Daniels  for a TCC post hospital discharge follow up call. The patient has a scheduled HOSFU appointment with Shlomo Villatoro MD  on 10/14/24 @ 1000.

## 2024-10-24 NOTE — PROGRESS NOTES
Subjective:       Patient ID: Aurelio Daniels is a 68 y.o. male.    Chief Complaint: Results, Chemotherapy, and Lung Cancer    HPI:  68-year-old male newly diagnosed with squamous carcinoma of left lung patient underwent CT-directed biopsy in subsequent air leak.  Patient underwent chest tube drainage.  In subsequently returns for follow-up initial chest x-ray prior to beginning showed small left-sided pleural effusion.  Patient presents for therapeutic considerations after seen radiation oncology ECOG status 1 accompanied by his wife    Past Medical History:   Diagnosis Date    Cancer     Prostate    History of prostate cancer 10/24/2024    Squamous cell carcinoma of left lung 10/14/2024    Tobacco use      Family History   Problem Relation Name Age of Onset    No Known Problems Mother      Heart disease Father      No Known Problems Brother      No Known Problems Brother      No Known Problems Daughter      No Known Problems Daughter       Social History     Socioeconomic History    Marital status:      Spouse name: YUE    Number of children: 2   Tobacco Use    Smoking status: Former     Current packs/day: 0.00     Types: Cigarettes     Quit date: 1/15/2022     Years since quittin.7     Passive exposure: Current    Smokeless tobacco: Never   Substance and Sexual Activity    Alcohol use: Yes     Alcohol/week: 2.0 standard drinks of alcohol     Types: 2 Shots of liquor per week    Drug use: Never    Sexual activity: Not Currently     Partners: Female     Social Drivers of Health     Financial Resource Strain: Low Risk  (2024)    Overall Financial Resource Strain (CARDIA)     Difficulty of Paying Living Expenses: Not hard at all   Food Insecurity: No Food Insecurity (2024)    Hunger Vital Sign     Worried About Running Out of Food in the Last Year: Never true     Ran Out of Food in the Last Year: Never true   Transportation Needs: No Transportation Needs (2024)    TRANSPORTATION NEEDS      Transportation : No   Physical Activity: Inactive (9/27/2024)    Exercise Vital Sign     Days of Exercise per Week: 0 days     Minutes of Exercise per Session: 0 min   Stress: No Stress Concern Present (9/27/2024)    [employer] - Occupational Stress Questionnaire     Feeling of Stress : Not at all   Housing Stability: Low Risk  (9/27/2024)    Housing Stability Vital Sign     Unable to Pay for Housing in the Last Year: No     Homeless in the Last Year: No     Past Surgical History:   Procedure Laterality Date    COLONOSCOPY      COLONOSCOPY N/A 5/20/2022    Procedure: COLONOSCOPY;  Surgeon: Jamia Alfaro MD;  Location: [facility] ENDO;  Service: Endoscopy;  Laterality: N/A;    COMPUTED TOMOGRAPHY N/A 10/3/2024    Procedure: CT (COMPUTED TOMOGRAPHY)/lung biopsy;  Surgeon: Navneet Oswald MD;  Location: [facility];  Service: General;  Laterality: N/A;    VASECTOMY  1985       Labs:  Lab Results   Component Value Date    WBC 7.25 10/05/2024    HGB 11.4 (L) 10/05/2024    HCT 34.0 (L) 10/05/2024    MCV 96 10/05/2024     10/05/2024     BMP  Lab Results   Component Value Date     10/05/2024    K 4.0 10/05/2024     10/05/2024    CO2 23 10/05/2024    BUN 10 10/05/2024    CREATININE 0.7 10/05/2024    CALCIUM 9.0 10/05/2024    ANIONGAP 8 10/05/2024    ESTGFRAFRICA >60 07/01/2022    EGFRNONAA >60 07/01/2022     Lab Results   Component Value Date    ALT 8 (L) 10/05/2024    AST 13 10/05/2024    ALKPHOS 85 10/05/2024    BILITOT 0.4 10/05/2024       Lab Results   Component Value Date    IRON 69 03/13/2009    TIBC 269 03/13/2009    FERRITIN 128 03/13/2009     Lab Results   Component Value Date    APOSSBUL65 >2000 (H) 09/27/2024     Lab Results   Component Value Date    FOLATE 9.8 09/27/2024     Lab Results   Component Value Date    TSH 0.677 02/25/2022         Review of Systems   Constitutional:  Negative for activity change, appetite change, chills, diaphoresis, fatigue, fever and  unexpected weight change.   HENT:  Negative for congestion, dental problem, drooling, ear discharge, ear pain, facial swelling, hearing loss, mouth sores, nosebleeds, postnasal drip, rhinorrhea, sinus pressure, sneezing, sore throat, tinnitus, trouble swallowing and voice change.    Eyes:  Negative for photophobia, pain, discharge, redness, itching and visual disturbance.   Respiratory:  Positive for cough and shortness of breath. Negative for apnea, choking, chest tightness, wheezing and stridor.    Cardiovascular:  Negative for chest pain, palpitations and leg swelling.   Gastrointestinal:  Negative for abdominal distention, abdominal pain, anal bleeding, blood in stool, constipation, diarrhea, nausea, rectal pain and vomiting.   Endocrine: Negative for cold intolerance, heat intolerance, polydipsia, polyphagia and polyuria.   Genitourinary:  Negative for decreased urine volume, difficulty urinating, dysuria, enuresis, flank pain, frequency, genital sores, hematuria, penile discharge, penile pain, penile swelling, scrotal swelling, testicular pain and urgency.   Musculoskeletal:  Negative for arthralgias, back pain, gait problem, joint swelling, myalgias, neck pain and neck stiffness.   Skin:  Negative for color change, pallor, rash and wound.   Allergic/Immunologic: Negative for environmental allergies, food allergies and immunocompromised state.   Neurological:  Negative for dizziness, tremors, seizures, syncope, facial asymmetry, speech difficulty, weakness, light-headedness, numbness and headaches.   Hematological:  Negative for adenopathy. Does not bruise/bleed easily.   Psychiatric/Behavioral:  Negative for agitation, behavioral problems, confusion, decreased concentration, dysphoric mood, hallucinations, self-injury, sleep disturbance and suicidal ideas. The patient is not nervous/anxious and is not hyperactive.        Objective:      Physical Exam  Vitals reviewed.   Constitutional:       General: He is  not in acute distress.     Appearance: He is well-developed. He is ill-appearing. He is not diaphoretic.   HENT:      Head: Normocephalic.      Right Ear: External ear normal.      Left Ear: External ear normal.      Nose: Nose normal.      Right Sinus: No maxillary sinus tenderness or frontal sinus tenderness.      Left Sinus: No maxillary sinus tenderness or frontal sinus tenderness.      Mouth/Throat:      Pharynx: No oropharyngeal exudate.   Eyes:      General: Lids are normal. No scleral icterus.        Right eye: No discharge.         Left eye: No discharge.      Extraocular Movements:      Right eye: Normal extraocular motion.      Left eye: Normal extraocular motion.      Conjunctiva/sclera:      Right eye: Right conjunctiva is not injected. No hemorrhage.     Left eye: Left conjunctiva is not injected. No hemorrhage.     Pupils: Pupils are equal, round, and reactive to light.   Neck:      Thyroid: No thyromegaly.      Vascular: No JVD.      Trachea: No tracheal deviation.   Cardiovascular:      Rate and Rhythm: Normal rate.      Heart sounds: Normal heart sounds.   Pulmonary:      Effort: Pulmonary effort is normal. No respiratory distress.      Breath sounds: No stridor.   Abdominal:      General: Bowel sounds are normal.      Palpations: Abdomen is soft. There is no hepatomegaly, splenomegaly or mass.      Tenderness: There is no abdominal tenderness.   Musculoskeletal:         General: No tenderness. Normal range of motion.      Cervical back: Normal range of motion and neck supple.   Lymphadenopathy:      Head:      Right side of head: No posterior auricular or occipital adenopathy.      Left side of head: No posterior auricular or occipital adenopathy.      Cervical: No cervical adenopathy.      Right cervical: No superficial, deep or posterior cervical adenopathy.     Left cervical: No superficial, deep or posterior cervical adenopathy.      Upper Body:      Right upper body: No supraclavicular  adenopathy.      Left upper body: No supraclavicular adenopathy.   Skin:     General: Skin is dry.      Findings: No erythema or rash.      Nails: There is no clubbing.   Neurological:      Mental Status: He is alert and oriented to person, place, and time.      Cranial Nerves: No cranial nerve deficit.      Coordination: Coordination normal.   Psychiatric:         Behavior: Behavior normal.         Thought Content: Thought content normal.         Judgment: Judgment normal.             Assessment:      1. Squamous cell carcinoma of left lung    2. Abnormal results of thyroid function studies    3. History of prostate cancer    4. Tobacco abuse           Med Onc Chart Routing      Follow up with physician . Nurse navigation to coordinate   Follow up with [facility]    Infusion scheduling note    Injection scheduling note    Labs    Imaging    Pharmacy appointment    Other referrals         Referral made for CT-directed thoracentesis for cytology; palliative care referral              Plan:     Newly diagnosed squamous carcinoma involving left lung.  With CT evidence of left-sided pleural effusion predated chest tube.  In addition patient on PET scan demonstrates hypermetabolic area in mediastinal lymph node set of possible positive metastatic disease.  I have discussed case with Dr. Ortiz in Radiation Oncology he is scheduled to see Dr. Jimenez as soon as he finishes the appointment with me.  My recommendations are that the patient proceed with bronchoscopic evaluation for lymph node status to see whether not he stage III CT in IR directed thoracentesis.  And will present at conference in one-week for review.  I have placed orders for presumptive diagnosis of stage III disease but possibility of stage IV is always a consideration I have talked to the family concerning this referral made for advanced care planning.Advance Care Planning     Nurse navigation help coordinate as soon as possible         Rahul Albright Jr, MD  FACP

## 2024-10-25 ENCOUNTER — HOSPITAL ENCOUNTER (OUTPATIENT)
Dept: RADIATION THERAPY | Facility: HOSPITAL | Age: 69
Discharge: HOME | End: 2024-10-25
Attending: INTERNAL MEDICINE
Payer: MEDICARE

## 2024-10-25 ENCOUNTER — HOSPITAL ENCOUNTER (OUTPATIENT)
Dept: RADIOLOGY | Facility: HOSPITAL | Age: 69
Discharge: HOME | End: 2024-10-25
Attending: INTERNAL MEDICINE
Payer: MEDICARE

## 2024-10-25 PROCEDURE — 77263 THER RADIOLOGY TX PLNG CPLX: CPT | Mod: ,,, | Performed by: SPECIALIST

## 2024-10-25 PROCEDURE — 77334 RADIATION TREATMENT AID(S): CPT | Mod: TC | Performed by: SPECIALIST

## 2024-10-25 PROCEDURE — 77014 HC CT GUIDANCE RADIATION THERAPY FLDS PLACEMENT: CPT | Mod: TC | Performed by: SPECIALIST

## 2024-10-25 PROCEDURE — 77334 RADIATION TREATMENT AID(S): CPT | Mod: 26,,, | Performed by: SPECIALIST

## 2024-10-25 PROCEDURE — 77014 PR  CT GUIDANCE PLACEMENT RAD THERAPY FIELDS: CPT | Mod: 26,,, | Performed by: SPECIALIST

## 2024-10-25 NOTE — PROGRESS NOTES
"Subjective:      Patient ID: Aurelio Daniels is a 68 y.o. male.    Chief Complaint: f/u COPD, lung CA    HPI    Sept 2024  68-year-old male long-time smoker here for evaluation of dyspnea on exertion. Patient states it has been getting steadily worse over time. Had PFTs done here March of last year which showed moderately severe obstruction with hyperinflation. No imaging done here. States that he was given prescription for albuterol and a long-acting inhaler but never filled the long-acting inhaler. Has been using albuterol only with no benefit. States that when he gets short of breath he just stops and rests and it takes him a few minutes to recover. No complaints of cough, anginal chest pain, productive sputum or fevers or chills. He is here today with his wife.     October 2024  In the interim was admitted to hospital with spontaneous Ptx  Also had lung mass which was biopsied + for SCCA  Ptx resolved  Saw oncology and had PET imaging with low level mediastinal LN uptake and non-avid ipsilateral pleural effusion  Mainly complains of some tightness and numbness to left chest    Review of Systems as per HPI otherwise negative    Objective:     Physical Exam   Constitutional: He is oriented to person, place, and time. No distress.   HENT:   Head: Normocephalic.   Cardiovascular: Normal rate and regular rhythm.   Pulmonary/Chest: Normal expansion, symmetric chest wall expansion and effort normal. He has no wheezes.   Musculoskeletal:      Cervical back: Neck supple.   Neurological: He is alert and oriented to person, place, and time.   Psychiatric: He has a normal mood and affect.   Nursing note and vitals reviewed.          10/24/2024     3:24 PM 10/24/2024     1:57 PM 10/23/2024     1:38 PM 10/16/2024     1:14 PM 10/14/2024    10:01 AM 10/9/2024    11:14 AM 10/9/2024     8:34 AM   Pulmonary Function Tests   SpO2  95 % 94 % 96 % 99 % 95 % 96 %   Height 5' 9" (1.753 m) 5' 9" (1.753 m) 5' 9" (1.753 m) 5' 9" " "(1.753 m) 5' 9" (1.753 m)     Weight 71.2 kg (156 lb 15.5 oz) 71.4 kg (157 lb 6.5 oz) 70.3 kg (154 lb 15.7 oz) 72.2 kg (159 lb 2.8 oz) 70.9 kg (156 lb 4.9 oz)     BMI (Calculated) 23.2 23.2 22.9 23.5 23.1          Assessment:     1. Local recurrence of malignant neoplasm of lung, left    2. Acute on chronic respiratory failure with hypoxemia    3. Primary malignant neoplasm of lung, left         Orders Placed This Encounter   Procedures    Case Request Endoscopy: ENDOBRONCHIAL ULTRASOUND (EBUS)     Order Specific Question:   Medical Necessity:     Answer:   Medically Non-Urgent [100]     Order Specific Question:   CPT Code:     Answer:   OK BRONCH W/ EBUS, SAMPLING 1 OR 2 NODES, INCL GUIDE [99883]     Order Specific Question:   Add on case?     Answer:   No [0]     Order Specific Question:   Clinical trial?     Answer:   No [0]     Order Specific Question:   Requested Time     Answer:   11:00 AM     Order Specific Question:   Case Referring Provider     Answer:   WILY ABRAHAM [[id number]]     Order Specific Question:   Post-Procedure Disposition:     Answer:   Amb Surgery/DOSC [2]     Order Specific Question:   Is an on-site pathologist required for this procedure?     Answer:   N/A     Order Specific Question:   Pre-op Diagnosis     Answer:   Lung cancer [[id number]]     I personally reviewed PET-CT images obtained yesterday. I agree with the radiologist interpretation as below:    COMPARISON:  No prior F 18 PET-CT     FINDINGS:  Head/neck: There is normal physiologic uptake noted within the visualized brain parenchyma. No FDG avid adenopathy within the neck.     Chest: There is a moderate to large sized layering left-sided pleural effusion.  Minimal pleural effusion seen within the medial right lung base.  There is some adjacent consolidation within the medial right lung base likely representing atelectasis.  There is significantly FDG avid mass within the left perihilar region which demonstrates a SUV max of 10.  This " mass measures 5 cm by approximately 3.6 cm in the greatest axial dimension.  There is also some consolidation along the paramediastinal/pericardial region on the left which may be representative of atelectasis.  No significant uptake.  There is mild uptake associated with some lymph nodes in the AP window which demonstrate an SUV max of up to 3.5.     Abdomen/Pelvis: Normal physiologic uptake noted within the liver, spleen, urinary tract, and bowel.The adrenals are unremarkable in appearance.  No FDG avid lymph nodes seen within the abdomen or pelvis.     Skeletal: No FDG avid osseus lesions identified.     Impression:     1. FDG avid left perihilar mass measuring up to 5 cm in size.  Findings consistent with known non-small cell lung carcinoma.  Mildly FDG avid lymph nodes in the AP window.  Metastatic disease is a definite possibility.  2. Remaining findings as discussed above      Plan:     Discussed current status and malignant disease  Needs to complete staging  Will plan for bronchoscopy with EBUS and LN biopsy as well as left diagnostic thoracentesis  Scheduled for 10/28  Discussed risks, benefits and alternatives of procedure with patient  Additional f/u plans based on results of above  Pt and wife voiced understanding and agreement with this plan

## 2024-10-28 ENCOUNTER — HOSPITAL ENCOUNTER (OUTPATIENT)
Facility: HOSPITAL | Age: 69
Discharge: HOME | End: 2024-10-28
Attending: INTERNAL MEDICINE | Admitting: INTERNAL MEDICINE
Payer: MEDICARE

## 2024-10-28 ENCOUNTER — ANESTHESIA EVENT (OUTPATIENT)
Dept: ENDOSCOPY | Facility: HOSPITAL | Age: 69
End: 2024-10-28
Payer: MEDICARE

## 2024-10-28 ENCOUNTER — ANESTHESIA (OUTPATIENT)
Dept: ENDOSCOPY | Facility: HOSPITAL | Age: 69
End: 2024-10-28
Payer: MEDICARE

## 2024-10-28 DIAGNOSIS — C34.12 MALIGNANT NEOPLASM OF UPPER LOBE OF LEFT LUNG: Primary | ICD-10-CM

## 2024-10-28 PROCEDURE — 88173 CYTOPATH EVAL FNA REPORT: CPT | Mod: 26,,, | Performed by: PATHOLOGY

## 2024-10-28 PROCEDURE — 88305 TISSUE EXAM BY PATHOLOGIST: CPT | Performed by: PATHOLOGY

## 2024-10-28 PROCEDURE — 37000008 HC ANESTHESIA 1ST 15 MINUTES: Performed by: INTERNAL MEDICINE

## 2024-10-28 PROCEDURE — 88177 CYTP FNA EVAL EA ADDL: CPT | Performed by: PATHOLOGY

## 2024-10-28 PROCEDURE — 88172 CYTP DX EVAL FNA 1ST EA SITE: CPT | Performed by: PATHOLOGY

## 2024-10-28 PROCEDURE — 37000009 HC ANESTHESIA EA ADD 15 MINS: Performed by: INTERNAL MEDICINE

## 2024-10-28 PROCEDURE — 27202059 HC NEEDLE, FNA (ANY): Performed by: INTERNAL MEDICINE

## 2024-10-28 PROCEDURE — 88112 CYTOPATH CELL ENHANCE TECH: CPT | Mod: 26,59,, | Performed by: PATHOLOGY

## 2024-10-28 PROCEDURE — 88177 CYTP FNA EVAL EA ADDL: CPT | Mod: 26,,, | Performed by: PATHOLOGY

## 2024-10-28 PROCEDURE — 25000003 PHARM REV CODE 250: Performed by: NURSE ANESTHETIST, CERTIFIED REGISTERED

## 2024-10-28 PROCEDURE — 88305 TISSUE EXAM BY PATHOLOGIST: CPT | Mod: 26,,, | Performed by: PATHOLOGY

## 2024-10-28 PROCEDURE — 88173 CYTOPATH EVAL FNA REPORT: CPT | Performed by: PATHOLOGY

## 2024-10-28 PROCEDURE — 88172 CYTP DX EVAL FNA 1ST EA SITE: CPT | Mod: 26,,, | Performed by: PATHOLOGY

## 2024-10-28 PROCEDURE — 88112 CYTOPATH CELL ENHANCE TECH: CPT | Performed by: PATHOLOGY

## 2024-10-28 PROCEDURE — 31652 BRONCH EBUS SAMPLNG 1/2 NODE: CPT | Performed by: INTERNAL MEDICINE

## 2024-10-28 PROCEDURE — 63600175 PHARM REV CODE 636 W HCPCS: Performed by: NURSE ANESTHETIST, CERTIFIED REGISTERED

## 2024-10-28 RX ORDER — LIDOCAINE HYDROCHLORIDE 20 MG/ML
INJECTION INTRAVENOUS
Status: DISCONTINUED | OUTPATIENT
Start: 2024-10-28 | End: 2024-10-28

## 2024-10-28 RX ORDER — ROCURONIUM BROMIDE 10 MG/ML
INJECTION, SOLUTION INTRAVENOUS
Status: DISCONTINUED | OUTPATIENT
Start: 2024-10-28 | End: 2024-10-28

## 2024-10-28 RX ORDER — PROPOFOL 10 MG/ML
VIAL (ML) INTRAVENOUS
Status: DISCONTINUED | OUTPATIENT
Start: 2024-10-28 | End: 2024-10-28

## 2024-10-28 RX ORDER — SUCCINYLCHOLINE CHLORIDE 20 MG/ML
INJECTION INTRAMUSCULAR; INTRAVENOUS
Status: DISCONTINUED | OUTPATIENT
Start: 2024-10-28 | End: 2024-10-28

## 2024-10-28 RX ORDER — DEXAMETHASONE SODIUM PHOSPHATE 4 MG/ML
INJECTION, SOLUTION INTRA-ARTICULAR; INTRALESIONAL; INTRAMUSCULAR; INTRAVENOUS; SOFT TISSUE
Status: DISCONTINUED | OUTPATIENT
Start: 2024-10-28 | End: 2024-10-28

## 2024-10-28 RX ORDER — ONDANSETRON HYDROCHLORIDE 2 MG/ML
INJECTION, SOLUTION INTRAVENOUS
Status: DISCONTINUED | OUTPATIENT
Start: 2024-10-28 | End: 2024-10-28

## 2024-10-28 RX ADMIN — LIDOCAINE HYDROCHLORIDE 100 MG: 20 INJECTION INTRAVENOUS at 12:10

## 2024-10-28 RX ADMIN — DEXAMETHASONE SODIUM PHOSPHATE 8 MG: 4 INJECTION, SOLUTION INTRA-ARTICULAR; INTRALESIONAL; INTRAMUSCULAR; INTRAVENOUS; SOFT TISSUE at 12:10

## 2024-10-28 RX ADMIN — ONDANSETRON 4 MG: 2 INJECTION INTRAMUSCULAR; INTRAVENOUS at 12:10

## 2024-10-28 RX ADMIN — ROCURONIUM BROMIDE 5 MG: 10 SOLUTION INTRAVENOUS at 12:10

## 2024-10-28 RX ADMIN — ROCURONIUM BROMIDE 35 MG: 10 SOLUTION INTRAVENOUS at 12:10

## 2024-10-28 RX ADMIN — PROPOFOL 150 MG: 10 INJECTION, EMULSION INTRAVENOUS at 12:10

## 2024-10-28 RX ADMIN — SUGAMMADEX 200 MG: 100 INJECTION, SOLUTION INTRAVENOUS at 01:10

## 2024-10-28 RX ADMIN — SUCCINYLCHOLINE CHLORIDE 140 MG: 20 INJECTION, SOLUTION INTRAMUSCULAR; INTRAVENOUS; PARENTERAL at 12:10

## 2024-10-28 RX ADMIN — SODIUM CHLORIDE, POTASSIUM CHLORIDE, SODIUM LACTATE AND CALCIUM CHLORIDE: 600; 310; 30; 20 INJECTION, SOLUTION INTRAVENOUS at 12:10

## 2024-10-28 NOTE — OP NOTE
Bronchoscopy procedure     Pre-operative diagnosis: SCCA of left upper lobe, mediastinal adenopathy, pleural effusion  Post-operative diagnosis: Same s/p bronchoscopy with EBUS-TBNA of left paratracheal lymph node, diagnostic left thoracentesis  Operating MD: Fabio Jimenez MD  Procedure performed:bronchoscopy with EBUS-TBNA of left paratracheal lymph node, diagnostic left thoracentesis  Anesthesia: General  EBL: none  Specimens obtained: Left paratracheal (4L) LN x 3, thoracentesis 30ml  Complications: None    Monitoring for procedure:  ETCO2, Pulse oximetry, BP, EKG    Procedure in detail:  ASA: III  Mallimpati score: II    Findings:  Vocal cords: ET tube in place  Trachea:  distal trachea normal  Left/Right main bronchi: normal  RUL: normal  RML: normal  RLL: normal  ALFREDO: normal  Lingula: normal  LLL: normal    Ultrasound findings:  4R: no LN  4L:8 x 14 mm LN, biopsy x 3  7:  no LN  10/11R: no LN  10/11L: no LN    4L lymph nodes targeted for biopsy. Specimens placed on glass slides and in cytolyt for rapid and permanent cytopathologic examination. After specimens obtained and hemostasis ensured, the scope was removed intact. Patient tolerated the procedure well. No complications noted. To recovery in stable condition.

## 2024-10-28 NOTE — DISCHARGE SUMMARY
[facility] - Endoscopy (Hospital)  Discharge Note  Short Stay    Procedure(s) (LRB):  ENDOBRONCHIAL ULTRASOUND (EBUS) (N/A)  THORACENTESIS (N/A)      OUTCOME: Patient tolerated treatment/procedure well without complication and is now ready for discharge.    DISPOSITION: Home or Self Care    FINAL DIAGNOSIS:  <principal problem not specified>    FOLLOWUP: In clinic    DISCHARGE INSTRUCTIONS:    Discharge Procedure Orders   Diet general        TIME SPENT ON DISCHARGE: 25 minutes

## 2024-10-28 NOTE — PLAN OF CARE
Dr. Jimenez notified that patient is ready for discharge. No CXR needed as per Dr. Jimenez. VSS. No distress noted. Awake/alert. Ready to eat.

## 2024-10-28 NOTE — OP NOTE
"Procedure note    Thoracentesis (diagnostic)  While patient was under general anesthesia for bronchoscopy, the linear EBUS scope was applied to the left posteriorlateral chest and pleural fluid identified. The skin was sterilized with hibiclens and a 22 ga 1.5" needle and 50ml syringe was used to perform diagnostic thoracentesis. 30ml of straw colored clear fluid obtained. Sent for cytology. Tolerated well. No complications    Operating MD: Fabio Jimenez MD  EBL: none  Complications: none  Disposition: recovery  "

## 2024-10-28 NOTE — H&P
"  Chief Complaint: f/u COPD, lung CA     HPI     Sept 2024  68-year-old male long-time smoker here for evaluation of dyspnea on exertion. Patient states it has been getting steadily worse over time. Had PFTs done here March of last year which showed moderately severe obstruction with hyperinflation. No imaging done here. States that he was given prescription for albuterol and a long-acting inhaler but never filled the long-acting inhaler. Has been using albuterol only with no benefit. States that when he gets short of breath he just stops and rests and it takes him a few minutes to recover. No complaints of cough, anginal chest pain, productive sputum or fevers or chills. He is here today with his wife.      October 2024  In the interim was admitted to hospital with spontaneous Ptx  Also had lung mass which was biopsied + for SCCA  Ptx resolved  Saw oncology and had PET imaging with low level mediastinal LN uptake and non-avid ipsilateral pleural effusion  Mainly complains of some tightness and numbness to left chest     Review of Systems as per HPI otherwise negative     Objective:      Physical Exam   Constitutional: He is oriented to person, place, and time. No distress.   HENT:   Head: Normocephalic.   Cardiovascular: Normal rate and regular rhythm.   Pulmonary/Chest: Normal expansion, symmetric chest wall expansion and effort normal. He has no wheezes.   Musculoskeletal:      Cervical back: Neck supple.   Neurological: He is alert and oriented to person, place, and time.   Psychiatric: He has a normal mood and affect.   Nursing note and vitals reviewed.            10/24/2024     3:24 PM 10/24/2024     1:57 PM 10/23/2024     1:38 PM 10/16/2024     1:14 PM 10/14/2024    10:01 AM 10/9/2024    11:14 AM 10/9/2024     8:34 AM   Pulmonary Function Tests   SpO2   95 % 94 % 96 % 99 % 95 % 96 %   Height 5' 9" (1.753 m) 5' 9" (1.753 m) 5' 9" (1.753 m) 5' 9" (1.753 m) 5' 9" (1.753 m)       Weight 71.2 kg (156 lb 15.5 oz) " 71.4 kg (157 lb 6.5 oz) 70.3 kg (154 lb 15.7 oz) 72.2 kg (159 lb 2.8 oz) 70.9 kg (156 lb 4.9 oz)       BMI (Calculated) 23.2 23.2 22.9 23.5 23.1             Assessment:      1. Local recurrence of malignant neoplasm of lung, left    2. Acute on chronic respiratory failure with hypoxemia    3. Primary malignant neoplasm of lung, left                Orders Placed This Encounter   Procedures    Case Request Endoscopy: ENDOBRONCHIAL ULTRASOUND (EBUS)       Order Specific Question:   Medical Necessity:       Answer:   Medically Non-Urgent [100]       Order Specific Question:   CPT Code:       Answer:   DC BRONCH W/ EBUS, SAMPLING 1 OR 2 NODES, INCL GUIDE [40601]       Order Specific Question:   Add on case?       Answer:   No [0]       Order Specific Question:   Clinical trial?       Answer:   No [0]       Order Specific Question:   Requested Time       Answer:   11:00 AM       Order Specific Question:   Case Referring Provider       Answer:   WILY ABRAHAM [[id number]]       Order Specific Question:   Post-Procedure Disposition:       Answer:   Amb Surgery/DOSC [2]       Order Specific Question:   Is an on-site pathologist required for this procedure?       Answer:   N/A       Order Specific Question:   Pre-op Diagnosis       Answer:   Lung cancer [[id number]]      I personally reviewed PET-CT images obtained yesterday. I agree with the radiologist interpretation as below:     COMPARISON:  No prior F 18 PET-CT     FINDINGS:  Head/neck: There is normal physiologic uptake noted within the visualized brain parenchyma. No FDG avid adenopathy within the neck.     Chest: There is a moderate to large sized layering left-sided pleural effusion.  Minimal pleural effusion seen within the medial right lung base.  There is some adjacent consolidation within the medial right lung base likely representing atelectasis.  There is significantly FDG avid mass within the left perihilar region which demonstrates a SUV max of 10.  This mass  measures 5 cm by approximately 3.6 cm in the greatest axial dimension.  There is also some consolidation along the paramediastinal/pericardial region on the left which may be representative of atelectasis.  No significant uptake.  There is mild uptake associated with some lymph nodes in the AP window which demonstrate an SUV max of up to 3.5.     Abdomen/Pelvis: Normal physiologic uptake noted within the liver, spleen, urinary tract, and bowel.The adrenals are unremarkable in appearance.  No FDG avid lymph nodes seen within the abdomen or pelvis.     Skeletal: No FDG avid osseus lesions identified.     Impression:     1. FDG avid left perihilar mass measuring up to 5 cm in size.  Findings consistent with known non-small cell lung carcinoma.  Mildly FDG avid lymph nodes in the AP window.  Metastatic disease is a definite possibility.  2. Remaining findings as discussed above        Plan:      Discussed current status and malignant disease  Needs to complete staging  Will plan for bronchoscopy with EBUS and LN biopsy as well as left diagnostic thoracentesis  Scheduled for 10/28  Discussed risks, benefits and alternatives of procedure with patient  Additional f/u plans based on results of above  Pt and wife voiced understanding and agreement with this plan

## 2024-10-29 VITALS
DIASTOLIC BLOOD PRESSURE: 68 MMHG | OXYGEN SATURATION: 96 % | TEMPERATURE: 99 F | RESPIRATION RATE: 22 BRPM | SYSTOLIC BLOOD PRESSURE: 139 MMHG | HEART RATE: 89 BPM

## 2024-10-30 ENCOUNTER — DOCUMENTATION ONLY (OUTPATIENT)
Dept: PALLIATIVE MEDICINE | Facility: HOSPITAL | Age: 69
End: 2024-10-30
Payer: MEDICARE

## 2024-10-30 LAB
FINAL PATHOLOGIC DIAGNOSIS: NORMAL
Lab: NORMAL

## 2024-10-31 ENCOUNTER — PATIENT MESSAGE (OUTPATIENT)
Dept: ADMINISTRATIVE | Facility: OTHER | Age: 69
End: 2024-10-31
Payer: MEDICARE

## 2024-10-31 ENCOUNTER — LAB VISIT (OUTPATIENT)
Dept: LAB | Facility: HOSPITAL | Age: 69
End: 2024-10-31
Attending: THORACIC SURGERY (CARDIOTHORACIC VASCULAR SURGERY)
Payer: MEDICARE

## 2024-10-31 DIAGNOSIS — C34.12 MALIGNANT NEOPLASM OF UPPER LOBE OF LEFT LUNG: ICD-10-CM

## 2024-10-31 LAB
CREAT SERPL-MCNC: 0.8 MG/DL (ref 0.5–1.4)
EST. GFR  (NO RACE VARIABLE): >60 ML/MIN/1.73 M^2

## 2024-10-31 PROCEDURE — 36415 COLL VENOUS BLD VENIPUNCTURE: CPT | Mod: PO | Performed by: THORACIC SURGERY (CARDIOTHORACIC VASCULAR SURGERY)

## 2024-10-31 PROCEDURE — 82565 ASSAY OF CREATININE: CPT | Performed by: THORACIC SURGERY (CARDIOTHORACIC VASCULAR SURGERY)

## 2024-11-01 ENCOUNTER — OFFICE VISIT (OUTPATIENT)
Dept: HEMATOLOGY/ONCOLOGY | Facility: CLINIC | Age: 69
End: 2024-11-01
Payer: MEDICARE

## 2024-11-01 ENCOUNTER — HOSPITAL ENCOUNTER (OUTPATIENT)
Dept: RADIATION THERAPY | Facility: HOSPITAL | Age: 69
Discharge: HOME | End: 2024-11-01
Attending: SPECIALIST
Payer: MEDICARE

## 2024-11-01 ENCOUNTER — TUMOR BOARD CONFERENCE (OUTPATIENT)
Dept: HEMATOLOGY/ONCOLOGY | Facility: CLINIC | Age: 69
End: 2024-11-01
Payer: MEDICARE
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
## 2024-11-01 DIAGNOSIS — C34.12 MALIGNANT NEOPLASM OF UPPER LOBE OF LEFT LUNG: Primary | ICD-10-CM

## 2024-11-01 DIAGNOSIS — D84.822 IMMUNODEFICIENCY SECONDARY TO RADIATION THERAPY: ICD-10-CM

## 2024-11-01 DIAGNOSIS — Z72.0 TOBACCO ABUSE: ICD-10-CM

## 2024-11-01 DIAGNOSIS — F17.210 CIGARETTE SMOKER: ICD-10-CM

## 2024-11-01 DIAGNOSIS — C34.92 SQUAMOUS CELL CARCINOMA OF LEFT LUNG: ICD-10-CM

## 2024-11-01 DIAGNOSIS — F17.200 TOBACCO DEPENDENCY: ICD-10-CM

## 2024-11-08 PROCEDURE — 77301 RADIOTHERAPY DOSE PLAN IMRT: CPT | Mod: TC | Performed by: SPECIALIST

## 2024-11-08 PROCEDURE — 77293 RESPIRATOR MOTION MGMT SIMUL: CPT | Mod: TC | Performed by: SPECIALIST

## 2024-11-08 PROCEDURE — 77301 RADIOTHERAPY DOSE PLAN IMRT: CPT | Mod: 26,,, | Performed by: SPECIALIST

## 2024-11-09 PROCEDURE — 77338 DESIGN MLC DEVICE FOR IMRT: CPT | Mod: 26,,, | Performed by: SPECIALIST

## 2024-11-09 PROCEDURE — 77300 RADIATION THERAPY DOSE PLAN: CPT | Mod: 26,,, | Performed by: SPECIALIST

## 2024-11-09 PROCEDURE — 77338 DESIGN MLC DEVICE FOR IMRT: CPT | Mod: TC | Performed by: SPECIALIST

## 2024-11-09 PROCEDURE — 77370 RADIATION PHYSICS CONSULT: CPT | Performed by: SPECIALIST

## 2024-11-09 PROCEDURE — 77300 RADIATION THERAPY DOSE PLAN: CPT | Mod: TC | Performed by: SPECIALIST

## 2024-11-10 PROCEDURE — 77470 SPECIAL RADIATION TREATMENT: CPT | Mod: 26,59,, | Performed by: SPECIALIST

## 2024-11-10 PROCEDURE — 77470 SPECIAL RADIATION TREATMENT: CPT | Mod: 59,TC | Performed by: SPECIALIST

## 2024-11-11 ENCOUNTER — DOCUMENTATION ONLY (OUTPATIENT)
Dept: RADIATION ONCOLOGY | Facility: CLINIC | Age: 69
End: 2024-11-11
Payer: MEDICARE

## 2024-11-11 PROCEDURE — 77014 PR  CT GUIDANCE PLACEMENT RAD THERAPY FIELDS: CPT | Mod: 26,,, | Performed by: SPECIALIST

## 2024-11-11 PROCEDURE — 77373 STRTCTC BDY RAD THER TX DLVR: CPT | Performed by: SPECIALIST

## 2024-11-11 NOTE — PLAN OF CARE
Day 1 outpatient xrt to the lung. Lung handout and verbal instructions given. Skin care and side effects reviewed. Contact info provided. Patient verbalized understanding.

## 2024-11-12 PROCEDURE — 77373 STRTCTC BDY RAD THER TX DLVR: CPT | Performed by: SPECIALIST

## 2024-11-12 PROCEDURE — 77014 PR  CT GUIDANCE PLACEMENT RAD THERAPY FIELDS: CPT | Mod: 26,,, | Performed by: SPECIALIST

## 2024-11-12 PROCEDURE — 77014 HC CT GUIDANCE RADIATION THERAPY FLDS PLACEMENT: CPT | Mod: TC | Performed by: SPECIALIST

## 2024-11-13 PROCEDURE — 77373 STRTCTC BDY RAD THER TX DLVR: CPT | Performed by: SPECIALIST

## 2024-11-13 PROCEDURE — 77014 HC CT GUIDANCE RADIATION THERAPY FLDS PLACEMENT: CPT | Mod: TC | Performed by: SPECIALIST

## 2024-11-13 PROCEDURE — 77014 PR  CT GUIDANCE PLACEMENT RAD THERAPY FIELDS: CPT | Mod: 26,,, | Performed by: SPECIALIST

## 2024-11-15 ENCOUNTER — DOCUMENTATION ONLY (OUTPATIENT)
Dept: RADIATION ONCOLOGY | Facility: CLINIC | Age: 69
End: 2024-11-15
Payer: MEDICARE

## 2024-12-17 ENCOUNTER — OFFICE VISIT (OUTPATIENT)
Dept: PULMONOLOGY | Facility: CLINIC | Age: 69
End: 2024-12-17
Attending: INTERNAL MEDICINE
Payer: MEDICARE

## 2024-12-17 ENCOUNTER — PATIENT MESSAGE (OUTPATIENT)
Dept: HEMATOLOGY/ONCOLOGY | Facility: CLINIC | Age: 69
End: 2024-12-17
Payer: MEDICARE

## 2024-12-17 ENCOUNTER — HOSPITAL ENCOUNTER (OUTPATIENT)
Dept: RADIOLOGY | Facility: HOSPITAL | Age: 69
Discharge: HOME | End: 2024-12-17
Attending: INTERNAL MEDICINE
Payer: MEDICARE

## 2024-12-17 VITALS
HEIGHT: 69 IN | BODY MASS INDEX: 23.97 KG/M2 | OXYGEN SATURATION: 98 % | RESPIRATION RATE: 21 BRPM | DIASTOLIC BLOOD PRESSURE: 74 MMHG | SYSTOLIC BLOOD PRESSURE: 130 MMHG | HEART RATE: 75 BPM | WEIGHT: 161.5 LBS | BODY MASS INDEX: 23.92 KG/M2 | HEIGHT: 69 IN | WEIGHT: 161.81 LBS

## 2024-12-17 DIAGNOSIS — J43.2 CENTRILOBULAR EMPHYSEMA: ICD-10-CM

## 2024-12-17 DIAGNOSIS — J43.2 CENTRILOBULAR EMPHYSEMA: Primary | ICD-10-CM

## 2024-12-17 LAB
BRPFT: ABNORMAL
DLCO SINGLE BREATH LLN: 19.22
DLCO SINGLE BREATH PRE REF: 57.8 %
DLCO SINGLE BREATH REF: 26.14
DLCOC SBVA LLN: 2.59
DLCOC SBVA REF: 3.78
DLCOC SINGLE BREATH LLN: 19.22
DLCOC SINGLE BREATH REF: 26.14
DLCOVA LLN: 2.59
DLCOVA PRE REF: 44.7 %
DLCOVA PRE: 1.69 ML/(MIN*MMHG*L) (ref 2.59–4.96)
DLCOVA REF: 3.78
ERV LLN: -16448.98
ERV PRE REF: 100.9 %
ERV REF: 1.02
FEF 25 75 LLN: 1.34
FEF 25 75 PRE REF: 21.3 %
FEF 25 75 REF: 3.05
FEV1 FVC LLN: 62
FEV1 FVC PRE REF: 68.4 %
FEV1 FVC REF: 76
FEV1 LLN: 2.21
FEV1 PRE REF: 61 %
FEV1 REF: 3.08
FRCPLETH LLN: 2.66
FRCPLETH PREREF: 97.5 %
FRCPLETH REF: 3.65
FVC LLN: 3.01
FVC PRE REF: 88.8 %
FVC REF: 4.07
IVC PRE: 3.39 L (ref 3.01–5.14)
IVC SINGLE BREATH LLN: 3.01
IVC SINGLE BREATH PRE REF: 83.3 %
IVC SINGLE BREATH REF: 4.07
MVV LLN: 102
MVV PRE REF: 61.6 %
MVV REF: 119
PEF LLN: 5.78
PEF PRE REF: 58.5 %
PEF REF: 8.03
PRE DLCO: 15.11 ML/(MIN*MMHG) (ref 19.22–33.07)
PRE ERV: 1.03 L (ref -16448.98–16451.02)
PRE FEF 25 75: 0.65 L/S (ref 1.34–4.76)
PRE FET 100: 14.46 SEC
PRE FEV1 FVC: 52.01 % (ref 62.43–88.03)
PRE FEV1: 1.88 L (ref 2.21–3.88)
PRE FRC PL: 3.56 L (ref 2.66–4.64)
PRE FVC: 3.61 L (ref 3.01–5.14)
PRE MVV: 73.56 L/MIN (ref 101.56–137.41)
PRE PEF: 4.7 L/S (ref 5.78–10.28)
PRE RV: 2.53 L (ref 1.95–3.3)
PRE TLC: 6.14 L (ref 5.77–8.07)
RAW LLN: 3.06
RAW PRE REF: 110.2 %
RAW PRE: 3.37 CMH2O*S/L (ref 3.06–3.06)
RAW REF: 3.06
RV LLN: 1.95
RV PRE REF: 96.2 %
RV REF: 2.63
RVTLC LLN: 33
RVTLC PRE REF: 98.9 %
RVTLC PRE: 41.21 % (ref 32.67–50.63)
RVTLC REF: 42
TLC LLN: 5.77
TLC PRE REF: 88.7 %
TLC REF: 6.92
VA PRE: 8.95 L (ref 6.77–6.77)
VA SINGLE BREATH LLN: 6.77
VA SINGLE BREATH PRE REF: 132.1 %
VA SINGLE BREATH REF: 6.77
VC LLN: 3.01
VC PRE REF: 88.8 %
VC PRE: 3.61 L (ref 3.01–5.14)
VC REF: 4.07

## 2024-12-17 PROCEDURE — 4010F ACE/ARB THERAPY RXD/TAKEN: CPT | Mod: CPTII,S$GLB,, | Performed by: INTERNAL MEDICINE

## 2024-12-17 PROCEDURE — 94010 BREATHING CAPACITY TEST: CPT | Mod: 59,S$GLB,, | Performed by: INTERNAL MEDICINE

## 2024-12-17 PROCEDURE — 94618 PULMONARY STRESS TESTING: CPT | Mod: S$GLB,,, | Performed by: INTERNAL MEDICINE

## 2024-12-17 PROCEDURE — 1159F MED LIST DOCD IN RCRD: CPT | Mod: CPTII,S$GLB,, | Performed by: INTERNAL MEDICINE

## 2024-12-17 PROCEDURE — 99214 OFFICE O/P EST MOD 30 MIN: CPT | Mod: 25,S$GLB,, | Performed by: INTERNAL MEDICINE

## 2024-12-17 PROCEDURE — 3288F FALL RISK ASSESSMENT DOCD: CPT | Mod: CPTII,S$GLB,, | Performed by: INTERNAL MEDICINE

## 2024-12-17 PROCEDURE — 3075F SYST BP GE 130 - 139MM HG: CPT | Mod: CPTII,S$GLB,, | Performed by: INTERNAL MEDICINE

## 2024-12-17 PROCEDURE — 1101F PT FALLS ASSESS-DOCD LE1/YR: CPT | Mod: CPTII,S$GLB,, | Performed by: INTERNAL MEDICINE

## 2024-12-17 PROCEDURE — 1126F AMNT PAIN NOTED NONE PRSNT: CPT | Mod: CPTII,S$GLB,, | Performed by: INTERNAL MEDICINE

## 2024-12-17 PROCEDURE — 99999 PR PBB SHADOW E&M-EST. PATIENT-LVL I: CPT | Mod: PBBFAC,,,

## 2024-12-17 PROCEDURE — 1160F RVW MEDS BY RX/DR IN RCRD: CPT | Mod: CPTII,S$GLB,, | Performed by: INTERNAL MEDICINE

## 2024-12-17 PROCEDURE — 71250 CT THORAX DX C-: CPT | Mod: 26,,, | Performed by: STUDENT IN AN ORGANIZED HEALTH CARE EDUCATION/TRAINING PROGRAM

## 2024-12-17 PROCEDURE — 99999 PR PBB SHADOW E&M-EST. PATIENT-LVL III: CPT | Mod: PBBFAC,,, | Performed by: INTERNAL MEDICINE

## 2024-12-17 PROCEDURE — 94726 PLETHYSMOGRAPHY LUNG VOLUMES: CPT | Mod: S$GLB,,, | Performed by: INTERNAL MEDICINE

## 2024-12-17 PROCEDURE — 3078F DIAST BP <80 MM HG: CPT | Mod: CPTII,S$GLB,, | Performed by: INTERNAL MEDICINE

## 2024-12-17 PROCEDURE — 94729 DIFFUSING CAPACITY: CPT | Mod: S$GLB,,, | Performed by: INTERNAL MEDICINE

## 2024-12-17 PROCEDURE — 3008F BODY MASS INDEX DOCD: CPT | Mod: CPTII,S$GLB,, | Performed by: INTERNAL MEDICINE

## 2024-12-17 PROCEDURE — 71250 CT THORAX DX C-: CPT | Mod: TC

## 2024-12-17 RX ORDER — UMECLIDINIUM BROMIDE AND VILANTEROL TRIFENATATE 62.5; 25 UG/1; UG/1
1 POWDER RESPIRATORY (INHALATION) DAILY
Qty: 60 EACH | Refills: 6 | Status: SHIPPED | OUTPATIENT
Start: 2024-12-17

## 2024-12-17 NOTE — PROCEDURES
"The [place] 3rd[facility]  Six Minute Walk     SUMMARY     Ordering Provider:    Interpreting Provider:   Performing nurse/tech/RT: JOS Wynn RRT  Diagnosis:  (Centrilobular Emphysema)  Height: 5' 9" (175.3 cm)  Weight: 73.4 kg (161 lb 13.1 oz)  BMI (Calculated): 23.9                Phase Oxygen Assessment Supplemental O2 Heart   Rate Blood Pressure Elizabeth Dyspnea Scale Rating   Resting 96 % Room Air 77 bpm 130/73 0   Exercise        Minute        1 95 % Room Air 87 bpm     2 95 % Room Air 98 bpm     3 94 % Room Air 103 bpm     4 95 % Room Air 109 bpm     5 94 % Room Air 112 bpm     6  95 % Room Air 110 bpm (!) 146/91 0   Recovery        Minute        1 96 % Room Air 93 bpm     2 97 % Room Air 86 bpm     3 96 % Room Air 83 bpm     4 96 % Room Air 83 bpm 129/83 0     Six Minute Walk Summary  6MWT Status: completed without stopping  Patient Reported: No complaints     Interpretation:  Did the patient stop or pause?: No                                         Total Time Walked (Calculated): 360 seconds  Final Partial Lap Distance (feet): 0 feet  Total Distance Meters (Calculated): 426.72 meters  Predicted Distance Meters (Calculated): 532.42 meters  Percentage of Predicted (Calculated): 80.15  Peak VO2 (Calculated): 16.78  Mets: 4.79  Has The Patient Had a Previous Six Minute Walk Test?: No       Previous 6MWT Results  Has The Patient Had a Previous Six Minute Walk Test?: No    "

## 2024-12-17 NOTE — PROGRESS NOTES
Subjective:      Patient ID: Aurelio Dnaiels is a 69 y.o. male.    Chief Complaint: f/u lung CA, COPD    Follow-up        Sept 2024  68-year-old male long-time smoker here for evaluation of dyspnea on exertion. Patient states it has been getting steadily worse over time. Had PFTs done here March of last year which showed moderately severe obstruction with hyperinflation. No imaging done here. States that he was given prescription for albuterol and a long-acting inhaler but never filled the long-acting inhaler. Has been using albuterol only with no benefit. States that when he gets short of breath he just stops and rests and it takes him a few minutes to recover. No complaints of cough, anginal chest pain, productive sputum or fevers or chills. He is here today with his wife.      October 2024  In the interim was admitted to hospital with spontaneous Ptx  Also had lung mass which was biopsied + for SCCA  Ptx resolved  Saw oncology and had PET imaging with low level mediastinal LN uptake and non-avid ipsilateral pleural effusion  Mainly complains of some tightness and numbness to left chest    December 2024  Here for follow up with the above  In the interim I did perform bronchoscopy with lymph node biopsy in thoracentesis both of which were negative for malignancy  Is being treated with external beam SBRT  Tolerating well so far  PFTs and 6 minute walk test done today for review  CT chest done today for review    Review of Systems as per history of present illness otherwise negative  Objective:     Physical Exam   Constitutional: He is oriented to person, place, and time. He appears well-developed. No distress.   HENT:   Head: Normocephalic.   Cardiovascular: Normal rate and regular rhythm.   Pulmonary/Chest: Normal expansion, symmetric chest wall expansion, effort normal and breath sounds normal.   Musculoskeletal:      Cervical back: Neck supple.   Neurological: He is alert and oriented to person, place, and  time.   Psychiatric: He has a normal mood and affect.   Nursing note and vitals reviewed.        Assessment:     1. Centrilobular emphysema      I personally reviewed pulmonary function tests obtained today.  This shows moderately severe COPD, unchanged from 2023    I personally reviewed 6 minute walk test data obtained today.  This shows walk distance achieve of 80% indicating mild exercise intolerance.  No significant desaturations.  Excellent cardiovascular response to exercise    I personally reviewed CT chest obtained today.  This shows resolution of left pleural effusion as well as positive response to treatment of the left perihilar lung mass    Plan:     Stable COPD symptoms  Patient requesting a step-down in intensity of therapy from Trelegy to something less potent  Sent prescription for Anoro inhaler to pharmacy  Continue to follow up with the radiation oncology and Hematology Oncology  I will see him back in 6 months, sooner if needed  Continue to abstain from smoking

## 2024-12-19 ENCOUNTER — DOCUMENTATION ONLY (OUTPATIENT)
Dept: PALLIATIVE MEDICINE | Facility: HOSPITAL | Age: 69
End: 2024-12-19
Payer: MEDICARE

## 2024-12-19 NOTE — PROGRESS NOTES
Palliative Referral Nurse Note:    Nurse reached out to pt for rescheduling for palliative from referral que, no answer, pt canceled apt scheduled, nurse attempted to contact pt via phone, no answer,  unable to leave voice message.

## 2024-12-20 ENCOUNTER — PATIENT MESSAGE (OUTPATIENT)
Dept: PULMONOLOGY | Facility: CLINIC | Age: 69
End: 2024-12-20
Payer: MEDICARE

## 2024-12-23 ENCOUNTER — TELEPHONE (OUTPATIENT)
Dept: INTERNAL MEDICINE | Facility: CLINIC | Age: 69
End: 2024-12-23
Payer: MEDICARE

## 2024-12-23 DIAGNOSIS — C34.12 MALIGNANT NEOPLASM OF UPPER LOBE OF LEFT LUNG: Primary | ICD-10-CM

## 2024-12-23 NOTE — TELEPHONE ENCOUNTER
----- Message from Liudmila sent at 12/23/2024  1:27 PM CST -----  Contact: Aurelio Carey need to have his oxygen supplies picked up from his home but the company won't do it without a form from the Dr. The name of the company is [employer]. Phone: [phone]. Can you please call the patient first at [phone]. The patient stated he was discharged on 09/17/2024 from [facility] [address] through [facility].

## 2024-12-26 ENCOUNTER — OFFICE VISIT (OUTPATIENT)
Dept: RADIATION ONCOLOGY | Facility: CLINIC | Age: 69
End: 2024-12-26
Payer: MEDICARE

## 2024-12-26 VITALS
HEIGHT: 69 IN | DIASTOLIC BLOOD PRESSURE: 87 MMHG | SYSTOLIC BLOOD PRESSURE: 161 MMHG | HEART RATE: 72 BPM | OXYGEN SATURATION: 99 % | TEMPERATURE: 99 F | BODY MASS INDEX: 24.29 KG/M2 | WEIGHT: 164 LBS | RESPIRATION RATE: 19 BRPM

## 2024-12-26 DIAGNOSIS — C34.92 SQUAMOUS CELL CARCINOMA OF LEFT LUNG: Primary | ICD-10-CM

## 2024-12-26 PROCEDURE — 99999 PR PBB SHADOW E&M-EST. PATIENT-LVL III: CPT | Mod: PBBFAC,,, | Performed by: SPECIALIST

## 2024-12-26 NOTE — PROGRESS NOTES
[facility] - Radiation Oncology Follow Up Note    HISTORY OF PRESENT ILLNESS:  68-year-old man previously treated with radiation and hormone for prostate cancer and not seen in this clinic in over 2 years, now off endocrine therapy with testosterone recovery and persistent unmeasurable PSA through 07/08/2024, at less than 0.01.        He  presented to his primary care physician complaining of cough and fever with chest x-ray on 09/09/2024 benign.  Chest x-ray on 09/13/2024 showed changes consistent with multifocal bilateral interstitial pneumonia.  Chest x-ray on 09/24/2024 suspicious for left upper lobe collapse with persistent consolidations and atelectasis on the left.  Additionally, an opaque mass at the level of the midthoracic spine.  CT was recommended      Chest x-ray on 09/27/2024 shows near-complete collapse of the left lung with a large left pneumothorax.  The right lung is clear     CT of the chest with contrast on 09/27/2024, which I have reviewed today, shows a spiculated left perihilar mass measuring 3.7 x 2.5 x 3.6 cm concerning for neoplasm.  Nonspecific mildly prominent mediastinal and left hilar lymph nodes.  Consolidations of the left upper lobe and posteromedial right lower lobe are concerning for pneumonia.  Emphysematous changes with bulla are noted in the left upper lobe nonspecific sclerotic foci at T12 and L1 vertebral bodies are nonspecific.  Small left and trace right pleural effusion.  Trace left apical pneumothorax with chest tube in place          CT-guided biopsy on 10/03/2024 recovered non-small-cell carcinoma favoring squamous cell, PD-L1 IHC and NextGen sequencing through [facility] are pending        Brain MRI yesterday was benign     PET scan today, which I have reviewed, shows the left perihilar biopsy-proven mass to measure 5 cm with mildly FDG avid AP window nodes, SUV up to 3.5          He presented to me with overwhelming and ongoing  "improvement in his cough and shortness of breath.  He denies direct symptoms related to his biopsy-proven tumor including pain, hemoptysis, or weight loss.  He has no palpable adenopathy.  He has no headache, nausea, vomiting, vision changes, mental status changes, other pain, abdominal bloating distention or pain, or GI or  complaints.    He was treated to his left sided lesion to 60 Gy in 5 fractions from 11/11/2024 to 11/15/2024       INTERVAL HISTORY:  He returns for routine one-month follow up.  He had tolerated therapy well, and today reports persistent freedom from treatment-related complaints or complaints worrisome for local regional or metastatic progression.  He has returned to work part-time without limitation    PFTs and 6 minute walk test with Dr. Jimenez on 12/17/2024 showed 80% of predicted walk distance with excellent cardiovascular response to exercise and no significant oxygen desaturations.  I can not find the PFT results in the chart    CT of the chest on 12/17/2024, which I have reviewed today, shows persistence of the left hilar mass, with modestly interval response, without new findings      PHYSICAL EXAMINATION:  Vitals:    12/26/24 0942   BP: (!) 161/87   Pulse: 72   Resp: 19   Temp: 98.6 °F (37 °C)   SpO2: 99%   Weight: 74.4 kg (164 lb 0.4 oz)   Height: 5' 9" (1.753 m)      General:  A&O x4, NAD  Lungs:  CTAB  Heart:  RRR  Abdomen:  NTND +BS    ASSESSMENT:  Clinically stable.  Modest interval radiographic response without new evidence of disease      PLAN:  Very pleased with his initial response and good tolerance of therapy.  He will return in 3 months with a CT of the chest     We reviewed signs and symptoms of radiation pneumonitis in the importance of having his treating physician contact me if experienced any time in the next 12 months or so.  He and his wife voiced an understanding      "

## 2025-02-12 ENCOUNTER — LAB VISIT (OUTPATIENT)
Dept: LAB | Facility: HOSPITAL | Age: 70
End: 2025-02-12
Attending: NURSE PRACTITIONER
Payer: MEDICARE

## 2025-02-12 DIAGNOSIS — I10 PRIMARY HYPERTENSION: ICD-10-CM

## 2025-02-12 LAB
ALBUMIN SERPL BCP-MCNC: 3.7 G/DL (ref 3.5–5.2)
ALP SERPL-CCNC: 78 U/L (ref 40–150)
ALT SERPL W/O P-5'-P-CCNC: 24 U/L (ref 10–44)
ANION GAP SERPL CALC-SCNC: 8 MMOL/L (ref 8–16)
AST SERPL-CCNC: 21 U/L (ref 10–40)
BASOPHILS # BLD AUTO: 0.05 K/UL (ref 0–0.2)
BASOPHILS NFR BLD: 1 % (ref 0–1.9)
BILIRUB SERPL-MCNC: 0.4 MG/DL (ref 0.1–1)
BUN SERPL-MCNC: 15 MG/DL (ref 8–23)
CALCIUM SERPL-MCNC: 9 MG/DL (ref 8.7–10.5)
CHLORIDE SERPL-SCNC: 113 MMOL/L (ref 95–110)
CHOLEST SERPL-MCNC: 182 MG/DL (ref 120–199)
CHOLEST/HDLC SERPL: 4.4 {RATIO} (ref 2–5)
CO2 SERPL-SCNC: 21 MMOL/L (ref 23–29)
CREAT SERPL-MCNC: 0.8 MG/DL (ref 0.5–1.4)
DIFFERENTIAL METHOD BLD: ABNORMAL
EOSINOPHIL # BLD AUTO: 0.2 K/UL (ref 0–0.5)
EOSINOPHIL NFR BLD: 3.2 % (ref 0–8)
ERYTHROCYTE [DISTWIDTH] IN BLOOD BY AUTOMATED COUNT: 13.1 % (ref 11.5–14.5)
EST. GFR  (NO RACE VARIABLE): >60 ML/MIN/1.73 M^2
GLUCOSE SERPL-MCNC: 108 MG/DL (ref 70–110)
HCT VFR BLD AUTO: 38.8 % (ref 40–54)
HDLC SERPL-MCNC: 41 MG/DL (ref 40–75)
HDLC SERPL: 22.5 % (ref 20–50)
HGB BLD-MCNC: 12.7 G/DL (ref 14–18)
IMM GRANULOCYTES # BLD AUTO: 0.03 K/UL (ref 0–0.04)
IMM GRANULOCYTES NFR BLD AUTO: 0.6 % (ref 0–0.5)
LDLC SERPL CALC-MCNC: 126.2 MG/DL (ref 63–159)
LYMPHOCYTES # BLD AUTO: 1 K/UL (ref 1–4.8)
LYMPHOCYTES NFR BLD: 20.1 % (ref 18–48)
MCH RBC QN AUTO: 31.1 PG (ref 27–31)
MCHC RBC AUTO-ENTMCNC: 32.7 G/DL (ref 32–36)
MCV RBC AUTO: 95 FL (ref 82–98)
MONOCYTES # BLD AUTO: 0.4 K/UL (ref 0.3–1)
MONOCYTES NFR BLD: 8.5 % (ref 4–15)
NEUTROPHILS # BLD AUTO: 3.4 K/UL (ref 1.8–7.7)
NEUTROPHILS NFR BLD: 66.6 % (ref 38–73)
NONHDLC SERPL-MCNC: 141 MG/DL
NRBC BLD-RTO: 0 /100 WBC
PLATELET # BLD AUTO: 325 K/UL (ref 150–450)
PMV BLD AUTO: 9 FL (ref 9.2–12.9)
POTASSIUM SERPL-SCNC: 4.3 MMOL/L (ref 3.5–5.1)
PROT SERPL-MCNC: 6.2 G/DL (ref 6–8.4)
RBC # BLD AUTO: 4.09 M/UL (ref 4.6–6.2)
SODIUM SERPL-SCNC: 142 MMOL/L (ref 136–145)
TRIGL SERPL-MCNC: 74 MG/DL (ref 30–150)
TSH SERPL DL<=0.005 MIU/L-ACNC: 1.02 UIU/ML (ref 0.4–4)
WBC # BLD AUTO: 5.07 K/UL (ref 3.9–12.7)

## 2025-02-12 PROCEDURE — 80053 COMPREHEN METABOLIC PANEL: CPT | Performed by: NURSE PRACTITIONER

## 2025-02-12 PROCEDURE — 36415 COLL VENOUS BLD VENIPUNCTURE: CPT | Mod: PN | Performed by: NURSE PRACTITIONER

## 2025-02-12 PROCEDURE — 84443 ASSAY THYROID STIM HORMONE: CPT | Performed by: NURSE PRACTITIONER

## 2025-02-12 PROCEDURE — 85025 COMPLETE CBC W/AUTO DIFF WBC: CPT | Performed by: NURSE PRACTITIONER

## 2025-02-12 PROCEDURE — 80061 LIPID PANEL: CPT | Performed by: NURSE PRACTITIONER

## 2025-02-18 ENCOUNTER — TELEPHONE (OUTPATIENT)
Dept: PULMONOLOGY | Facility: CLINIC | Age: 70
End: 2025-02-18
Payer: MEDICARE

## 2025-02-19 ENCOUNTER — OFFICE VISIT (OUTPATIENT)
Dept: INTERNAL MEDICINE | Facility: CLINIC | Age: 70
End: 2025-02-19
Payer: MEDICARE

## 2025-02-19 ENCOUNTER — CLINICAL SUPPORT (OUTPATIENT)
Dept: PULMONOLOGY | Facility: CLINIC | Age: 70
End: 2025-02-19
Payer: MEDICARE

## 2025-02-19 VITALS
SYSTOLIC BLOOD PRESSURE: 150 MMHG | BODY MASS INDEX: 24.95 KG/M2 | HEART RATE: 60 BPM | TEMPERATURE: 97 F | DIASTOLIC BLOOD PRESSURE: 94 MMHG | OXYGEN SATURATION: 97 % | HEIGHT: 69 IN | WEIGHT: 168.44 LBS

## 2025-02-19 DIAGNOSIS — Z85.46 HISTORY OF PROSTATE CANCER: ICD-10-CM

## 2025-02-19 DIAGNOSIS — I10 PRIMARY HYPERTENSION: Primary | ICD-10-CM

## 2025-02-19 DIAGNOSIS — D53.9 MACROCYTIC ANEMIA: ICD-10-CM

## 2025-02-19 DIAGNOSIS — J43.2 CENTRILOBULAR EMPHYSEMA: ICD-10-CM

## 2025-02-19 DIAGNOSIS — J43.2 CENTRILOBULAR EMPHYSEMA: Primary | ICD-10-CM

## 2025-02-19 DIAGNOSIS — C34.92 SQUAMOUS CELL CARCINOMA OF LEFT LUNG: ICD-10-CM

## 2025-02-19 PROBLEM — F17.200 TOBACCO DEPENDENCY: Status: RESOLVED | Noted: 2024-09-13 | Resolved: 2025-02-19

## 2025-02-19 PROBLEM — F17.210 CIGARETTE SMOKER: Status: RESOLVED | Noted: 2023-02-27 | Resolved: 2025-02-19

## 2025-02-19 PROBLEM — Z72.0 TOBACCO ABUSE: Status: RESOLVED | Noted: 2024-09-27 | Resolved: 2025-02-19

## 2025-02-19 PROCEDURE — 99214 OFFICE O/P EST MOD 30 MIN: CPT | Mod: S$GLB,,, | Performed by: NURSE PRACTITIONER

## 2025-02-19 PROCEDURE — G2211 COMPLEX E/M VISIT ADD ON: HCPCS | Mod: S$GLB,,, | Performed by: NURSE PRACTITIONER

## 2025-02-19 RX ORDER — VALSARTAN 80 MG/1
80 TABLET ORAL DAILY
Qty: 90 TABLET | Refills: 3 | Status: SHIPPED | OUTPATIENT
Start: 2025-02-19

## 2025-02-19 NOTE — PROGRESS NOTES
Subjective:       Patient ID: Aurelio Daniels is a 69 y.o. male.    Chief Complaint: Follow-up    HPI       Prostate cancer: followed by Dr. Smith (urology)     Hx of smoking:no longer smokes     COPD: PFTS due today, no longer on O2     Lung CA- sees rad/onc here. Stable. Tolerated treatments     HTN:BP elevated. Stopped valsartan during last hospitalization last year. Nash not monitor BP at home. no HTNive symptoms.     Anemia:long standing, had colonoscopy 2022              Review of Systems   Constitutional:  Negative for activity change, appetite change, chills, diaphoresis, fatigue, fever and unexpected weight change.   HENT:  Negative for congestion, ear pain, postnasal drip, rhinorrhea, sinus pressure, sinus pain, sneezing, sore throat, tinnitus, trouble swallowing and voice change.    Eyes:  Negative for photophobia, pain and visual disturbance.   Respiratory:  Negative for cough, chest tightness, shortness of breath and wheezing.    Cardiovascular:  Negative for chest pain, palpitations and leg swelling.   Gastrointestinal:  Negative for abdominal distention, abdominal pain, constipation, diarrhea, nausea and vomiting.   Genitourinary:  Negative for decreased urine volume, difficulty urinating, dysuria, flank pain, frequency, hematuria and urgency.   Musculoskeletal:  Negative for arthralgias, back pain, joint swelling, neck pain and neck stiffness.   Allergic/Immunologic: Negative for immunocompromised state.   Neurological:  Negative for dizziness, tremors, seizures, syncope, facial asymmetry, speech difficulty, weakness, light-headedness, numbness and headaches.   Hematological:  Negative for adenopathy. Does not bruise/bleed easily.   Psychiatric/Behavioral:  Negative for confusion and sleep disturbance.        Objective:      Physical Exam  HENT:      Head: Normocephalic and atraumatic.      Right Ear: Tympanic membrane normal.      Left Ear: Tympanic membrane normal.   Eyes:       "Conjunctiva/sclera: Conjunctivae normal.   Cardiovascular:      Rate and Rhythm: Normal rate and regular rhythm.      Heart sounds: Normal heart sounds.   Pulmonary:      Effort: Pulmonary effort is normal.      Breath sounds: Normal breath sounds.   Abdominal:      General: Bowel sounds are normal.      Palpations: Abdomen is soft.   Musculoskeletal:         General: Normal range of motion.      Cervical back: Normal range of motion and neck supple.   Skin:     General: Skin is warm and dry.   Neurological:      Mental Status: He is alert and oriented to person, place, and time.         Assessment:     Vitals:    02/19/25 1313   BP: (!) 150/94   Pulse: 60   Temp: 96.9 °F (36.1 °C)         1. Primary hypertension    2. Centrilobular emphysema    3. Squamous cell carcinoma of left lung    4. History of prostate cancer    5. Macrocytic anemia        Plan:   Primary hypertension  -     Comprehensive Metabolic Panel; Future; Expected date: 08/18/2025  -     Lipid Panel; Future; Expected date: 08/18/2025  -     TSH; Future; Expected date: 08/18/2025    Centrilobular emphysema    Squamous cell carcinoma of left lung    History of prostate cancer    Macrocytic anemia  -     CBC Auto Differential; Future; Expected date: 08/18/2025    Other orders  -     valsartan (DIOVAN) 80 MG tablet; Take 1 tablet (80 mg total) by mouth once daily.  Dispense: 90 tablet; Refill: 3      BP elevated at last few visits. Recommend monitoring Bp at home daily/ restart valsartan if BP consistently > 140/80  He stopped it due to having much improved BP after hospitalization last year  He has been "eating whatever he wants" advised to follow low salt/low fat diet.  Keep specialty care follow up  Rtc 6 mo w lab  Chronic care mgt done  "

## 2025-02-19 NOTE — PATIENT INSTRUCTIONS
ACTION PLAN    GREEN ZONE  My sputum is clear/white/usual color and easily cleared.  My breathing is no harder than usual.  I can do my usual activities.  I can think clearly.   Take your usual medicines, including oxygen, as you are told to do so by your health care provider.   YELLOW ZONE  My sputum has change (color, thickness, amount).  I am more short of breath than usual.  I cough or wheeze more.  I weigh more and my legs/feet swell.  I cannot do my usual activities without resting.   Call your health care provider. You will probably be told to begin taking an antibiotic and prednisone. Have your pharmacy phone number available.   RED ZONE  I cannot cough out my sputum.  I am much more short of breath than normal.  I need to sit up to breathe  I cannot do my usual activities.  I am unable to speak more than one or two words at a time.  I am confused.   Call your health care provider. You may be asked to come in to be seen, told to go to the emergency room, or told to call 9-1-1.

## 2025-02-19 NOTE — PROGRESS NOTES
Pulmonary Disease Management  [facility]  Follow up Visit  Chronic Care Management    Aurelio Daniels  was seen 2/19/2025  1:30 PM in the Pulmonary Disease Management Clinic for evaluation, education, reinforcement of self management techniques and exacerbation action plan.    Josegautam Jones    Past Medical History:   Diagnosis Date    Cancer     Prostate    History of prostate cancer 10/24/2024    Squamous cell carcinoma of left lung 10/14/2024    Tobacco use        Patient's Medications   New Prescriptions    No medications on file   Previous Medications    AZELASTINE (ASTELIN) 137 MCG (0.1 %) NASAL SPRAY    1 spray (137 mcg total) by Nasal route 2 (two) times daily.    HYDROCODONE-ACETAMINOPHEN (NORCO) 7.5-325 MG PER TABLET    Take 1 tablet by mouth every 8 (eight) hours as needed for Pain.    UMECLIDINIUM-VILANTEROL (ANORO ELLIPTA) 62.5-25 MCG/ACTUATION DSDV    Inhale 1 puff into the lungs once daily. Controller   Modified Medications    Modified Medication Previous Medication    VALSARTAN (DIOVAN) 80 MG TABLET valsartan (DIOVAN) 80 MG tablet       Take 1 tablet (80 mg total) by mouth once daily.    Take 1 tablet (80 mg total) by mouth once daily.   Discontinued Medications    No medications on file             Educational assessment:   [x]            Good  []            Fair  []            Poor    Readiness to learn:   [x]            Good  []            Fair  []            Poor    Vision Status:   [x]            Good  []            Fair  []            Poor    Reading Ability:  [x]            Good  []            Fair  []            Poor    Knowledge of condition:   [x]            Good  []            Fair  []            Poor    Language Barriers:   []            Good  []            Fair  []            Poor  [x]            None    Cognitive/ Physical Barriers:   []            Good  []            Fair  []            Poor  [x]            None    Learning best by:                       [x]             Seeing  []            Hearing  []            Reading                         [x]            Doing    Describe any barrier /Limitation or financial implications of care choices identified     []            Financial  []            Emotional  []            Education  []            Vision/Hearing  []            Physical  [x]            None  []                TOPIC /CONTENT FOR TODAY:    []            MDI with or without spacer  [x]            Dry powder inhaler  []            Acapella   []           Peak Flow meter  [x]            COPD action plan  []            Nebulizer use  []            Oxygen use safety  [x]            Breathing and cough techniques  []            Energy conservation  [x]            Infection prevention  []           OTHER________________________        Learner:    [x]            Patient   []            Caregiver    Method:    [x]            Verbal explanation  [x]            Audio visual    [x]            Literature  [x]            Teach back      Evaluation:    [x]            Teach back  [x]            Demonstrate  [x]            Follow up phone call    []            2 weeks     []            4 weeks   [x]            PRN    Additional comments:   Patient was seen today to review respiratory medication purpose and proper technique for use of inhalers. Patient practiced proper technique using MDI with valved holding chamber (spacer) and DPI inhalers. Patient demonstrated understanding. Literature was given to patient.     Asthma trigger checklist was verbally reviewed and literature given to patient.     Infection prevention was discussed. Patient declines RSV vaccine. Hand hygiene and cleaning of respiratory equipment was also discussed. Patient verbalized understanding.      COPD action plan was reviewed and literature was given to patient. Patient verbalized understanding.     Plan:  Monthly Pulmonary Disease Management Questionnaire  Follow-up PDM appointment scheduled as needed  Reinforce  education  Meds: Anoro   DME Needs: n/a   Action Plan  Immunization: Pneumococcal- current, Flu-current , Covid 19- declined   Next Provider Visit: 6/17/25  Next Spirometry/CPFT: not scheduled   Approximate time spent with patient: 30 mins

## 2025-02-27 ENCOUNTER — PATIENT MESSAGE (OUTPATIENT)
Dept: HEMATOLOGY/ONCOLOGY | Facility: CLINIC | Age: 70
End: 2025-02-27
Payer: MEDICARE

## 2025-03-24 DIAGNOSIS — Z00.00 ENCOUNTER FOR MEDICARE ANNUAL WELLNESS EXAM: ICD-10-CM

## 2025-03-25 ENCOUNTER — OFFICE VISIT (OUTPATIENT)
Dept: UROLOGY | Facility: CLINIC | Age: 70
End: 2025-03-25
Payer: MEDICARE

## 2025-03-25 ENCOUNTER — LAB VISIT (OUTPATIENT)
Dept: LAB | Facility: HOSPITAL | Age: 70
End: 2025-03-25
Attending: UROLOGY
Payer: MEDICARE

## 2025-03-25 VITALS
BODY MASS INDEX: 24.82 KG/M2 | DIASTOLIC BLOOD PRESSURE: 92 MMHG | SYSTOLIC BLOOD PRESSURE: 143 MMHG | HEIGHT: 69 IN | WEIGHT: 167.56 LBS | HEART RATE: 71 BPM

## 2025-03-25 DIAGNOSIS — C61 PROSTATE CANCER: Primary | ICD-10-CM

## 2025-03-25 DIAGNOSIS — C61 PROSTATE CANCER: ICD-10-CM

## 2025-03-25 LAB — PSA SERPL-MCNC: 0.04 NG/ML

## 2025-03-25 PROCEDURE — 1101F PT FALLS ASSESS-DOCD LE1/YR: CPT | Mod: CPTII,S$GLB,, | Performed by: UROLOGY

## 2025-03-25 PROCEDURE — 3008F BODY MASS INDEX DOCD: CPT | Mod: CPTII,S$GLB,, | Performed by: UROLOGY

## 2025-03-25 PROCEDURE — 36415 COLL VENOUS BLD VENIPUNCTURE: CPT

## 2025-03-25 PROCEDURE — 3077F SYST BP >= 140 MM HG: CPT | Mod: CPTII,S$GLB,, | Performed by: UROLOGY

## 2025-03-25 PROCEDURE — 3080F DIAST BP >= 90 MM HG: CPT | Mod: CPTII,S$GLB,, | Performed by: UROLOGY

## 2025-03-25 PROCEDURE — 1159F MED LIST DOCD IN RCRD: CPT | Mod: CPTII,S$GLB,, | Performed by: UROLOGY

## 2025-03-25 PROCEDURE — 1126F AMNT PAIN NOTED NONE PRSNT: CPT | Mod: CPTII,S$GLB,, | Performed by: UROLOGY

## 2025-03-25 PROCEDURE — 3288F FALL RISK ASSESSMENT DOCD: CPT | Mod: CPTII,S$GLB,, | Performed by: UROLOGY

## 2025-03-25 PROCEDURE — 99213 OFFICE O/P EST LOW 20 MIN: CPT | Mod: S$GLB,,, | Performed by: UROLOGY

## 2025-03-25 PROCEDURE — 84153 ASSAY OF PSA TOTAL: CPT

## 2025-03-25 PROCEDURE — 4010F ACE/ARB THERAPY RXD/TAKEN: CPT | Mod: CPTII,S$GLB,, | Performed by: UROLOGY

## 2025-03-25 PROCEDURE — 1160F RVW MEDS BY RX/DR IN RCRD: CPT | Mod: CPTII,S$GLB,, | Performed by: UROLOGY

## 2025-03-25 PROCEDURE — 99999 PR PBB SHADOW E&M-EST. PATIENT-LVL III: CPT | Mod: PBBFAC,,, | Performed by: UROLOGY

## 2025-03-25 NOTE — PROGRESS NOTES
Chief Complaint:  High risk prostate cancer    HPI:   03/25/2025 - returns today for follow-up, no new issues in the interim, still working doing much smaller maintenance things, no voiding issues, no gross hematuria or dysuria, due for PSA    07/10/2024 - returns today for follow-up, no new issues in the interim, PSA remains undetectable, thinking about retiring in August 04/09/2024 - returns today for follow-up, no new issues in the interim, voiding well, PSA remains undetectable, energy level good, strength is pretty good, still few hot flashes but nothing bothersome    01/03/2024 - patient returns today for follow-up, no new issues in the interim, no voiding difficulty, still little bit hot flashes, PSA remains undetectable, due for last Lupron today    09/27/2023 - returns today for follow-up, no new issues in the interim, voids well, denies any gross hematuria or dysuria, still has hot flashes due to Lupron but tolerable, PSA remains undetectable    06/272023 - patient returns today for follow-up, no new issues in the interim, still having some very soft bowel movements, has a good flow and empties reasonably well, nocturia x2 but not bothersome, PSA remains undetectable    03/28/2023 - returns for follow-up, no issues in the interim, voiding well, no GH, does have some hot flashes but tolerating ok     12/27/2022 - patient returns today for follow-up, doing well with the Lupron, has noticed that he gets tired little bit easier, also has a little bit of fecal urgency but voiding okay, PSA down to 0.02, denies any gross hematuria or dysuria    08/24/2022 - returns for follow-up, finished his 5th day of radiation today, overall feeling well, a little tired and maybe some hot flashes, no voiding issues or GH, no dysuria, PSA down to 4.2    05/13/2022 - returns today for follow-up, no issues since his biopsy, path reviewed Dwight 3 + 3 in left apex and Dwight 3 + 3 in right apex, presents today for  discussion    04/28/2022 - TRUS bx    03/29/2022 - returns today for follow-up, PSA now up to 97, voiding well, denies any pelvic discomfort, denies fevers, denies GH    03/08/2022 - 67yo male that presents for elevated PSA.  Patient had routine lab work obtained as part of normal screening in late February which resulted with PSA of 80.6.  Previous PSA level in our system 3.2 in 2009.  Patient notes some intermittency with his urinary stream but denies overt weak stream.  Denies gross hematuria.  He is a smoker and quit about 2 months ago.  Denies family history of  cancers.  Had a vasectomy in 1985 but no other urologic procedures. Mild ED, does not take any meds.     PMH:  Past Medical History:   Diagnosis Date    Cancer     Prostate    History of prostate cancer 10/24/2024    Squamous cell carcinoma of left lung 10/14/2024    Tobacco use        PSH:  Past Surgical History:   Procedure Laterality Date    COLONOSCOPY      COLONOSCOPY N/A 5/20/2022    Procedure: COLONOSCOPY;  Surgeon: Jamia Alfaro MD;  Location: [facility];  Service: Endoscopy;  Laterality: N/A;    COMPUTED TOMOGRAPHY N/A 10/3/2024    Procedure: CT (COMPUTED TOMOGRAPHY)/lung biopsy;  Surgeon: Nvaneet Oswald MD;  Location: [facility];  Service: General;  Laterality: N/A;    ENDOBRONCHIAL ULTRASOUND N/A 10/28/2024    Procedure: ENDOBRONCHIAL ULTRASOUND (EBUS);  Surgeon: Fabio Jimenez MD;  Location: [facility];  Service: Pulmonary;  Laterality: N/A;    THORACENTESIS N/A 10/28/2024    Procedure: THORACENTESIS;  Surgeon: Fbaio Jimenez MD;  Location: [facility];  Service: Pulmonary;  Laterality: N/A;    VASECTOMY  1985       Family History:  Family History   Problem Relation Name Age of Onset    No Known Problems Mother      Heart disease Father      No Known Problems Brother      No Known Problems Brother      No Known Problems Daughter      No Known Problems Daughter         Social History:  Social History     Tobacco Use    Smoking  status: Former     Current packs/day: 0.00     Types: Cigarettes     Quit date: 1/15/2022     Years since quitting: 3.1     Passive exposure: Current    Smokeless tobacco: Never   Substance Use Topics    Alcohol use: Not Currently     Alcohol/week: 2.0 standard drinks of alcohol     Types: 2 Shots of liquor per week    Drug use: Never        Review of Systems:  General: No fever, chills  Skin: No rashes  Chest:  Denies cough and sputum production  Heart: Denies chest pain  Resp: Denies dyspnea  Abdomen: Denies diarrhea, abdominal pain, hematemesis, or blood in stool.  Musculoskeletal: No joint stiffness or swelling. Denies back pain.  : see HPI  Neuro: no dizziness or weakness    Allergies:  Patient has no known allergies.    Medications:    Current Outpatient Medications:     umeclidinium-vilanteroL (ANORO ELLIPTA) 62.5-25 mcg/actuation DsDv, Inhale 1 puff into the lungs once daily. Controller, Disp: 60 each, Rfl: 6    valsartan (DIOVAN) 80 MG tablet, Take 1 tablet (80 mg total) by mouth once daily., Disp: 90 tablet, Rfl: 3    azelastine (ASTELIN) 137 mcg (0.1 %) nasal spray, 1 spray (137 mcg total) by Nasal route 2 (two) times daily., Disp: 30 mL, Rfl: 3    HYDROcodone-acetaminophen (NORCO) 7.5-325 mg per tablet, Take 1 tablet by mouth every 8 (eight) hours as needed for Pain., Disp: 15 tablet, Rfl: 0    Physical Exam:  Vitals:    03/25/25 0740   BP: (!) 143/92   Pulse: 71     Body mass index is 24.74 kg/m².  General: awake, alert, cooperative  Head: NC/AT  Ears: external ears normal  Eyes: sclera normal  Lungs: normal inspiration, NAD  Heart: well-perfused  Abdomen: Soft, NT, ND   3/22: Normal uncirc'd phallus, meatus normal in size and position, BL testicles palpable, no masses, nontender, no abnormalities of epididymi  CHRISTOPH 3/22: Normal rectal tone, no hemorrhoids. Prostate smooth and normal, no nodules 40 gm SV not palpable. Perineum and anus normal.  Lymphatic: groin nodes negative  Skin: The skin is warm  and dry  Ext: No c/c/e.  Neuro: grossly intact, AOx3    RADIOLOGY:  No recent relevant imaging available for review.    LABS:  I personally reviewed the following lab values:  Lab Results   Component Value Date    WBC 5.07 02/12/2025    HGB 12.7 (L) 02/12/2025    HCT 38.8 (L) 02/12/2025     02/12/2025     02/12/2025    K 4.3 02/12/2025     (H) 02/12/2025    CREATININE 0.8 02/12/2025    BUN 15 02/12/2025    CO2 21 (L) 02/12/2025    TSH 1.022 02/12/2025    PSA 80.6 (H) 02/25/2022    INR 0.9 10/02/2024    CHOL 182 02/12/2025    TRIG 74 02/12/2025    HDL 41 02/12/2025    ALT 24 02/12/2025    AST 21 02/12/2025     1. PROSTATE, LEFT BASE, BIOPSY:   - Benign prostate tissue.   2. PROSTATE, LEFT APEX, BIOPSY:   - Prostatic adenocarcinoma, Dwight score 3+3=6 (Grade group 1) involving 2   of 2 cores (5% of tissue submitted).   - Total linear lengths of carcinoma are <1 mm and 1 mm.   3. PROSTATE, LEFT MID, BIOPSY:   - High grade prostatic intraepithelial lesion (PIN).   - AMACR, p63 and HMWK with appropriate controls are performed and support the   diagnosis.   4. PROSTATE, RIGHT BASE, BIOPSY:   - Prostate tissue with small focus of atypical glands, suspicious for   carcinoma.   - Most of the glands of interest are not present on Immunohistochemically   stained slides for further evaluation.   5. PROSTATE, RIGHT APEX, BIOPSY:   - Prostatic adenocarcinoma, [place] score 3+3=6 (Grade group 1) involving 2   of 3 cores (10% of tissue submitted).   - Total linear lengths of carcinoma are <1 mm and 2 mm.   - AMACR, p63 and HMWK with appropriate controls are performed and support the   diagnosis.   6. PROSTATE, RIGHT MID, BIOPSY:   - High grade prostatic intraepithelial lesion (PIN).     Assessment/Plan:   Aurelio Daniels is a 69 y.o. male with high risk prostate cancer s/p XRT and ADT, PSA today, done with 2 yrs of lupron, f/u 6 months with PSA    Jian Smith MD  Urology

## 2025-03-28 ENCOUNTER — LAB VISIT (OUTPATIENT)
Dept: LAB | Facility: HOSPITAL | Age: 70
End: 2025-03-28
Attending: SPECIALIST
Payer: MEDICARE

## 2025-03-28 DIAGNOSIS — C34.92 SQUAMOUS CELL CARCINOMA OF LEFT LUNG: ICD-10-CM

## 2025-03-28 LAB
CREAT SERPL-MCNC: 0.8 MG/DL (ref 0.5–1.4)
GFR SERPLBLD CREATININE-BSD FMLA CKD-EPI: >60 ML/MIN/1.73/M2

## 2025-03-28 PROCEDURE — 36415 COLL VENOUS BLD VENIPUNCTURE: CPT | Mod: PN

## 2025-03-28 PROCEDURE — 82565 ASSAY OF CREATININE: CPT

## 2025-03-31 ENCOUNTER — HOSPITAL ENCOUNTER (OUTPATIENT)
Dept: RADIOLOGY | Facility: HOSPITAL | Age: 70
Discharge: HOME | End: 2025-03-31
Attending: SPECIALIST
Payer: MEDICARE

## 2025-03-31 DIAGNOSIS — C34.92 SQUAMOUS CELL CARCINOMA OF LEFT LUNG: ICD-10-CM

## 2025-03-31 PROCEDURE — 71260 CT THORAX DX C+: CPT | Mod: TC,PN

## 2025-03-31 PROCEDURE — 25500020 PHARM REV CODE 255: Mod: PN | Performed by: SPECIALIST

## 2025-03-31 PROCEDURE — 71260 CT THORAX DX C+: CPT | Mod: 26,,, | Performed by: RADIOLOGY

## 2025-03-31 RX ADMIN — IOHEXOL 75 ML: 350 INJECTION, SOLUTION INTRAVENOUS at 08:03

## 2025-04-03 ENCOUNTER — OFFICE VISIT (OUTPATIENT)
Dept: RADIATION ONCOLOGY | Facility: CLINIC | Age: 70
End: 2025-04-03
Payer: MEDICARE

## 2025-04-03 VITALS
RESPIRATION RATE: 19 BRPM | TEMPERATURE: 98 F | HEART RATE: 75 BPM | DIASTOLIC BLOOD PRESSURE: 70 MMHG | OXYGEN SATURATION: 97 % | SYSTOLIC BLOOD PRESSURE: 118 MMHG | WEIGHT: 166.25 LBS | HEIGHT: 69 IN | BODY MASS INDEX: 24.62 KG/M2

## 2025-04-03 DIAGNOSIS — C34.92 SQUAMOUS CELL CARCINOMA OF LEFT LUNG: Primary | ICD-10-CM

## 2025-04-03 PROCEDURE — 99999 PR PBB SHADOW E&M-EST. PATIENT-LVL III: CPT | Mod: PBBFAC,,, | Performed by: SPECIALIST

## 2025-04-03 NOTE — PROGRESS NOTES
[facility] / [facility] - Radiation Oncology Follow Up Note    HISTORY OF PRESENT ILLNESS: 68-year-old man previously treated with radiation and hormone for prostate cancer and not seen in this clinic in over 2 years, now off endocrine therapy with testosterone recovery and persistent unmeasurable PSA through 07/08/2024, at less than 0.01.        He  presented to his primary care physician complaining of cough and fever with chest x-ray on 09/09/2024 benign.  Chest x-ray on 09/13/2024 showed changes consistent with multifocal bilateral interstitial pneumonia.  Chest x-ray on 09/24/2024 suspicious for left upper lobe collapse with persistent consolidations and atelectasis on the left.  Additionally, an opaque mass at the level of the midthoracic spine.  CT was recommended      Chest x-ray on 09/27/2024 shows near-complete collapse of the left lung with a large left pneumothorax.  The right lung is clear     CT of the chest with contrast on 09/27/2024, which I have reviewed today, shows a spiculated left perihilar mass measuring 3.7 x 2.5 x 3.6 cm concerning for neoplasm.  Nonspecific mildly prominent mediastinal and left hilar lymph nodes.  Consolidations of the left upper lobe and posteromedial right lower lobe are concerning for pneumonia.  Emphysematous changes with bulla are noted in the left upper lobe nonspecific sclerotic foci at T12 and L1 vertebral bodies are nonspecific.  Small left and trace right pleural effusion.  Trace left apical pneumothorax with chest tube in place          CT-guided biopsy on 10/03/2024 recovered non-small-cell carcinoma favoring squamous cell, PD-L1 IHC and NextGen sequencing through [facility] are pending        Brain MRI yesterday was benign     PET scan today, which I have reviewed, shows the left perihilar biopsy-proven mass to measure 5 cm with mildly FDG avid AP window nodes, SUV up to 3.5          He presented to me with overwhelming and ongoing  improvement in his cough and shortness of breath.  He denies direct symptoms related to his biopsy-proven tumor including pain, hemoptysis, or weight loss.  He has no palpable adenopathy.  He has no headache, nausea, vomiting, vision changes, mental status changes, other pain, abdominal bloating distention or pain, or GI or  complaints.     He was treated to his left sided lesion to 60 Gy in 5 fractions from 11/11/2024 to 11/15/2024         12/26/2024:  He returns for routine one-month follow up.  He had tolerated therapy well, and today reports persistent freedom from treatment-related complaints or complaints worrisome for local regional or metastatic progression.  He has returned to work part-time without limitation     PFTs and 6 minute walk test with Dr. Jimenez on 12/17/2024 showed 80% of predicted walk distance with excellent cardiovascular response to exercise and no significant oxygen desaturations.  I can not find the PFT results in the chart     CT of the chest on 12/17/2024, which I have reviewed today, shows persistence of the left hilar mass, with modestly interval response, without new findings      INTERVAL HISTORY:  He returns for routine 4 month follow up.  He has modest worsened dyspnea on exertion not interfering with activities of daily living, otherwise no new complaints related to therapy or worrisome for local regional or metastatic recurrence    He underwent restaging contrasted CT of the chest on 03/31/2025 which I have reviewed and compared to his prior scans.  It shows further reduction in the size of his left hilar mass, now 2.7 x 2.3 cm, previously 3.5 x 3.1 cm, with a new small nodular small tissue density in the left lower lobe, 1.2 x 0.9 cm possibly representing atelectasis or scarring, PET-CT versus attention on follow up.      PHYSICAL EXAMINATION:  Vitals:    04/03/25 1029   BP: 118/70   Pulse: 75   Resp: 19   Temp: 97.7 °F (36.5 °C)   TempSrc: Temporal   SpO2: 97%   Weight:  "75.4 kg (166 lb 3.6 oz)   Height: 5' 9" (1.753 m)      General:  A&O x4, NAD  HEENT:  PEERLA, CN II-XII intact, EOM intact,   Lymphatics:  no cervical/sclav LAD  Lungs:  CTAB  Heart:  RRR  Abdomen:  NTND +BS, no HSM      ASSESSMENT:  Modest subjective interval increase in OH with further radiographic response in the treated lesion with a new small left lower lobe density of uncertain significance      PLAN:  Follow up in 4 months with a contrasted CT of the chest      "

## 2025-05-04 ENCOUNTER — PATIENT MESSAGE (OUTPATIENT)
Dept: PULMONOLOGY | Facility: CLINIC | Age: 70
End: 2025-05-04
Payer: MEDICARE

## 2025-05-06 DIAGNOSIS — J43.2 CENTRILOBULAR EMPHYSEMA: Primary | ICD-10-CM

## 2025-05-06 RX ORDER — FLUTICASONE FUROATE, UMECLIDINIUM BROMIDE AND VILANTEROL TRIFENATATE 200; 62.5; 25 UG/1; UG/1; UG/1
1 POWDER RESPIRATORY (INHALATION) DAILY
Qty: 60 EACH | Refills: 6 | Status: SHIPPED | OUTPATIENT
Start: 2025-05-06

## 2025-06-17 ENCOUNTER — OFFICE VISIT (OUTPATIENT)
Dept: PULMONOLOGY | Facility: CLINIC | Age: 70
End: 2025-06-17
Payer: MEDICARE

## 2025-06-17 VITALS
SYSTOLIC BLOOD PRESSURE: 118 MMHG | DIASTOLIC BLOOD PRESSURE: 68 MMHG | OXYGEN SATURATION: 98 % | BODY MASS INDEX: 25.7 KG/M2 | WEIGHT: 173.5 LBS | HEIGHT: 69 IN | RESPIRATION RATE: 15 BRPM | HEART RATE: 84 BPM

## 2025-06-17 DIAGNOSIS — C34.92 SQUAMOUS CELL CARCINOMA OF LEFT LUNG: ICD-10-CM

## 2025-06-17 DIAGNOSIS — J43.2 CENTRILOBULAR EMPHYSEMA: Primary | ICD-10-CM

## 2025-06-17 PROCEDURE — 3074F SYST BP LT 130 MM HG: CPT | Mod: CPTII,S$GLB,, | Performed by: INTERNAL MEDICINE

## 2025-06-17 PROCEDURE — 1160F RVW MEDS BY RX/DR IN RCRD: CPT | Mod: CPTII,S$GLB,, | Performed by: INTERNAL MEDICINE

## 2025-06-17 PROCEDURE — 99213 OFFICE O/P EST LOW 20 MIN: CPT | Mod: S$GLB,,, | Performed by: INTERNAL MEDICINE

## 2025-06-17 PROCEDURE — 1101F PT FALLS ASSESS-DOCD LE1/YR: CPT | Mod: CPTII,S$GLB,, | Performed by: INTERNAL MEDICINE

## 2025-06-17 PROCEDURE — 4010F ACE/ARB THERAPY RXD/TAKEN: CPT | Mod: CPTII,S$GLB,, | Performed by: INTERNAL MEDICINE

## 2025-06-17 PROCEDURE — 3288F FALL RISK ASSESSMENT DOCD: CPT | Mod: CPTII,S$GLB,, | Performed by: INTERNAL MEDICINE

## 2025-06-17 PROCEDURE — 99999 PR PBB SHADOW E&M-EST. PATIENT-LVL III: CPT | Mod: PBBFAC,,, | Performed by: INTERNAL MEDICINE

## 2025-06-17 PROCEDURE — 1159F MED LIST DOCD IN RCRD: CPT | Mod: CPTII,S$GLB,, | Performed by: INTERNAL MEDICINE

## 2025-06-17 PROCEDURE — 3078F DIAST BP <80 MM HG: CPT | Mod: CPTII,S$GLB,, | Performed by: INTERNAL MEDICINE

## 2025-06-17 PROCEDURE — 1126F AMNT PAIN NOTED NONE PRSNT: CPT | Mod: CPTII,S$GLB,, | Performed by: INTERNAL MEDICINE

## 2025-06-17 PROCEDURE — 3008F BODY MASS INDEX DOCD: CPT | Mod: CPTII,S$GLB,, | Performed by: INTERNAL MEDICINE

## 2025-06-17 RX ORDER — ALBUTEROL SULFATE 90 UG/1
2 INHALANT RESPIRATORY (INHALATION) EVERY 6 HOURS PRN
Qty: 18 G | Refills: 3 | Status: SHIPPED | OUTPATIENT
Start: 2025-06-17

## 2025-06-17 NOTE — PROGRESS NOTES
Subjective:      Patient ID: Aurelio Daniels is a 69 y.o. male.    Chief Complaint: 6 mnth f/u      HPI  Sept 2024  68-year-old male long-time smoker here for evaluation of dyspnea on exertion. Patient states it has been getting steadily worse over time. Had PFTs done here March of last year which showed moderately severe obstruction with hyperinflation. No imaging done here. States that he was given prescription for albuterol and a long-acting inhaler but never filled the long-acting inhaler. Has been using albuterol only with no benefit. States that when he gets short of breath he just stops and rests and it takes him a few minutes to recover. No complaints of cough, anginal chest pain, productive sputum or fevers or chills. He is here today with his wife.      October 2024  In the interim was admitted to hospital with spontaneous Ptx  Also had lung mass which was biopsied + for SCCA  Ptx resolved  Saw oncology and had PET imaging with low level mediastinal LN uptake and non-avid ipsilateral pleural effusion  Mainly complains of some tightness and numbness to left chest     December 2024  Here for follow up with the above  In the interim I did perform bronchoscopy with lymph node biopsy in thoracentesis both of which were negative for malignancy  Is being treated with external beam SBRT  Tolerating well so far  PFTs and 6 minute walk test done today for review  CT chest done today for review    June 2025  Here for follow up of the above  Recent visit with Radiation Oncology and imaging showed continued improvement in the size of his tumor  Stable dyspnea on exertion  Does complain of some intermittent productive cough  No fevers or chills    Review of Systems as per history of present illness otherwise negative  Objective:     Physical Exam   Constitutional: He is oriented to person, place, and time. He appears well-developed. No distress.   HENT:   Head: Normocephalic.   Cardiovascular: Normal rate and  "regular rhythm.   Pulmonary/Chest: Normal expansion, symmetric chest wall expansion, effort normal and breath sounds normal.   Musculoskeletal:      Cervical back: Neck supple.   Neurological: He is alert and oriented to person, place, and time.   Psychiatric: He has a normal mood and affect.   Nursing note and vitals reviewed.            6/17/2025    10:58 AM 4/3/2025    10:29 AM 3/25/2025     7:40 AM 2/19/2025     1:13 PM 12/26/2024     9:42 AM 12/17/2024    11:56 AM 12/17/2024    11:26 AM   Pulmonary Function Tests   SpO2 98 % 97 %  97 % 99 % 98 %    Ordering Provider          Performing nurse/tech/RT       JOS Wynn, RRT   Diagnosis       --   Height 5' 9" (1.753 m) 5' 9" (1.753 m) 5' 9" (1.753 m) 5' 9" (1.753 m) 5' 9" (1.753 m) 5' 9" (1.753 m) 5' 9" (1.753 m)   Weight 78.7 kg (173 lb 8 oz) 75.4 kg (166 lb 3.6 oz) 76 kg (167 lb 8.8 oz) 76.4 kg (168 lb 6.9 oz) 74.4 kg (164 lb 0.4 oz) 73.3 kg (161 lb 8 oz) 73.4 kg (161 lb 13.1 oz)   BMI (Calculated) 25.6 24.5 24.7 24.9 24.2 23.8 23.9   6MWT Status       completed without stopping   Patient Reported       No complaints   Was O2 used?       No   6MW Distance walked (feet)       1400 feet   Distance walked (meters)       426.72 meters   Did patient stop?       No   Type of assistive device(s) used?       no assistive devices   Oxygen Saturation       96 %   Supplemental Oxygen       Room Air   Heart Rate       77 bpm   Blood Pressure       130/73   Elizabeth Dyspnea Rating        nothing at all   Oxygen Saturation       95 %   Supplemental Oxygen       Room Air   Heart Rate       110 bpm   Blood Pressure       146/91   [place] Dyspnea Rating        nothing at all   Recovery Time (seconds)       240 seconds   Oxygen Saturation       96 %   Supplemental Oxygen       Room Air   Heart Rate       83 bpm   Blood Pressure       129/83   [place] Dyspnea Rating        nothing at all   Is procedure ready for interpretation?       Yes   Oxygen Qualification?       No      "   Assessment:     1. Centrilobular emphysema    2. Squamous cell carcinoma of left lung        Plan:     Stable COPD  Continue Trelegy once daily  Sent prescription for as needed Ventolin inhaler to the pharmacy  Discussed role of weight loss and exercise and improving his overall cardiovascular status  Continue to follow up with Hematology Oncology and Radiation Oncology  Return to see me in 6 months, sooner if necessary  I personally reviewed the above with the patient and/or family including test and radiology results. They voiced understanding and agreement with the above. Questions were answered to their apparent satisfaction.

## 2025-08-21 ENCOUNTER — LAB VISIT (OUTPATIENT)
Dept: LAB | Facility: HOSPITAL | Age: 70
End: 2025-08-21
Attending: NURSE PRACTITIONER
Payer: MEDICARE

## 2025-08-21 DIAGNOSIS — D53.9 MACROCYTIC ANEMIA: ICD-10-CM

## 2025-08-21 DIAGNOSIS — C34.92 SQUAMOUS CELL CARCINOMA OF LEFT LUNG: ICD-10-CM

## 2025-08-21 DIAGNOSIS — I10 PRIMARY HYPERTENSION: ICD-10-CM

## 2025-08-21 LAB
ABSOLUTE EOSINOPHIL (OHS): 0.2 K/UL
ABSOLUTE MONOCYTE (OHS): 0.47 K/UL (ref 0.3–1)
ABSOLUTE NEUTROPHIL COUNT (OHS): 4.51 K/UL (ref 1.8–7.7)
ALBUMIN SERPL BCP-MCNC: 3.9 G/DL (ref 3.5–5.2)
ALP SERPL-CCNC: 91 UNIT/L (ref 40–150)
ALT SERPL W/O P-5'-P-CCNC: 54 UNIT/L (ref 0–55)
ANION GAP (OHS): 7 MMOL/L (ref 8–16)
AST SERPL-CCNC: 32 UNIT/L (ref 0–50)
BASOPHILS # BLD AUTO: 0.07 K/UL
BASOPHILS NFR BLD AUTO: 1 %
BILIRUB SERPL-MCNC: 0.6 MG/DL (ref 0.1–1)
BUN SERPL-MCNC: 18 MG/DL (ref 8–23)
CALCIUM SERPL-MCNC: 8.9 MG/DL (ref 8.7–10.5)
CHLORIDE SERPL-SCNC: 111 MMOL/L (ref 95–110)
CHOLEST SERPL-MCNC: 194 MG/DL (ref 120–199)
CHOLEST/HDLC SERPL: 4.2 {RATIO} (ref 2–5)
CO2 SERPL-SCNC: 24 MMOL/L (ref 23–29)
CREAT SERPL-MCNC: 0.9 MG/DL (ref 0.5–1.4)
ERYTHROCYTE [DISTWIDTH] IN BLOOD BY AUTOMATED COUNT: 12.4 % (ref 11.5–14.5)
GFR SERPLBLD CREATININE-BSD FMLA CKD-EPI: >60 ML/MIN/1.73/M2
GLUCOSE SERPL-MCNC: 102 MG/DL (ref 70–110)
HCT VFR BLD AUTO: 41.2 % (ref 40–54)
HDLC SERPL-MCNC: 46 MG/DL (ref 40–75)
HDLC SERPL: 23.7 % (ref 20–50)
HGB BLD-MCNC: 13.7 GM/DL (ref 14–18)
IMM GRANULOCYTES # BLD AUTO: 0.06 K/UL (ref 0–0.04)
IMM GRANULOCYTES NFR BLD AUTO: 0.8 % (ref 0–0.5)
LDLC SERPL CALC-MCNC: 122 MG/DL (ref 63–159)
LYMPHOCYTES # BLD AUTO: 1.98 K/UL (ref 1–4.8)
MCH RBC QN AUTO: 32.2 PG (ref 27–31)
MCHC RBC AUTO-ENTMCNC: 33.3 G/DL (ref 32–36)
MCV RBC AUTO: 97 FL (ref 82–98)
NONHDLC SERPL-MCNC: 148 MG/DL
NUCLEATED RBC (/100WBC) (OHS): 0 /100 WBC
PLATELET # BLD AUTO: 282 K/UL (ref 150–450)
PMV BLD AUTO: 9.1 FL (ref 9.2–12.9)
POTASSIUM SERPL-SCNC: 4.2 MMOL/L (ref 3.5–5.1)
PROT SERPL-MCNC: 6.6 GM/DL (ref 6–8.4)
RBC # BLD AUTO: 4.26 M/UL (ref 4.6–6.2)
RELATIVE EOSINOPHIL (OHS): 2.7 %
RELATIVE LYMPHOCYTE (OHS): 27.2 % (ref 18–48)
RELATIVE MONOCYTE (OHS): 6.4 % (ref 4–15)
RELATIVE NEUTROPHIL (OHS): 61.9 % (ref 38–73)
SODIUM SERPL-SCNC: 142 MMOL/L (ref 136–145)
TRIGL SERPL-MCNC: 130 MG/DL (ref 30–150)
TSH SERPL-ACNC: 2.03 UIU/ML (ref 0.4–4)
WBC # BLD AUTO: 7.29 K/UL (ref 3.9–12.7)

## 2025-08-21 PROCEDURE — 80061 LIPID PANEL: CPT

## 2025-08-21 PROCEDURE — 84443 ASSAY THYROID STIM HORMONE: CPT

## 2025-08-21 PROCEDURE — 36415 COLL VENOUS BLD VENIPUNCTURE: CPT | Mod: PN

## 2025-08-21 PROCEDURE — 85025 COMPLETE CBC W/AUTO DIFF WBC: CPT

## 2025-08-21 PROCEDURE — 82565 ASSAY OF CREATININE: CPT

## 2025-08-26 ENCOUNTER — HOSPITAL ENCOUNTER (OUTPATIENT)
Dept: RADIOLOGY | Facility: HOSPITAL | Age: 70
Discharge: HOME | End: 2025-08-26
Attending: SPECIALIST
Payer: MEDICARE

## 2025-08-26 DIAGNOSIS — C34.92 SQUAMOUS CELL CARCINOMA OF LEFT LUNG: ICD-10-CM

## 2025-08-26 PROCEDURE — 71260 CT THORAX DX C+: CPT | Mod: TC,PN

## 2025-08-26 PROCEDURE — 25500020 PHARM REV CODE 255: Mod: PN | Performed by: SPECIALIST

## 2025-08-26 PROCEDURE — 71260 CT THORAX DX C+: CPT | Mod: 26,,, | Performed by: STUDENT IN AN ORGANIZED HEALTH CARE EDUCATION/TRAINING PROGRAM

## 2025-08-26 RX ADMIN — IOHEXOL 75 ML: 350 INJECTION, SOLUTION INTRAVENOUS at 09:08

## 2025-08-29 ENCOUNTER — OFFICE VISIT (OUTPATIENT)
Dept: RADIATION ONCOLOGY | Facility: CLINIC | Age: 70
End: 2025-08-29
Payer: MEDICARE

## 2025-08-29 VITALS
TEMPERATURE: 97 F | WEIGHT: 177.69 LBS | HEART RATE: 71 BPM | HEIGHT: 69 IN | BODY MASS INDEX: 26.32 KG/M2 | DIASTOLIC BLOOD PRESSURE: 95 MMHG | RESPIRATION RATE: 18 BRPM | SYSTOLIC BLOOD PRESSURE: 155 MMHG | OXYGEN SATURATION: 98 %

## 2025-08-29 DIAGNOSIS — R91.8 OTHER NONSPECIFIC ABNORMAL FINDING OF LUNG FIELD: ICD-10-CM

## 2025-08-29 DIAGNOSIS — C34.92 SQUAMOUS CELL CARCINOMA OF LEFT LUNG: Primary | ICD-10-CM

## 2025-08-29 PROCEDURE — 99999 PR PBB SHADOW E&M-EST. PATIENT-LVL III: CPT | Mod: PBBFAC,,, | Performed by: SPECIALIST

## 2025-09-03 ENCOUNTER — OFFICE VISIT (OUTPATIENT)
Dept: INTERNAL MEDICINE | Facility: CLINIC | Age: 70
End: 2025-09-03
Payer: MEDICARE

## 2025-09-03 VITALS
BODY MASS INDEX: 25.93 KG/M2 | WEIGHT: 175.06 LBS | TEMPERATURE: 97 F | HEIGHT: 69 IN | HEART RATE: 75 BPM | DIASTOLIC BLOOD PRESSURE: 84 MMHG | RESPIRATION RATE: 17 BRPM | SYSTOLIC BLOOD PRESSURE: 138 MMHG | OXYGEN SATURATION: 97 %

## 2025-09-03 DIAGNOSIS — I10 PRIMARY HYPERTENSION: Primary | ICD-10-CM

## 2025-09-03 DIAGNOSIS — D64.89 ANEMIA DUE TO OTHER CAUSE, NOT CLASSIFIED: ICD-10-CM

## 2025-09-03 DIAGNOSIS — C34.92 SQUAMOUS CELL CARCINOMA OF LEFT LUNG: ICD-10-CM

## 2025-09-03 DIAGNOSIS — Z85.46 HISTORY OF PROSTATE CANCER: ICD-10-CM

## 2025-09-03 DIAGNOSIS — E87.6 HYPOKALEMIA: ICD-10-CM

## 2025-09-03 DIAGNOSIS — J41.0 SIMPLE CHRONIC BRONCHITIS: ICD-10-CM

## 2025-09-03 DIAGNOSIS — K21.9 GASTROESOPHAGEAL REFLUX DISEASE, UNSPECIFIED WHETHER ESOPHAGITIS PRESENT: ICD-10-CM

## 2025-09-03 DIAGNOSIS — J43.2 CENTRILOBULAR EMPHYSEMA: ICD-10-CM

## 2025-09-03 PROCEDURE — 3008F BODY MASS INDEX DOCD: CPT | Mod: CPTII,S$GLB,, | Performed by: PEDIATRICS

## 2025-09-03 PROCEDURE — 3075F SYST BP GE 130 - 139MM HG: CPT | Mod: CPTII,S$GLB,, | Performed by: PEDIATRICS

## 2025-09-03 PROCEDURE — 4010F ACE/ARB THERAPY RXD/TAKEN: CPT | Mod: CPTII,S$GLB,, | Performed by: PEDIATRICS

## 2025-09-03 PROCEDURE — 1159F MED LIST DOCD IN RCRD: CPT | Mod: CPTII,S$GLB,, | Performed by: PEDIATRICS

## 2025-09-03 PROCEDURE — 99999 PR PBB SHADOW E&M-EST. PATIENT-LVL IV: CPT | Mod: PBBFAC,,, | Performed by: PEDIATRICS

## 2025-09-03 PROCEDURE — 1160F RVW MEDS BY RX/DR IN RCRD: CPT | Mod: CPTII,S$GLB,, | Performed by: PEDIATRICS

## 2025-09-03 PROCEDURE — 99214 OFFICE O/P EST MOD 30 MIN: CPT | Mod: S$GLB,,, | Performed by: PEDIATRICS

## 2025-09-03 PROCEDURE — 3288F FALL RISK ASSESSMENT DOCD: CPT | Mod: CPTII,S$GLB,, | Performed by: PEDIATRICS

## 2025-09-03 PROCEDURE — G2211 COMPLEX E/M VISIT ADD ON: HCPCS | Mod: S$GLB,,, | Performed by: PEDIATRICS

## 2025-09-03 PROCEDURE — 1101F PT FALLS ASSESS-DOCD LE1/YR: CPT | Mod: CPTII,S$GLB,, | Performed by: PEDIATRICS

## 2025-09-03 PROCEDURE — 1126F AMNT PAIN NOTED NONE PRSNT: CPT | Mod: CPTII,S$GLB,, | Performed by: PEDIATRICS

## 2025-09-03 PROCEDURE — 3079F DIAST BP 80-89 MM HG: CPT | Mod: CPTII,S$GLB,, | Performed by: PEDIATRICS

## 2025-09-03 RX ORDER — OMEPRAZOLE 40 MG/1
40 CAPSULE, DELAYED RELEASE ORAL DAILY
Qty: 90 CAPSULE | Refills: 3 | Status: SHIPPED | OUTPATIENT
Start: 2025-09-03 | End: 2026-09-03

## 2025-09-05 ENCOUNTER — HOSPITAL ENCOUNTER (OUTPATIENT)
Dept: RADIOLOGY | Facility: HOSPITAL | Age: 70
Discharge: HOME | End: 2025-09-05
Attending: SPECIALIST
Payer: MEDICARE

## 2025-09-05 ENCOUNTER — OFFICE VISIT (OUTPATIENT)
Dept: RADIATION ONCOLOGY | Facility: CLINIC | Age: 70
End: 2025-09-05
Payer: MEDICARE

## 2025-09-05 VITALS
WEIGHT: 173.75 LBS | RESPIRATION RATE: 20 BRPM | DIASTOLIC BLOOD PRESSURE: 95 MMHG | BODY MASS INDEX: 25.65 KG/M2 | SYSTOLIC BLOOD PRESSURE: 138 MMHG | TEMPERATURE: 98 F | OXYGEN SATURATION: 97 % | HEART RATE: 68 BPM

## 2025-09-05 DIAGNOSIS — R91.8 OTHER NONSPECIFIC ABNORMAL FINDING OF LUNG FIELD: ICD-10-CM

## 2025-09-05 DIAGNOSIS — C34.92 SQUAMOUS CELL CARCINOMA OF LEFT LUNG: Primary | ICD-10-CM

## 2025-09-05 DIAGNOSIS — C34.92 SQUAMOUS CELL CARCINOMA OF LEFT LUNG: ICD-10-CM

## 2025-09-05 PROCEDURE — 78815 PET IMAGE W/CT SKULL-THIGH: CPT | Mod: TC,PS

## 2025-09-05 PROCEDURE — A9552 F18 FDG: HCPCS | Performed by: SPECIALIST

## 2025-09-05 PROCEDURE — 99999 PR PBB SHADOW E&M-EST. PATIENT-LVL III: CPT | Mod: PBBFAC,,, | Performed by: SPECIALIST

## 2025-09-05 RX ORDER — FLUDEOXYGLUCOSE F18 500 MCI/ML
12.84 INJECTION INTRAVENOUS
Status: COMPLETED | OUTPATIENT
Start: 2025-09-05 | End: 2025-09-05

## 2025-09-05 RX ADMIN — FLUDEOXYGLUCOSE F-18 12.84 MILLICURIE: 500 INJECTION INTRAVENOUS at 12:09
